# Patient Record
Sex: MALE | Race: WHITE | NOT HISPANIC OR LATINO | Employment: OTHER | URBAN - METROPOLITAN AREA
[De-identification: names, ages, dates, MRNs, and addresses within clinical notes are randomized per-mention and may not be internally consistent; named-entity substitution may affect disease eponyms.]

---

## 2023-05-17 ENCOUNTER — TELEPHONE (OUTPATIENT)
Dept: NEUROLOGY | Facility: CLINIC | Age: 78
End: 2023-05-17
Payer: MEDICARE

## 2023-05-18 ENCOUNTER — OFFICE VISIT (OUTPATIENT)
Dept: NEUROLOGY | Facility: CLINIC | Age: 78
End: 2023-05-18
Payer: MEDICARE

## 2023-05-18 VITALS — DIASTOLIC BLOOD PRESSURE: 92 MMHG | HEART RATE: 71 BPM | SYSTOLIC BLOOD PRESSURE: 160 MMHG | OXYGEN SATURATION: 96 %

## 2023-05-18 DIAGNOSIS — G40.909 SEIZURE DISORDER: ICD-10-CM

## 2023-05-18 DIAGNOSIS — G61.81 CIDP (CHRONIC INFLAMMATORY DEMYELINATING POLYNEUROPATHY): Primary | ICD-10-CM

## 2023-05-18 RX ORDER — LEVETIRACETAM 1000 MG/1
1 TABLET ORAL EVERY 12 HOURS SCHEDULED
COMMUNITY
Start: 2023-05-16

## 2023-05-18 RX ORDER — DIPHENOXYLATE HYDROCHLORIDE AND ATROPINE SULFATE 2.5; .025 MG/1; MG/1
TABLET ORAL DAILY
COMMUNITY

## 2023-05-18 NOTE — PROGRESS NOTES
Notes by MA:  Patient has been referred to our office for CIPD. Patient presents today with their a friend, Chris and Lavern Carrera (POA) and has given consent to give health information to them.         Subjective:     Dear Winnie, thank you for referring Mr. Christianson for neurological consultation.  As you know, he is a very pleasant and intelligent 78-year-old gentleman sent for evaluation and management of CIDP.  This first came to light in 2020 where he had a thorough evaluation at the [facility], which I personally reviewed today..  His EMG/nerve conduction study at the time revealed an advanced demyelinating polyneuropathy.  He had appropriate lumbar puncture testing as well as laboratory testing and was eventually diagnosed with CIDP.  He was put in the hospital for a 5-day IVIG course.  He tolerated this very well and thinks there might have been a mild improvement afterwards but he did not follow-up any further with him citing the onset of the pandemic at that time.  He has not received specific treatment for this since then presents for further evaluation and potential management.  He is getting appropriate occupational and physical therapy and has appropriate support at home as well as a wheelchair and other supportive devices.  Patient ID: Efren Christianson is a 78 y.o. male.    History of Present Illness  The following portions of the patient's history were reviewed and updated as appropriate: allergies, current medications, past family history, past medical history, past social history, past surgical history and problem list.    Review of Systems   Constitutional: Positive for appetite change and fatigue. Negative for activity change.   HENT: Negative for facial swelling and trouble swallowing.    Eyes: Negative for photophobia, pain and visual disturbance.   Respiratory: Negative for chest tightness, shortness of breath and wheezing.    Cardiovascular: Positive for leg swelling  (left ankle). Negative for chest pain and palpitations.   Gastrointestinal: Negative for abdominal pain, nausea and vomiting.   Musculoskeletal: Positive for gait problem. Negative for arthralgias, back pain, joint swelling, myalgias, neck pain and neck stiffness.   Neurological: Positive for seizures, syncope and weakness (left leg). Negative for dizziness, tremors, facial asymmetry, speech difficulty, light-headedness, numbness and headaches.   Hematological: Does not bruise/bleed easily.   Psychiatric/Behavioral: Positive for agitation. Negative for behavioral problems, confusion, decreased concentration, dysphoric mood, hallucinations, self-injury, sleep disturbance and suicidal ideas. The patient is nervous/anxious. The patient is not hyperactive.         Objective:    Neurologic Exam  He is awake alert pleasant cooperative cognitively preserved and very pleasant.  Speech and language functions are normal.    Cranial nerves II through XII normal and symmetric.    Motor exam reveals diffuse atrophy of the intrinsic hand muscles bilaterally with both proximal and distal weakness of the arms, greater than legs.  However, there is weakness of hip flexion extension and distal foot weakness, worse in the left than on the right.  No spasticity or upper motor neuron findings.    Sensory exam reveals some distal decreased to light touch and temperature sensation.    Tendon reflexes are diffusely absent.    He is in a wheelchair today.  He cannot walk without assistance from ambulatory devices or physical help.  Physical Exam  He is thin.  Neck is supple.  No cervical bruits.  Chest is clear.  Cardiac rhythm is regular.  Reasonable distal pulses.  Assessment/Plan:     Diagnoses and all orders for this visit:    1. 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 Seizure disorder     78-year-old gentleman carrying the diagnosis of CIDP.  I reviewed his extensive work-up from the [facility]  [place] from 2020.  At that point he received 1 full round of IVIG with modest improvement but failed to follow-up as the COVID pandemic was just setting in.  He is now finally getting around to follow-up with neurology.  I reviewed his records with him.  We discussed the diagnosis and I answered his questions today.  He is interested in getting back to IVIG and we will do our best to try to make those arrangements and the most convenient possible form out for him, hopefully via home health given his mobility issues.    He also has a history of seizures and remains on Keppra at 1000 mg twice daily.  He has been seizure-free for some time and is interested in trying to come off of the medication.  We have both agreed to delay this decision until we get treatment for his CIDP underway and will address this again at our next visit.    65 minutes total patient care time today including record review, examination and discussion,  and documentation.

## 2023-05-22 ENCOUNTER — TELEPHONE (OUTPATIENT)
Dept: NEUROLOGY | Facility: CLINIC | Age: 78
End: 2023-05-22

## 2023-05-22 DIAGNOSIS — G61.81 CIDP (CHRONIC INFLAMMATORY DEMYELINATING POLYNEUROPATHY): Primary | ICD-10-CM

## 2023-05-22 NOTE — TELEPHONE ENCOUNTER
Caller: SCHUYLER     Relationship: CAREGIVER     Best call back number: [phone] CAN LEAVE DETAILED MESS IF GET V.M     What was the call regarding: PT CHANGED MIND AND WOULD LIKE  HIS INFUSION DONE AT HOSPITAL NOT HOME/PLEASE VERIFY  P.A FOR INS      Do you require a callback: YES     PLEASE ADVISE.

## 2023-05-23 ENCOUNTER — PATIENT ROUNDING (BHMG ONLY) (OUTPATIENT)
Dept: NEUROLOGY | Facility: CLINIC | Age: 78
End: 2023-05-23
Payer: MEDICARE

## 2023-05-23 ENCOUNTER — TELEPHONE (OUTPATIENT)
Dept: NEUROLOGY | Facility: CLINIC | Age: 78
End: 2023-05-23
Payer: MEDICARE

## 2023-05-23 NOTE — TELEPHONE ENCOUNTER
Spoke with Megan at [employer]. Per Megan, Case ID [id number] would also approve infusions at  [facility] is not site specific and is good for hosp infusions, as well. Dates approved 04/19/23 - 05/18/2024.

## 2023-06-02 ENCOUNTER — TELEPHONE (OUTPATIENT)
Dept: INFUSION THERAPY | Facility: HOSPITAL | Age: 78
End: 2023-06-02

## 2023-06-08 ENCOUNTER — HOSPITAL ENCOUNTER (OUTPATIENT)
Dept: INFUSION THERAPY | Facility: HOSPITAL | Age: 78
Discharge: HOME | End: 2023-06-08
Payer: MEDICARE

## 2023-06-12 ENCOUNTER — HOSPITAL ENCOUNTER (OUTPATIENT)
Dept: INFUSION THERAPY | Facility: HOSPITAL | Age: 78
Discharge: HOME | End: 2023-06-12
Admitting: PSYCHIATRY & NEUROLOGY
Payer: MEDICARE

## 2023-06-12 VITALS
TEMPERATURE: 97.6 F | DIASTOLIC BLOOD PRESSURE: 80 MMHG | OXYGEN SATURATION: 97 % | HEART RATE: 62 BPM | WEIGHT: 138.5 LBS | RESPIRATION RATE: 16 BRPM | SYSTOLIC BLOOD PRESSURE: 147 MMHG

## 2023-06-12 DIAGNOSIS — G61.81 CIDP WITH CNS OVERLAP (CHRONIC INFLAMMATORY DEMYELINATING POLYNEURITIS): ICD-10-CM

## 2023-06-12 DIAGNOSIS — G61.81 CIDP (CHRONIC INFLAMMATORY DEMYELINATING POLYNEUROPATHY): Primary | ICD-10-CM

## 2023-06-12 PROCEDURE — A9270 NON-COVERED ITEM OR SERVICE: HCPCS | Performed by: PSYCHIATRY & NEUROLOGY

## 2023-06-12 PROCEDURE — 63710000001 ACETAMINOPHEN 325 MG TABLET: Performed by: PSYCHIATRY & NEUROLOGY

## 2023-06-12 PROCEDURE — 63710000001 DIPHENHYDRAMINE PER 50 MG: Performed by: PSYCHIATRY & NEUROLOGY

## 2023-06-12 PROCEDURE — 96365 THER/PROPH/DIAG IV INF INIT: CPT

## 2023-06-12 PROCEDURE — 25010000002 IMMUNE GLOBULIN (HUMAN) 20 GM/200ML SOLUTION: Performed by: PSYCHIATRY & NEUROLOGY

## 2023-06-12 PROCEDURE — 96366 THER/PROPH/DIAG IV INF ADDON: CPT

## 2023-06-12 PROCEDURE — 25010000002 IMMUNE GLOBULIN (HUMAN) 5 GM/50ML SOLUTION: Performed by: PSYCHIATRY & NEUROLOGY

## 2023-06-12 RX ORDER — FERROUS SULFATE TAB EC 324 MG (65 MG FE EQUIVALENT) 324 (65 FE) MG
324 TABLET DELAYED RESPONSE ORAL
COMMUNITY

## 2023-06-12 RX ORDER — ACETAMINOPHEN 325 MG/1
650 TABLET ORAL ONCE
Status: CANCELLED | OUTPATIENT
Start: 2023-06-13

## 2023-06-12 RX ORDER — MULTIVIT WITH MINERALS/LUTEIN
250 TABLET ORAL DAILY
COMMUNITY

## 2023-06-12 RX ORDER — ACETAMINOPHEN 325 MG/1
650 TABLET ORAL ONCE
Status: COMPLETED | OUTPATIENT
Start: 2023-06-12 | End: 2023-06-12

## 2023-06-12 RX ORDER — SODIUM CHLORIDE 9 MG/ML
250 INJECTION, SOLUTION INTRAVENOUS ONCE
Status: CANCELLED | OUTPATIENT
Start: 2023-06-13

## 2023-06-12 RX ORDER — MEPERIDINE HYDROCHLORIDE 50 MG/ML
25 INJECTION INTRAMUSCULAR; INTRAVENOUS; SUBCUTANEOUS
Status: CANCELLED | OUTPATIENT
Start: 2023-06-13

## 2023-06-12 RX ORDER — FAMOTIDINE 10 MG/ML
20 INJECTION, SOLUTION INTRAVENOUS AS NEEDED
Status: CANCELLED | OUTPATIENT
Start: 2023-06-13

## 2023-06-12 RX ORDER — DIPHENHYDRAMINE HYDROCHLORIDE 50 MG/ML
25 INJECTION INTRAMUSCULAR; INTRAVENOUS AS NEEDED
Status: CANCELLED | OUTPATIENT
Start: 2023-06-13

## 2023-06-12 RX ORDER — DIPHENHYDRAMINE HCL 25 MG
25 CAPSULE ORAL ONCE
Status: CANCELLED | OUTPATIENT
Start: 2023-06-13

## 2023-06-12 RX ORDER — DIPHENHYDRAMINE HCL 25 MG
25 CAPSULE ORAL ONCE
Status: COMPLETED | OUTPATIENT
Start: 2023-06-12 | End: 2023-06-12

## 2023-06-12 RX ADMIN — ACETAMINOPHEN 650 MG: 325 TABLET ORAL at 10:44

## 2023-06-12 RX ADMIN — IMMUNE GLOBULIN INFUSION (HUMAN) 5 G: 100 INJECTION, SOLUTION INTRAVENOUS; SUBCUTANEOUS at 11:00

## 2023-06-12 RX ADMIN — IMMUNE GLOBULIN INFUSION (HUMAN) 20 G: 100 INJECTION, SOLUTION INTRAVENOUS; SUBCUTANEOUS at 11:36

## 2023-06-12 RX ADMIN — DIPHENHYDRAMINE HYDROCHLORIDE 25 MG: 25 CAPSULE ORAL at 10:44

## 2023-06-12 NOTE — NURSING NOTE
Patient arrived to [facility] at 1030.  History and medications reviewed with patient and caregiver.  Medication administered as ordered without complications.  AVS refused by patient.  Patient discharged in stable condition at 1334.  No complaints.

## 2023-06-13 ENCOUNTER — HOSPITAL ENCOUNTER (OUTPATIENT)
Dept: INFUSION THERAPY | Facility: HOSPITAL | Age: 78
Discharge: HOME | End: 2023-06-13
Admitting: PSYCHIATRY & NEUROLOGY
Payer: MEDICARE

## 2023-06-13 VITALS
TEMPERATURE: 98.1 F | OXYGEN SATURATION: 98 % | HEART RATE: 87 BPM | SYSTOLIC BLOOD PRESSURE: 167 MMHG | RESPIRATION RATE: 16 BRPM | DIASTOLIC BLOOD PRESSURE: 87 MMHG

## 2023-06-13 DIAGNOSIS — G61.81 CIDP (CHRONIC INFLAMMATORY DEMYELINATING POLYNEUROPATHY): Primary | ICD-10-CM

## 2023-06-13 DIAGNOSIS — G61.81 CIDP WITH CNS OVERLAP (CHRONIC INFLAMMATORY DEMYELINATING POLYNEURITIS): ICD-10-CM

## 2023-06-13 PROCEDURE — 63710000001 ACETAMINOPHEN 325 MG TABLET: Performed by: PSYCHIATRY & NEUROLOGY

## 2023-06-13 PROCEDURE — 25010000002 IMMUNE GLOBULIN (HUMAN) 20 GM/200ML SOLUTION: Performed by: PSYCHIATRY & NEUROLOGY

## 2023-06-13 PROCEDURE — A9270 NON-COVERED ITEM OR SERVICE: HCPCS | Performed by: PSYCHIATRY & NEUROLOGY

## 2023-06-13 PROCEDURE — 96365 THER/PROPH/DIAG IV INF INIT: CPT

## 2023-06-13 PROCEDURE — 25010000002 IMMUNE GLOBULIN (HUMAN) 5 GM/50ML SOLUTION: Performed by: PSYCHIATRY & NEUROLOGY

## 2023-06-13 PROCEDURE — 63710000001 DIPHENHYDRAMINE PER 50 MG: Performed by: PSYCHIATRY & NEUROLOGY

## 2023-06-13 PROCEDURE — 96366 THER/PROPH/DIAG IV INF ADDON: CPT

## 2023-06-13 RX ORDER — DIPHENHYDRAMINE HYDROCHLORIDE 50 MG/ML
25 INJECTION INTRAMUSCULAR; INTRAVENOUS AS NEEDED
Status: CANCELLED | OUTPATIENT
Start: 2023-06-14

## 2023-06-13 RX ORDER — ACETAMINOPHEN 325 MG/1
650 TABLET ORAL ONCE
Status: CANCELLED | OUTPATIENT
Start: 2023-06-14

## 2023-06-13 RX ORDER — DIPHENHYDRAMINE HCL 25 MG
25 CAPSULE ORAL ONCE
Status: CANCELLED | OUTPATIENT
Start: 2023-06-14

## 2023-06-13 RX ORDER — DIPHENHYDRAMINE HCL 25 MG
25 CAPSULE ORAL ONCE
Status: COMPLETED | OUTPATIENT
Start: 2023-06-13 | End: 2023-06-13

## 2023-06-13 RX ORDER — MEPERIDINE HYDROCHLORIDE 50 MG/ML
25 INJECTION INTRAMUSCULAR; INTRAVENOUS; SUBCUTANEOUS
Status: DISCONTINUED | OUTPATIENT
Start: 2023-06-13 | End: 2023-06-15 | Stop reason: HOSPADM

## 2023-06-13 RX ORDER — ACETAMINOPHEN 325 MG/1
650 TABLET ORAL ONCE
Status: COMPLETED | OUTPATIENT
Start: 2023-06-13 | End: 2023-06-13

## 2023-06-13 RX ORDER — DIPHENHYDRAMINE HYDROCHLORIDE 50 MG/ML
25 INJECTION INTRAMUSCULAR; INTRAVENOUS AS NEEDED
Status: DISCONTINUED | OUTPATIENT
Start: 2023-06-13 | End: 2023-06-15 | Stop reason: HOSPADM

## 2023-06-13 RX ORDER — SODIUM CHLORIDE 9 MG/ML
250 INJECTION, SOLUTION INTRAVENOUS ONCE
Status: CANCELLED | OUTPATIENT
Start: 2023-06-14

## 2023-06-13 RX ORDER — FAMOTIDINE 10 MG/ML
20 INJECTION, SOLUTION INTRAVENOUS AS NEEDED
Status: CANCELLED | OUTPATIENT
Start: 2023-06-14

## 2023-06-13 RX ORDER — MEPERIDINE HYDROCHLORIDE 50 MG/ML
25 INJECTION INTRAMUSCULAR; INTRAVENOUS; SUBCUTANEOUS
Status: CANCELLED | OUTPATIENT
Start: 2023-06-14

## 2023-06-13 RX ORDER — FAMOTIDINE 10 MG/ML
20 INJECTION, SOLUTION INTRAVENOUS AS NEEDED
Status: DISCONTINUED | OUTPATIENT
Start: 2023-06-13 | End: 2023-06-15 | Stop reason: HOSPADM

## 2023-06-13 RX ADMIN — ACETAMINOPHEN 650 MG: 325 TABLET ORAL at 10:05

## 2023-06-13 RX ADMIN — IMMUNE GLOBULIN INFUSION (HUMAN) 20 G: 100 INJECTION, SOLUTION INTRAVENOUS; SUBCUTANEOUS at 10:40

## 2023-06-13 RX ADMIN — IMMUNE GLOBULIN INFUSION (HUMAN) 5 G: 100 INJECTION, SOLUTION INTRAVENOUS; SUBCUTANEOUS at 10:07

## 2023-06-13 RX ADMIN — DIPHENHYDRAMINE HYDROCHLORIDE 25 MG: 25 CAPSULE ORAL at 10:05

## 2023-06-13 NOTE — NURSING NOTE
Pt arrived to [facility] for appt. VSS, no complaints at this time. Medication given per MD order. Pt tolerated infusion without complications. VSS post infusion. Pt discharged at 13:00 in stable condition, without complaints.

## 2023-06-13 NOTE — PATIENT INSTRUCTIONS
"  Call  Dr. Efraín Reyes @ [phone]  if you have any problems or concerns.    We know you have a Choice in healthcare and appreciate you using [facility].  Our purpose is to provide you \"Excellent Care\".  We hope that you will always choose us in the future and continue to recommend us to your family and friends.              "

## 2023-06-14 ENCOUNTER — HOSPITAL ENCOUNTER (OUTPATIENT)
Dept: INFUSION THERAPY | Facility: HOSPITAL | Age: 78
Discharge: HOME | End: 2023-06-14
Admitting: PSYCHIATRY & NEUROLOGY
Payer: MEDICARE

## 2023-06-14 VITALS
OXYGEN SATURATION: 98 % | SYSTOLIC BLOOD PRESSURE: 159 MMHG | HEART RATE: 68 BPM | DIASTOLIC BLOOD PRESSURE: 84 MMHG | TEMPERATURE: 98.7 F | RESPIRATION RATE: 18 BRPM

## 2023-06-14 DIAGNOSIS — G61.81 CIDP WITH CNS OVERLAP (CHRONIC INFLAMMATORY DEMYELINATING POLYNEURITIS): ICD-10-CM

## 2023-06-14 DIAGNOSIS — G61.81 CIDP (CHRONIC INFLAMMATORY DEMYELINATING POLYNEUROPATHY): Primary | ICD-10-CM

## 2023-06-14 PROCEDURE — A9270 NON-COVERED ITEM OR SERVICE: HCPCS | Performed by: PSYCHIATRY & NEUROLOGY

## 2023-06-14 PROCEDURE — 63710000001 ACETAMINOPHEN 325 MG TABLET: Performed by: PSYCHIATRY & NEUROLOGY

## 2023-06-14 PROCEDURE — 96366 THER/PROPH/DIAG IV INF ADDON: CPT

## 2023-06-14 PROCEDURE — 96365 THER/PROPH/DIAG IV INF INIT: CPT

## 2023-06-14 PROCEDURE — 25010000002 IMMUNE GLOBULIN (HUMAN) 20 GM/200ML SOLUTION: Performed by: PSYCHIATRY & NEUROLOGY

## 2023-06-14 PROCEDURE — 63710000001 DIPHENHYDRAMINE PER 50 MG: Performed by: PSYCHIATRY & NEUROLOGY

## 2023-06-14 PROCEDURE — 25010000002 IMMUNE GLOBULIN (HUMAN) 5 GM/50ML SOLUTION: Performed by: PSYCHIATRY & NEUROLOGY

## 2023-06-14 RX ORDER — SODIUM CHLORIDE 9 MG/ML
250 INJECTION, SOLUTION INTRAVENOUS ONCE
Status: CANCELLED | OUTPATIENT
Start: 2023-06-15

## 2023-06-14 RX ORDER — FAMOTIDINE 10 MG/ML
20 INJECTION, SOLUTION INTRAVENOUS AS NEEDED
Status: CANCELLED | OUTPATIENT
Start: 2023-06-15

## 2023-06-14 RX ORDER — DIPHENHYDRAMINE HCL 25 MG
25 CAPSULE ORAL ONCE
Status: COMPLETED | OUTPATIENT
Start: 2023-06-14 | End: 2023-06-14

## 2023-06-14 RX ORDER — ACETAMINOPHEN 325 MG/1
650 TABLET ORAL ONCE
Status: CANCELLED | OUTPATIENT
Start: 2023-06-15

## 2023-06-14 RX ORDER — DIPHENHYDRAMINE HYDROCHLORIDE 50 MG/ML
25 INJECTION INTRAMUSCULAR; INTRAVENOUS AS NEEDED
Status: CANCELLED | OUTPATIENT
Start: 2023-06-15

## 2023-06-14 RX ORDER — ACETAMINOPHEN 325 MG/1
650 TABLET ORAL ONCE
Status: COMPLETED | OUTPATIENT
Start: 2023-06-14 | End: 2023-06-14

## 2023-06-14 RX ORDER — DIPHENHYDRAMINE HCL 25 MG
25 CAPSULE ORAL ONCE
Status: CANCELLED | OUTPATIENT
Start: 2023-06-15

## 2023-06-14 RX ORDER — MEPERIDINE HYDROCHLORIDE 50 MG/ML
25 INJECTION INTRAMUSCULAR; INTRAVENOUS; SUBCUTANEOUS
Status: CANCELLED | OUTPATIENT
Start: 2023-06-15

## 2023-06-14 RX ADMIN — DIPHENHYDRAMINE HYDROCHLORIDE 25 MG: 25 CAPSULE ORAL at 09:48

## 2023-06-14 RX ADMIN — IMMUNE GLOBULIN INFUSION (HUMAN) 20 G: 100 INJECTION, SOLUTION INTRAVENOUS; SUBCUTANEOUS at 10:21

## 2023-06-14 RX ADMIN — IMMUNE GLOBULIN INFUSION (HUMAN) 5 G: 100 INJECTION, SOLUTION INTRAVENOUS; SUBCUTANEOUS at 09:58

## 2023-06-14 RX ADMIN — ACETAMINOPHEN 650 MG: 325 TABLET ORAL at 09:48

## 2023-06-14 NOTE — NURSING NOTE
Pt arrived for [facility] appt. VSS, no complaints at this time. Medication given per MD order. Pt tolerated well. VSS post infusion. Pt discharged at 12:50 PM in stable condition, without complaints.

## 2023-06-15 ENCOUNTER — HOSPITAL ENCOUNTER (OUTPATIENT)
Dept: INFUSION THERAPY | Facility: HOSPITAL | Age: 78
Discharge: HOME | End: 2023-06-15
Payer: MEDICARE

## 2023-06-15 VITALS
RESPIRATION RATE: 16 BRPM | OXYGEN SATURATION: 97 % | DIASTOLIC BLOOD PRESSURE: 82 MMHG | HEART RATE: 79 BPM | TEMPERATURE: 97.4 F | SYSTOLIC BLOOD PRESSURE: 145 MMHG

## 2023-06-15 DIAGNOSIS — G61.81 CIDP WITH CNS OVERLAP (CHRONIC INFLAMMATORY DEMYELINATING POLYNEURITIS): ICD-10-CM

## 2023-06-15 DIAGNOSIS — G61.81 CIDP (CHRONIC INFLAMMATORY DEMYELINATING POLYNEUROPATHY): Primary | ICD-10-CM

## 2023-06-15 PROCEDURE — 96366 THER/PROPH/DIAG IV INF ADDON: CPT

## 2023-06-15 PROCEDURE — 96365 THER/PROPH/DIAG IV INF INIT: CPT

## 2023-06-15 PROCEDURE — A9270 NON-COVERED ITEM OR SERVICE: HCPCS | Performed by: PSYCHIATRY & NEUROLOGY

## 2023-06-15 PROCEDURE — 63710000001 DIPHENHYDRAMINE PER 50 MG: Performed by: PSYCHIATRY & NEUROLOGY

## 2023-06-15 PROCEDURE — 25010000002 IMMUNE GLOBULIN (HUMAN) 5 GM/50ML SOLUTION: Performed by: PSYCHIATRY & NEUROLOGY

## 2023-06-15 PROCEDURE — 63710000001 ACETAMINOPHEN 325 MG TABLET: Performed by: PSYCHIATRY & NEUROLOGY

## 2023-06-15 PROCEDURE — 25010000002 IMMUNE GLOBULIN (HUMAN) 20 GM/200ML SOLUTION: Performed by: PSYCHIATRY & NEUROLOGY

## 2023-06-15 RX ORDER — ACETAMINOPHEN 325 MG/1
650 TABLET ORAL ONCE
Status: COMPLETED | OUTPATIENT
Start: 2023-06-15 | End: 2023-06-15

## 2023-06-15 RX ORDER — MEPERIDINE HYDROCHLORIDE 50 MG/ML
25 INJECTION INTRAMUSCULAR; INTRAVENOUS; SUBCUTANEOUS
OUTPATIENT
Start: 2023-06-16

## 2023-06-15 RX ORDER — DIPHENHYDRAMINE HCL 25 MG
25 CAPSULE ORAL ONCE
Status: COMPLETED | OUTPATIENT
Start: 2023-06-15 | End: 2023-06-15

## 2023-06-15 RX ORDER — ACETAMINOPHEN 325 MG/1
650 TABLET ORAL ONCE
Status: CANCELLED | OUTPATIENT
Start: 2023-06-16

## 2023-06-15 RX ORDER — FAMOTIDINE 10 MG/ML
20 INJECTION, SOLUTION INTRAVENOUS AS NEEDED
OUTPATIENT
Start: 2023-06-16

## 2023-06-15 RX ORDER — SODIUM CHLORIDE 9 MG/ML
250 INJECTION, SOLUTION INTRAVENOUS ONCE
Status: CANCELLED | OUTPATIENT
Start: 2023-06-16

## 2023-06-15 RX ORDER — DIPHENHYDRAMINE HCL 25 MG
25 CAPSULE ORAL ONCE
Status: CANCELLED | OUTPATIENT
Start: 2023-06-16

## 2023-06-15 RX ORDER — DIPHENHYDRAMINE HYDROCHLORIDE 50 MG/ML
25 INJECTION INTRAMUSCULAR; INTRAVENOUS AS NEEDED
OUTPATIENT
Start: 2023-06-16

## 2023-06-15 RX ADMIN — IMMUNE GLOBULIN INFUSION (HUMAN) 20 G: 100 INJECTION, SOLUTION INTRAVENOUS; SUBCUTANEOUS at 11:55

## 2023-06-15 RX ADMIN — DIPHENHYDRAMINE HYDROCHLORIDE 25 MG: 25 CAPSULE ORAL at 11:09

## 2023-06-15 RX ADMIN — ACETAMINOPHEN 650 MG: 325 TABLET ORAL at 11:09

## 2023-06-15 RX ADMIN — IMMUNE GLOBULIN INFUSION (HUMAN) 5 G: 100 INJECTION, SOLUTION INTRAVENOUS; SUBCUTANEOUS at 11:20

## 2023-06-15 NOTE — NURSING NOTE
NURSING PROGRESS NOTE:    PATIENT ARRIVE TO [facility] FOR SCHEDULED INFUSION AT 1040 .  PROCEDURE WAS PERFORMED WITHOUT INCIDENT. ESCORTED TO [facility] PER W/C AND   PATIENT DISCHARGED HOME AT 1340 WITH POA. . REED HOLGUIN

## 2023-06-16 ENCOUNTER — HOSPITAL ENCOUNTER (OUTPATIENT)
Dept: INFUSION THERAPY | Facility: HOSPITAL | Age: 78
Discharge: HOME | End: 2023-06-16
Payer: MEDICARE

## 2023-06-16 VITALS
TEMPERATURE: 98 F | RESPIRATION RATE: 16 BRPM | HEART RATE: 65 BPM | OXYGEN SATURATION: 98 % | SYSTOLIC BLOOD PRESSURE: 127 MMHG | DIASTOLIC BLOOD PRESSURE: 76 MMHG

## 2023-06-16 DIAGNOSIS — G61.81 CIDP (CHRONIC INFLAMMATORY DEMYELINATING POLYNEUROPATHY): Primary | ICD-10-CM

## 2023-06-16 DIAGNOSIS — G61.81 CIDP WITH CNS OVERLAP (CHRONIC INFLAMMATORY DEMYELINATING POLYNEURITIS): ICD-10-CM

## 2023-06-16 PROCEDURE — A9270 NON-COVERED ITEM OR SERVICE: HCPCS | Performed by: PSYCHIATRY & NEUROLOGY

## 2023-06-16 PROCEDURE — 63710000001 ACETAMINOPHEN 325 MG TABLET: Performed by: PSYCHIATRY & NEUROLOGY

## 2023-06-16 PROCEDURE — 63710000001 DIPHENHYDRAMINE PER 50 MG: Performed by: PSYCHIATRY & NEUROLOGY

## 2023-06-16 PROCEDURE — 96365 THER/PROPH/DIAG IV INF INIT: CPT

## 2023-06-16 PROCEDURE — 96366 THER/PROPH/DIAG IV INF ADDON: CPT

## 2023-06-16 PROCEDURE — 25010000002 IMMUNE GLOBULIN (HUMAN) 20 GM/200ML SOLUTION: Performed by: PSYCHIATRY & NEUROLOGY

## 2023-06-16 PROCEDURE — 25010000002 IMMUNE GLOBULIN (HUMAN) 5 GM/50ML SOLUTION: Performed by: PSYCHIATRY & NEUROLOGY

## 2023-06-16 RX ORDER — ACETAMINOPHEN 325 MG/1
650 TABLET ORAL ONCE
Status: COMPLETED | OUTPATIENT
Start: 2023-06-16 | End: 2023-06-16

## 2023-06-16 RX ORDER — FAMOTIDINE 10 MG/ML
20 INJECTION, SOLUTION INTRAVENOUS AS NEEDED
OUTPATIENT
Start: 2023-06-17

## 2023-06-16 RX ORDER — DIPHENHYDRAMINE HYDROCHLORIDE 50 MG/ML
25 INJECTION INTRAMUSCULAR; INTRAVENOUS AS NEEDED
OUTPATIENT
Start: 2023-06-17

## 2023-06-16 RX ORDER — DIPHENHYDRAMINE HCL 25 MG
25 CAPSULE ORAL ONCE
Status: COMPLETED | OUTPATIENT
Start: 2023-06-16 | End: 2023-06-16

## 2023-06-16 RX ORDER — SODIUM CHLORIDE 9 MG/ML
250 INJECTION, SOLUTION INTRAVENOUS ONCE
OUTPATIENT
Start: 2023-06-17

## 2023-06-16 RX ORDER — DIPHENHYDRAMINE HCL 25 MG
25 CAPSULE ORAL ONCE
Status: CANCELLED | OUTPATIENT
Start: 2023-06-17

## 2023-06-16 RX ORDER — SODIUM CHLORIDE 9 MG/ML
250 INJECTION, SOLUTION INTRAVENOUS ONCE
Status: DISCONTINUED | OUTPATIENT
Start: 2023-06-16 | End: 2023-06-18 | Stop reason: HOSPADM

## 2023-06-16 RX ORDER — MEPERIDINE HYDROCHLORIDE 50 MG/ML
25 INJECTION INTRAMUSCULAR; INTRAVENOUS; SUBCUTANEOUS
OUTPATIENT
Start: 2023-06-17

## 2023-06-16 RX ORDER — ACETAMINOPHEN 325 MG/1
650 TABLET ORAL ONCE
Status: CANCELLED | OUTPATIENT
Start: 2023-06-17

## 2023-06-16 RX ADMIN — IMMUNE GLOBULIN INFUSION (HUMAN) 20 G: 100 INJECTION, SOLUTION INTRAVENOUS; SUBCUTANEOUS at 10:43

## 2023-06-16 RX ADMIN — DIPHENHYDRAMINE HYDROCHLORIDE 25 MG: 25 CAPSULE ORAL at 09:44

## 2023-06-16 RX ADMIN — IMMUNE GLOBULIN INFUSION (HUMAN) 5 G: 100 INJECTION, SOLUTION INTRAVENOUS; SUBCUTANEOUS at 10:17

## 2023-06-16 RX ADMIN — ACETAMINOPHEN 650 MG: 325 TABLET ORAL at 09:44

## 2023-06-16 NOTE — PATIENT INSTRUCTIONS
"  Call  Dr. Efraín Reyes @ [phone]  if you have any problems or concerns.    We know you have a Choice in healthcare and appreciate you using [facility].  Our purpose is to provide you \"Excellent Care\".  We hope that you will always choose us in the future and continue to recommend us to your family and friends.              "

## 2023-07-17 PROBLEM — L72.3 SEBACEOUS CYST: Status: ACTIVE | Noted: 2023-07-17

## 2023-07-26 ENCOUNTER — ANESTHESIA EVENT (OUTPATIENT)
Dept: PERIOP | Facility: HOSPITAL | Age: 78
End: 2023-07-26
Payer: MEDICARE

## 2023-07-26 ENCOUNTER — PRE-ADMISSION TESTING (OUTPATIENT)
Dept: PREADMISSION TESTING | Facility: HOSPITAL | Age: 78
End: 2023-07-26
Payer: MEDICARE

## 2023-07-26 VITALS
RESPIRATION RATE: 16 BRPM | BODY MASS INDEX: 20.92 KG/M2 | DIASTOLIC BLOOD PRESSURE: 82 MMHG | HEART RATE: 73 BPM | OXYGEN SATURATION: 96 % | HEIGHT: 68 IN | SYSTOLIC BLOOD PRESSURE: 168 MMHG | WEIGHT: 138 LBS

## 2023-07-26 LAB
ANION GAP SERPL CALCULATED.3IONS-SCNC: 7.4 MMOL/L (ref 5–15)
BUN SERPL-MCNC: 23 MG/DL (ref 8–23)
BUN/CREAT SERPL: 20.4 (ref 7–25)
CALCIUM SPEC-SCNC: 8.7 MG/DL (ref 8.6–10.5)
CHLORIDE SERPL-SCNC: 103 MMOL/L (ref 98–107)
CO2 SERPL-SCNC: 27.6 MMOL/L (ref 22–29)
CREAT SERPL-MCNC: 1.13 MG/DL (ref 0.76–1.27)
DEPRECATED RDW RBC AUTO: 46.2 FL (ref 37–54)
EGFRCR SERPLBLD CKD-EPI 2021: 67.4 ML/MIN/1.73
ERYTHROCYTE [DISTWIDTH] IN BLOOD BY AUTOMATED COUNT: 12.4 % (ref 12.3–15.4)
GLUCOSE SERPL-MCNC: 76 MG/DL (ref 65–99)
HCT VFR BLD AUTO: 40.4 % (ref 37.5–51)
HGB BLD-MCNC: 13.6 G/DL (ref 13–17.7)
MCH RBC QN AUTO: 33.3 PG (ref 26.6–33)
MCHC RBC AUTO-ENTMCNC: 33.7 G/DL (ref 31.5–35.7)
MCV RBC AUTO: 98.8 FL (ref 79–97)
PLATELET # BLD AUTO: 161 10*3/MM3 (ref 140–450)
PMV BLD AUTO: 9.8 FL (ref 6–12)
POTASSIUM SERPL-SCNC: 4.4 MMOL/L (ref 3.5–5.2)
RBC # BLD AUTO: 4.09 10*6/MM3 (ref 4.14–5.8)
SODIUM SERPL-SCNC: 138 MMOL/L (ref 136–145)
WBC NRBC COR # BLD: 5.34 10*3/MM3 (ref 3.4–10.8)

## 2023-07-26 PROCEDURE — 80048 BASIC METABOLIC PNL TOTAL CA: CPT | Performed by: SURGERY

## 2023-07-26 PROCEDURE — 36415 COLL VENOUS BLD VENIPUNCTURE: CPT

## 2023-07-26 PROCEDURE — 85027 COMPLETE CBC AUTOMATED: CPT | Performed by: SURGERY

## 2023-07-26 NOTE — DISCHARGE INSTRUCTIONS
PRE-ADMISSION TESTING INSTRUCTIONS FOR ADULTS    Take these medications the morning of surgery with a small sip of water: Keppra       Do not take any insulin or diabetes medications the morning of surgery.      No aspirin, advil, aleve, ibuprofen, naproxen, diet pills, decongestants, or herbal/vitamins for a week prior to surgery.       Tylenol/Acetaminophen is okay to take if needed.    General Instructions:    DO NOT EAT SOLID FOOD AFTER MIDNIGHT THE NIGHT BEFORE SURGERY. No gum, mints, or hard candy after midnight the night before surgery.  You may drink clear liquids the day of surgery up until 2 hours before your arrival time.  Clear liquids are liquids you can see through. Nothing RED in color.    Plain water    Sports drinks      Gelatin (Jell-O)  Fruit juices without pulp such as white grape juice and apple juice  Popsicles that contain no fruit or yogurt  Tea or coffee (no cream or milk added)    It is beneficial for you to have a clear drink that contains carbohydrates 2 hours before your arrival time.  We suggest a 20 ounce bottle of Gatorade or Powerade for non-diabetic patients or a 20 ounce bottle of Gatorade Zero or Powerade Zero for diabetic patients.     Patients who avoid smoking, chewing tobacco and alcohol for 4 weeks prior to surgery have a reduced risk of post-operative complications.  If at all possible, quit smoking as many days before surgery as you can.    Do not smoke, use chewing tobacco or drink alcohol the day of surgery    Bring your C-PAP/ BI-PAP machine if you use one.  Wear clean comfortable clothes.  Do not wear contact lenses, lotion, deodorant, or make-up.  Bring a case for your glasses if applicable. You may brush your teeth the morning of surgery.  You may wear dentures/partials, do not put adhesive/glue on them.  Leave all other jewelry and valuables at home.      Preventing a Surgical Site Infection:    Shower the night before and on the morning of surgery using the  chlorhexidine soap you were given.  Use a clean washcloth with the soap.  Place clean sheets on your bed after showering the night before surgery. Do not use the CHG soap on your hair, face, or private areas. Wash your body gently for five (5) minutes. Do not scrub your skin.  Dry with a clean towel and dress in clean clothing.  Do not shave the surgical area for 10 days-2 weeks prior to surgery  because the razor can irritate skin and make it easier to develop an infection.  Make sure you, your family, and all healthcare providers clean their hands with soap and water or an alcohol based hand  before caring for you or your wound.      Day of surgery:    Your surgeon’s office will advise you of your arrival time for the day of surgery.    Upon arrival, a Pre-op nurse and Anesthesia provider will review your health history, obtain vital signs, and answer questions you may have. The anesthesia provider will also discuss the type of anesthesia that will be needed for your procedure, which may include general anesthesia. The only belongings needed at this time will be your home medications and if applicable your C-PAP/BI-PAP machine.  If you are staying overnight your family can leave the rest of your belongings in the car and bring them to your room later.  A Pre-op nurse will start an IV and you may receive medication in preparation for surgery, including something to help you relax.  Your family will be able to see you in the Pre-op area.  While you are in surgery your family should notify the waiting room  if they leave the waiting room area and provide a contact phone number.    IF you have any questions, you can call the Pre-Admission Department at [phone] or your surgeon's office.  Notify your surgeon if  you become sick, have a fever, productive cough, or cannot be here the day of surgery    Please be aware that surgery does come with discomfort.  We want to make every effort to  control your discomfort so please discuss any uncontrolled symptoms with your nurse.   Your doctor will most likely have prescribed pain medications.      If you are going home after surgery, you will receive individualized written care instructions before being discharged.  A responsible adult (over the age of 18) must drive you to and from the hospital on the day of your surgery and stay with you for 24 hours after anesthesia.    If you are staying overnight following surgery, you will be transported to your hospital room following the recovery period.  [facility] has all private rooms.    You may receive a survey regarding the care you received. Your feedback is very important and will be used to collect the necessary data to help us to continue to provide excellent care.     Deductibles and co-payments are collected on the day of service. Please be prepared to pay the required co-pay, deductible or deposit on the day of service as defined by your plan.

## 2023-07-26 NOTE — PAT
Pt here for PAT visit.  Pre-op tests completed, chg soap given, and instructions reviewed.  Instructed clears until 2 hrs prior to arrival time, voiced understanding. Pt sees Neurologist for CIDP - notified Anesthesia.

## 2023-08-03 ENCOUNTER — ANESTHESIA (OUTPATIENT)
Dept: PERIOP | Facility: HOSPITAL | Age: 78
End: 2023-08-03
Payer: MEDICARE

## 2023-08-03 ENCOUNTER — HOSPITAL ENCOUNTER (OUTPATIENT)
Facility: HOSPITAL | Age: 78
Setting detail: HOSPITAL OUTPATIENT SURGERY
Discharge: HOME | End: 2023-08-03
Attending: SURGERY | Admitting: SURGERY
Payer: MEDICARE

## 2023-08-03 VITALS
BODY MASS INDEX: 21.13 KG/M2 | DIASTOLIC BLOOD PRESSURE: 83 MMHG | HEART RATE: 65 BPM | OXYGEN SATURATION: 97 % | WEIGHT: 139 LBS | RESPIRATION RATE: 15 BRPM | TEMPERATURE: 97.9 F | SYSTOLIC BLOOD PRESSURE: 141 MMHG

## 2023-08-03 DIAGNOSIS — L72.3 SEBACEOUS CYST: ICD-10-CM

## 2023-08-03 PROCEDURE — 25010000002 PROPOFOL 200 MG/20ML EMULSION: Performed by: NURSE ANESTHETIST, CERTIFIED REGISTERED

## 2023-08-03 PROCEDURE — 11406 EXC TR-EXT B9+MARG >4.0 CM: CPT | Performed by: SURGERY

## 2023-08-03 PROCEDURE — 25010000002 FENTANYL CITRATE (PF) 50 MCG/ML SOLUTION: Performed by: NURSE ANESTHETIST, CERTIFIED REGISTERED

## 2023-08-03 PROCEDURE — 11404 EXC TR-EXT B9+MARG 3.1-4 CM: CPT | Performed by: SURGERY

## 2023-08-03 PROCEDURE — 25010000002 ONDANSETRON PER 1 MG: Performed by: ANESTHESIOLOGY

## 2023-08-03 PROCEDURE — 12034 INTMD RPR S/TR/EXT 7.6-12.5: CPT | Performed by: SURGERY

## 2023-08-03 PROCEDURE — 88304 TISSUE EXAM BY PATHOLOGIST: CPT | Performed by: SURGERY

## 2023-08-03 PROCEDURE — 25010000002 BUPIVACAINE (PF) 0.5 % SOLUTION 30 ML VIAL: Performed by: SURGERY

## 2023-08-03 PROCEDURE — 0 CEFAZOLIN SODIUM-DEXTROSE 2-3 GM-%(50ML) RECONSTITUTED SOLUTION: Performed by: SURGERY

## 2023-08-03 RX ORDER — CEFAZOLIN SODIUM 2 G/50ML
2000 SOLUTION INTRAVENOUS ONCE
Status: COMPLETED | OUTPATIENT
Start: 2023-08-03 | End: 2023-08-03

## 2023-08-03 RX ORDER — SODIUM CHLORIDE, SODIUM LACTATE, POTASSIUM CHLORIDE, CALCIUM CHLORIDE 600; 310; 30; 20 MG/100ML; MG/100ML; MG/100ML; MG/100ML
100 INJECTION, SOLUTION INTRAVENOUS CONTINUOUS
Status: DISCONTINUED | OUTPATIENT
Start: 2023-08-03 | End: 2023-08-03 | Stop reason: HOSPADM

## 2023-08-03 RX ORDER — SODIUM CHLORIDE 9 MG/ML
40 INJECTION, SOLUTION INTRAVENOUS AS NEEDED
Status: DISCONTINUED | OUTPATIENT
Start: 2023-08-03 | End: 2023-08-03 | Stop reason: HOSPADM

## 2023-08-03 RX ORDER — SODIUM CHLORIDE, SODIUM LACTATE, POTASSIUM CHLORIDE, CALCIUM CHLORIDE 600; 310; 30; 20 MG/100ML; MG/100ML; MG/100ML; MG/100ML
9 INJECTION, SOLUTION INTRAVENOUS CONTINUOUS
Status: DISCONTINUED | OUTPATIENT
Start: 2023-08-03 | End: 2023-08-03 | Stop reason: HOSPADM

## 2023-08-03 RX ORDER — FENTANYL CITRATE 50 UG/ML
INJECTION, SOLUTION INTRAMUSCULAR; INTRAVENOUS AS NEEDED
Status: DISCONTINUED | OUTPATIENT
Start: 2023-08-03 | End: 2023-08-03 | Stop reason: SURG

## 2023-08-03 RX ORDER — ONDANSETRON 2 MG/ML
4 INJECTION INTRAMUSCULAR; INTRAVENOUS ONCE AS NEEDED
Status: DISCONTINUED | OUTPATIENT
Start: 2023-08-03 | End: 2023-08-03 | Stop reason: HOSPADM

## 2023-08-03 RX ORDER — ONDANSETRON 2 MG/ML
4 INJECTION INTRAMUSCULAR; INTRAVENOUS ONCE AS NEEDED
Status: COMPLETED | OUTPATIENT
Start: 2023-08-03 | End: 2023-08-03

## 2023-08-03 RX ORDER — PROPOFOL 10 MG/ML
INJECTION, EMULSION INTRAVENOUS AS NEEDED
Status: DISCONTINUED | OUTPATIENT
Start: 2023-08-03 | End: 2023-08-03 | Stop reason: SURG

## 2023-08-03 RX ORDER — SODIUM CHLORIDE 0.9 % (FLUSH) 0.9 %
10 SYRINGE (ML) INJECTION EVERY 12 HOURS SCHEDULED
Status: DISCONTINUED | OUTPATIENT
Start: 2023-08-03 | End: 2023-08-03 | Stop reason: HOSPADM

## 2023-08-03 RX ORDER — FAMOTIDINE 10 MG/ML
20 INJECTION, SOLUTION INTRAVENOUS
Status: COMPLETED | OUTPATIENT
Start: 2023-08-03 | End: 2023-08-03

## 2023-08-03 RX ORDER — SODIUM CHLORIDE 0.9 % (FLUSH) 0.9 %
10 SYRINGE (ML) INJECTION AS NEEDED
Status: DISCONTINUED | OUTPATIENT
Start: 2023-08-03 | End: 2023-08-03 | Stop reason: HOSPADM

## 2023-08-03 RX ORDER — LIDOCAINE HYDROCHLORIDE 20 MG/ML
INJECTION, SOLUTION INFILTRATION; PERINEURAL AS NEEDED
Status: DISCONTINUED | OUTPATIENT
Start: 2023-08-03 | End: 2023-08-03 | Stop reason: SURG

## 2023-08-03 RX ORDER — MAGNESIUM HYDROXIDE 1200 MG/15ML
LIQUID ORAL AS NEEDED
Status: DISCONTINUED | OUTPATIENT
Start: 2023-08-03 | End: 2023-08-03 | Stop reason: HOSPADM

## 2023-08-03 RX ADMIN — PROPOFOL 30 MG: 10 INJECTION, EMULSION INTRAVENOUS at 10:38

## 2023-08-03 RX ADMIN — PROPOFOL 20 MG: 10 INJECTION, EMULSION INTRAVENOUS at 10:37

## 2023-08-03 RX ADMIN — PROPOFOL 100 MG: 10 INJECTION, EMULSION INTRAVENOUS at 11:01

## 2023-08-03 RX ADMIN — CEFAZOLIN SODIUM 2000 MG: 2 SOLUTION INTRAVENOUS at 10:35

## 2023-08-03 RX ADMIN — PROPOFOL 50 MG: 10 INJECTION, EMULSION INTRAVENOUS at 10:50

## 2023-08-03 RX ADMIN — FENTANYL CITRATE 25 MCG: 50 INJECTION, SOLUTION INTRAMUSCULAR; INTRAVENOUS at 10:36

## 2023-08-03 RX ADMIN — PROPOFOL 50 MG: 10 INJECTION, EMULSION INTRAVENOUS at 11:09

## 2023-08-03 RX ADMIN — FENTANYL CITRATE 25 MCG: 50 INJECTION, SOLUTION INTRAMUSCULAR; INTRAVENOUS at 10:35

## 2023-08-03 RX ADMIN — LIDOCAINE HYDROCHLORIDE 50 MG: 20 INJECTION, SOLUTION INFILTRATION; PERINEURAL at 10:37

## 2023-08-03 RX ADMIN — PROPOFOL 100 MG: 10 INJECTION, EMULSION INTRAVENOUS at 11:25

## 2023-08-03 RX ADMIN — PROPOFOL 50 MG: 10 INJECTION, EMULSION INTRAVENOUS at 10:47

## 2023-08-03 RX ADMIN — PROPOFOL 50 MG: 10 INJECTION, EMULSION INTRAVENOUS at 10:43

## 2023-08-03 RX ADMIN — ONDANSETRON 4 MG: 2 INJECTION INTRAMUSCULAR; INTRAVENOUS at 10:13

## 2023-08-03 RX ADMIN — PROPOFOL 100 MG: 10 INJECTION, EMULSION INTRAVENOUS at 10:54

## 2023-08-03 RX ADMIN — SODIUM CHLORIDE, POTASSIUM CHLORIDE, SODIUM LACTATE AND CALCIUM CHLORIDE 9 ML/HR: 600; 310; 30; 20 INJECTION, SOLUTION INTRAVENOUS at 09:54

## 2023-08-03 RX ADMIN — PROPOFOL 50 MG: 10 INJECTION, EMULSION INTRAVENOUS at 11:03

## 2023-08-03 RX ADMIN — FAMOTIDINE 20 MG: 10 INJECTION INTRAVENOUS at 10:14

## 2023-08-03 NOTE — H&P
Efren Christianson 78 y.o. male presents @ the req of MUKESH Arnold for eval of   Chief Complaint   Patient presents with    Follow-up     PT PRESENTS TODAY TO DISCUSS CT AS WELL AS EXCISING LESIONS.             BAYLEE Schwartz is here following his CT scan.  His CT scan showed a greater than 7 cm lesion on his chest wall that was consistent with an epidermal inclusion cyst.  He also has a sebaceous cyst on his back that is still draining.  He is wheelchair-bound but his infusions are very much helping his neurologic issues.  He has no chest pain or shortness of breath.  He has no fevers or chills.  He has no other complaints.      Review of Systems   All other systems reviewed and are negative.          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        Social History     Tobacco Use    Smoking status: Never    Smokeless tobacco: Never   Substance Use Topics    Alcohol use: Not Currently    Drug use: Never             There is no immunization history on file for this patient.        Physical Exam  Constitutional:       Appearance: Normal appearance.   HENT:      Head: Normocephalic and atraumatic.   Cardiovascular:      Rate and Rhythm: Normal rate and regular rhythm.   Pulmonary:      Effort: Pulmonary effort is normal.      Breath sounds: Normal breath sounds.    Abdominal:      General: Bowel sounds are normal.      Palpations: Abdomen is soft.   Musculoskeletal:         General: Deformity present. No swelling.   Skin:     Comments: Greater than 7 cm epidermal cyst of the chest wall and 1 cm sebaceous cyst of the back   Neurological:      General: No focal deficit present.      Mental Status: He is alert and oriented to person, place, and time.   Psychiatric:         Mood and Affect: Mood normal.         Behavior: Behavior normal.       Debilities/Disabilities Identified: None    Emotional Behavior: Appropriate      There were no vitals taken for this visit.        Diagnoses and all orders for this visit:    1. Sebaceous cyst (Primary)    We discussed excising both of these cysts and surgery.  I discussed with Efren and his caregiver the benefits and risks which they appear to understand and are willing to proceed.    Thank you for allowing me to participate in the care of this interesting patient.

## 2023-08-03 NOTE — OP NOTE
GENERAL SURGERY :  Hazel  Efren CAVAZOS Slade  1945    Procedure Date: 08/03/23    Pre-op Diagnosis: Sebaceous cyst of the chest wall and back    Post-op Diagnosis: Sebaceous cyst of the chest wall and back    Procedure: Excision of sebaceous cyst of the back and sebaceous cyst of the chest wall    Surgeon: Hazel    Assistant: Ruth Ann Pascal CSA was responsible for performing the following activities: Retraction, Closing, and Placing Dressing and their skilled assistance was necessary for the success of this case.      Estimated Blood Loss:  5 ml    Complications: None    Specimen: 7.5 cm x 4 cm x 4 cm sebaceous cyst of the chest wall.  4 cm x 3 cm x 3 cm sebaceous cyst of the back    Findings: Very large sebaceous cyst of the chest wall and smaller sebaceous cyst of the back.    Clinical Note: Efren presented with a sebaceous cyst of the back that was leaking.  He actually had a quite large sebaceous cyst of his chest wall.  We discussed excising them both in surgery.  We discussed all the benefits risks and rationale for the procedure and he appeared to understand and was willing to proceed.    Procedure: Efren was taken the operative suite and placed in the left lateral decubitus position.  He was given MAC anesthesia with appropriate cardiopulmonary.  He was prepped and draped in usual sterile fashion.  After appropriate timeout was performed local was injected on the chest wall cyst.  Elliptical incision was made.  Using Bovie cautery the cyst was excised in entirety.  Copious irrigation was carried out.  The skin had to be undermined due to the large nature of the cyst.  Copious irrigation was carried out.  Hemostasis was perfected with Bovie cautery.  The wound was then closed in layered fashion using 2-0 Monocryl and 3-0 nylon suture.  Bacitracin ointment was placed on the wound and sterile dressings were applied.  Attention was taken to the back wound where local was injected.  An  elliptical incision was made around the wound and it was excised as entirety Bovie cautery.  Copious irrigation was carried out.  The wound was closed in layered fashion using 2-0 Monocryl and 3-0 nylon suture.  Bacitracin ointment and sterile dressings were applied.  Efren was then taken back to his room in stable postoperative condition having tolerated his procedure well.        Kianna Oliva DO  11:24 EDT

## 2023-08-04 LAB
LAB AP CASE REPORT: NORMAL
PATH REPORT.FINAL DX SPEC: NORMAL
PATH REPORT.GROSS SPEC: NORMAL

## 2023-08-21 ENCOUNTER — OFFICE VISIT (OUTPATIENT)
Dept: SURGERY | Facility: CLINIC | Age: 78
End: 2023-08-21
Payer: MEDICARE

## 2023-08-21 DIAGNOSIS — Z48.02 VISIT FOR SUTURE REMOVAL: ICD-10-CM

## 2023-08-21 DIAGNOSIS — Z98.890 STATUS POST EXCISIONAL BIOPSY: Primary | ICD-10-CM

## 2023-08-21 PROCEDURE — 1160F RVW MEDS BY RX/DR IN RCRD: CPT | Performed by: SURGERY

## 2023-08-21 PROCEDURE — 99212 OFFICE O/P EST SF 10 MIN: CPT | Performed by: SURGERY

## 2023-08-21 PROCEDURE — 1159F MED LIST DOCD IN RCRD: CPT | Performed by: SURGERY

## 2023-08-21 NOTE — LETTER
August 21, 2023       No Recipients    Patient: Efren Christianson   YOB: 1945   Date of Visit: 8/21/2023     Dear Winnie Murray:       Thank you for referring Efren Christianson to me for evaluation. Below are the relevant portions of my assessment and plan of care.    If you have questions, please do not hesitate to call me. I look forward to following Efren along with you.         Sincerely,        Kianna Oliva DO        CC:   No Recipients    Kianna Oliva DO  08/21/23 1125  Sign when Signing Visit  Efren Christianson 78 y.o. male presents for PO FU/suture removal.        HPI   Above noted and agree.  Efren is doing well.        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   Musculoskeletal:         General: Deformity present.   Skin:     Comments: Wounds healed.  Sutures removed.  There is some ecchymosis at the chest wall wound.   Neurological:      General: No focal deficit present.      Mental Status: He is alert and oriented to person, place, and time.   Psychiatric:         Mood and Affect: Mood normal.         Behavior: Behavior normal.           There were no vitals taken for this visit.        Diagnoses and all orders for this visit:    1. Status post excisional biopsy (Primary)    2. Visit for suture removal    Efren may return anytime as needed.    Thank you for allowing me to participate in the care of this interesting patient.

## 2023-08-21 NOTE — PROGRESS NOTES
Efren Christianson 78 y.o. male presents for PO FU/suture removal.        HPI   Above noted and agree.  Efren is doing well.        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   Musculoskeletal:         General: Deformity present.   Skin:     Comments: Wounds healed.  Sutures removed.  There is some ecchymosis at the chest wall wound.   Neurological:      General: No focal deficit present.      Mental Status: He is alert and oriented to person, place, and time.   Psychiatric:         Mood and Affect: Mood normal.         Behavior: Behavior normal.           There were no vitals taken for this visit.        Diagnoses and all orders for this visit:    1. Status post excisional biopsy (Primary)    2. Visit for suture removal    Efren may return anytime as needed.    Thank you for allowing me to participate in the care of this interesting patient.

## 2023-08-24 ENCOUNTER — OFFICE VISIT (OUTPATIENT)
Dept: NEUROLOGY | Facility: CLINIC | Age: 78
End: 2023-08-24
Payer: MEDICARE

## 2023-08-24 VITALS
OXYGEN SATURATION: 93 % | HEART RATE: 77 BPM | HEIGHT: 68 IN | SYSTOLIC BLOOD PRESSURE: 124 MMHG | DIASTOLIC BLOOD PRESSURE: 80 MMHG | BODY MASS INDEX: 21.14 KG/M2

## 2023-08-24 DIAGNOSIS — G61.81 CIDP (CHRONIC INFLAMMATORY DEMYELINATING POLYNEUROPATHY): ICD-10-CM

## 2023-08-24 DIAGNOSIS — R56.9 GENERALIZED CONVULSIVE SEIZURES: Primary | ICD-10-CM

## 2023-08-24 NOTE — PROGRESS NOTES
Notes by LPN:  Patient presents for follow up CIDP. Infusions worked great. Has had increased symptoms for about 6 weeks/          Subjective:     Patient ID: Efren Christianson is a 78 y.o. male.    History of Present Illness  The following portions of the patient's history were reviewed and updated as appropriate: allergies, current medications, past family history, past medical history, past social history, past surgical history, and problem list.    Review of Systems   Constitutional:  Negative for activity change, appetite change and fatigue.   HENT:  Negative for facial swelling, trouble swallowing and voice change.    Eyes:  Negative for photophobia, pain and visual disturbance.   Respiratory:  Negative for chest tightness, shortness of breath and wheezing.    Cardiovascular:  Negative for chest pain, palpitations and leg swelling.   Gastrointestinal:  Negative for abdominal pain, nausea and vomiting.   Endocrine: Negative for cold intolerance and heat intolerance.   Musculoskeletal:  Positive for gait problem. Negative for arthralgias, back pain, joint swelling, myalgias, neck pain and neck stiffness.   Neurological:  Negative for dizziness, tremors, seizures, syncope, facial asymmetry, speech difficulty, weakness, light-headedness, numbness and headaches.   Hematological:  Does not bruise/bleed easily.   Psychiatric/Behavioral:  Negative for agitation, behavioral problems, confusion, decreased concentration, dysphoric mood, hallucinations, self-injury, sleep disturbance and suicidal ideas. The patient is not nervous/anxious and is not hyperactive.       Objective:    Neurologic Exam  He is awake alert pleasant cooperative cognitively preserved and very pleasant.  Speech and language functions are normal.     Cranial nerves II through XII normal and symmetric.     Motor exam reveals diffuse atrophy of the intrinsic hand muscles bilaterally with both proximal and distal weakness of the arms, greater than legs.   However, there is weakness of hip flexion extension and distal foot weakness, worse in the left than on the right.  No spasticity or upper motor neuron findings.     Sensory exam reveals some distal decreased to light touch and temperature sensation.     Tendon reflexes are diffusely absent.     He is in a wheelchair today.  He cannot walk without assistance from ambulatory devices or physical help.  Physical Exam  He is thin.  Neck is supple.  No cervical bruits.  Chest is clear.  Cardiac rhythm is regular.  Reasonable distal pulses.  Physical Exam    Assessment/Plan:     Diagnoses and all orders for this visit:    1. Generalized convulsive seizures (Primary)  -     EEG (Hospital Performed); Future    2. CIDP (chronic inflammatory demyelinating polyneuropathy)     Apparently had 1 episode of loss of consciousness that was worrisome for a seizure.  This started him on Keppra and he is interested in coming off.  He has been seizure-free for some time.  We will arrange for follow-up EEG and if normal consider the pros and cons of tapering him slowly off of his Keppra once he returns from his vacation in mid September.    CIDP, responded very nicely to 5-day IVIG infusion.  However, this appears to have worn off and he has clinically deteriorated once again to his previous baseline.  He tolerated it quite well.  I think we should arrange a follow-up 5-day infusion followed by monthly 1 g/kg infusions and I discussed this with him and his family at length and answered their questions.  We will try to make those arrangements for him in short order.    40 minutes total patient care time including record review, examination, counseling, , and documentation.

## 2023-08-30 ENCOUNTER — TELEPHONE (OUTPATIENT)
Dept: NEUROLOGY | Facility: CLINIC | Age: 78
End: 2023-08-30
Payer: MEDICARE

## 2023-08-30 NOTE — TELEPHONE ENCOUNTER
Caller: SCHUYLER  Relationship to Patient: KAYLYN  Phone Number: [phone]    Reason for Call: SHE STATES SHE WAS CALLING TO CHECK ON THE STATUS OF THE 5 DAY IVIG, SHE STATES THEY ARE GOING OUT OF TOWN 9-25-23 AND PT WOULD LIKE COMPLETED BEFORE THEN. PLEASE REVIEW AND ADVISE

## 2023-08-31 ENCOUNTER — HOSPITAL ENCOUNTER (OUTPATIENT)
Dept: PULMONOLOGY | Facility: HOSPITAL | Age: 78
Discharge: HOME | End: 2023-08-31
Payer: MEDICARE

## 2023-08-31 DIAGNOSIS — R56.9 GENERALIZED CONVULSIVE SEIZURES: ICD-10-CM

## 2023-08-31 PROCEDURE — 95812 EEG 41-60 MINUTES: CPT

## 2023-09-01 RX ORDER — IBUPROFEN 200 MG
600 TABLET ORAL ONCE
OUTPATIENT
Start: 2023-10-03

## 2023-09-01 RX ORDER — CETIRIZINE HYDROCHLORIDE 10 MG/1
10 TABLET ORAL ONCE
OUTPATIENT
Start: 2023-10-03

## 2023-09-01 RX ORDER — SODIUM CHLORIDE 9 MG/ML
250 INJECTION, SOLUTION INTRAVENOUS ONCE
OUTPATIENT
Start: 2023-10-03

## 2023-09-01 RX ORDER — MEPERIDINE HYDROCHLORIDE 50 MG/ML
25 INJECTION INTRAMUSCULAR; INTRAVENOUS; SUBCUTANEOUS
OUTPATIENT
Start: 2023-10-03

## 2023-09-01 RX ORDER — DIPHENHYDRAMINE HCL 25 MG
25 CAPSULE ORAL ONCE
OUTPATIENT
Start: 2023-10-03

## 2023-09-01 RX ORDER — ACETAMINOPHEN 325 MG/1
650 TABLET ORAL ONCE
OUTPATIENT
Start: 2023-10-03

## 2023-09-01 RX ORDER — METHYLPREDNISOLONE SODIUM SUCCINATE 125 MG/2ML
125 INJECTION, POWDER, LYOPHILIZED, FOR SOLUTION INTRAMUSCULAR; INTRAVENOUS ONCE
OUTPATIENT
Start: 2023-10-03

## 2023-09-01 RX ORDER — DIPHENHYDRAMINE HYDROCHLORIDE 50 MG/ML
25 INJECTION INTRAMUSCULAR; INTRAVENOUS AS NEEDED
OUTPATIENT
Start: 2023-10-03

## 2023-09-01 RX ORDER — FAMOTIDINE 10 MG/ML
20 INJECTION, SOLUTION INTRAVENOUS ONCE
OUTPATIENT
Start: 2023-10-03

## 2023-09-01 RX ORDER — FAMOTIDINE 10 MG/ML
20 INJECTION, SOLUTION INTRAVENOUS AS NEEDED
OUTPATIENT
Start: 2023-10-03

## 2023-09-07 NOTE — ADDENDUM NOTE
Addended by: NEHEMIAS RIVERA on: 9/7/2023 02:26 PM     Modules accepted: Orders, Level of Service

## 2023-09-07 NOTE — TELEPHONE ENCOUNTER
Provider: DR CARTER  Caller: SCHUYLER  Relationship to Patient: POA  Phone Number: [phone]  Reason for Call: CALLED TO GET A STATUS UPDATE ON IVIG. PLEASE ADVISE, THANK YOU.

## 2023-09-07 NOTE — TELEPHONE ENCOUNTER
Left message orders placed and sent. If not contacted to schedule by Monday, please call me back.

## 2023-09-18 ENCOUNTER — HOSPITAL ENCOUNTER (OUTPATIENT)
Dept: INFUSION THERAPY | Facility: HOSPITAL | Age: 78
Discharge: HOME | End: 2023-09-18
Admitting: PSYCHIATRY & NEUROLOGY
Payer: MEDICARE

## 2023-09-18 VITALS
BODY MASS INDEX: 22.51 KG/M2 | OXYGEN SATURATION: 97 % | DIASTOLIC BLOOD PRESSURE: 71 MMHG | WEIGHT: 148 LBS | TEMPERATURE: 97.9 F | RESPIRATION RATE: 18 BRPM | SYSTOLIC BLOOD PRESSURE: 138 MMHG | HEART RATE: 66 BPM

## 2023-09-18 DIAGNOSIS — G61.81 CIDP WITH CNS OVERLAP (CHRONIC INFLAMMATORY DEMYELINATING POLYNEURITIS): Primary | ICD-10-CM

## 2023-09-18 DIAGNOSIS — G61.81 CIDP (CHRONIC INFLAMMATORY DEMYELINATING POLYNEUROPATHY): ICD-10-CM

## 2023-09-18 PROCEDURE — 96365 THER/PROPH/DIAG IV INF INIT: CPT

## 2023-09-18 PROCEDURE — 63710000001 DIPHENHYDRAMINE PER 50 MG: Performed by: PSYCHIATRY & NEUROLOGY

## 2023-09-18 PROCEDURE — 25010000002 METHYLPREDNISOLONE PER 125 MG: Performed by: PSYCHIATRY & NEUROLOGY

## 2023-09-18 PROCEDURE — A9270 NON-COVERED ITEM OR SERVICE: HCPCS | Performed by: PSYCHIATRY & NEUROLOGY

## 2023-09-18 PROCEDURE — 63710000001 ACETAMINOPHEN 325 MG TABLET: Performed by: PSYCHIATRY & NEUROLOGY

## 2023-09-18 PROCEDURE — 25010000002 IMMUNE GLOBULIN (HUMAN) 20 GM/200ML SOLUTION: Performed by: PSYCHIATRY & NEUROLOGY

## 2023-09-18 PROCEDURE — 25010000002 IMMUNE GLOBULIN (HUMAN) 5 GM/50ML SOLUTION: Performed by: PSYCHIATRY & NEUROLOGY

## 2023-09-18 PROCEDURE — 96366 THER/PROPH/DIAG IV INF ADDON: CPT

## 2023-09-18 PROCEDURE — 96375 TX/PRO/DX INJ NEW DRUG ADDON: CPT

## 2023-09-18 PROCEDURE — 63710000001 CETIRIZINE 10 MG TABLET: Performed by: PSYCHIATRY & NEUROLOGY

## 2023-09-18 RX ORDER — MEPERIDINE HYDROCHLORIDE 50 MG/ML
25 INJECTION INTRAMUSCULAR; INTRAVENOUS; SUBCUTANEOUS
Status: CANCELLED | OUTPATIENT
Start: 2023-10-16

## 2023-09-18 RX ORDER — DIPHENHYDRAMINE HCL 25 MG
25 CAPSULE ORAL ONCE
Status: CANCELLED | OUTPATIENT
Start: 2023-09-19

## 2023-09-18 RX ORDER — METHYLPREDNISOLONE SODIUM SUCCINATE 125 MG/2ML
125 INJECTION, POWDER, LYOPHILIZED, FOR SOLUTION INTRAMUSCULAR; INTRAVENOUS ONCE
Status: CANCELLED | OUTPATIENT
Start: 2023-10-16

## 2023-09-18 RX ORDER — METHYLPREDNISOLONE SODIUM SUCCINATE 125 MG/2ML
125 INJECTION, POWDER, LYOPHILIZED, FOR SOLUTION INTRAMUSCULAR; INTRAVENOUS ONCE
Status: CANCELLED | OUTPATIENT
Start: 2023-09-19

## 2023-09-18 RX ORDER — FAMOTIDINE 10 MG/ML
20 INJECTION, SOLUTION INTRAVENOUS ONCE
Status: DISCONTINUED | OUTPATIENT
Start: 2023-09-18 | End: 2023-09-18

## 2023-09-18 RX ORDER — SODIUM CHLORIDE 9 MG/ML
250 INJECTION, SOLUTION INTRAVENOUS ONCE
Status: CANCELLED | OUTPATIENT
Start: 2023-09-19

## 2023-09-18 RX ORDER — MEPERIDINE HYDROCHLORIDE 50 MG/ML
25 INJECTION INTRAMUSCULAR; INTRAVENOUS; SUBCUTANEOUS
Status: CANCELLED | OUTPATIENT
Start: 2023-09-19

## 2023-09-18 RX ORDER — FAMOTIDINE 10 MG/ML
20 INJECTION, SOLUTION INTRAVENOUS AS NEEDED
Status: CANCELLED | OUTPATIENT
Start: 2023-09-19

## 2023-09-18 RX ORDER — FAMOTIDINE 10 MG/ML
20 INJECTION, SOLUTION INTRAVENOUS ONCE
Status: CANCELLED | OUTPATIENT
Start: 2023-10-16

## 2023-09-18 RX ORDER — DIPHENHYDRAMINE HCL 25 MG
25 CAPSULE ORAL ONCE
Status: COMPLETED | OUTPATIENT
Start: 2023-09-18 | End: 2023-09-18

## 2023-09-18 RX ORDER — FAMOTIDINE 10 MG/ML
20 INJECTION, SOLUTION INTRAVENOUS ONCE
Status: COMPLETED | OUTPATIENT
Start: 2023-09-18 | End: 2023-09-18

## 2023-09-18 RX ORDER — METHYLPREDNISOLONE SODIUM SUCCINATE 125 MG/2ML
125 INJECTION, POWDER, LYOPHILIZED, FOR SOLUTION INTRAMUSCULAR; INTRAVENOUS ONCE
Status: DISCONTINUED | OUTPATIENT
Start: 2023-09-18 | End: 2023-09-18

## 2023-09-18 RX ORDER — ACETAMINOPHEN 325 MG/1
650 TABLET ORAL ONCE
Status: CANCELLED | OUTPATIENT
Start: 2023-09-19

## 2023-09-18 RX ORDER — DIPHENHYDRAMINE HYDROCHLORIDE 50 MG/ML
25 INJECTION INTRAMUSCULAR; INTRAVENOUS AS NEEDED
Status: CANCELLED | OUTPATIENT
Start: 2023-10-16

## 2023-09-18 RX ORDER — FAMOTIDINE 10 MG/ML
20 INJECTION, SOLUTION INTRAVENOUS AS NEEDED
Status: CANCELLED | OUTPATIENT
Start: 2023-10-16

## 2023-09-18 RX ORDER — CETIRIZINE HYDROCHLORIDE 10 MG/1
10 TABLET ORAL ONCE
Status: DISCONTINUED | OUTPATIENT
Start: 2023-09-18 | End: 2023-09-18

## 2023-09-18 RX ORDER — CETIRIZINE HYDROCHLORIDE 10 MG/1
10 TABLET ORAL ONCE
Status: COMPLETED | OUTPATIENT
Start: 2023-09-18 | End: 2023-09-18

## 2023-09-18 RX ORDER — DIPHENHYDRAMINE HCL 25 MG
25 CAPSULE ORAL ONCE
Status: CANCELLED | OUTPATIENT
Start: 2023-10-16

## 2023-09-18 RX ORDER — FAMOTIDINE 10 MG/ML
20 INJECTION, SOLUTION INTRAVENOUS ONCE
Status: CANCELLED | OUTPATIENT
Start: 2023-09-19

## 2023-09-18 RX ORDER — DIPHENHYDRAMINE HYDROCHLORIDE 50 MG/ML
25 INJECTION INTRAMUSCULAR; INTRAVENOUS AS NEEDED
Status: CANCELLED | OUTPATIENT
Start: 2023-09-19

## 2023-09-18 RX ORDER — ACETAMINOPHEN 325 MG/1
650 TABLET ORAL ONCE
Status: COMPLETED | OUTPATIENT
Start: 2023-09-18 | End: 2023-09-18

## 2023-09-18 RX ORDER — METHYLPREDNISOLONE SODIUM SUCCINATE 125 MG/2ML
125 INJECTION, POWDER, LYOPHILIZED, FOR SOLUTION INTRAMUSCULAR; INTRAVENOUS ONCE
Status: COMPLETED | OUTPATIENT
Start: 2023-09-18 | End: 2023-09-18

## 2023-09-18 RX ORDER — CETIRIZINE HYDROCHLORIDE 10 MG/1
10 TABLET ORAL ONCE
Status: CANCELLED | OUTPATIENT
Start: 2023-09-19

## 2023-09-18 RX ORDER — ACETAMINOPHEN 325 MG/1
650 TABLET ORAL ONCE
Status: DISCONTINUED | OUTPATIENT
Start: 2023-09-18 | End: 2023-09-18

## 2023-09-18 RX ORDER — CETIRIZINE HYDROCHLORIDE 10 MG/1
10 TABLET ORAL ONCE
Status: CANCELLED | OUTPATIENT
Start: 2023-10-16

## 2023-09-18 RX ORDER — DIPHENHYDRAMINE HCL 25 MG
25 CAPSULE ORAL ONCE
Status: DISCONTINUED | OUTPATIENT
Start: 2023-09-18 | End: 2023-09-18

## 2023-09-18 RX ORDER — SODIUM CHLORIDE 9 MG/ML
250 INJECTION, SOLUTION INTRAVENOUS ONCE
Status: CANCELLED | OUTPATIENT
Start: 2023-10-16

## 2023-09-18 RX ORDER — ACETAMINOPHEN 325 MG/1
650 TABLET ORAL ONCE
Status: CANCELLED | OUTPATIENT
Start: 2023-10-16

## 2023-09-18 RX ADMIN — FAMOTIDINE 20 MG: 10 INJECTION INTRAVENOUS at 10:57

## 2023-09-18 RX ADMIN — IMMUNE GLOBULIN INFUSION (HUMAN) 20 G: 100 INJECTION, SOLUTION INTRAVENOUS; SUBCUTANEOUS at 11:49

## 2023-09-18 RX ADMIN — METHYLPREDNISOLONE SODIUM SUCCINATE 125 MG: 125 INJECTION, POWDER, FOR SOLUTION INTRAMUSCULAR; INTRAVENOUS at 11:07

## 2023-09-18 RX ADMIN — IMMUNE GLOBULIN INFUSION (HUMAN) 5 G: 100 INJECTION, SOLUTION INTRAVENOUS; SUBCUTANEOUS at 11:10

## 2023-09-18 RX ADMIN — CETIRIZINE HYDROCHLORIDE 10 MG: 10 TABLET, FILM COATED ORAL at 11:14

## 2023-09-18 RX ADMIN — ACETAMINOPHEN 650 MG: 325 TABLET ORAL at 10:57

## 2023-09-18 RX ADMIN — DIPHENHYDRAMINE HYDROCHLORIDE 25 MG: 25 CAPSULE ORAL at 10:57

## 2023-09-18 NOTE — PATIENT INSTRUCTIONS
"  Call  DR. BIB CARTER @ [phone]  if you have any problems or concerns.    We know you have a Choice in healthcare and appreciate you using [facility].  Our purpose is to provide you \"Excellent Care\".  We hope that you will always choose us in the future and continue to recommend us to your family and friends.              "

## 2023-09-18 NOTE — NURSING NOTE
Pt arrived to [facility] for appt. VSS, no complaints at this time. History and medication reviewed with pt. Medication administered per MD order. Pt tolerated well. VSS post infusion. Rescheduled appt on Friday. Pt discharged from [facility] at 13:40 PM in stable condition, without complaints.

## 2023-09-19 ENCOUNTER — HOSPITAL ENCOUNTER (OUTPATIENT)
Dept: INFUSION THERAPY | Facility: HOSPITAL | Age: 78
Discharge: HOME | End: 2023-09-19
Admitting: PSYCHIATRY & NEUROLOGY
Payer: MEDICARE

## 2023-09-19 VITALS
OXYGEN SATURATION: 95 % | RESPIRATION RATE: 18 BRPM | HEART RATE: 66 BPM | TEMPERATURE: 97 F | SYSTOLIC BLOOD PRESSURE: 135 MMHG | DIASTOLIC BLOOD PRESSURE: 81 MMHG

## 2023-09-19 DIAGNOSIS — G61.81 CIDP WITH CNS OVERLAP (CHRONIC INFLAMMATORY DEMYELINATING POLYNEURITIS): Primary | ICD-10-CM

## 2023-09-19 DIAGNOSIS — G61.81 CIDP (CHRONIC INFLAMMATORY DEMYELINATING POLYNEUROPATHY): ICD-10-CM

## 2023-09-19 PROCEDURE — 63710000001 ACETAMINOPHEN 325 MG TABLET: Performed by: PSYCHIATRY & NEUROLOGY

## 2023-09-19 PROCEDURE — 63710000001 DIPHENHYDRAMINE PER 50 MG: Performed by: PSYCHIATRY & NEUROLOGY

## 2023-09-19 PROCEDURE — 96375 TX/PRO/DX INJ NEW DRUG ADDON: CPT

## 2023-09-19 PROCEDURE — 96366 THER/PROPH/DIAG IV INF ADDON: CPT

## 2023-09-19 PROCEDURE — A9270 NON-COVERED ITEM OR SERVICE: HCPCS | Performed by: PSYCHIATRY & NEUROLOGY

## 2023-09-19 PROCEDURE — 63710000001 CETIRIZINE 10 MG TABLET: Performed by: PSYCHIATRY & NEUROLOGY

## 2023-09-19 PROCEDURE — 25010000002 IMMUNE GLOBULIN (HUMAN) 5 GM/50ML SOLUTION: Performed by: PSYCHIATRY & NEUROLOGY

## 2023-09-19 PROCEDURE — 25010000002 IMMUNE GLOBULIN (HUMAN) 20 GM/200ML SOLUTION: Performed by: PSYCHIATRY & NEUROLOGY

## 2023-09-19 PROCEDURE — 25010000002 METHYLPREDNISOLONE PER 125 MG: Performed by: PSYCHIATRY & NEUROLOGY

## 2023-09-19 PROCEDURE — 96365 THER/PROPH/DIAG IV INF INIT: CPT

## 2023-09-19 RX ORDER — CETIRIZINE HYDROCHLORIDE 10 MG/1
10 TABLET ORAL ONCE
Status: COMPLETED | OUTPATIENT
Start: 2023-09-19 | End: 2023-09-19

## 2023-09-19 RX ORDER — SODIUM CHLORIDE 9 MG/ML
250 INJECTION, SOLUTION INTRAVENOUS ONCE
Status: CANCELLED | OUTPATIENT
Start: 2023-09-20

## 2023-09-19 RX ORDER — DIPHENHYDRAMINE HCL 25 MG
25 CAPSULE ORAL ONCE
Status: DISCONTINUED | OUTPATIENT
Start: 2023-09-19 | End: 2023-09-19

## 2023-09-19 RX ORDER — IBUPROFEN 400 MG/1
400 TABLET ORAL EVERY 6 HOURS PRN
Status: CANCELLED | OUTPATIENT
Start: 2023-09-20

## 2023-09-19 RX ORDER — FAMOTIDINE 10 MG/ML
20 INJECTION, SOLUTION INTRAVENOUS ONCE
Status: COMPLETED | OUTPATIENT
Start: 2023-09-19 | End: 2023-09-19

## 2023-09-19 RX ORDER — FAMOTIDINE 10 MG/ML
20 INJECTION, SOLUTION INTRAVENOUS ONCE
Status: CANCELLED | OUTPATIENT
Start: 2023-09-20

## 2023-09-19 RX ORDER — METHYLPREDNISOLONE SODIUM SUCCINATE 125 MG/2ML
125 INJECTION, POWDER, LYOPHILIZED, FOR SOLUTION INTRAMUSCULAR; INTRAVENOUS ONCE
Status: CANCELLED | OUTPATIENT
Start: 2023-09-20

## 2023-09-19 RX ORDER — DIPHENHYDRAMINE HCL 25 MG
25 TABLET ORAL ONCE
Status: CANCELLED | OUTPATIENT
Start: 2023-09-20

## 2023-09-19 RX ORDER — CETIRIZINE HYDROCHLORIDE 10 MG/1
10 TABLET ORAL ONCE
Status: CANCELLED | OUTPATIENT
Start: 2023-09-20

## 2023-09-19 RX ORDER — DIPHENHYDRAMINE HYDROCHLORIDE 50 MG/ML
25 INJECTION INTRAMUSCULAR; INTRAVENOUS AS NEEDED
Status: CANCELLED | OUTPATIENT
Start: 2023-09-20

## 2023-09-19 RX ORDER — FAMOTIDINE 10 MG/ML
20 INJECTION, SOLUTION INTRAVENOUS AS NEEDED
Status: CANCELLED | OUTPATIENT
Start: 2023-09-19

## 2023-09-19 RX ORDER — FAMOTIDINE 10 MG/ML
20 INJECTION, SOLUTION INTRAVENOUS ONCE
Status: DISCONTINUED | OUTPATIENT
Start: 2023-09-19 | End: 2023-09-19

## 2023-09-19 RX ORDER — DEXTROSE MONOHYDRATE 50 MG/ML
250 INJECTION, SOLUTION INTRAVENOUS ONCE
Status: CANCELLED | OUTPATIENT
Start: 2023-09-20

## 2023-09-19 RX ORDER — FAMOTIDINE 10 MG/ML
20 INJECTION, SOLUTION INTRAVENOUS AS NEEDED
Status: CANCELLED | OUTPATIENT
Start: 2023-09-20

## 2023-09-19 RX ORDER — METHYLPREDNISOLONE SODIUM SUCCINATE 125 MG/2ML
125 INJECTION, POWDER, LYOPHILIZED, FOR SOLUTION INTRAMUSCULAR; INTRAVENOUS ONCE
Status: DISCONTINUED | OUTPATIENT
Start: 2023-09-19 | End: 2023-09-21 | Stop reason: HOSPADM

## 2023-09-19 RX ORDER — METHYLPREDNISOLONE SODIUM SUCCINATE 125 MG/2ML
125 INJECTION, POWDER, LYOPHILIZED, FOR SOLUTION INTRAMUSCULAR; INTRAVENOUS ONCE
Status: DISCONTINUED | OUTPATIENT
Start: 2023-09-19 | End: 2023-09-19

## 2023-09-19 RX ORDER — DIPHENHYDRAMINE HCL 25 MG
25 TABLET ORAL ONCE
Status: CANCELLED | OUTPATIENT
Start: 2023-09-19

## 2023-09-19 RX ORDER — ACETAMINOPHEN 325 MG/1
650 TABLET ORAL EVERY 6 HOURS PRN
Status: CANCELLED | OUTPATIENT
Start: 2023-09-19

## 2023-09-19 RX ORDER — DIPHENHYDRAMINE HYDROCHLORIDE 50 MG/ML
25 INJECTION INTRAMUSCULAR; INTRAVENOUS AS NEEDED
Status: CANCELLED | OUTPATIENT
Start: 2023-09-19

## 2023-09-19 RX ORDER — ACETAMINOPHEN 325 MG/1
650 TABLET ORAL EVERY 6 HOURS PRN
Status: CANCELLED | OUTPATIENT
Start: 2023-09-20

## 2023-09-19 RX ORDER — ACETAMINOPHEN 325 MG/1
650 TABLET ORAL ONCE
Status: CANCELLED | OUTPATIENT
Start: 2023-09-20

## 2023-09-19 RX ORDER — CETIRIZINE HYDROCHLORIDE 10 MG/1
10 TABLET ORAL ONCE
Status: DISCONTINUED | OUTPATIENT
Start: 2023-09-19 | End: 2023-09-19

## 2023-09-19 RX ORDER — ACETAMINOPHEN 325 MG/1
650 TABLET ORAL ONCE
Status: COMPLETED | OUTPATIENT
Start: 2023-09-19 | End: 2023-09-19

## 2023-09-19 RX ORDER — IBUPROFEN 400 MG/1
400 TABLET ORAL EVERY 6 HOURS PRN
Status: CANCELLED | OUTPATIENT
Start: 2023-09-19

## 2023-09-19 RX ORDER — MEPERIDINE HYDROCHLORIDE 50 MG/ML
25 INJECTION INTRAMUSCULAR; INTRAVENOUS; SUBCUTANEOUS
Status: CANCELLED | OUTPATIENT
Start: 2023-09-19

## 2023-09-19 RX ORDER — DIPHENHYDRAMINE HCL 25 MG
25 CAPSULE ORAL ONCE
Status: CANCELLED | OUTPATIENT
Start: 2023-09-20

## 2023-09-19 RX ORDER — MEPERIDINE HYDROCHLORIDE 50 MG/ML
25 INJECTION INTRAMUSCULAR; INTRAVENOUS; SUBCUTANEOUS
Status: CANCELLED | OUTPATIENT
Start: 2023-09-20

## 2023-09-19 RX ORDER — DEXTROSE MONOHYDRATE 50 MG/ML
250 INJECTION, SOLUTION INTRAVENOUS ONCE
Status: CANCELLED | OUTPATIENT
Start: 2023-09-19

## 2023-09-19 RX ORDER — ACETAMINOPHEN 325 MG/1
650 TABLET ORAL ONCE
Status: DISCONTINUED | OUTPATIENT
Start: 2023-09-19 | End: 2023-09-19

## 2023-09-19 RX ORDER — DIPHENHYDRAMINE HCL 25 MG
25 CAPSULE ORAL ONCE
Status: COMPLETED | OUTPATIENT
Start: 2023-09-19 | End: 2023-09-19

## 2023-09-19 RX ORDER — SODIUM CHLORIDE 9 MG/ML
250 INJECTION, SOLUTION INTRAVENOUS ONCE
Status: CANCELLED | OUTPATIENT
Start: 2023-09-19

## 2023-09-19 RX ADMIN — IMMUNE GLOBULIN INFUSION (HUMAN) 20 G: 100 INJECTION, SOLUTION INTRAVENOUS; SUBCUTANEOUS at 14:09

## 2023-09-19 RX ADMIN — DIPHENHYDRAMINE HYDROCHLORIDE 25 MG: 25 CAPSULE ORAL at 13:23

## 2023-09-19 RX ADMIN — METHYLPREDNISOLONE SODIUM SUCCINATE 125 MG: 125 INJECTION, POWDER, FOR SOLUTION INTRAMUSCULAR; INTRAVENOUS at 13:23

## 2023-09-19 RX ADMIN — IMMUNE GLOBULIN INFUSION (HUMAN) 5 G: 100 INJECTION, SOLUTION INTRAVENOUS; SUBCUTANEOUS at 13:33

## 2023-09-19 RX ADMIN — CETIRIZINE HYDROCHLORIDE 10 MG: 10 TABLET, FILM COATED ORAL at 13:23

## 2023-09-19 RX ADMIN — ACETAMINOPHEN 650 MG: 325 TABLET ORAL at 13:23

## 2023-09-19 RX ADMIN — FAMOTIDINE 20 MG: 10 INJECTION INTRAVENOUS at 13:24

## 2023-09-19 NOTE — PATIENT INSTRUCTIONS
"  Call  DR. IBB CARTER @ [phone]  if you have any problems or concerns.    We know you have a Choice in healthcare and appreciate you using [facility].  Our purpose is to provide you \"Excellent Care\".  We hope that you will always choose us in the future and continue to recommend us to your family and friends.              "

## 2023-09-19 NOTE — NURSING NOTE
Pt arrived to [facility] for appt. VSS, no complaints at this time. Medication and history reviewed with pt. Medication administered per MD order. Pt tolerated well. VSS post infusion. Pt discharged from [facility] at 16:05 PM in stable condition, without complaints.

## 2023-09-20 ENCOUNTER — HOSPITAL ENCOUNTER (OUTPATIENT)
Dept: INFUSION THERAPY | Facility: HOSPITAL | Age: 78
Discharge: HOME | End: 2023-09-20
Admitting: PSYCHIATRY & NEUROLOGY
Payer: MEDICARE

## 2023-09-20 VITALS
DIASTOLIC BLOOD PRESSURE: 79 MMHG | RESPIRATION RATE: 16 BRPM | HEART RATE: 68 BPM | TEMPERATURE: 98.1 F | WEIGHT: 147.93 LBS | SYSTOLIC BLOOD PRESSURE: 144 MMHG | OXYGEN SATURATION: 97 % | BODY MASS INDEX: 22.5 KG/M2

## 2023-09-20 DIAGNOSIS — G61.81 CIDP (CHRONIC INFLAMMATORY DEMYELINATING POLYNEUROPATHY): ICD-10-CM

## 2023-09-20 DIAGNOSIS — G61.81 CIDP WITH CNS OVERLAP (CHRONIC INFLAMMATORY DEMYELINATING POLYNEURITIS): Primary | ICD-10-CM

## 2023-09-20 PROCEDURE — 96365 THER/PROPH/DIAG IV INF INIT: CPT

## 2023-09-20 PROCEDURE — A9270 NON-COVERED ITEM OR SERVICE: HCPCS | Performed by: PSYCHIATRY & NEUROLOGY

## 2023-09-20 PROCEDURE — 96366 THER/PROPH/DIAG IV INF ADDON: CPT

## 2023-09-20 PROCEDURE — 63710000001 DIPHENHYDRAMINE PER 50 MG: Performed by: PSYCHIATRY & NEUROLOGY

## 2023-09-20 PROCEDURE — 63710000001 ACETAMINOPHEN 325 MG TABLET: Performed by: PSYCHIATRY & NEUROLOGY

## 2023-09-20 PROCEDURE — 25010000002 METHYLPREDNISOLONE PER 125 MG: Performed by: PSYCHIATRY & NEUROLOGY

## 2023-09-20 PROCEDURE — 25010000002 IMMUNE GLOBULIN (HUMAN) 20 GM/200ML SOLUTION: Performed by: PSYCHIATRY & NEUROLOGY

## 2023-09-20 PROCEDURE — 96375 TX/PRO/DX INJ NEW DRUG ADDON: CPT

## 2023-09-20 PROCEDURE — 63710000001 CETIRIZINE 10 MG TABLET: Performed by: PSYCHIATRY & NEUROLOGY

## 2023-09-20 PROCEDURE — 25010000002 IMMUNE GLOBULIN (HUMAN) 5 GM/50ML SOLUTION: Performed by: PSYCHIATRY & NEUROLOGY

## 2023-09-20 RX ORDER — DIPHENHYDRAMINE HCL 25 MG
25 CAPSULE ORAL ONCE
Status: DISCONTINUED | OUTPATIENT
Start: 2023-09-20 | End: 2023-09-20

## 2023-09-20 RX ORDER — DIPHENHYDRAMINE HCL 25 MG
25 CAPSULE ORAL ONCE
Status: CANCELLED | OUTPATIENT
Start: 2023-09-21

## 2023-09-20 RX ORDER — MEPERIDINE HYDROCHLORIDE 50 MG/ML
25 INJECTION INTRAMUSCULAR; INTRAVENOUS; SUBCUTANEOUS
Status: CANCELLED | OUTPATIENT
Start: 2023-09-21

## 2023-09-20 RX ORDER — DIPHENHYDRAMINE HYDROCHLORIDE 50 MG/ML
25 INJECTION INTRAMUSCULAR; INTRAVENOUS AS NEEDED
Status: CANCELLED | OUTPATIENT
Start: 2023-09-21

## 2023-09-20 RX ORDER — SODIUM CHLORIDE 9 MG/ML
250 INJECTION, SOLUTION INTRAVENOUS ONCE
Status: CANCELLED | OUTPATIENT
Start: 2023-09-21

## 2023-09-20 RX ORDER — FAMOTIDINE 10 MG/ML
20 INJECTION, SOLUTION INTRAVENOUS AS NEEDED
Status: CANCELLED | OUTPATIENT
Start: 2023-09-21

## 2023-09-20 RX ORDER — FAMOTIDINE 10 MG/ML
20 INJECTION, SOLUTION INTRAVENOUS ONCE
Status: DISCONTINUED | OUTPATIENT
Start: 2023-09-20 | End: 2023-09-20

## 2023-09-20 RX ORDER — ACETAMINOPHEN 325 MG/1
650 TABLET ORAL ONCE
Status: COMPLETED | OUTPATIENT
Start: 2023-09-20 | End: 2023-09-20

## 2023-09-20 RX ORDER — METHYLPREDNISOLONE SODIUM SUCCINATE 125 MG/2ML
125 INJECTION, POWDER, LYOPHILIZED, FOR SOLUTION INTRAMUSCULAR; INTRAVENOUS ONCE
Status: DISCONTINUED | OUTPATIENT
Start: 2023-09-20 | End: 2023-09-20

## 2023-09-20 RX ORDER — CETIRIZINE HYDROCHLORIDE 10 MG/1
10 TABLET ORAL ONCE
Status: DISCONTINUED | OUTPATIENT
Start: 2023-09-20 | End: 2023-09-20

## 2023-09-20 RX ORDER — FAMOTIDINE 10 MG/ML
20 INJECTION, SOLUTION INTRAVENOUS ONCE
Status: CANCELLED | OUTPATIENT
Start: 2023-09-21

## 2023-09-20 RX ORDER — ACETAMINOPHEN 325 MG/1
650 TABLET ORAL ONCE
Status: DISCONTINUED | OUTPATIENT
Start: 2023-09-20 | End: 2023-09-20

## 2023-09-20 RX ORDER — ACETAMINOPHEN 325 MG/1
650 TABLET ORAL ONCE
Status: CANCELLED | OUTPATIENT
Start: 2023-09-21

## 2023-09-20 RX ORDER — DEXTROSE MONOHYDRATE 50 MG/ML
250 INJECTION, SOLUTION INTRAVENOUS ONCE
Status: CANCELLED | OUTPATIENT
Start: 2023-09-21

## 2023-09-20 RX ORDER — FAMOTIDINE 10 MG/ML
20 INJECTION, SOLUTION INTRAVENOUS ONCE
Status: COMPLETED | OUTPATIENT
Start: 2023-09-20 | End: 2023-09-20

## 2023-09-20 RX ORDER — DIPHENHYDRAMINE HCL 25 MG
25 TABLET ORAL ONCE
Status: CANCELLED | OUTPATIENT
Start: 2023-09-21

## 2023-09-20 RX ORDER — METHYLPREDNISOLONE SODIUM SUCCINATE 125 MG/2ML
125 INJECTION, POWDER, LYOPHILIZED, FOR SOLUTION INTRAMUSCULAR; INTRAVENOUS ONCE
Status: CANCELLED | OUTPATIENT
Start: 2023-09-21

## 2023-09-20 RX ORDER — METHYLPREDNISOLONE SODIUM SUCCINATE 125 MG/2ML
125 INJECTION, POWDER, LYOPHILIZED, FOR SOLUTION INTRAMUSCULAR; INTRAVENOUS ONCE
Status: COMPLETED | OUTPATIENT
Start: 2023-09-20 | End: 2023-09-20

## 2023-09-20 RX ORDER — ACETAMINOPHEN 325 MG/1
650 TABLET ORAL EVERY 6 HOURS PRN
Status: CANCELLED | OUTPATIENT
Start: 2023-09-21

## 2023-09-20 RX ORDER — CETIRIZINE HYDROCHLORIDE 10 MG/1
10 TABLET ORAL ONCE
Status: CANCELLED | OUTPATIENT
Start: 2023-09-21

## 2023-09-20 RX ORDER — DIPHENHYDRAMINE HCL 25 MG
25 CAPSULE ORAL ONCE
Status: COMPLETED | OUTPATIENT
Start: 2023-09-20 | End: 2023-09-20

## 2023-09-20 RX ORDER — IBUPROFEN 400 MG/1
400 TABLET ORAL EVERY 6 HOURS PRN
Status: CANCELLED | OUTPATIENT
Start: 2023-09-21

## 2023-09-20 RX ORDER — CETIRIZINE HYDROCHLORIDE 10 MG/1
10 TABLET ORAL ONCE
Status: COMPLETED | OUTPATIENT
Start: 2023-09-20 | End: 2023-09-20

## 2023-09-20 RX ADMIN — CETIRIZINE HYDROCHLORIDE 10 MG: 10 TABLET, FILM COATED ORAL at 08:57

## 2023-09-20 RX ADMIN — IMMUNE GLOBULIN INFUSION (HUMAN) 5 G: 100 INJECTION, SOLUTION INTRAVENOUS; SUBCUTANEOUS at 09:16

## 2023-09-20 RX ADMIN — FAMOTIDINE 20 MG: 10 INJECTION INTRAVENOUS at 09:08

## 2023-09-20 RX ADMIN — METHYLPREDNISOLONE SODIUM SUCCINATE 125 MG: 125 INJECTION, POWDER, FOR SOLUTION INTRAMUSCULAR; INTRAVENOUS at 09:06

## 2023-09-20 RX ADMIN — IMMUNE GLOBULIN INFUSION (HUMAN) 20 G: 100 INJECTION, SOLUTION INTRAVENOUS; SUBCUTANEOUS at 10:01

## 2023-09-20 RX ADMIN — ACETAMINOPHEN 650 MG: 325 TABLET ORAL at 08:56

## 2023-09-20 RX ADMIN — DIPHENHYDRAMINE HYDROCHLORIDE 25 MG: 25 CAPSULE ORAL at 08:56

## 2023-09-20 NOTE — NURSING NOTE
0900 Pt here to [facility] per wheelchair for IVIG infusion.  Pt with caretaker.  1153  Pt discharged per wheelchair with caretaker; no paperwork wanted; has appt dates and times.  Pt denies any adverse reactions to infusion today; VSS.

## 2023-09-21 ENCOUNTER — HOSPITAL ENCOUNTER (OUTPATIENT)
Dept: INFUSION THERAPY | Facility: HOSPITAL | Age: 78
Discharge: HOME | End: 2023-09-21

## 2023-09-21 VITALS
TEMPERATURE: 97.6 F | RESPIRATION RATE: 18 BRPM | DIASTOLIC BLOOD PRESSURE: 89 MMHG | OXYGEN SATURATION: 93 % | HEART RATE: 76 BPM | SYSTOLIC BLOOD PRESSURE: 149 MMHG

## 2023-09-21 DIAGNOSIS — G61.81 CIDP WITH CNS OVERLAP (CHRONIC INFLAMMATORY DEMYELINATING POLYNEURITIS): ICD-10-CM

## 2023-09-21 DIAGNOSIS — G61.81 CIDP (CHRONIC INFLAMMATORY DEMYELINATING POLYNEUROPATHY): Primary | ICD-10-CM

## 2023-09-21 PROCEDURE — 63710000001 DIPHENHYDRAMINE PER 50 MG: Performed by: PSYCHIATRY & NEUROLOGY

## 2023-09-21 PROCEDURE — A9270 NON-COVERED ITEM OR SERVICE: HCPCS | Performed by: PSYCHIATRY & NEUROLOGY

## 2023-09-21 PROCEDURE — 25010000002 IMMUNE GLOBULIN (HUMAN) 20 GM/200ML SOLUTION: Performed by: PSYCHIATRY & NEUROLOGY

## 2023-09-21 PROCEDURE — 96366 THER/PROPH/DIAG IV INF ADDON: CPT

## 2023-09-21 PROCEDURE — 25010000002 METHYLPREDNISOLONE PER 125 MG: Performed by: PSYCHIATRY & NEUROLOGY

## 2023-09-21 PROCEDURE — 96375 TX/PRO/DX INJ NEW DRUG ADDON: CPT

## 2023-09-21 PROCEDURE — 63710000001 ACETAMINOPHEN 325 MG TABLET: Performed by: PSYCHIATRY & NEUROLOGY

## 2023-09-21 PROCEDURE — 96365 THER/PROPH/DIAG IV INF INIT: CPT

## 2023-09-21 PROCEDURE — 63710000001 CETIRIZINE 10 MG TABLET: Performed by: PSYCHIATRY & NEUROLOGY

## 2023-09-21 PROCEDURE — 25010000002 IMMUNE GLOBULIN (HUMAN) 5 GM/50ML SOLUTION: Performed by: PSYCHIATRY & NEUROLOGY

## 2023-09-21 RX ORDER — METHYLPREDNISOLONE SODIUM SUCCINATE 125 MG/2ML
125 INJECTION, POWDER, LYOPHILIZED, FOR SOLUTION INTRAMUSCULAR; INTRAVENOUS ONCE
Status: COMPLETED | OUTPATIENT
Start: 2023-09-21 | End: 2023-09-21

## 2023-09-21 RX ORDER — DIPHENHYDRAMINE HCL 25 MG
25 CAPSULE ORAL ONCE
Status: COMPLETED | OUTPATIENT
Start: 2023-09-21 | End: 2023-09-21

## 2023-09-21 RX ORDER — DIPHENHYDRAMINE HCL 25 MG
25 CAPSULE ORAL ONCE
Status: CANCELLED | OUTPATIENT
Start: 2023-09-22

## 2023-09-21 RX ORDER — DIPHENHYDRAMINE HCL 25 MG
25 TABLET ORAL ONCE
OUTPATIENT
Start: 2023-09-22

## 2023-09-21 RX ORDER — CETIRIZINE HYDROCHLORIDE 10 MG/1
10 TABLET ORAL ONCE
Status: CANCELLED | OUTPATIENT
Start: 2023-09-22

## 2023-09-21 RX ORDER — MEPERIDINE HYDROCHLORIDE 50 MG/ML
25 INJECTION INTRAMUSCULAR; INTRAVENOUS; SUBCUTANEOUS
Status: CANCELLED | OUTPATIENT
Start: 2023-09-22

## 2023-09-21 RX ORDER — FAMOTIDINE 10 MG/ML
20 INJECTION, SOLUTION INTRAVENOUS ONCE
Status: CANCELLED | OUTPATIENT
Start: 2023-09-22

## 2023-09-21 RX ORDER — ACETAMINOPHEN 325 MG/1
650 TABLET ORAL ONCE
Status: DISCONTINUED | OUTPATIENT
Start: 2023-09-21 | End: 2023-09-21

## 2023-09-21 RX ORDER — SODIUM CHLORIDE 9 MG/ML
250 INJECTION, SOLUTION INTRAVENOUS ONCE
Status: CANCELLED | OUTPATIENT
Start: 2023-09-22

## 2023-09-21 RX ORDER — DIPHENHYDRAMINE HYDROCHLORIDE 50 MG/ML
25 INJECTION INTRAMUSCULAR; INTRAVENOUS AS NEEDED
Status: CANCELLED | OUTPATIENT
Start: 2023-09-22

## 2023-09-21 RX ORDER — METHYLPREDNISOLONE SODIUM SUCCINATE 125 MG/2ML
125 INJECTION, POWDER, LYOPHILIZED, FOR SOLUTION INTRAMUSCULAR; INTRAVENOUS ONCE
Status: DISCONTINUED | OUTPATIENT
Start: 2023-09-21 | End: 2023-09-21

## 2023-09-21 RX ORDER — FAMOTIDINE 10 MG/ML
20 INJECTION, SOLUTION INTRAVENOUS ONCE
Status: COMPLETED | OUTPATIENT
Start: 2023-09-21 | End: 2023-09-21

## 2023-09-21 RX ORDER — ACETAMINOPHEN 325 MG/1
650 TABLET ORAL ONCE
Status: CANCELLED | OUTPATIENT
Start: 2023-09-22

## 2023-09-21 RX ORDER — ACETAMINOPHEN 325 MG/1
650 TABLET ORAL ONCE
Status: COMPLETED | OUTPATIENT
Start: 2023-09-21 | End: 2023-09-21

## 2023-09-21 RX ORDER — FAMOTIDINE 10 MG/ML
20 INJECTION, SOLUTION INTRAVENOUS ONCE
Status: DISCONTINUED | OUTPATIENT
Start: 2023-09-21 | End: 2023-09-21

## 2023-09-21 RX ORDER — CETIRIZINE HYDROCHLORIDE 10 MG/1
10 TABLET ORAL ONCE
Status: DISCONTINUED | OUTPATIENT
Start: 2023-09-21 | End: 2023-09-21

## 2023-09-21 RX ORDER — FAMOTIDINE 10 MG/ML
20 INJECTION, SOLUTION INTRAVENOUS AS NEEDED
Status: CANCELLED | OUTPATIENT
Start: 2023-09-22

## 2023-09-21 RX ORDER — DEXTROSE MONOHYDRATE 50 MG/ML
250 INJECTION, SOLUTION INTRAVENOUS ONCE
Status: CANCELLED | OUTPATIENT
Start: 2023-09-22

## 2023-09-21 RX ORDER — ACETAMINOPHEN 325 MG/1
650 TABLET ORAL EVERY 6 HOURS PRN
OUTPATIENT
Start: 2023-09-22

## 2023-09-21 RX ORDER — DIPHENHYDRAMINE HCL 25 MG
25 CAPSULE ORAL ONCE
Status: DISCONTINUED | OUTPATIENT
Start: 2023-09-21 | End: 2023-09-21

## 2023-09-21 RX ORDER — CETIRIZINE HYDROCHLORIDE 10 MG/1
10 TABLET ORAL ONCE
Status: COMPLETED | OUTPATIENT
Start: 2023-09-21 | End: 2023-09-21

## 2023-09-21 RX ORDER — METHYLPREDNISOLONE SODIUM SUCCINATE 125 MG/2ML
125 INJECTION, POWDER, LYOPHILIZED, FOR SOLUTION INTRAMUSCULAR; INTRAVENOUS ONCE
Status: CANCELLED | OUTPATIENT
Start: 2023-09-22

## 2023-09-21 RX ORDER — IBUPROFEN 400 MG/1
400 TABLET ORAL EVERY 6 HOURS PRN
OUTPATIENT
Start: 2023-09-22

## 2023-09-21 RX ADMIN — CETIRIZINE HYDROCHLORIDE 10 MG: 10 TABLET, FILM COATED ORAL at 11:05

## 2023-09-21 RX ADMIN — DIPHENHYDRAMINE HYDROCHLORIDE 25 MG: 25 CAPSULE ORAL at 11:05

## 2023-09-21 RX ADMIN — IMMUNE GLOBULIN INFUSION (HUMAN) 20 G: 100 INJECTION, SOLUTION INTRAVENOUS; SUBCUTANEOUS at 11:50

## 2023-09-21 RX ADMIN — IMMUNE GLOBULIN INFUSION (HUMAN) 5 G: 100 INJECTION, SOLUTION INTRAVENOUS; SUBCUTANEOUS at 11:09

## 2023-09-21 RX ADMIN — ACETAMINOPHEN 650 MG: 325 TABLET ORAL at 11:05

## 2023-09-21 RX ADMIN — FAMOTIDINE 20 MG: 10 INJECTION INTRAVENOUS at 11:03

## 2023-09-21 RX ADMIN — METHYLPREDNISOLONE SODIUM SUCCINATE 125 MG: 125 INJECTION, POWDER, FOR SOLUTION INTRAMUSCULAR; INTRAVENOUS at 11:04

## 2023-09-21 NOTE — PATIENT INSTRUCTIONS
"  Call  DR BIB CARTER @ [phone]  if you have any problems or concerns.    We know you have a Choice in healthcare and appreciate you using [facility].  Our purpose is to provide you \"Excellent Care\".  We hope that you will always choose us in the future and continue to recommend us to your family and friends.              "

## 2023-09-22 ENCOUNTER — HOSPITAL ENCOUNTER (OUTPATIENT)
Dept: INFUSION THERAPY | Facility: HOSPITAL | Age: 78
Discharge: HOME | End: 2023-09-22
Payer: MEDICARE

## 2023-09-22 VITALS
BODY MASS INDEX: 22.36 KG/M2 | DIASTOLIC BLOOD PRESSURE: 81 MMHG | HEART RATE: 72 BPM | WEIGHT: 147 LBS | TEMPERATURE: 98.1 F | SYSTOLIC BLOOD PRESSURE: 154 MMHG | OXYGEN SATURATION: 97 % | RESPIRATION RATE: 16 BRPM

## 2023-09-22 DIAGNOSIS — G61.81 CIDP (CHRONIC INFLAMMATORY DEMYELINATING POLYNEUROPATHY): Primary | ICD-10-CM

## 2023-09-22 DIAGNOSIS — G61.81 CIDP WITH CNS OVERLAP (CHRONIC INFLAMMATORY DEMYELINATING POLYNEURITIS): ICD-10-CM

## 2023-09-22 PROCEDURE — 63710000001 ACETAMINOPHEN 325 MG TABLET: Performed by: PSYCHIATRY & NEUROLOGY

## 2023-09-22 PROCEDURE — 63710000001 CETIRIZINE 10 MG TABLET: Performed by: PSYCHIATRY & NEUROLOGY

## 2023-09-22 PROCEDURE — 25010000002 IMMUNE GLOBULIN (HUMAN) 20 GM/200ML SOLUTION: Performed by: PSYCHIATRY & NEUROLOGY

## 2023-09-22 PROCEDURE — 63710000001 DIPHENHYDRAMINE PER 50 MG: Performed by: PSYCHIATRY & NEUROLOGY

## 2023-09-22 PROCEDURE — 96365 THER/PROPH/DIAG IV INF INIT: CPT

## 2023-09-22 PROCEDURE — 96366 THER/PROPH/DIAG IV INF ADDON: CPT

## 2023-09-22 PROCEDURE — 25010000002 IMMUNE GLOBULIN (HUMAN) 5 GM/50ML SOLUTION: Performed by: PSYCHIATRY & NEUROLOGY

## 2023-09-22 PROCEDURE — A9270 NON-COVERED ITEM OR SERVICE: HCPCS | Performed by: PSYCHIATRY & NEUROLOGY

## 2023-09-22 PROCEDURE — 25010000002 METHYLPREDNISOLONE PER 125 MG: Performed by: PSYCHIATRY & NEUROLOGY

## 2023-09-22 PROCEDURE — 96375 TX/PRO/DX INJ NEW DRUG ADDON: CPT

## 2023-09-22 RX ORDER — CETIRIZINE HYDROCHLORIDE 10 MG/1
10 TABLET ORAL ONCE
Status: COMPLETED | OUTPATIENT
Start: 2023-09-22 | End: 2023-09-22

## 2023-09-22 RX ORDER — FAMOTIDINE 10 MG/ML
20 INJECTION, SOLUTION INTRAVENOUS ONCE
Status: CANCELLED | OUTPATIENT
Start: 2023-09-23

## 2023-09-22 RX ORDER — DIPHENHYDRAMINE HYDROCHLORIDE 50 MG/ML
25 INJECTION INTRAMUSCULAR; INTRAVENOUS AS NEEDED
OUTPATIENT
Start: 2023-10-20

## 2023-09-22 RX ORDER — ACETAMINOPHEN 325 MG/1
650 TABLET ORAL ONCE
Status: DISCONTINUED | OUTPATIENT
Start: 2023-09-22 | End: 2023-09-22

## 2023-09-22 RX ORDER — ACETAMINOPHEN 325 MG/1
650 TABLET ORAL ONCE
Status: COMPLETED | OUTPATIENT
Start: 2023-09-22 | End: 2023-09-22

## 2023-09-22 RX ORDER — ACETAMINOPHEN 325 MG/1
650 TABLET ORAL ONCE
OUTPATIENT
Start: 2023-10-20

## 2023-09-22 RX ORDER — DEXTROSE MONOHYDRATE 50 MG/ML
250 INJECTION, SOLUTION INTRAVENOUS ONCE
Status: DISCONTINUED | OUTPATIENT
Start: 2023-09-22 | End: 2023-09-24 | Stop reason: HOSPADM

## 2023-09-22 RX ORDER — MEPERIDINE HYDROCHLORIDE 50 MG/ML
25 INJECTION INTRAMUSCULAR; INTRAVENOUS; SUBCUTANEOUS
OUTPATIENT
Start: 2023-09-23

## 2023-09-22 RX ORDER — CETIRIZINE HYDROCHLORIDE 10 MG/1
10 TABLET ORAL ONCE
Status: CANCELLED | OUTPATIENT
Start: 2023-09-23

## 2023-09-22 RX ORDER — ACETAMINOPHEN 325 MG/1
650 TABLET ORAL EVERY 6 HOURS PRN
OUTPATIENT
Start: 2023-09-23

## 2023-09-22 RX ORDER — METHYLPREDNISOLONE SODIUM SUCCINATE 125 MG/2ML
125 INJECTION, POWDER, LYOPHILIZED, FOR SOLUTION INTRAMUSCULAR; INTRAVENOUS ONCE
Status: CANCELLED | OUTPATIENT
Start: 2023-09-23

## 2023-09-22 RX ORDER — FAMOTIDINE 10 MG/ML
20 INJECTION, SOLUTION INTRAVENOUS AS NEEDED
Status: DISCONTINUED | OUTPATIENT
Start: 2023-09-22 | End: 2023-09-22

## 2023-09-22 RX ORDER — MEPERIDINE HYDROCHLORIDE 50 MG/ML
25 INJECTION INTRAMUSCULAR; INTRAVENOUS; SUBCUTANEOUS
OUTPATIENT
Start: 2023-10-20

## 2023-09-22 RX ORDER — CETIRIZINE HYDROCHLORIDE 10 MG/1
10 TABLET ORAL ONCE
Status: DISCONTINUED | OUTPATIENT
Start: 2023-09-22 | End: 2023-09-22

## 2023-09-22 RX ORDER — ACETAMINOPHEN 325 MG/1
650 TABLET ORAL ONCE
Status: CANCELLED | OUTPATIENT
Start: 2023-09-23

## 2023-09-22 RX ORDER — SODIUM CHLORIDE 9 MG/ML
250 INJECTION, SOLUTION INTRAVENOUS ONCE
Status: DISCONTINUED | OUTPATIENT
Start: 2023-09-22 | End: 2023-09-24 | Stop reason: HOSPADM

## 2023-09-22 RX ORDER — FAMOTIDINE 10 MG/ML
20 INJECTION, SOLUTION INTRAVENOUS ONCE
Status: DISCONTINUED | OUTPATIENT
Start: 2023-09-22 | End: 2023-09-22

## 2023-09-22 RX ORDER — SODIUM CHLORIDE 9 MG/ML
250 INJECTION, SOLUTION INTRAVENOUS ONCE
OUTPATIENT
Start: 2023-10-20

## 2023-09-22 RX ORDER — DIPHENHYDRAMINE HYDROCHLORIDE 50 MG/ML
25 INJECTION INTRAMUSCULAR; INTRAVENOUS AS NEEDED
Status: DISCONTINUED | OUTPATIENT
Start: 2023-09-22 | End: 2023-09-24 | Stop reason: HOSPADM

## 2023-09-22 RX ORDER — FAMOTIDINE 10 MG/ML
20 INJECTION, SOLUTION INTRAVENOUS AS NEEDED
OUTPATIENT
Start: 2023-10-20

## 2023-09-22 RX ORDER — METHYLPREDNISOLONE SODIUM SUCCINATE 125 MG/2ML
125 INJECTION, POWDER, LYOPHILIZED, FOR SOLUTION INTRAMUSCULAR; INTRAVENOUS ONCE
Status: DISCONTINUED | OUTPATIENT
Start: 2023-09-22 | End: 2023-09-22

## 2023-09-22 RX ORDER — FAMOTIDINE 10 MG/ML
20 INJECTION, SOLUTION INTRAVENOUS AS NEEDED
OUTPATIENT
Start: 2023-09-23

## 2023-09-22 RX ORDER — DIPHENHYDRAMINE HCL 25 MG
25 CAPSULE ORAL ONCE
Status: COMPLETED | OUTPATIENT
Start: 2023-09-22 | End: 2023-09-22

## 2023-09-22 RX ORDER — IBUPROFEN 400 MG/1
400 TABLET ORAL EVERY 6 HOURS PRN
OUTPATIENT
Start: 2023-09-23

## 2023-09-22 RX ORDER — METHYLPREDNISOLONE SODIUM SUCCINATE 125 MG/2ML
125 INJECTION, POWDER, LYOPHILIZED, FOR SOLUTION INTRAMUSCULAR; INTRAVENOUS ONCE
Status: COMPLETED | OUTPATIENT
Start: 2023-09-22 | End: 2023-09-22

## 2023-09-22 RX ORDER — DIPHENHYDRAMINE HCL 25 MG
25 CAPSULE ORAL ONCE
Status: DISCONTINUED | OUTPATIENT
Start: 2023-09-22 | End: 2023-09-22

## 2023-09-22 RX ORDER — MEPERIDINE HYDROCHLORIDE 50 MG/ML
25 INJECTION INTRAMUSCULAR; INTRAVENOUS; SUBCUTANEOUS
Status: ACTIVE | OUTPATIENT
Start: 2023-09-22 | End: 2023-09-22

## 2023-09-22 RX ORDER — DIPHENHYDRAMINE HCL 25 MG
25 CAPSULE ORAL ONCE
Status: CANCELLED | OUTPATIENT
Start: 2023-09-23

## 2023-09-22 RX ORDER — SODIUM CHLORIDE 9 MG/ML
250 INJECTION, SOLUTION INTRAVENOUS ONCE
OUTPATIENT
Start: 2023-09-23

## 2023-09-22 RX ORDER — FAMOTIDINE 10 MG/ML
20 INJECTION, SOLUTION INTRAVENOUS ONCE
Status: COMPLETED | OUTPATIENT
Start: 2023-09-22 | End: 2023-09-22

## 2023-09-22 RX ORDER — DEXTROSE MONOHYDRATE 50 MG/ML
250 INJECTION, SOLUTION INTRAVENOUS ONCE
OUTPATIENT
Start: 2023-09-23

## 2023-09-22 RX ORDER — DIPHENHYDRAMINE HCL 25 MG
25 CAPSULE ORAL ONCE
OUTPATIENT
Start: 2023-10-20

## 2023-09-22 RX ORDER — METHYLPREDNISOLONE SODIUM SUCCINATE 125 MG/2ML
125 INJECTION, POWDER, LYOPHILIZED, FOR SOLUTION INTRAMUSCULAR; INTRAVENOUS ONCE
OUTPATIENT
Start: 2023-10-20

## 2023-09-22 RX ORDER — DIPHENHYDRAMINE HCL 25 MG
25 TABLET ORAL ONCE
OUTPATIENT
Start: 2023-09-23

## 2023-09-22 RX ORDER — DIPHENHYDRAMINE HYDROCHLORIDE 50 MG/ML
25 INJECTION INTRAMUSCULAR; INTRAVENOUS AS NEEDED
OUTPATIENT
Start: 2023-09-23

## 2023-09-22 RX ORDER — FAMOTIDINE 10 MG/ML
20 INJECTION, SOLUTION INTRAVENOUS ONCE
OUTPATIENT
Start: 2023-10-20

## 2023-09-22 RX ORDER — CETIRIZINE HYDROCHLORIDE 10 MG/1
10 TABLET ORAL ONCE
OUTPATIENT
Start: 2023-10-20

## 2023-09-22 RX ADMIN — IMMUNE GLOBULIN INFUSION (HUMAN) 5 G: 100 INJECTION, SOLUTION INTRAVENOUS; SUBCUTANEOUS at 11:57

## 2023-09-22 RX ADMIN — CETIRIZINE HYDROCHLORIDE 10 MG: 10 TABLET, FILM COATED ORAL at 11:54

## 2023-09-22 RX ADMIN — ACETAMINOPHEN 650 MG: 325 TABLET ORAL at 11:41

## 2023-09-22 RX ADMIN — FAMOTIDINE 20 MG: 10 INJECTION INTRAVENOUS at 11:42

## 2023-09-22 RX ADMIN — METHYLPREDNISOLONE SODIUM SUCCINATE 125 MG: 125 INJECTION, POWDER, FOR SOLUTION INTRAMUSCULAR; INTRAVENOUS at 11:54

## 2023-09-22 RX ADMIN — DIPHENHYDRAMINE HYDROCHLORIDE 25 MG: 25 CAPSULE ORAL at 11:39

## 2023-09-22 RX ADMIN — IMMUNE GLOBULIN INFUSION (HUMAN) 20 G: 100 INJECTION, SOLUTION INTRAVENOUS; SUBCUTANEOUS at 12:34

## 2023-09-22 NOTE — NURSING NOTE
NURSING PROGRESS NOTE:    PATIENT ARRIVE TO [facility] FOR SCHEDULED INFUSION AT 1120 .  PROCEDURE WAS PERFORMED WITHOUT INCIDENT. AVS REVIEWED PRIOR TO DISCHARGE. ESCORTED TO LOBBY PER W/C AND PATIENT DISCHARGED HOME AT 1415 WITH POAILEEN HOLGUIN RN

## 2023-10-05 ENCOUNTER — TELEPHONE (OUTPATIENT)
Dept: NEUROLOGY | Facility: CLINIC | Age: 78
End: 2023-10-05
Payer: MEDICARE

## 2023-10-05 NOTE — TELEPHONE ENCOUNTER
Provider: RAUL    Caller:SCHUYLER    Relationship to Patient: POA    Phone Number: [phone]     Reason for Call: SCHUYLER CALLED AND STATES THAT INFUSION CENTER IS NEEDING ORDERS FROM PROVIDER TO GET PT SCHEDULED FOR NEXT INFUSION.    PLEASE REVIEW AND ADVISE.  THANK YOU

## 2023-10-16 NOTE — TELEPHONE ENCOUNTER
Spoke with Kristen in the [facility]. Confirmed orders there and signed. rKisten will reach out to pt to schedule.

## 2023-10-16 NOTE — TELEPHONE ENCOUNTER
Informed Lavern orders there and [facility] will be calling to schedule pt. Lavern v/u.

## 2023-10-20 ENCOUNTER — HOSPITAL ENCOUNTER (OUTPATIENT)
Dept: INFUSION THERAPY | Facility: HOSPITAL | Age: 78
Discharge: HOME | End: 2023-10-20
Payer: MEDICARE

## 2023-10-20 VITALS
OXYGEN SATURATION: 96 % | RESPIRATION RATE: 18 BRPM | HEART RATE: 82 BPM | SYSTOLIC BLOOD PRESSURE: 158 MMHG | TEMPERATURE: 97.2 F | DIASTOLIC BLOOD PRESSURE: 99 MMHG

## 2023-10-20 DIAGNOSIS — G61.81 CIDP (CHRONIC INFLAMMATORY DEMYELINATING POLYNEUROPATHY): Primary | ICD-10-CM

## 2023-10-20 PROCEDURE — 25010000002 IMMUNE GLOBULIN (HUMAN) 30 GM/300ML SOLUTION: Performed by: PSYCHIATRY & NEUROLOGY

## 2023-10-20 PROCEDURE — 63710000001 DIPHENHYDRAMINE PER 50 MG: Performed by: PSYCHIATRY & NEUROLOGY

## 2023-10-20 PROCEDURE — A9270 NON-COVERED ITEM OR SERVICE: HCPCS | Performed by: PSYCHIATRY & NEUROLOGY

## 2023-10-20 PROCEDURE — 63710000001 ACETAMINOPHEN 325 MG TABLET: Performed by: PSYCHIATRY & NEUROLOGY

## 2023-10-20 PROCEDURE — 96366 THER/PROPH/DIAG IV INF ADDON: CPT

## 2023-10-20 PROCEDURE — 25010000002 METHYLPREDNISOLONE PER 125 MG: Performed by: PSYCHIATRY & NEUROLOGY

## 2023-10-20 PROCEDURE — 96365 THER/PROPH/DIAG IV INF INIT: CPT

## 2023-10-20 PROCEDURE — 63710000001 CETIRIZINE 10 MG TABLET: Performed by: PSYCHIATRY & NEUROLOGY

## 2023-10-20 PROCEDURE — 96375 TX/PRO/DX INJ NEW DRUG ADDON: CPT

## 2023-10-20 RX ORDER — CETIRIZINE HYDROCHLORIDE 10 MG/1
10 TABLET ORAL ONCE
OUTPATIENT
Start: 2023-11-17

## 2023-10-20 RX ORDER — MEPERIDINE HYDROCHLORIDE 50 MG/ML
25 INJECTION INTRAMUSCULAR; INTRAVENOUS; SUBCUTANEOUS
OUTPATIENT
Start: 2023-11-17

## 2023-10-20 RX ORDER — FAMOTIDINE 10 MG/ML
20 INJECTION, SOLUTION INTRAVENOUS ONCE
OUTPATIENT
Start: 2023-11-17

## 2023-10-20 RX ORDER — ACETAMINOPHEN 325 MG/1
650 TABLET ORAL ONCE
OUTPATIENT
Start: 2023-11-17

## 2023-10-20 RX ORDER — METHYLPREDNISOLONE SODIUM SUCCINATE 125 MG/2ML
125 INJECTION, POWDER, LYOPHILIZED, FOR SOLUTION INTRAMUSCULAR; INTRAVENOUS ONCE
Status: COMPLETED | OUTPATIENT
Start: 2023-10-20 | End: 2023-10-20

## 2023-10-20 RX ORDER — DIPHENHYDRAMINE HCL 25 MG
25 CAPSULE ORAL ONCE
Status: COMPLETED | OUTPATIENT
Start: 2023-10-20 | End: 2023-10-20

## 2023-10-20 RX ORDER — METHYLPREDNISOLONE SODIUM SUCCINATE 125 MG/2ML
125 INJECTION, POWDER, LYOPHILIZED, FOR SOLUTION INTRAMUSCULAR; INTRAVENOUS ONCE
OUTPATIENT
Start: 2023-11-17

## 2023-10-20 RX ORDER — FAMOTIDINE 10 MG/ML
20 INJECTION, SOLUTION INTRAVENOUS AS NEEDED
OUTPATIENT
Start: 2023-11-17

## 2023-10-20 RX ORDER — FAMOTIDINE 10 MG/ML
20 INJECTION, SOLUTION INTRAVENOUS ONCE
Status: COMPLETED | OUTPATIENT
Start: 2023-10-20 | End: 2023-10-20

## 2023-10-20 RX ORDER — DIPHENHYDRAMINE HYDROCHLORIDE 50 MG/ML
25 INJECTION INTRAMUSCULAR; INTRAVENOUS AS NEEDED
OUTPATIENT
Start: 2023-11-17

## 2023-10-20 RX ORDER — CETIRIZINE HYDROCHLORIDE 10 MG/1
10 TABLET ORAL ONCE
Status: COMPLETED | OUTPATIENT
Start: 2023-10-20 | End: 2023-10-20

## 2023-10-20 RX ORDER — SODIUM CHLORIDE 9 MG/ML
250 INJECTION, SOLUTION INTRAVENOUS ONCE
OUTPATIENT
Start: 2023-11-17

## 2023-10-20 RX ORDER — DIPHENHYDRAMINE HCL 25 MG
25 CAPSULE ORAL ONCE
OUTPATIENT
Start: 2023-11-17

## 2023-10-20 RX ORDER — ACETAMINOPHEN 325 MG/1
650 TABLET ORAL ONCE
Status: COMPLETED | OUTPATIENT
Start: 2023-10-20 | End: 2023-10-20

## 2023-10-20 RX ADMIN — IMMUNE GLOBULIN INFUSION (HUMAN) 30 G: 100 INJECTION, SOLUTION INTRAVENOUS; SUBCUTANEOUS at 12:11

## 2023-10-20 RX ADMIN — IMMUNE GLOBULIN INFUSION (HUMAN) 5 G: 100 INJECTION, SOLUTION INTRAVENOUS; SUBCUTANEOUS at 09:15

## 2023-10-20 RX ADMIN — DIPHENHYDRAMINE HYDROCHLORIDE 25 MG: 25 CAPSULE ORAL at 08:59

## 2023-10-20 RX ADMIN — IMMUNE GLOBULIN INFUSION (HUMAN) 30 G: 100 INJECTION, SOLUTION INTRAVENOUS; SUBCUTANEOUS at 09:45

## 2023-10-20 RX ADMIN — ACETAMINOPHEN 650 MG: 325 TABLET ORAL at 08:59

## 2023-10-20 RX ADMIN — FAMOTIDINE 20 MG: 10 INJECTION, SOLUTION INTRAVENOUS at 09:00

## 2023-10-20 RX ADMIN — CETIRIZINE HYDROCHLORIDE 10 MG: 10 TABLET, FILM COATED ORAL at 08:59

## 2023-10-20 RX ADMIN — METHYLPREDNISOLONE SODIUM SUCCINATE 125 MG: 125 INJECTION, POWDER, FOR SOLUTION INTRAMUSCULAR; INTRAVENOUS at 08:59

## 2023-10-20 NOTE — NURSING NOTE
Patient arrived to [facility] at 0900.  History and medications reviewed with patient.  Medication administered without complications.  AVS refused by patient.  Patient discharged at 1508 in stable condition without complaint.

## 2023-10-20 NOTE — PATIENT INSTRUCTIONS
"  Call  DR BIB CARTER @ [phone]  if you have any problems or concerns.    We know you have a Choice in healthcare and appreciate you using [facility].  Our purpose is to provide you \"Excellent Care\".  We hope that you will always choose us in the future and continue to recommend us to your family and friends.            "

## 2023-11-17 ENCOUNTER — HOSPITAL ENCOUNTER (OUTPATIENT)
Dept: INFUSION THERAPY | Facility: HOSPITAL | Age: 78
Discharge: HOME | End: 2023-11-17
Payer: MEDICARE

## 2023-11-17 VITALS
BODY MASS INDEX: 23.27 KG/M2 | SYSTOLIC BLOOD PRESSURE: 168 MMHG | DIASTOLIC BLOOD PRESSURE: 96 MMHG | RESPIRATION RATE: 18 BRPM | OXYGEN SATURATION: 95 % | TEMPERATURE: 98.1 F | HEART RATE: 86 BPM | WEIGHT: 153 LBS

## 2023-11-17 DIAGNOSIS — G61.81 CIDP WITH CNS OVERLAP (CHRONIC INFLAMMATORY DEMYELINATING POLYNEURITIS): Primary | ICD-10-CM

## 2023-11-17 DIAGNOSIS — G61.81 CIDP (CHRONIC INFLAMMATORY DEMYELINATING POLYNEUROPATHY): ICD-10-CM

## 2023-11-17 PROCEDURE — 25010000002 IMMUNE GLOBULIN (HUMAN) 30 GM/300ML SOLUTION: Performed by: PSYCHIATRY & NEUROLOGY

## 2023-11-17 PROCEDURE — 25010000002 METHYLPREDNISOLONE PER 125 MG: Performed by: PSYCHIATRY & NEUROLOGY

## 2023-11-17 PROCEDURE — A9270 NON-COVERED ITEM OR SERVICE: HCPCS | Performed by: PSYCHIATRY & NEUROLOGY

## 2023-11-17 PROCEDURE — 96375 TX/PRO/DX INJ NEW DRUG ADDON: CPT

## 2023-11-17 PROCEDURE — 63710000001 ACETAMINOPHEN 325 MG TABLET: Performed by: PSYCHIATRY & NEUROLOGY

## 2023-11-17 PROCEDURE — 96365 THER/PROPH/DIAG IV INF INIT: CPT

## 2023-11-17 PROCEDURE — 25010000002 IMMUNE GLOBULIN (HUMAN) 5 GM/50ML SOLUTION: Performed by: PSYCHIATRY & NEUROLOGY

## 2023-11-17 PROCEDURE — 63710000001 CETIRIZINE 10 MG TABLET: Performed by: PSYCHIATRY & NEUROLOGY

## 2023-11-17 PROCEDURE — 96366 THER/PROPH/DIAG IV INF ADDON: CPT

## 2023-11-17 PROCEDURE — 63710000001 DIPHENHYDRAMINE PER 50 MG: Performed by: PSYCHIATRY & NEUROLOGY

## 2023-11-17 RX ORDER — METHYLPREDNISOLONE SODIUM SUCCINATE 125 MG/2ML
125 INJECTION, POWDER, LYOPHILIZED, FOR SOLUTION INTRAMUSCULAR; INTRAVENOUS ONCE
OUTPATIENT
Start: 2023-12-15

## 2023-11-17 RX ORDER — CETIRIZINE HYDROCHLORIDE 10 MG/1
10 TABLET ORAL ONCE
Status: COMPLETED | OUTPATIENT
Start: 2023-11-17 | End: 2023-11-17

## 2023-11-17 RX ORDER — CETIRIZINE HYDROCHLORIDE 10 MG/1
10 TABLET ORAL ONCE
OUTPATIENT
Start: 2023-12-15

## 2023-11-17 RX ORDER — FAMOTIDINE 10 MG/ML
20 INJECTION, SOLUTION INTRAVENOUS ONCE
OUTPATIENT
Start: 2023-12-15

## 2023-11-17 RX ORDER — DIPHENHYDRAMINE HCL 25 MG
25 CAPSULE ORAL ONCE
OUTPATIENT
Start: 2023-12-15

## 2023-11-17 RX ORDER — METHYLPREDNISOLONE SODIUM SUCCINATE 125 MG/2ML
125 INJECTION, POWDER, LYOPHILIZED, FOR SOLUTION INTRAMUSCULAR; INTRAVENOUS ONCE
Status: COMPLETED | OUTPATIENT
Start: 2023-11-17 | End: 2023-11-17

## 2023-11-17 RX ORDER — FAMOTIDINE 10 MG/ML
20 INJECTION, SOLUTION INTRAVENOUS AS NEEDED
OUTPATIENT
Start: 2023-12-15

## 2023-11-17 RX ORDER — ACETAMINOPHEN 325 MG/1
650 TABLET ORAL ONCE
Status: COMPLETED | OUTPATIENT
Start: 2023-11-17 | End: 2023-11-17

## 2023-11-17 RX ORDER — ACETAMINOPHEN 325 MG/1
650 TABLET ORAL ONCE
OUTPATIENT
Start: 2023-12-15

## 2023-11-17 RX ORDER — DIPHENHYDRAMINE HYDROCHLORIDE 50 MG/ML
25 INJECTION INTRAMUSCULAR; INTRAVENOUS AS NEEDED
OUTPATIENT
Start: 2023-12-15

## 2023-11-17 RX ORDER — MEPERIDINE HYDROCHLORIDE 50 MG/ML
25 INJECTION INTRAMUSCULAR; INTRAVENOUS; SUBCUTANEOUS
OUTPATIENT
Start: 2023-12-15

## 2023-11-17 RX ORDER — DIPHENHYDRAMINE HCL 25 MG
25 CAPSULE ORAL ONCE
Status: COMPLETED | OUTPATIENT
Start: 2023-11-17 | End: 2023-11-17

## 2023-11-17 RX ORDER — FAMOTIDINE 10 MG/ML
20 INJECTION, SOLUTION INTRAVENOUS ONCE
Status: COMPLETED | OUTPATIENT
Start: 2023-11-17 | End: 2023-11-17

## 2023-11-17 RX ORDER — SODIUM CHLORIDE 9 MG/ML
250 INJECTION, SOLUTION INTRAVENOUS ONCE
OUTPATIENT
Start: 2023-12-15

## 2023-11-17 RX ADMIN — IMMUNE GLOBULIN INFUSION (HUMAN) 30 G: 100 INJECTION, SOLUTION INTRAVENOUS; SUBCUTANEOUS at 10:47

## 2023-11-17 RX ADMIN — ACETAMINOPHEN 650 MG: 325 TABLET ORAL at 09:17

## 2023-11-17 RX ADMIN — DIPHENHYDRAMINE HYDROCHLORIDE 25 MG: 25 CAPSULE ORAL at 09:17

## 2023-11-17 RX ADMIN — FAMOTIDINE 20 MG: 10 INJECTION, SOLUTION INTRAVENOUS at 09:19

## 2023-11-17 RX ADMIN — METHYLPREDNISOLONE SODIUM SUCCINATE 125 MG: 125 INJECTION, POWDER, FOR SOLUTION INTRAMUSCULAR; INTRAVENOUS at 09:24

## 2023-11-17 RX ADMIN — IMMUNE GLOBULIN INFUSION (HUMAN) 30 G: 100 INJECTION, SOLUTION INTRAVENOUS; SUBCUTANEOUS at 13:02

## 2023-11-17 RX ADMIN — CETIRIZINE HYDROCHLORIDE 10 MG: 10 TABLET, FILM COATED ORAL at 09:19

## 2023-11-17 RX ADMIN — IMMUNE GLOBULIN INFUSION (HUMAN) 5 G: 100 INJECTION, SOLUTION INTRAVENOUS; SUBCUTANEOUS at 09:31

## 2023-11-17 RX ADMIN — IMMUNE GLOBULIN INFUSION (HUMAN) 5 G: 100 INJECTION, SOLUTION INTRAVENOUS; SUBCUTANEOUS at 10:29

## 2023-11-17 NOTE — NURSING NOTE
NURSING PROGRESS NOTE:    PATIENT ARRIVE TO [facility] PER W/C FOR SCHEDULED INFUSION AT 0900 .  PROCEDURE WAS PERFORMED WITHOUT INCIDENT. DECLINED THE NEED FOR AVS.   PATIENT ESCORTED TO LOBBY VIA W/C PER CARE GIVER AND DISCHARGED HOME AT 1530 . REED HOLGUIN

## 2023-12-15 ENCOUNTER — HOSPITAL ENCOUNTER (OUTPATIENT)
Dept: INFUSION THERAPY | Facility: HOSPITAL | Age: 78
Discharge: HOME | End: 2023-12-15
Payer: MEDICARE

## 2023-12-15 VITALS
SYSTOLIC BLOOD PRESSURE: 157 MMHG | OXYGEN SATURATION: 98 % | RESPIRATION RATE: 18 BRPM | HEART RATE: 69 BPM | DIASTOLIC BLOOD PRESSURE: 72 MMHG | TEMPERATURE: 97.7 F

## 2023-12-15 DIAGNOSIS — G61.81 CIDP (CHRONIC INFLAMMATORY DEMYELINATING POLYNEUROPATHY): ICD-10-CM

## 2023-12-15 DIAGNOSIS — G61.81 CIDP WITH CNS OVERLAP (CHRONIC INFLAMMATORY DEMYELINATING POLYNEURITIS): Primary | ICD-10-CM

## 2023-12-15 PROCEDURE — 63710000001 ACETAMINOPHEN 325 MG TABLET: Performed by: PSYCHIATRY & NEUROLOGY

## 2023-12-15 PROCEDURE — 96365 THER/PROPH/DIAG IV INF INIT: CPT

## 2023-12-15 PROCEDURE — 63710000001 CETIRIZINE 10 MG TABLET: Performed by: PSYCHIATRY & NEUROLOGY

## 2023-12-15 PROCEDURE — A9270 NON-COVERED ITEM OR SERVICE: HCPCS | Performed by: PSYCHIATRY & NEUROLOGY

## 2023-12-15 PROCEDURE — 25010000002 IMMUNE GLOBULIN (HUMAN) 10 GM/100ML SOLUTION: Performed by: PSYCHIATRY & NEUROLOGY

## 2023-12-15 PROCEDURE — 96375 TX/PRO/DX INJ NEW DRUG ADDON: CPT

## 2023-12-15 PROCEDURE — 25010000002 IMMUNE GLOBULIN (HUMAN) 30 GM/300ML SOLUTION: Performed by: PSYCHIATRY & NEUROLOGY

## 2023-12-15 PROCEDURE — 63710000001 DIPHENHYDRAMINE PER 50 MG: Performed by: PSYCHIATRY & NEUROLOGY

## 2023-12-15 PROCEDURE — 96366 THER/PROPH/DIAG IV INF ADDON: CPT

## 2023-12-15 RX ORDER — MEPERIDINE HYDROCHLORIDE 50 MG/ML
25 INJECTION INTRAMUSCULAR; INTRAVENOUS; SUBCUTANEOUS
OUTPATIENT
Start: 2024-01-12

## 2023-12-15 RX ORDER — CETIRIZINE HYDROCHLORIDE 10 MG/1
10 TABLET ORAL ONCE
OUTPATIENT
Start: 2024-01-12

## 2023-12-15 RX ORDER — FAMOTIDINE 10 MG/ML
20 INJECTION, SOLUTION INTRAVENOUS ONCE
Status: COMPLETED | OUTPATIENT
Start: 2023-12-15 | End: 2023-12-15

## 2023-12-15 RX ORDER — FAMOTIDINE 10 MG/ML
20 INJECTION, SOLUTION INTRAVENOUS ONCE
OUTPATIENT
Start: 2024-01-12

## 2023-12-15 RX ORDER — FAMOTIDINE 10 MG/ML
20 INJECTION, SOLUTION INTRAVENOUS AS NEEDED
OUTPATIENT
Start: 2024-01-12

## 2023-12-15 RX ORDER — METHYLPREDNISOLONE SODIUM SUCCINATE 125 MG/2ML
125 INJECTION, POWDER, LYOPHILIZED, FOR SOLUTION INTRAMUSCULAR; INTRAVENOUS ONCE
Status: DISCONTINUED | OUTPATIENT
Start: 2023-12-15 | End: 2023-12-17 | Stop reason: HOSPADM

## 2023-12-15 RX ORDER — ACETAMINOPHEN 325 MG/1
650 TABLET ORAL ONCE
Status: COMPLETED | OUTPATIENT
Start: 2023-12-15 | End: 2023-12-15

## 2023-12-15 RX ORDER — SODIUM CHLORIDE 9 MG/ML
250 INJECTION, SOLUTION INTRAVENOUS ONCE
OUTPATIENT
Start: 2024-01-12

## 2023-12-15 RX ORDER — ACETAMINOPHEN 325 MG/1
650 TABLET ORAL ONCE
OUTPATIENT
Start: 2024-01-12

## 2023-12-15 RX ORDER — DIPHENHYDRAMINE HYDROCHLORIDE 50 MG/ML
25 INJECTION INTRAMUSCULAR; INTRAVENOUS AS NEEDED
OUTPATIENT
Start: 2024-01-12

## 2023-12-15 RX ORDER — METHYLPREDNISOLONE SODIUM SUCCINATE 125 MG/2ML
125 INJECTION, POWDER, LYOPHILIZED, FOR SOLUTION INTRAMUSCULAR; INTRAVENOUS ONCE
OUTPATIENT
Start: 2024-01-12

## 2023-12-15 RX ORDER — CETIRIZINE HYDROCHLORIDE 10 MG/1
10 TABLET ORAL ONCE
Status: COMPLETED | OUTPATIENT
Start: 2023-12-15 | End: 2023-12-15

## 2023-12-15 RX ORDER — DIPHENHYDRAMINE HCL 25 MG
25 CAPSULE ORAL ONCE
Status: COMPLETED | OUTPATIENT
Start: 2023-12-15 | End: 2023-12-15

## 2023-12-15 RX ORDER — DIPHENHYDRAMINE HCL 25 MG
25 CAPSULE ORAL ONCE
OUTPATIENT
Start: 2024-01-12

## 2023-12-15 RX ADMIN — IMMUNE GLOBULIN INFUSION (HUMAN) 30 G: 100 INJECTION, SOLUTION INTRAVENOUS; SUBCUTANEOUS at 12:16

## 2023-12-15 RX ADMIN — IMMUNE GLOBULIN INFUSION (HUMAN) 30 G: 100 INJECTION, SOLUTION INTRAVENOUS; SUBCUTANEOUS at 10:01

## 2023-12-15 RX ADMIN — ACETAMINOPHEN 650 MG: 325 TABLET ORAL at 09:12

## 2023-12-15 RX ADMIN — CETIRIZINE HYDROCHLORIDE 10 MG: 10 TABLET, FILM COATED ORAL at 09:12

## 2023-12-15 RX ADMIN — DIPHENHYDRAMINE HYDROCHLORIDE 25 MG: 25 CAPSULE ORAL at 09:12

## 2023-12-15 RX ADMIN — FAMOTIDINE 20 MG: 10 INJECTION, SOLUTION INTRAVENOUS at 09:12

## 2023-12-15 RX ADMIN — IMMUNE GLOBULIN INFUSION (HUMAN) 10 G: 100 INJECTION, SOLUTION INTRAVENOUS; SUBCUTANEOUS at 09:05

## 2023-12-15 NOTE — NURSING NOTE
Pt arrived to [facility] for appt. VSS, no complaints at this time. Medication administered per MD order. Pt tolerated well. VSS post infusion. Scheduled next appt. Pt discharged from [facility] at 15:00 PM in stable condition, without complaints.    Ambulatory

## 2023-12-15 NOTE — PATIENT INSTRUCTIONS
"Call  DR BIB CARTER @ [phone]   if you have any problems or concerns.  -  [facility]  Our purpose is to provide you \"Excellent Care\".  We hope that you will always choose us in the future and continue to recommend us to your family and friends.  -            "

## 2024-01-01 DIAGNOSIS — G61.81 CIDP (CHRONIC INFLAMMATORY DEMYELINATING POLYNEUROPATHY): Primary | ICD-10-CM

## 2024-01-26 ENCOUNTER — HOSPITAL ENCOUNTER (OUTPATIENT)
Dept: INFUSION THERAPY | Facility: HOSPITAL | Age: 79
Discharge: HOME | End: 2024-01-26
Payer: MEDICARE

## 2024-01-26 VITALS
BODY MASS INDEX: 22.9 KG/M2 | TEMPERATURE: 97.5 F | HEART RATE: 67 BPM | SYSTOLIC BLOOD PRESSURE: 131 MMHG | DIASTOLIC BLOOD PRESSURE: 75 MMHG | WEIGHT: 150.6 LBS | OXYGEN SATURATION: 96 % | RESPIRATION RATE: 18 BRPM

## 2024-01-26 DIAGNOSIS — G61.81 CIDP (CHRONIC INFLAMMATORY DEMYELINATING POLYNEUROPATHY): Primary | ICD-10-CM

## 2024-01-26 PROCEDURE — 96375 TX/PRO/DX INJ NEW DRUG ADDON: CPT

## 2024-01-26 PROCEDURE — A9270 NON-COVERED ITEM OR SERVICE: HCPCS | Performed by: PSYCHIATRY & NEUROLOGY

## 2024-01-26 PROCEDURE — 63710000001 ACETAMINOPHEN 325 MG TABLET: Performed by: PSYCHIATRY & NEUROLOGY

## 2024-01-26 PROCEDURE — 63710000001 CETIRIZINE 10 MG TABLET: Performed by: PSYCHIATRY & NEUROLOGY

## 2024-01-26 PROCEDURE — 63710000001 DIPHENHYDRAMINE PER 50 MG: Performed by: PSYCHIATRY & NEUROLOGY

## 2024-01-26 PROCEDURE — 25010000002 METHYLPREDNISOLONE PER 125 MG: Performed by: PSYCHIATRY & NEUROLOGY

## 2024-01-26 PROCEDURE — 25010000002 IMMUNE GLOBULIN (HUMAN) 30 GM/300ML SOLUTION: Performed by: PSYCHIATRY & NEUROLOGY

## 2024-01-26 PROCEDURE — 96366 THER/PROPH/DIAG IV INF ADDON: CPT

## 2024-01-26 PROCEDURE — 25010000002 IMMUNE GLOBULIN (HUMAN) 10 GM/100ML SOLUTION: Performed by: PSYCHIATRY & NEUROLOGY

## 2024-01-26 PROCEDURE — 96365 THER/PROPH/DIAG IV INF INIT: CPT

## 2024-01-26 RX ORDER — DIPHENHYDRAMINE HYDROCHLORIDE 50 MG/ML
25 INJECTION INTRAMUSCULAR; INTRAVENOUS AS NEEDED
OUTPATIENT
Start: 2024-02-09

## 2024-01-26 RX ORDER — ACETAMINOPHEN 325 MG/1
650 TABLET ORAL ONCE
OUTPATIENT
Start: 2024-02-09

## 2024-01-26 RX ORDER — DIPHENHYDRAMINE HCL 25 MG
25 CAPSULE ORAL ONCE
Status: COMPLETED | OUTPATIENT
Start: 2024-01-26 | End: 2024-01-26

## 2024-01-26 RX ORDER — METHYLPREDNISOLONE SODIUM SUCCINATE 125 MG/2ML
125 INJECTION, POWDER, LYOPHILIZED, FOR SOLUTION INTRAMUSCULAR; INTRAVENOUS ONCE
OUTPATIENT
Start: 2024-02-09

## 2024-01-26 RX ORDER — ACETAMINOPHEN 325 MG/1
650 TABLET ORAL ONCE
Status: COMPLETED | OUTPATIENT
Start: 2024-01-26 | End: 2024-01-26

## 2024-01-26 RX ORDER — CETIRIZINE HYDROCHLORIDE 10 MG/1
10 TABLET ORAL ONCE
OUTPATIENT
Start: 2024-02-09

## 2024-01-26 RX ORDER — FAMOTIDINE 10 MG/ML
20 INJECTION, SOLUTION INTRAVENOUS AS NEEDED
OUTPATIENT
Start: 2024-02-09

## 2024-01-26 RX ORDER — METHYLPREDNISOLONE SODIUM SUCCINATE 125 MG/2ML
125 INJECTION, POWDER, LYOPHILIZED, FOR SOLUTION INTRAMUSCULAR; INTRAVENOUS ONCE
Status: COMPLETED | OUTPATIENT
Start: 2024-01-26 | End: 2024-01-26

## 2024-01-26 RX ORDER — FAMOTIDINE 10 MG/ML
20 INJECTION, SOLUTION INTRAVENOUS ONCE
Status: COMPLETED | OUTPATIENT
Start: 2024-01-26 | End: 2024-01-26

## 2024-01-26 RX ORDER — DIPHENHYDRAMINE HCL 25 MG
25 CAPSULE ORAL ONCE
OUTPATIENT
Start: 2024-02-09

## 2024-01-26 RX ORDER — SODIUM CHLORIDE 9 MG/ML
250 INJECTION, SOLUTION INTRAVENOUS ONCE
Status: DISCONTINUED | OUTPATIENT
Start: 2024-01-26 | End: 2024-01-28 | Stop reason: HOSPADM

## 2024-01-26 RX ORDER — SODIUM CHLORIDE 9 MG/ML
250 INJECTION, SOLUTION INTRAVENOUS ONCE
OUTPATIENT
Start: 2024-02-09

## 2024-01-26 RX ORDER — FAMOTIDINE 10 MG/ML
20 INJECTION, SOLUTION INTRAVENOUS ONCE
OUTPATIENT
Start: 2024-02-09

## 2024-01-26 RX ORDER — CETIRIZINE HYDROCHLORIDE 10 MG/1
10 TABLET ORAL ONCE
Status: COMPLETED | OUTPATIENT
Start: 2024-01-26 | End: 2024-01-26

## 2024-01-26 RX ORDER — MEPERIDINE HYDROCHLORIDE 50 MG/ML
25 INJECTION INTRAMUSCULAR; INTRAVENOUS; SUBCUTANEOUS
OUTPATIENT
Start: 2024-02-09

## 2024-01-26 RX ADMIN — ACETAMINOPHEN 650 MG: 325 TABLET ORAL at 09:04

## 2024-01-26 RX ADMIN — IMMUNE GLOBULIN INFUSION (HUMAN) 30 G: 100 INJECTION, SOLUTION INTRAVENOUS; SUBCUTANEOUS at 11:47

## 2024-01-26 RX ADMIN — FAMOTIDINE 20 MG: 10 INJECTION, SOLUTION INTRAVENOUS at 09:04

## 2024-01-26 RX ADMIN — IMMUNE GLOBULIN INFUSION (HUMAN) 10 G: 100 INJECTION, SOLUTION INTRAVENOUS; SUBCUTANEOUS at 14:25

## 2024-01-26 RX ADMIN — IMMUNE GLOBULIN INFUSION (HUMAN) 30 G: 100 INJECTION, SOLUTION INTRAVENOUS; SUBCUTANEOUS at 09:14

## 2024-01-26 RX ADMIN — METHYLPREDNISOLONE SODIUM SUCCINATE 125 MG: 125 INJECTION, POWDER, FOR SOLUTION INTRAMUSCULAR; INTRAVENOUS at 09:04

## 2024-01-26 RX ADMIN — DIPHENHYDRAMINE HYDROCHLORIDE 25 MG: 25 CAPSULE ORAL at 09:04

## 2024-01-26 RX ADMIN — CETIRIZINE HYDROCHLORIDE 10 MG: 10 TABLET, FILM COATED ORAL at 09:04

## 2024-01-26 NOTE — NURSING NOTE
Pt arrived to [facility] for appt. VSS, no complaints at this time. Medication administered per MD order. VSS throughout infusion. Pt tolerated well. Scheduled next appt. AVS printed out, copy given to pt. Pt discharged from [facility] at 15:30 PM in stable condition, without complaints.

## 2024-02-05 ENCOUNTER — OFFICE VISIT (OUTPATIENT)
Dept: SURGERY | Facility: CLINIC | Age: 79
End: 2024-02-05
Payer: MEDICARE

## 2024-02-05 VITALS
SYSTOLIC BLOOD PRESSURE: 126 MMHG | BODY MASS INDEX: 22.73 KG/M2 | DIASTOLIC BLOOD PRESSURE: 80 MMHG | WEIGHT: 150 LBS | RESPIRATION RATE: 16 BRPM | HEART RATE: 72 BPM | HEIGHT: 68 IN

## 2024-02-05 DIAGNOSIS — S30.851A FOREIGN BODY IN UMBILICUS: Primary | ICD-10-CM

## 2024-02-05 PROCEDURE — 1160F RVW MEDS BY RX/DR IN RCRD: CPT | Performed by: SURGERY

## 2024-02-05 PROCEDURE — 10120 INC&RMVL FB SUBQ TISS SMPL: CPT | Performed by: SURGERY

## 2024-02-05 PROCEDURE — 1159F MED LIST DOCD IN RCRD: CPT | Performed by: SURGERY

## 2024-02-05 NOTE — LETTER
February 5, 2024       No Recipients    Patient: Efren Christianson   YOB: 1945   Date of Visit: 2/5/2024     Dear Winnie Murray:       Thank you for referring Efren Christianson to me for evaluation. Below are the relevant portions of my assessment and plan of care.    If you have questions, please do not hesitate to call me. I look forward to following Efren along with you.         Sincerely,        Kianna Oliva DO        CC:   No Recipients    Kianna Oliva DO  02/05/24 0859  Sign when Signing Visit  Efren Christianson 78 y.o. male presents as a self ref for eval umbilicus.  Pt states he noticed what feels like a stone inside his umbilicus for a long time.  Pt states she has tried softening the hard area in the shower and rubbing it out.  Reports this caused irritation.   Chief Complaint   Patient presents with   • Mass             HPI   Above noted and agree.      Review of Systems          Current Outpatient Medications:   •  ferrous sulfate 324 (65 Fe) MG tablet delayed-release EC tablet, Take 1 tablet by mouth Daily With Breakfast., Disp: , Rfl:   •  Immune Globulin, Human, (GAMMAGARD IV), Infuse 1 g/kg into a venous catheter Every 30 (Thirty) Days., Disp: , Rfl:   •  levETIRAcetam (KEPPRA) 1000 MG tablet, Take 1 tablet by mouth Every 12 (Twelve) Hours., Disp: , Rfl:   •  multivitamin (THERAGRAN) tablet tablet, Take  by mouth Daily., Disp: , Rfl:   •  vitamin C (ASCORBIC ACID) 250 MG tablet, Take 1 tablet by mouth Daily., Disp: , Rfl:         No Known Allergies        Past Medical History:   Diagnosis Date   • 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)    • Difficulty walking    • Hepatitis A     as a child   • Seizures    • Syncope    • Urgency of urination            Past Surgical History:   Procedure Laterality Date   • TRUNK LESION/CYST EXCISION N/A 8/3/2023    Procedure: excision of chest wall cyst and back cyst 10:00;  Surgeon: Kianna Oliva  "DO;  Location: [facility] OR;  Service: General;  Laterality: N/A;           Social History     Tobacco Use   • Smoking status: Never   • Smokeless tobacco: Never   Vaping Use   • Vaping Use: Never used   Substance Use Topics   • Alcohol use: Yes     Comment: occasional   • Drug use: Never             There is no immunization history on file for this patient.        Physical Exam  Constitutional:       Appearance: Normal appearance.   Skin:     Comments: The umbilicus was cleaned with Betadine.  Using a pickup the black tissue was grasped and removed.  It was the exact shape of his umbilicus.  The umbilicus was then cleaned with Betadine and covered with a sterile Band aid.     Neurological:      Mental Status: He is alert. Mental status is at baseline.   Psychiatric:         Mood and Affect: Mood normal.         Debilities/Disabilities Identified: None    Emotional Behavior: Appropriate      /80   Pulse 72   Resp 16   Ht 172.7 cm (68\")   Wt 68 kg (150 lb)   BMI 22.81 kg/m²         Diagnoses and all orders for this visit:    1. Foreign body in umbilicus (Primary)    We discussed keeping the umbilicus clean.  Efren may return anytime as needed.    Thank you for allowing me to participate in the care of this interesting patient.         Answers submitted by the patient for this visit:  Primary Reason for Visit (Submitted on 2/4/2024)  What is the primary reason for your visit?: Other  Other (Submitted on 2/4/2024)  Please describe your symptoms.: Possible cyst in belly button  Have you had these symptoms before?: Yes  How long have you been having these symptoms?: Greater than 2 weeks    "

## 2024-02-05 NOTE — PROGRESS NOTES
Efren Christianson 78 y.o. male presents as a self ref for eval umbilicus.  Pt states he noticed what feels like a stone inside his umbilicus for a long time.  Pt states she has tried softening the hard area in the shower and rubbing it out.  Reports this caused irritation.   Chief Complaint   Patient presents with    Mass             HPI   Above noted and agree.      Review of Systems          Current Outpatient Medications:     ferrous sulfate 324 (65 Fe) MG tablet delayed-release EC tablet, Take 1 tablet by mouth Daily With Breakfast., Disp: , Rfl:     Immune Globulin, Human, (GAMMAGARD IV), Infuse 1 g/kg into a venous catheter Every 30 (Thirty) Days., Disp: , Rfl:     levETIRAcetam (KEPPRA) 1000 MG tablet, Take 1 tablet by mouth Every 12 (Twelve) Hours., Disp: , Rfl:     multivitamin (THERAGRAN) tablet tablet, Take  by mouth Daily., Disp: , Rfl:     vitamin C (ASCORBIC ACID) 250 MG tablet, Take 1 tablet by mouth Daily., Disp: , Rfl:         No Known Allergies        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)     Difficulty walking     Hepatitis A     as a child    Seizures     Syncope     Urgency of urination            Past Surgical History:   Procedure Laterality Date    TRUNK LESION/CYST EXCISION N/A 8/3/2023    Procedure: excision of chest wall cyst and back cyst 10:00;  Surgeon: Kianna Oliva DO;  Location: [facility];  Service: General;  Laterality: N/A;           Social History     Tobacco Use    Smoking status: Never    Smokeless tobacco: Never   Vaping Use    Vaping Use: Never used   Substance Use Topics    Alcohol use: Yes     Comment: occasional    Drug use: Never             There is no immunization history on file for this patient.        Physical Exam  Constitutional:       Appearance: Normal appearance.   Skin:     Comments: The umbilicus was cleaned with Betadine.  Using a pickup the black tissue was grasped and removed.   "It was the exact shape of his umbilicus.  The umbilicus was then cleaned with Betadine and covered with a sterile Band aid.     Neurological:      Mental Status: He is alert. Mental status is at baseline.   Psychiatric:         Mood and Affect: Mood normal.         Debilities/Disabilities Identified: None    Emotional Behavior: Appropriate      /80   Pulse 72   Resp 16   Ht 172.7 cm (68\")   Wt 68 kg (150 lb)   BMI 22.81 kg/m²         Diagnoses and all orders for this visit:    1. Foreign body in umbilicus (Primary)    We discussed keeping the umbilicus clean.  Efren may return anytime as needed.    Thank you for allowing me to participate in the care of this interesting patient.         Answers submitted by the patient for this visit:  Primary Reason for Visit (Submitted on 2/4/2024)  What is the primary reason for your visit?: Other  Other (Submitted on 2/4/2024)  Please describe your symptoms.: Possible cyst in belly button  Have you had these symptoms before?: Yes  How long have you been having these symptoms?: Greater than 2 weeks    "

## 2024-02-23 ENCOUNTER — HOSPITAL ENCOUNTER (OUTPATIENT)
Dept: INFUSION THERAPY | Facility: HOSPITAL | Age: 79
Discharge: HOME | End: 2024-02-23
Payer: MEDICARE

## 2024-02-23 DIAGNOSIS — G61.81 CIDP (CHRONIC INFLAMMATORY DEMYELINATING POLYNEUROPATHY): Primary | ICD-10-CM

## 2024-02-29 ENCOUNTER — HOSPITAL ENCOUNTER (OUTPATIENT)
Dept: INFUSION THERAPY | Facility: HOSPITAL | Age: 79
Discharge: HOME | End: 2024-02-29
Admitting: PSYCHIATRY & NEUROLOGY
Payer: MEDICARE

## 2024-02-29 VITALS
DIASTOLIC BLOOD PRESSURE: 87 MMHG | HEIGHT: 70 IN | OXYGEN SATURATION: 97 % | HEART RATE: 87 BPM | SYSTOLIC BLOOD PRESSURE: 150 MMHG | WEIGHT: 154 LBS | RESPIRATION RATE: 16 BRPM | BODY MASS INDEX: 22.05 KG/M2 | TEMPERATURE: 97.9 F

## 2024-02-29 DIAGNOSIS — G61.81 CIDP (CHRONIC INFLAMMATORY DEMYELINATING POLYNEUROPATHY): Primary | ICD-10-CM

## 2024-02-29 PROCEDURE — 96374 THER/PROPH/DIAG INJ IV PUSH: CPT

## 2024-02-29 PROCEDURE — 96375 TX/PRO/DX INJ NEW DRUG ADDON: CPT

## 2024-02-29 PROCEDURE — 63710000001 DIPHENHYDRAMINE PER 50 MG: Performed by: PSYCHIATRY & NEUROLOGY

## 2024-02-29 PROCEDURE — 96365 THER/PROPH/DIAG IV INF INIT: CPT

## 2024-02-29 PROCEDURE — A9270 NON-COVERED ITEM OR SERVICE: HCPCS | Performed by: PSYCHIATRY & NEUROLOGY

## 2024-02-29 PROCEDURE — 25010000002 IMMUNE GLOBULIN (HUMAN) 30 GM/300ML SOLUTION: Performed by: PSYCHIATRY & NEUROLOGY

## 2024-02-29 PROCEDURE — 96366 THER/PROPH/DIAG IV INF ADDON: CPT

## 2024-02-29 PROCEDURE — 63710000001 CETIRIZINE 10 MG TABLET: Performed by: PSYCHIATRY & NEUROLOGY

## 2024-02-29 PROCEDURE — 25010000002 METHYLPREDNISOLONE PER 125 MG: Performed by: PSYCHIATRY & NEUROLOGY

## 2024-02-29 PROCEDURE — 63710000001 ACETAMINOPHEN 325 MG TABLET: Performed by: PSYCHIATRY & NEUROLOGY

## 2024-02-29 PROCEDURE — 25010000002 IMMUNE GLOBULIN (HUMAN) 20 GM/200ML SOLUTION: Performed by: PSYCHIATRY & NEUROLOGY

## 2024-02-29 RX ORDER — ACETAMINOPHEN 325 MG/1
650 TABLET ORAL ONCE
Status: COMPLETED | OUTPATIENT
Start: 2024-02-29 | End: 2024-02-29

## 2024-02-29 RX ORDER — FAMOTIDINE 10 MG/ML
20 INJECTION, SOLUTION INTRAVENOUS AS NEEDED
OUTPATIENT
Start: 2024-02-29

## 2024-02-29 RX ORDER — FAMOTIDINE 10 MG/ML
20 INJECTION, SOLUTION INTRAVENOUS ONCE
Status: CANCELLED | OUTPATIENT
Start: 2024-02-29

## 2024-02-29 RX ORDER — CETIRIZINE HYDROCHLORIDE 10 MG/1
10 TABLET ORAL ONCE
Status: CANCELLED | OUTPATIENT
Start: 2024-02-29

## 2024-02-29 RX ORDER — DIPHENHYDRAMINE HCL 25 MG
25 CAPSULE ORAL ONCE
Status: CANCELLED | OUTPATIENT
Start: 2024-02-29

## 2024-02-29 RX ORDER — FAMOTIDINE 10 MG/ML
20 INJECTION, SOLUTION INTRAVENOUS ONCE
Status: COMPLETED | OUTPATIENT
Start: 2024-02-29 | End: 2024-02-29

## 2024-02-29 RX ORDER — ACETAMINOPHEN 325 MG/1
650 TABLET ORAL ONCE
Status: CANCELLED | OUTPATIENT
Start: 2024-02-29

## 2024-02-29 RX ORDER — CETIRIZINE HYDROCHLORIDE 10 MG/1
10 TABLET ORAL ONCE
Status: COMPLETED | OUTPATIENT
Start: 2024-02-29 | End: 2024-02-29

## 2024-02-29 RX ORDER — DIPHENHYDRAMINE HCL 25 MG
25 CAPSULE ORAL ONCE
Status: COMPLETED | OUTPATIENT
Start: 2024-02-29 | End: 2024-02-29

## 2024-02-29 RX ORDER — DIPHENHYDRAMINE HYDROCHLORIDE 50 MG/ML
25 INJECTION INTRAMUSCULAR; INTRAVENOUS AS NEEDED
Status: CANCELLED | OUTPATIENT
Start: 2024-02-29

## 2024-02-29 RX ORDER — METHYLPREDNISOLONE SODIUM SUCCINATE 125 MG/2ML
125 INJECTION, POWDER, LYOPHILIZED, FOR SOLUTION INTRAMUSCULAR; INTRAVENOUS ONCE
Status: CANCELLED | OUTPATIENT
Start: 2024-02-29

## 2024-02-29 RX ORDER — SODIUM CHLORIDE 9 MG/ML
250 INJECTION, SOLUTION INTRAVENOUS ONCE
Status: CANCELLED | OUTPATIENT
Start: 2024-02-29

## 2024-02-29 RX ORDER — METHYLPREDNISOLONE SODIUM SUCCINATE 125 MG/2ML
125 INJECTION, POWDER, LYOPHILIZED, FOR SOLUTION INTRAMUSCULAR; INTRAVENOUS ONCE
Status: COMPLETED | OUTPATIENT
Start: 2024-02-29 | End: 2024-02-29

## 2024-02-29 RX ORDER — MEPERIDINE HYDROCHLORIDE 50 MG/ML
25 INJECTION INTRAMUSCULAR; INTRAVENOUS; SUBCUTANEOUS
OUTPATIENT
Start: 2024-02-29

## 2024-02-29 RX ORDER — SODIUM CHLORIDE 9 MG/ML
250 INJECTION, SOLUTION INTRAVENOUS ONCE
OUTPATIENT
Start: 2024-02-29

## 2024-02-29 RX ORDER — DIPHENHYDRAMINE HYDROCHLORIDE 50 MG/ML
25 INJECTION INTRAMUSCULAR; INTRAVENOUS AS NEEDED
OUTPATIENT
Start: 2024-02-29

## 2024-02-29 RX ORDER — FAMOTIDINE 10 MG/ML
20 INJECTION, SOLUTION INTRAVENOUS AS NEEDED
Status: CANCELLED | OUTPATIENT
Start: 2024-02-29

## 2024-02-29 RX ORDER — MEPERIDINE HYDROCHLORIDE 50 MG/ML
25 INJECTION INTRAMUSCULAR; INTRAVENOUS; SUBCUTANEOUS
Status: CANCELLED | OUTPATIENT
Start: 2024-02-29

## 2024-02-29 RX ADMIN — METHYLPREDNISOLONE SODIUM SUCCINATE 125 MG: 125 INJECTION, POWDER, FOR SOLUTION INTRAMUSCULAR; INTRAVENOUS at 09:29

## 2024-02-29 RX ADMIN — IMMUNE GLOBULIN INFUSION (HUMAN) 20 G: 100 INJECTION, SOLUTION INTRAVENOUS; SUBCUTANEOUS at 13:03

## 2024-02-29 RX ADMIN — ACETAMINOPHEN 650 MG: 325 TABLET ORAL at 09:29

## 2024-02-29 RX ADMIN — IMMUNE GLOBULIN INFUSION (HUMAN) 30 G: 100 INJECTION, SOLUTION INTRAVENOUS; SUBCUTANEOUS at 10:43

## 2024-02-29 RX ADMIN — IMMUNE GLOBULIN INFUSION (HUMAN) 20 G: 100 INJECTION, SOLUTION INTRAVENOUS; SUBCUTANEOUS at 14:25

## 2024-02-29 RX ADMIN — CETIRIZINE HYDROCHLORIDE 10 MG: 10 TABLET, FILM COATED ORAL at 09:29

## 2024-02-29 RX ADMIN — FAMOTIDINE 20 MG: 10 INJECTION, SOLUTION INTRAVENOUS at 09:31

## 2024-02-29 RX ADMIN — DIPHENHYDRAMINE HYDROCHLORIDE 25 MG: 25 CAPSULE ORAL at 09:29

## 2024-02-29 NOTE — NURSING NOTE
Patient arrived to [facility] at 0900. History and medications reviewed. Medications given as ordered. Patient tolerated well without complications. AVS refused. Patient discharged at 1600 in stable condition without complaints.

## 2024-03-04 ENCOUNTER — TELEPHONE (OUTPATIENT)
Dept: NEUROLOGY | Facility: CLINIC | Age: 79
End: 2024-03-04
Payer: MEDICARE

## 2024-03-04 NOTE — TELEPHONE ENCOUNTER
Provider: DR CARTER    Caller: SCHUYLER    Relationship to Patient: POA    Phone Number: [phone]    Reason for Call: STATES AFTER INFUSION ON THURSDAY THEY WERE ADVISED THAT THEY COULD NOT SCHEDULE ANYMORE INFUSIONS AND NEEDED TO CALL THE OFFICE. PLEASE REVIEW AND ADVISE, THANK YOU.

## 2024-03-15 ENCOUNTER — OFFICE VISIT (OUTPATIENT)
Dept: NEUROLOGY | Facility: CLINIC | Age: 79
End: 2024-03-15
Payer: MEDICARE

## 2024-03-15 VITALS — OXYGEN SATURATION: 97 % | HEART RATE: 88 BPM | DIASTOLIC BLOOD PRESSURE: 80 MMHG | SYSTOLIC BLOOD PRESSURE: 120 MMHG

## 2024-03-15 DIAGNOSIS — G61.81 CIDP (CHRONIC INFLAMMATORY DEMYELINATING POLYNEUROPATHY): Primary | ICD-10-CM

## 2024-03-15 PROCEDURE — 1159F MED LIST DOCD IN RCRD: CPT | Performed by: PSYCHIATRY & NEUROLOGY

## 2024-03-15 PROCEDURE — 99214 OFFICE O/P EST MOD 30 MIN: CPT | Performed by: PSYCHIATRY & NEUROLOGY

## 2024-03-15 PROCEDURE — 1160F RVW MEDS BY RX/DR IN RCRD: CPT | Performed by: PSYCHIATRY & NEUROLOGY

## 2024-03-15 RX ORDER — DIPHENHYDRAMINE HYDROCHLORIDE 50 MG/ML
25 INJECTION INTRAMUSCULAR; INTRAVENOUS AS NEEDED
OUTPATIENT
Start: 2024-03-18

## 2024-03-15 RX ORDER — MEPERIDINE HYDROCHLORIDE 50 MG/ML
25 INJECTION INTRAMUSCULAR; INTRAVENOUS; SUBCUTANEOUS
OUTPATIENT
Start: 2024-03-18

## 2024-03-15 RX ORDER — ACETAMINOPHEN 325 MG/1
650 TABLET ORAL ONCE
Status: CANCELLED | OUTPATIENT
Start: 2024-03-18

## 2024-03-15 RX ORDER — CETIRIZINE HYDROCHLORIDE 10 MG/1
10 TABLET ORAL ONCE
Status: CANCELLED | OUTPATIENT
Start: 2024-03-18

## 2024-03-15 RX ORDER — SODIUM CHLORIDE 9 MG/ML
20 INJECTION, SOLUTION INTRAVENOUS ONCE
OUTPATIENT
Start: 2024-03-18

## 2024-03-15 RX ORDER — METHYLPREDNISOLONE SODIUM SUCCINATE 125 MG/2ML
125 INJECTION, POWDER, LYOPHILIZED, FOR SOLUTION INTRAMUSCULAR; INTRAVENOUS ONCE
Status: CANCELLED | OUTPATIENT
Start: 2024-03-18

## 2024-03-15 RX ORDER — FAMOTIDINE 10 MG/ML
20 INJECTION, SOLUTION INTRAVENOUS AS NEEDED
OUTPATIENT
Start: 2024-03-18

## 2024-03-15 RX ORDER — DIPHENHYDRAMINE HCL 25 MG
25 CAPSULE ORAL ONCE
Status: CANCELLED | OUTPATIENT
Start: 2024-03-18

## 2024-03-15 RX ORDER — FAMOTIDINE 10 MG/ML
20 INJECTION, SOLUTION INTRAVENOUS ONCE
Status: CANCELLED | OUTPATIENT
Start: 2024-03-18

## 2024-03-15 NOTE — PROGRESS NOTES
Notes by MA:  Pt is here today with his caregiver, Lavern. Pt verifies consent that we can speak with her regarding his medical care.      Subjective:     Patient ID: Efren Christianson is a 78 y.o. male.    History of Present Illness  The following portions of the patient's history were reviewed and updated as appropriate: allergies, current medications, past family history, past medical history, past social history, past surgical history, and problem list.    Review of Systems   Musculoskeletal:  Positive for gait problem (in wheelchair).   Neurological:  Positive for tremors, weakness and numbness. Negative for dizziness, seizures, syncope, facial asymmetry, speech difficulty, light-headedness and headaches.   Psychiatric/Behavioral:  Negative for agitation, behavioral problems, confusion, decreased concentration, dysphoric mood, hallucinations, self-injury, sleep disturbance and suicidal ideas. The patient is not nervous/anxious and is not hyperactive.         Objective:    Neurologic Exam  He is awake alert pleasant cooperative cognitively preserved and very pleasant.  Speech and language functions are normal.     Cranial nerves II through XII normal and symmetric.     Motor exam reveals diffuse atrophy of the intrinsic hand muscles bilaterally with both proximal and distal weakness of the arms, greater than legs.  However, there is weakness of hip flexion extension and distal foot weakness, worse in the left than on the right.  No spasticity or upper motor neuron findings.     Sensory exam reveals some distal decreased to light touch and temperature sensation.     Tendon reflexes are diffusely absent.     He is in a wheelchair today.  Physical Exam    Assessment/Plan:     Diagnoses and all orders for this visit:    1. CIDP (chronic inflammatory demyelinating polyneuropathy) (Primary)     Significant improvement in function with monthly IVIG infusions.  He can clearly see the improvement and then tapering off prior  to his next infusion.  He is still mostly wheelchair-bound but can transfer from a chair to her bed or to a toilet.  I am interested in trying to get him more ambulatory.  Offered a referral to physical therapy and he says he will begin home exercises with his caretaker first and then if they need it they will call for referral.  Otherwise no changes from our standpoint.  Continuing IVIG unchanged.

## 2024-03-18 ENCOUNTER — HOSPITAL ENCOUNTER (OUTPATIENT)
Dept: INFUSION THERAPY | Facility: HOSPITAL | Age: 79
Discharge: HOME | End: 2024-03-18
Admitting: PSYCHIATRY & NEUROLOGY
Payer: MEDICARE

## 2024-03-18 VITALS
WEIGHT: 152.2 LBS | BODY MASS INDEX: 21.84 KG/M2 | HEART RATE: 83 BPM | DIASTOLIC BLOOD PRESSURE: 84 MMHG | SYSTOLIC BLOOD PRESSURE: 151 MMHG | OXYGEN SATURATION: 96 % | TEMPERATURE: 97.9 F | RESPIRATION RATE: 16 BRPM

## 2024-03-18 DIAGNOSIS — G61.81 CIDP (CHRONIC INFLAMMATORY DEMYELINATING POLYNEUROPATHY): Primary | ICD-10-CM

## 2024-03-18 PROCEDURE — A9270 NON-COVERED ITEM OR SERVICE: HCPCS | Performed by: PSYCHIATRY & NEUROLOGY

## 2024-03-18 PROCEDURE — 96366 THER/PROPH/DIAG IV INF ADDON: CPT

## 2024-03-18 PROCEDURE — 96365 THER/PROPH/DIAG IV INF INIT: CPT

## 2024-03-18 PROCEDURE — 25010000002 METHYLPREDNISOLONE PER 125 MG: Performed by: PSYCHIATRY & NEUROLOGY

## 2024-03-18 PROCEDURE — 96367 TX/PROPH/DG ADDL SEQ IV INF: CPT

## 2024-03-18 PROCEDURE — 96376 TX/PRO/DX INJ SAME DRUG ADON: CPT

## 2024-03-18 PROCEDURE — 25010000002 IMMUNE GLOBULIN (HUMAN) 30 GM/300ML SOLUTION: Performed by: PSYCHIATRY & NEUROLOGY

## 2024-03-18 PROCEDURE — 25010000002 IMMUNE GLOBULIN (HUMAN) 20 GM/200ML SOLUTION: Performed by: PSYCHIATRY & NEUROLOGY

## 2024-03-18 PROCEDURE — 63710000001 ACETAMINOPHEN 325 MG TABLET: Performed by: PSYCHIATRY & NEUROLOGY

## 2024-03-18 PROCEDURE — 63710000001 DIPHENHYDRAMINE PER 50 MG: Performed by: PSYCHIATRY & NEUROLOGY

## 2024-03-18 PROCEDURE — 63710000001 CETIRIZINE 10 MG TABLET: Performed by: PSYCHIATRY & NEUROLOGY

## 2024-03-18 RX ORDER — DIPHENHYDRAMINE HCL 25 MG
25 CAPSULE ORAL ONCE
OUTPATIENT
Start: 2024-04-18

## 2024-03-18 RX ORDER — MEPERIDINE HYDROCHLORIDE 50 MG/ML
25 INJECTION INTRAMUSCULAR; INTRAVENOUS; SUBCUTANEOUS
OUTPATIENT
Start: 2024-04-18

## 2024-03-18 RX ORDER — DIPHENHYDRAMINE HYDROCHLORIDE 50 MG/ML
25 INJECTION INTRAMUSCULAR; INTRAVENOUS AS NEEDED
OUTPATIENT
Start: 2024-04-18

## 2024-03-18 RX ORDER — CETIRIZINE HYDROCHLORIDE 10 MG/1
10 TABLET ORAL ONCE
OUTPATIENT
Start: 2024-04-18

## 2024-03-18 RX ORDER — DIPHENHYDRAMINE HCL 25 MG
25 CAPSULE ORAL ONCE
Status: COMPLETED | OUTPATIENT
Start: 2024-03-18 | End: 2024-03-18

## 2024-03-18 RX ORDER — FAMOTIDINE 10 MG/ML
20 INJECTION, SOLUTION INTRAVENOUS AS NEEDED
OUTPATIENT
Start: 2024-04-18

## 2024-03-18 RX ORDER — FAMOTIDINE 10 MG/ML
20 INJECTION, SOLUTION INTRAVENOUS ONCE
OUTPATIENT
Start: 2024-04-18

## 2024-03-18 RX ORDER — ACETAMINOPHEN 325 MG/1
650 TABLET ORAL ONCE
OUTPATIENT
Start: 2024-04-18

## 2024-03-18 RX ORDER — METHYLPREDNISOLONE SODIUM SUCCINATE 125 MG/2ML
125 INJECTION, POWDER, LYOPHILIZED, FOR SOLUTION INTRAMUSCULAR; INTRAVENOUS ONCE
Status: COMPLETED | OUTPATIENT
Start: 2024-03-18 | End: 2024-03-18

## 2024-03-18 RX ORDER — ACETAMINOPHEN 325 MG/1
650 TABLET ORAL ONCE
Status: COMPLETED | OUTPATIENT
Start: 2024-03-18 | End: 2024-03-18

## 2024-03-18 RX ORDER — METHYLPREDNISOLONE SODIUM SUCCINATE 125 MG/2ML
125 INJECTION, POWDER, LYOPHILIZED, FOR SOLUTION INTRAMUSCULAR; INTRAVENOUS ONCE
OUTPATIENT
Start: 2024-04-18

## 2024-03-18 RX ORDER — CETIRIZINE HYDROCHLORIDE 10 MG/1
10 TABLET ORAL ONCE
Status: COMPLETED | OUTPATIENT
Start: 2024-03-18 | End: 2024-03-18

## 2024-03-18 RX ORDER — FAMOTIDINE 10 MG/ML
20 INJECTION, SOLUTION INTRAVENOUS ONCE
Status: COMPLETED | OUTPATIENT
Start: 2024-03-18 | End: 2024-03-18

## 2024-03-18 RX ORDER — SODIUM CHLORIDE 9 MG/ML
20 INJECTION, SOLUTION INTRAVENOUS ONCE
OUTPATIENT
Start: 2024-04-18

## 2024-03-18 RX ADMIN — FAMOTIDINE 20 MG: 10 INJECTION, SOLUTION INTRAVENOUS at 09:10

## 2024-03-18 RX ADMIN — IMMUNE GLOBULIN INFUSION (HUMAN) 30 G: 100 INJECTION, SOLUTION INTRAVENOUS; SUBCUTANEOUS at 09:16

## 2024-03-18 RX ADMIN — ACETAMINOPHEN 650 MG: 325 TABLET ORAL at 09:11

## 2024-03-18 RX ADMIN — METHYLPREDNISOLONE SODIUM SUCCINATE 125 MG: 125 INJECTION, POWDER, FOR SOLUTION INTRAMUSCULAR; INTRAVENOUS at 09:10

## 2024-03-18 RX ADMIN — IMMUNE GLOBULIN INFUSION (HUMAN) 20 G: 100 INJECTION, SOLUTION INTRAVENOUS; SUBCUTANEOUS at 13:11

## 2024-03-18 RX ADMIN — IMMUNE GLOBULIN INFUSION (HUMAN) 20 G: 100 INJECTION, SOLUTION INTRAVENOUS; SUBCUTANEOUS at 11:46

## 2024-03-18 RX ADMIN — DIPHENHYDRAMINE HYDROCHLORIDE 25 MG: 25 CAPSULE ORAL at 09:11

## 2024-03-18 RX ADMIN — CETIRIZINE HYDROCHLORIDE 10 MG: 10 TABLET, FILM COATED ORAL at 09:10

## 2024-03-18 NOTE — NURSING NOTE
Patient arrived to [facility] at 0845.  History and medications reviewed with patient.  Medications administered as ordered without complications.  AVS refused by patient.  Patient discharged att 1457 in stable condition without complaint.

## 2024-04-19 ENCOUNTER — HOSPITAL ENCOUNTER (OUTPATIENT)
Dept: INFUSION THERAPY | Facility: HOSPITAL | Age: 79
Discharge: HOME | End: 2024-04-19
Payer: MEDICARE

## 2024-04-19 VITALS
WEIGHT: 152 LBS | OXYGEN SATURATION: 95 % | SYSTOLIC BLOOD PRESSURE: 158 MMHG | DIASTOLIC BLOOD PRESSURE: 93 MMHG | TEMPERATURE: 97.5 F | BODY MASS INDEX: 21.81 KG/M2 | RESPIRATION RATE: 15 BRPM | HEART RATE: 90 BPM

## 2024-04-19 DIAGNOSIS — G61.81 CIDP (CHRONIC INFLAMMATORY DEMYELINATING POLYNEUROPATHY): Primary | ICD-10-CM

## 2024-04-19 PROCEDURE — 63710000001 DIPHENHYDRAMINE PER 50 MG: Performed by: PSYCHIATRY & NEUROLOGY

## 2024-04-19 PROCEDURE — 96366 THER/PROPH/DIAG IV INF ADDON: CPT

## 2024-04-19 PROCEDURE — 96365 THER/PROPH/DIAG IV INF INIT: CPT

## 2024-04-19 PROCEDURE — A9270 NON-COVERED ITEM OR SERVICE: HCPCS | Performed by: PSYCHIATRY & NEUROLOGY

## 2024-04-19 PROCEDURE — 63710000001 CETIRIZINE 10 MG TABLET: Performed by: PSYCHIATRY & NEUROLOGY

## 2024-04-19 PROCEDURE — 25010000002 IMMUNE GLOBULIN (HUMAN) 20 GM/200ML SOLUTION: Performed by: PSYCHIATRY & NEUROLOGY

## 2024-04-19 PROCEDURE — 63710000001 ACETAMINOPHEN 325 MG TABLET: Performed by: PSYCHIATRY & NEUROLOGY

## 2024-04-19 PROCEDURE — 25010000002 METHYLPREDNISOLONE PER 125 MG: Performed by: PSYCHIATRY & NEUROLOGY

## 2024-04-19 PROCEDURE — 96374 THER/PROPH/DIAG INJ IV PUSH: CPT

## 2024-04-19 PROCEDURE — 96375 TX/PRO/DX INJ NEW DRUG ADDON: CPT

## 2024-04-19 PROCEDURE — 25010000002 IMMUNE GLOBULIN (HUMAN) 30 GM/300ML SOLUTION: Performed by: PSYCHIATRY & NEUROLOGY

## 2024-04-19 RX ORDER — DIPHENHYDRAMINE HYDROCHLORIDE 50 MG/ML
25 INJECTION INTRAMUSCULAR; INTRAVENOUS AS NEEDED
OUTPATIENT
Start: 2024-05-18

## 2024-04-19 RX ORDER — SODIUM CHLORIDE 9 MG/ML
20 INJECTION, SOLUTION INTRAVENOUS ONCE
OUTPATIENT
Start: 2024-05-18

## 2024-04-19 RX ORDER — METHYLPREDNISOLONE SODIUM SUCCINATE 125 MG/2ML
125 INJECTION, POWDER, LYOPHILIZED, FOR SOLUTION INTRAMUSCULAR; INTRAVENOUS ONCE
OUTPATIENT
Start: 2024-05-18

## 2024-04-19 RX ORDER — ACETAMINOPHEN 325 MG/1
650 TABLET ORAL ONCE
Status: COMPLETED | OUTPATIENT
Start: 2024-04-19 | End: 2024-04-19

## 2024-04-19 RX ORDER — DIPHENHYDRAMINE HCL 25 MG
25 CAPSULE ORAL ONCE
OUTPATIENT
Start: 2024-05-18

## 2024-04-19 RX ORDER — DIPHENHYDRAMINE HCL 25 MG
25 CAPSULE ORAL ONCE
Status: COMPLETED | OUTPATIENT
Start: 2024-04-19 | End: 2024-04-19

## 2024-04-19 RX ORDER — METHYLPREDNISOLONE SODIUM SUCCINATE 125 MG/2ML
125 INJECTION, POWDER, LYOPHILIZED, FOR SOLUTION INTRAMUSCULAR; INTRAVENOUS ONCE
Status: COMPLETED | OUTPATIENT
Start: 2024-04-19 | End: 2024-04-19

## 2024-04-19 RX ORDER — CETIRIZINE HYDROCHLORIDE 10 MG/1
10 TABLET ORAL ONCE
OUTPATIENT
Start: 2024-05-18

## 2024-04-19 RX ORDER — SODIUM CHLORIDE 9 MG/ML
20 INJECTION, SOLUTION INTRAVENOUS ONCE
Status: DISCONTINUED | OUTPATIENT
Start: 2024-04-19 | End: 2024-04-21 | Stop reason: HOSPADM

## 2024-04-19 RX ORDER — MEPERIDINE HYDROCHLORIDE 50 MG/ML
25 INJECTION INTRAMUSCULAR; INTRAVENOUS; SUBCUTANEOUS
OUTPATIENT
Start: 2024-05-18

## 2024-04-19 RX ORDER — CETIRIZINE HYDROCHLORIDE 10 MG/1
10 TABLET ORAL ONCE
Status: COMPLETED | OUTPATIENT
Start: 2024-04-19 | End: 2024-04-19

## 2024-04-19 RX ORDER — FAMOTIDINE 10 MG/ML
20 INJECTION, SOLUTION INTRAVENOUS ONCE
Status: COMPLETED | OUTPATIENT
Start: 2024-04-19 | End: 2024-04-19

## 2024-04-19 RX ORDER — FAMOTIDINE 10 MG/ML
20 INJECTION, SOLUTION INTRAVENOUS AS NEEDED
OUTPATIENT
Start: 2024-05-18

## 2024-04-19 RX ORDER — FAMOTIDINE 10 MG/ML
20 INJECTION, SOLUTION INTRAVENOUS ONCE
OUTPATIENT
Start: 2024-05-18

## 2024-04-19 RX ORDER — ACETAMINOPHEN 325 MG/1
650 TABLET ORAL ONCE
OUTPATIENT
Start: 2024-05-18

## 2024-04-19 RX ADMIN — CETIRIZINE HYDROCHLORIDE 10 MG: 10 TABLET, FILM COATED ORAL at 09:13

## 2024-04-19 RX ADMIN — IMMUNE GLOBULIN INFUSION (HUMAN) 30 G: 100 INJECTION, SOLUTION INTRAVENOUS; SUBCUTANEOUS at 13:37

## 2024-04-19 RX ADMIN — IMMUNE GLOBULIN INFUSION (HUMAN) 20 G: 100 INJECTION, SOLUTION INTRAVENOUS; SUBCUTANEOUS at 09:24

## 2024-04-19 RX ADMIN — ACETAMINOPHEN 650 MG: 325 TABLET, FILM COATED ORAL at 09:13

## 2024-04-19 RX ADMIN — DIPHENHYDRAMINE HYDROCHLORIDE 25 MG: 25 CAPSULE ORAL at 09:13

## 2024-04-19 RX ADMIN — IMMUNE GLOBULIN INFUSION (HUMAN) 20 G: 100 INJECTION, SOLUTION INTRAVENOUS; SUBCUTANEOUS at 11:31

## 2024-04-19 RX ADMIN — METHYLPREDNISOLONE SODIUM SUCCINATE 125 MG: 125 INJECTION, POWDER, FOR SOLUTION INTRAMUSCULAR; INTRAVENOUS at 09:22

## 2024-04-19 RX ADMIN — FAMOTIDINE 20 MG: 10 INJECTION, SOLUTION INTRAVENOUS at 09:17

## 2024-04-19 NOTE — NURSING NOTE
PT ARRIVED TO [facility] FOR APPT. VSS, NO COMPLAINTS AT THIS TIME. MEDICATION ADMINISTERED PER MD ORDER. PT TOLERATED WELL. VSS POST INFUSION. SCHEDULED NEXT APPT. AVS OFFERED, PT REFUSED. PT DISCHARGED FROM [facility] AT 16:17 PM IN STABLE CONDITION, WITHOUT COMPLAINTS.

## 2024-04-19 NOTE — PATIENT INSTRUCTIONS
"  Call  DR BIB CARTER @ [phone]  if you have any problems or concerns.    We know you have a Choice in healthcare and appreciate you using [facility].  Our purpose is to provide you \"Excellent Care\".  We hope that you will always choose us in the future and continue to recommend us to your family and friends.              "

## 2024-04-22 ENCOUNTER — TELEPHONE (OUTPATIENT)
Dept: NEUROLOGY | Facility: CLINIC | Age: 79
End: 2024-04-22
Payer: MEDICARE

## 2024-04-22 NOTE — TELEPHONE ENCOUNTER
[facility] CALLING REGARDING PATIENT MEDICATION - NEED TO SPEAK WITH SOMEONE - [phone]  REF # [id number]    THANK YOU

## 2024-04-23 ENCOUNTER — TELEMEDICINE (OUTPATIENT)
Dept: INFUSION THERAPY | Facility: HOSPITAL | Age: 79
End: 2024-04-23
Payer: MEDICARE

## 2024-04-23 NOTE — PROGRESS NOTES
Leann from [facility] ([facility]) called to obtain dose and vial size of IVIG administered on 10/20/23.  Portillo Kwan [facility]  04/23/24  16:35 EDT

## 2024-04-24 ENCOUNTER — SPECIALTY PHARMACY (OUTPATIENT)
Dept: INFUSION THERAPY | Facility: HOSPITAL | Age: 79
End: 2024-04-24
Payer: MEDICARE

## 2024-05-21 ENCOUNTER — HOSPITAL ENCOUNTER (OUTPATIENT)
Dept: INFUSION THERAPY | Facility: HOSPITAL | Age: 79
Discharge: HOME | End: 2024-05-21
Payer: MEDICARE

## 2024-05-21 DIAGNOSIS — G61.81 CIDP (CHRONIC INFLAMMATORY DEMYELINATING POLYNEUROPATHY): Primary | ICD-10-CM

## 2024-05-23 ENCOUNTER — HOSPITAL ENCOUNTER (OUTPATIENT)
Dept: INFUSION THERAPY | Facility: HOSPITAL | Age: 79
Discharge: HOME | End: 2024-05-23
Payer: MEDICARE

## 2024-05-23 VITALS
TEMPERATURE: 97.6 F | HEART RATE: 80 BPM | RESPIRATION RATE: 16 BRPM | SYSTOLIC BLOOD PRESSURE: 148 MMHG | OXYGEN SATURATION: 96 % | BODY MASS INDEX: 23.84 KG/M2 | DIASTOLIC BLOOD PRESSURE: 80 MMHG | WEIGHT: 157.3 LBS | HEIGHT: 68 IN

## 2024-05-23 DIAGNOSIS — G61.81 CIDP (CHRONIC INFLAMMATORY DEMYELINATING POLYNEUROPATHY): Primary | ICD-10-CM

## 2024-05-23 PROCEDURE — 63710000001 ACETAMINOPHEN 325 MG TABLET: Performed by: PSYCHIATRY & NEUROLOGY

## 2024-05-23 PROCEDURE — A9270 NON-COVERED ITEM OR SERVICE: HCPCS | Performed by: PSYCHIATRY & NEUROLOGY

## 2024-05-23 PROCEDURE — 96366 THER/PROPH/DIAG IV INF ADDON: CPT

## 2024-05-23 PROCEDURE — 25010000002 IMMUNE GLOBULIN (HUMAN) 30 GM/300ML SOLUTION: Performed by: PSYCHIATRY & NEUROLOGY

## 2024-05-23 PROCEDURE — 25010000002 IMMUNE GLOBULIN (HUMAN) 10 GM/100ML SOLUTION: Performed by: PSYCHIATRY & NEUROLOGY

## 2024-05-23 PROCEDURE — 96375 TX/PRO/DX INJ NEW DRUG ADDON: CPT

## 2024-05-23 PROCEDURE — 63710000001 DIPHENHYDRAMINE PER 50 MG: Performed by: PSYCHIATRY & NEUROLOGY

## 2024-05-23 PROCEDURE — 25010000002 METHYLPREDNISOLONE PER 125 MG: Performed by: PSYCHIATRY & NEUROLOGY

## 2024-05-23 PROCEDURE — 63710000001 CETIRIZINE 10 MG TABLET: Performed by: PSYCHIATRY & NEUROLOGY

## 2024-05-23 PROCEDURE — 96365 THER/PROPH/DIAG IV INF INIT: CPT

## 2024-05-23 RX ORDER — FAMOTIDINE 10 MG/ML
20 INJECTION, SOLUTION INTRAVENOUS ONCE
Status: COMPLETED | OUTPATIENT
Start: 2024-05-23 | End: 2024-05-23

## 2024-05-23 RX ORDER — MEPERIDINE HYDROCHLORIDE 50 MG/ML
25 INJECTION INTRAMUSCULAR; INTRAVENOUS; SUBCUTANEOUS
OUTPATIENT
Start: 2024-06-13

## 2024-05-23 RX ORDER — METHYLPREDNISOLONE SODIUM SUCCINATE 125 MG/2ML
125 INJECTION, POWDER, LYOPHILIZED, FOR SOLUTION INTRAMUSCULAR; INTRAVENOUS ONCE
Status: COMPLETED | OUTPATIENT
Start: 2024-05-23 | End: 2024-05-23

## 2024-05-23 RX ORDER — DIPHENHYDRAMINE HCL 25 MG
25 CAPSULE ORAL ONCE
Status: COMPLETED | OUTPATIENT
Start: 2024-05-23 | End: 2024-05-23

## 2024-05-23 RX ORDER — CETIRIZINE HYDROCHLORIDE 10 MG/1
10 TABLET ORAL ONCE
OUTPATIENT
Start: 2024-06-13

## 2024-05-23 RX ORDER — DIPHENHYDRAMINE HYDROCHLORIDE 50 MG/ML
25 INJECTION INTRAMUSCULAR; INTRAVENOUS AS NEEDED
OUTPATIENT
Start: 2024-06-13

## 2024-05-23 RX ORDER — ACETAMINOPHEN 325 MG/1
650 TABLET ORAL ONCE
OUTPATIENT
Start: 2024-06-13

## 2024-05-23 RX ORDER — FAMOTIDINE 10 MG/ML
20 INJECTION, SOLUTION INTRAVENOUS AS NEEDED
OUTPATIENT
Start: 2024-06-13

## 2024-05-23 RX ORDER — ACETAMINOPHEN 325 MG/1
650 TABLET ORAL ONCE
Status: COMPLETED | OUTPATIENT
Start: 2024-05-23 | End: 2024-05-23

## 2024-05-23 RX ORDER — DIPHENHYDRAMINE HCL 25 MG
25 CAPSULE ORAL ONCE
OUTPATIENT
Start: 2024-06-13

## 2024-05-23 RX ORDER — FAMOTIDINE 10 MG/ML
20 INJECTION, SOLUTION INTRAVENOUS ONCE
OUTPATIENT
Start: 2024-06-13

## 2024-05-23 RX ORDER — METHYLPREDNISOLONE SODIUM SUCCINATE 125 MG/2ML
125 INJECTION, POWDER, LYOPHILIZED, FOR SOLUTION INTRAMUSCULAR; INTRAVENOUS ONCE
OUTPATIENT
Start: 2024-06-13

## 2024-05-23 RX ORDER — CETIRIZINE HYDROCHLORIDE 10 MG/1
10 TABLET ORAL ONCE
Status: COMPLETED | OUTPATIENT
Start: 2024-05-23 | End: 2024-05-23

## 2024-05-23 RX ORDER — SODIUM CHLORIDE 9 MG/ML
20 INJECTION, SOLUTION INTRAVENOUS ONCE
OUTPATIENT
Start: 2024-06-13

## 2024-05-23 RX ADMIN — METHYLPREDNISOLONE SODIUM SUCCINATE 125 MG: 125 INJECTION, POWDER, FOR SOLUTION INTRAMUSCULAR; INTRAVENOUS at 10:00

## 2024-05-23 RX ADMIN — DIPHENHYDRAMINE HYDROCHLORIDE 25 MG: 25 CAPSULE ORAL at 09:49

## 2024-05-23 RX ADMIN — ACETAMINOPHEN 650 MG: 325 TABLET ORAL at 09:50

## 2024-05-23 RX ADMIN — IMMUNE GLOBULIN INFUSION (HUMAN) 30 G: 100 INJECTION, SOLUTION INTRAVENOUS; SUBCUTANEOUS at 11:00

## 2024-05-23 RX ADMIN — IMMUNE GLOBULIN INFUSION (HUMAN) 30 G: 100 INJECTION, SOLUTION INTRAVENOUS; SUBCUTANEOUS at 13:03

## 2024-05-23 RX ADMIN — CETIRIZINE HYDROCHLORIDE 10 MG: 10 TABLET, FILM COATED ORAL at 09:49

## 2024-05-23 RX ADMIN — IMMUNE GLOBULIN INFUSION (HUMAN) 10 G: 100 INJECTION, SOLUTION INTRAVENOUS; SUBCUTANEOUS at 10:04

## 2024-05-23 RX ADMIN — FAMOTIDINE 20 MG: 10 INJECTION, SOLUTION INTRAVENOUS at 09:51

## 2024-05-23 NOTE — NURSING NOTE
NURSE PROGRESS NOTE    PT. ARRIVED IN [facility] FOR SCHEDULED INFUSION @ 0900 .  INFUSION WAS PERFORMED WITHOUT INCIDENT. PT. SAT IN RECLINER FOR INFUSION, DID  % OF LUNCH TRAY. FLUIDS ENCOURAGED, ASSISTED WITH URINAL PRN. PT. ISSUED AVS AND VERBALIZES UNDERSTANDING, AND DISCHARGED AT 1530 VIA W/C WITH CAREGIVER.

## 2024-05-23 NOTE — PATIENT INSTRUCTIONS
"  Call  DR. DIA  if you have any problems or concerns.    We know you have a Choice in healthcare and appreciate you using [facility].  Our purpose is to provide you \"Excellent Care\".  We hope that you will always choose us in the future and continue to recommend us to your family and friends.             "

## 2024-06-17 ENCOUNTER — HOSPITAL ENCOUNTER (OUTPATIENT)
Dept: INFUSION THERAPY | Facility: HOSPITAL | Age: 79
Discharge: HOME | End: 2024-06-17
Admitting: PSYCHIATRY & NEUROLOGY
Payer: MEDICARE

## 2024-06-17 VITALS
HEIGHT: 68 IN | DIASTOLIC BLOOD PRESSURE: 85 MMHG | TEMPERATURE: 98.2 F | SYSTOLIC BLOOD PRESSURE: 135 MMHG | OXYGEN SATURATION: 95 % | WEIGHT: 157 LBS | HEART RATE: 73 BPM | BODY MASS INDEX: 23.79 KG/M2 | RESPIRATION RATE: 16 BRPM

## 2024-06-17 DIAGNOSIS — G61.81 CIDP (CHRONIC INFLAMMATORY DEMYELINATING POLYNEUROPATHY): Primary | ICD-10-CM

## 2024-06-17 PROCEDURE — A9270 NON-COVERED ITEM OR SERVICE: HCPCS | Performed by: PSYCHIATRY & NEUROLOGY

## 2024-06-17 PROCEDURE — 25010000002 IMMUNE GLOBULIN (HUMAN) 20 GM/200ML SOLUTION: Performed by: PSYCHIATRY & NEUROLOGY

## 2024-06-17 PROCEDURE — 25010000002 IMMUNE GLOBULIN (HUMAN) 30 GM/300ML SOLUTION: Performed by: PSYCHIATRY & NEUROLOGY

## 2024-06-17 PROCEDURE — 63710000001 CETIRIZINE 10 MG TABLET: Performed by: PSYCHIATRY & NEUROLOGY

## 2024-06-17 PROCEDURE — 96365 THER/PROPH/DIAG IV INF INIT: CPT

## 2024-06-17 PROCEDURE — 96374 THER/PROPH/DIAG INJ IV PUSH: CPT

## 2024-06-17 PROCEDURE — 25010000002 METHYLPREDNISOLONE PER 125 MG: Performed by: PSYCHIATRY & NEUROLOGY

## 2024-06-17 PROCEDURE — 63710000001 ACETAMINOPHEN 325 MG TABLET: Performed by: PSYCHIATRY & NEUROLOGY

## 2024-06-17 PROCEDURE — 96375 TX/PRO/DX INJ NEW DRUG ADDON: CPT

## 2024-06-17 PROCEDURE — 63710000001 DIPHENHYDRAMINE PER 50 MG: Performed by: PSYCHIATRY & NEUROLOGY

## 2024-06-17 PROCEDURE — 96366 THER/PROPH/DIAG IV INF ADDON: CPT

## 2024-06-17 RX ORDER — DIPHENHYDRAMINE HCL 25 MG
25 CAPSULE ORAL ONCE
Status: COMPLETED | OUTPATIENT
Start: 2024-06-17 | End: 2024-06-17

## 2024-06-17 RX ORDER — CETIRIZINE HYDROCHLORIDE 10 MG/1
10 TABLET ORAL ONCE
Status: COMPLETED | OUTPATIENT
Start: 2024-06-17 | End: 2024-06-17

## 2024-06-17 RX ORDER — METHYLPREDNISOLONE SODIUM SUCCINATE 125 MG/2ML
125 INJECTION, POWDER, LYOPHILIZED, FOR SOLUTION INTRAMUSCULAR; INTRAVENOUS ONCE
Status: COMPLETED | OUTPATIENT
Start: 2024-06-17 | End: 2024-06-17

## 2024-06-17 RX ORDER — DIPHENHYDRAMINE HYDROCHLORIDE 50 MG/ML
25 INJECTION INTRAMUSCULAR; INTRAVENOUS AS NEEDED
OUTPATIENT
Start: 2024-06-18

## 2024-06-17 RX ORDER — SODIUM CHLORIDE 9 MG/ML
20 INJECTION, SOLUTION INTRAVENOUS ONCE
Status: DISCONTINUED | OUTPATIENT
Start: 2024-06-17 | End: 2024-06-19 | Stop reason: HOSPADM

## 2024-06-17 RX ORDER — FAMOTIDINE 10 MG/ML
20 INJECTION, SOLUTION INTRAVENOUS ONCE
Status: COMPLETED | OUTPATIENT
Start: 2024-06-17 | End: 2024-06-17

## 2024-06-17 RX ORDER — FAMOTIDINE 10 MG/ML
20 INJECTION, SOLUTION INTRAVENOUS ONCE
OUTPATIENT
Start: 2024-06-18

## 2024-06-17 RX ORDER — CETIRIZINE HYDROCHLORIDE 10 MG/1
10 TABLET ORAL ONCE
OUTPATIENT
Start: 2024-06-18

## 2024-06-17 RX ORDER — METHYLPREDNISOLONE SODIUM SUCCINATE 125 MG/2ML
125 INJECTION, POWDER, LYOPHILIZED, FOR SOLUTION INTRAMUSCULAR; INTRAVENOUS ONCE
OUTPATIENT
Start: 2024-06-18

## 2024-06-17 RX ORDER — ACETAMINOPHEN 325 MG/1
650 TABLET ORAL ONCE
OUTPATIENT
Start: 2024-06-18

## 2024-06-17 RX ORDER — FAMOTIDINE 10 MG/ML
20 INJECTION, SOLUTION INTRAVENOUS AS NEEDED
OUTPATIENT
Start: 2024-06-18

## 2024-06-17 RX ORDER — MEPERIDINE HYDROCHLORIDE 50 MG/ML
25 INJECTION INTRAMUSCULAR; INTRAVENOUS; SUBCUTANEOUS
OUTPATIENT
Start: 2024-06-18

## 2024-06-17 RX ORDER — SODIUM CHLORIDE 9 MG/ML
20 INJECTION, SOLUTION INTRAVENOUS ONCE
OUTPATIENT
Start: 2024-06-18

## 2024-06-17 RX ORDER — DIPHENHYDRAMINE HCL 25 MG
25 CAPSULE ORAL ONCE
OUTPATIENT
Start: 2024-06-18

## 2024-06-17 RX ORDER — ACETAMINOPHEN 325 MG/1
650 TABLET ORAL ONCE
Status: COMPLETED | OUTPATIENT
Start: 2024-06-17 | End: 2024-06-17

## 2024-06-17 RX ADMIN — METHYLPREDNISOLONE SODIUM SUCCINATE 125 MG: 125 INJECTION, POWDER, FOR SOLUTION INTRAMUSCULAR; INTRAVENOUS at 08:16

## 2024-06-17 RX ADMIN — DIPHENHYDRAMINE HYDROCHLORIDE 25 MG: 25 CAPSULE ORAL at 08:17

## 2024-06-17 RX ADMIN — CETIRIZINE HYDROCHLORIDE 10 MG: 10 TABLET, FILM COATED ORAL at 08:17

## 2024-06-17 RX ADMIN — IMMUNE GLOBULIN INFUSION (HUMAN) 20 G: 100 INJECTION, SOLUTION INTRAVENOUS; SUBCUTANEOUS at 08:22

## 2024-06-17 RX ADMIN — FAMOTIDINE 20 MG: 10 INJECTION, SOLUTION INTRAVENOUS at 08:22

## 2024-06-17 RX ADMIN — ACETAMINOPHEN 650 MG: 325 TABLET ORAL at 08:17

## 2024-06-17 RX ADMIN — IMMUNE GLOBULIN INFUSION (HUMAN) 20 G: 100 INJECTION, SOLUTION INTRAVENOUS; SUBCUTANEOUS at 10:11

## 2024-06-17 RX ADMIN — IMMUNE GLOBULIN INFUSION (HUMAN) 30 G: 100 INJECTION, SOLUTION INTRAVENOUS; SUBCUTANEOUS at 11:24

## 2024-06-17 NOTE — NURSING NOTE
Pt arrived to [facility] for appt. VSS, no complaints at this time. Medication administered per MD order. Pt tolerated well. VSS throughout infusion and post infusion. Pt did not want to schedule next appt until caregiver called. Pt discharged from [facility] at 13:45 PM in stable condition, without complaints.

## 2024-07-15 ENCOUNTER — HOSPITAL ENCOUNTER (OUTPATIENT)
Dept: INFUSION THERAPY | Facility: HOSPITAL | Age: 79
Discharge: HOME | End: 2024-07-15
Admitting: PSYCHIATRY & NEUROLOGY
Payer: MEDICARE

## 2024-07-15 VITALS
TEMPERATURE: 97.6 F | RESPIRATION RATE: 16 BRPM | DIASTOLIC BLOOD PRESSURE: 83 MMHG | HEART RATE: 89 BPM | SYSTOLIC BLOOD PRESSURE: 139 MMHG | WEIGHT: 157 LBS | BODY MASS INDEX: 23.87 KG/M2 | OXYGEN SATURATION: 95 %

## 2024-07-15 DIAGNOSIS — G61.81 CIDP (CHRONIC INFLAMMATORY DEMYELINATING POLYNEUROPATHY): Primary | ICD-10-CM

## 2024-07-15 PROCEDURE — 63710000001 CETIRIZINE 10 MG TABLET: Performed by: PSYCHIATRY & NEUROLOGY

## 2024-07-15 PROCEDURE — A9270 NON-COVERED ITEM OR SERVICE: HCPCS | Performed by: PSYCHIATRY & NEUROLOGY

## 2024-07-15 PROCEDURE — 63710000001 ACETAMINOPHEN 325 MG TABLET: Performed by: PSYCHIATRY & NEUROLOGY

## 2024-07-15 PROCEDURE — 63710000001 DIPHENHYDRAMINE PER 50 MG: Performed by: PSYCHIATRY & NEUROLOGY

## 2024-07-15 PROCEDURE — 96366 THER/PROPH/DIAG IV INF ADDON: CPT

## 2024-07-15 PROCEDURE — 25010000002 IMMUNE GLOBULIN (HUMAN) 30 GM/300ML SOLUTION: Performed by: PSYCHIATRY & NEUROLOGY

## 2024-07-15 PROCEDURE — 25010000002 METHYLPREDNISOLONE PER 125 MG: Performed by: PSYCHIATRY & NEUROLOGY

## 2024-07-15 PROCEDURE — 96375 TX/PRO/DX INJ NEW DRUG ADDON: CPT

## 2024-07-15 PROCEDURE — 96365 THER/PROPH/DIAG IV INF INIT: CPT

## 2024-07-15 PROCEDURE — 96374 THER/PROPH/DIAG INJ IV PUSH: CPT

## 2024-07-15 PROCEDURE — 25010000002 IMMUNE GLOBULIN (HUMAN) 10 GM/100ML SOLUTION: Performed by: PSYCHIATRY & NEUROLOGY

## 2024-07-15 RX ORDER — FAMOTIDINE 10 MG/ML
20 INJECTION, SOLUTION INTRAVENOUS AS NEEDED
OUTPATIENT
Start: 2024-07-18

## 2024-07-15 RX ORDER — SODIUM CHLORIDE 9 MG/ML
20 INJECTION, SOLUTION INTRAVENOUS ONCE
OUTPATIENT
Start: 2024-07-18

## 2024-07-15 RX ORDER — ACETAMINOPHEN 325 MG/1
650 TABLET ORAL ONCE
Status: COMPLETED | OUTPATIENT
Start: 2024-07-15 | End: 2024-07-15

## 2024-07-15 RX ORDER — DIPHENHYDRAMINE HCL 25 MG
25 CAPSULE ORAL ONCE
OUTPATIENT
Start: 2024-07-18

## 2024-07-15 RX ORDER — DIPHENHYDRAMINE HYDROCHLORIDE 50 MG/ML
25 INJECTION INTRAMUSCULAR; INTRAVENOUS AS NEEDED
OUTPATIENT
Start: 2024-07-18

## 2024-07-15 RX ORDER — METHYLPREDNISOLONE SODIUM SUCCINATE 125 MG/2ML
125 INJECTION, POWDER, LYOPHILIZED, FOR SOLUTION INTRAMUSCULAR; INTRAVENOUS ONCE
OUTPATIENT
Start: 2024-07-18

## 2024-07-15 RX ORDER — METHYLPREDNISOLONE SODIUM SUCCINATE 125 MG/2ML
125 INJECTION, POWDER, LYOPHILIZED, FOR SOLUTION INTRAMUSCULAR; INTRAVENOUS ONCE
Status: COMPLETED | OUTPATIENT
Start: 2024-07-15 | End: 2024-07-15

## 2024-07-15 RX ORDER — CETIRIZINE HYDROCHLORIDE 10 MG/1
10 TABLET ORAL ONCE
Status: COMPLETED | OUTPATIENT
Start: 2024-07-15 | End: 2024-07-15

## 2024-07-15 RX ORDER — ACETAMINOPHEN 325 MG/1
650 TABLET ORAL ONCE
OUTPATIENT
Start: 2024-07-18

## 2024-07-15 RX ORDER — MEPERIDINE HYDROCHLORIDE 50 MG/ML
25 INJECTION INTRAMUSCULAR; INTRAVENOUS; SUBCUTANEOUS
OUTPATIENT
Start: 2024-07-18

## 2024-07-15 RX ORDER — FAMOTIDINE 10 MG/ML
20 INJECTION, SOLUTION INTRAVENOUS ONCE
Status: COMPLETED | OUTPATIENT
Start: 2024-07-15 | End: 2024-07-15

## 2024-07-15 RX ORDER — CETIRIZINE HYDROCHLORIDE 10 MG/1
10 TABLET ORAL ONCE
OUTPATIENT
Start: 2024-07-18

## 2024-07-15 RX ORDER — DIPHENHYDRAMINE HCL 25 MG
25 CAPSULE ORAL ONCE
Status: COMPLETED | OUTPATIENT
Start: 2024-07-15 | End: 2024-07-15

## 2024-07-15 RX ORDER — FAMOTIDINE 10 MG/ML
20 INJECTION, SOLUTION INTRAVENOUS ONCE
OUTPATIENT
Start: 2024-07-18

## 2024-07-15 RX ADMIN — IMMUNE GLOBULIN INFUSION (HUMAN) 30 G: 100 INJECTION, SOLUTION INTRAVENOUS; SUBCUTANEOUS at 09:36

## 2024-07-15 RX ADMIN — DIPHENHYDRAMINE HYDROCHLORIDE 25 MG: 25 CAPSULE ORAL at 08:22

## 2024-07-15 RX ADMIN — IMMUNE GLOBULIN INFUSION (HUMAN) 10 G: 100 INJECTION, SOLUTION INTRAVENOUS; SUBCUTANEOUS at 08:40

## 2024-07-15 RX ADMIN — CETIRIZINE HYDROCHLORIDE 10 MG: 10 TABLET, FILM COATED ORAL at 08:22

## 2024-07-15 RX ADMIN — ACETAMINOPHEN 650 MG: 325 TABLET ORAL at 08:22

## 2024-07-15 RX ADMIN — METHYLPREDNISOLONE SODIUM SUCCINATE 125 MG: 125 INJECTION, POWDER, FOR SOLUTION INTRAMUSCULAR; INTRAVENOUS at 08:30

## 2024-07-15 RX ADMIN — IMMUNE GLOBULIN INFUSION (HUMAN) 30 G: 100 INJECTION, SOLUTION INTRAVENOUS; SUBCUTANEOUS at 11:44

## 2024-07-15 RX ADMIN — FAMOTIDINE 20 MG: 10 INJECTION, SOLUTION INTRAVENOUS at 08:22

## 2024-07-15 NOTE — NURSING NOTE
PT ARRIVED TO [facility] FOR APPT. VSS, NO COMPLAINTS AT THIS TIME. MEDICATION ADMINISTERED PER MD ORDER. PT TOLERATED WELL. VSS POST INFUSION. SCHEDULED NEXT APPT. AVS OFFERED, PT REFUSED. PT DISCHARGED FROM [facility] AT  14:05 PM IN STABLE CONDITION, WITHOUT COMPLAINTS.

## 2024-07-15 NOTE — PATIENT INSTRUCTIONS
"  Call  Dr. Reyes at [phone]  if you have any problems or concerns.    We know you have a Choice in healthcare and appreciate you using [facility].  Our purpose is to provide you \"Excellent Care\".  We hope that you will always choose us in the future and continue to recommend us to your family and friends.             "

## 2024-08-12 ENCOUNTER — HOSPITAL ENCOUNTER (OUTPATIENT)
Dept: INFUSION THERAPY | Facility: HOSPITAL | Age: 79
Discharge: HOME | End: 2024-08-12
Admitting: PSYCHIATRY & NEUROLOGY
Payer: MEDICARE

## 2024-08-12 VITALS
DIASTOLIC BLOOD PRESSURE: 89 MMHG | RESPIRATION RATE: 16 BRPM | SYSTOLIC BLOOD PRESSURE: 161 MMHG | OXYGEN SATURATION: 96 % | HEART RATE: 76 BPM | TEMPERATURE: 97.4 F

## 2024-08-12 DIAGNOSIS — G61.81 CIDP (CHRONIC INFLAMMATORY DEMYELINATING POLYNEUROPATHY): Primary | ICD-10-CM

## 2024-08-12 PROCEDURE — A9270 NON-COVERED ITEM OR SERVICE: HCPCS | Performed by: PSYCHIATRY & NEUROLOGY

## 2024-08-12 PROCEDURE — 63710000001 CETIRIZINE 10 MG TABLET: Performed by: PSYCHIATRY & NEUROLOGY

## 2024-08-12 PROCEDURE — 63710000001 DIPHENHYDRAMINE PER 50 MG: Performed by: PSYCHIATRY & NEUROLOGY

## 2024-08-12 PROCEDURE — 96374 THER/PROPH/DIAG INJ IV PUSH: CPT

## 2024-08-12 PROCEDURE — 96375 TX/PRO/DX INJ NEW DRUG ADDON: CPT

## 2024-08-12 PROCEDURE — 25010000002 METHYLPREDNISOLONE PER 125 MG: Performed by: PSYCHIATRY & NEUROLOGY

## 2024-08-12 PROCEDURE — 96366 THER/PROPH/DIAG IV INF ADDON: CPT

## 2024-08-12 PROCEDURE — 96365 THER/PROPH/DIAG IV INF INIT: CPT

## 2024-08-12 PROCEDURE — 63710000001 ACETAMINOPHEN 325 MG TABLET: Performed by: PSYCHIATRY & NEUROLOGY

## 2024-08-12 PROCEDURE — 25010000002 IMMUNE GLOBULIN (HUMAN) 30 GM/300ML SOLUTION: Performed by: PSYCHIATRY & NEUROLOGY

## 2024-08-12 PROCEDURE — 25010000002 IMMUNE GLOBULIN (HUMAN) 10 GM/100ML SOLUTION: Performed by: PSYCHIATRY & NEUROLOGY

## 2024-08-12 RX ORDER — CETIRIZINE HYDROCHLORIDE 10 MG/1
10 TABLET ORAL ONCE
Status: COMPLETED | OUTPATIENT
Start: 2024-08-12 | End: 2024-08-12

## 2024-08-12 RX ORDER — FAMOTIDINE 10 MG/ML
20 INJECTION, SOLUTION INTRAVENOUS AS NEEDED
OUTPATIENT
Start: 2024-08-18

## 2024-08-12 RX ORDER — METHYLPREDNISOLONE SODIUM SUCCINATE 125 MG/2ML
125 INJECTION, POWDER, LYOPHILIZED, FOR SOLUTION INTRAMUSCULAR; INTRAVENOUS ONCE
OUTPATIENT
Start: 2024-08-18

## 2024-08-12 RX ORDER — FAMOTIDINE 10 MG/ML
20 INJECTION, SOLUTION INTRAVENOUS ONCE
OUTPATIENT
Start: 2024-08-18

## 2024-08-12 RX ORDER — ACETAMINOPHEN 325 MG/1
650 TABLET ORAL ONCE
OUTPATIENT
Start: 2024-08-18

## 2024-08-12 RX ORDER — MEPERIDINE HYDROCHLORIDE 50 MG/ML
25 INJECTION INTRAMUSCULAR; INTRAVENOUS; SUBCUTANEOUS
OUTPATIENT
Start: 2024-08-18

## 2024-08-12 RX ORDER — CETIRIZINE HYDROCHLORIDE 10 MG/1
10 TABLET ORAL ONCE
OUTPATIENT
Start: 2024-08-18

## 2024-08-12 RX ORDER — SODIUM CHLORIDE 9 MG/ML
20 INJECTION, SOLUTION INTRAVENOUS ONCE
OUTPATIENT
Start: 2024-08-18

## 2024-08-12 RX ORDER — DIPHENHYDRAMINE HYDROCHLORIDE 50 MG/ML
25 INJECTION INTRAMUSCULAR; INTRAVENOUS AS NEEDED
OUTPATIENT
Start: 2024-08-18

## 2024-08-12 RX ORDER — DIPHENHYDRAMINE HCL 25 MG
25 CAPSULE ORAL ONCE
OUTPATIENT
Start: 2024-08-18

## 2024-08-12 RX ORDER — FAMOTIDINE 10 MG/ML
20 INJECTION, SOLUTION INTRAVENOUS ONCE
Status: COMPLETED | OUTPATIENT
Start: 2024-08-12 | End: 2024-08-12

## 2024-08-12 RX ORDER — ACETAMINOPHEN 325 MG/1
650 TABLET ORAL ONCE
Status: COMPLETED | OUTPATIENT
Start: 2024-08-12 | End: 2024-08-12

## 2024-08-12 RX ORDER — METHYLPREDNISOLONE SODIUM SUCCINATE 125 MG/2ML
125 INJECTION, POWDER, LYOPHILIZED, FOR SOLUTION INTRAMUSCULAR; INTRAVENOUS ONCE
Status: COMPLETED | OUTPATIENT
Start: 2024-08-12 | End: 2024-08-12

## 2024-08-12 RX ORDER — DIPHENHYDRAMINE HCL 25 MG
25 CAPSULE ORAL ONCE
Status: COMPLETED | OUTPATIENT
Start: 2024-08-12 | End: 2024-08-12

## 2024-08-12 RX ADMIN — IMMUNE GLOBULIN INFUSION (HUMAN) 10 G: 100 INJECTION, SOLUTION INTRAVENOUS; SUBCUTANEOUS at 11:33

## 2024-08-12 RX ADMIN — FAMOTIDINE 20 MG: 10 INJECTION, SOLUTION INTRAVENOUS at 08:20

## 2024-08-12 RX ADMIN — CETIRIZINE HYDROCHLORIDE 10 MG: 10 TABLET, FILM COATED ORAL at 08:20

## 2024-08-12 RX ADMIN — METHYLPREDNISOLONE SODIUM SUCCINATE 125 MG: 125 INJECTION, POWDER, FOR SOLUTION INTRAMUSCULAR; INTRAVENOUS at 08:19

## 2024-08-12 RX ADMIN — DIPHENHYDRAMINE HYDROCHLORIDE 25 MG: 25 CAPSULE ORAL at 08:20

## 2024-08-12 RX ADMIN — IMMUNE GLOBULIN INFUSION (HUMAN) 30 G: 100 INJECTION, SOLUTION INTRAVENOUS; SUBCUTANEOUS at 08:28

## 2024-08-12 RX ADMIN — ACETAMINOPHEN 650 MG: 325 TABLET ORAL at 08:20

## 2024-08-12 RX ADMIN — IMMUNE GLOBULIN INFUSION (HUMAN) 30 G: 100 INJECTION, SOLUTION INTRAVENOUS; SUBCUTANEOUS at 09:24

## 2024-08-12 NOTE — NURSING NOTE
PT ARRIVED TO [facility] FOR APPT. VSS, NO COMPLAINTS AT THIS TIME. MEDICATION ADMINISTERED PER MD ORDER. VSS THROUGHOUT INFUSION AND POST INFUSION. SCHEDULED NEXT APPT. AVS OFFERED, PT REFUSED. PT DISCHARGED FROM [facility] AT 14:08 PM IN STABLE CONDITION, WITHOUT COMPLAINTS.

## 2024-09-10 ENCOUNTER — HOSPITAL ENCOUNTER (OUTPATIENT)
Dept: INFUSION THERAPY | Facility: HOSPITAL | Age: 79
Discharge: HOME | End: 2024-09-10
Admitting: PSYCHIATRY & NEUROLOGY
Payer: MEDICARE

## 2024-09-10 VITALS
SYSTOLIC BLOOD PRESSURE: 162 MMHG | BODY MASS INDEX: 23.87 KG/M2 | DIASTOLIC BLOOD PRESSURE: 100 MMHG | HEART RATE: 88 BPM | WEIGHT: 157 LBS | OXYGEN SATURATION: 95 % | RESPIRATION RATE: 16 BRPM | TEMPERATURE: 98.1 F

## 2024-09-10 DIAGNOSIS — G61.81 CIDP (CHRONIC INFLAMMATORY DEMYELINATING POLYNEUROPATHY): Primary | ICD-10-CM

## 2024-09-10 PROCEDURE — 96374 THER/PROPH/DIAG INJ IV PUSH: CPT

## 2024-09-10 PROCEDURE — 25010000002 METHYLPREDNISOLONE PER 125 MG: Performed by: PSYCHIATRY & NEUROLOGY

## 2024-09-10 PROCEDURE — 96375 TX/PRO/DX INJ NEW DRUG ADDON: CPT

## 2024-09-10 PROCEDURE — A9270 NON-COVERED ITEM OR SERVICE: HCPCS | Performed by: PSYCHIATRY & NEUROLOGY

## 2024-09-10 PROCEDURE — 63710000001 CETIRIZINE 10 MG TABLET: Performed by: PSYCHIATRY & NEUROLOGY

## 2024-09-10 PROCEDURE — 63710000001 ACETAMINOPHEN 325 MG TABLET: Performed by: PSYCHIATRY & NEUROLOGY

## 2024-09-10 PROCEDURE — 96365 THER/PROPH/DIAG IV INF INIT: CPT

## 2024-09-10 PROCEDURE — 63710000001 DIPHENHYDRAMINE PER 50 MG: Performed by: PSYCHIATRY & NEUROLOGY

## 2024-09-10 PROCEDURE — 96366 THER/PROPH/DIAG IV INF ADDON: CPT

## 2024-09-10 PROCEDURE — 25010000002 IMMUNE GLOBULIN (HUMAN) 10 GM/100ML SOLUTION: Performed by: PSYCHIATRY & NEUROLOGY

## 2024-09-10 PROCEDURE — 25010000002 IMMUNE GLOBULIN (HUMAN) 30 GM/300ML SOLUTION: Performed by: PSYCHIATRY & NEUROLOGY

## 2024-09-10 RX ORDER — MEPERIDINE HYDROCHLORIDE 50 MG/ML
25 INJECTION INTRAMUSCULAR; INTRAVENOUS; SUBCUTANEOUS
OUTPATIENT
Start: 2024-09-18

## 2024-09-10 RX ORDER — CETIRIZINE HYDROCHLORIDE 10 MG/1
10 TABLET ORAL ONCE
OUTPATIENT
Start: 2024-09-18

## 2024-09-10 RX ORDER — SODIUM CHLORIDE 9 MG/ML
20 INJECTION, SOLUTION INTRAVENOUS ONCE
OUTPATIENT
Start: 2024-09-18

## 2024-09-10 RX ORDER — METHYLPREDNISOLONE SODIUM SUCCINATE 125 MG/2ML
125 INJECTION, POWDER, LYOPHILIZED, FOR SOLUTION INTRAMUSCULAR; INTRAVENOUS ONCE
OUTPATIENT
Start: 2024-09-18

## 2024-09-10 RX ORDER — DIPHENHYDRAMINE HCL 25 MG
25 CAPSULE ORAL ONCE
OUTPATIENT
Start: 2024-09-18

## 2024-09-10 RX ORDER — FAMOTIDINE 10 MG/ML
20 INJECTION, SOLUTION INTRAVENOUS ONCE
Status: COMPLETED | OUTPATIENT
Start: 2024-09-10 | End: 2024-09-10

## 2024-09-10 RX ORDER — DIPHENHYDRAMINE HCL 25 MG
25 CAPSULE ORAL ONCE
Status: COMPLETED | OUTPATIENT
Start: 2024-09-10 | End: 2024-09-10

## 2024-09-10 RX ORDER — FAMOTIDINE 10 MG/ML
20 INJECTION, SOLUTION INTRAVENOUS AS NEEDED
OUTPATIENT
Start: 2024-09-18

## 2024-09-10 RX ORDER — CETIRIZINE HYDROCHLORIDE 10 MG/1
10 TABLET ORAL ONCE
Status: COMPLETED | OUTPATIENT
Start: 2024-09-10 | End: 2024-09-10

## 2024-09-10 RX ORDER — FAMOTIDINE 10 MG/ML
20 INJECTION, SOLUTION INTRAVENOUS ONCE
OUTPATIENT
Start: 2024-09-18

## 2024-09-10 RX ORDER — ACETAMINOPHEN 325 MG/1
650 TABLET ORAL ONCE
OUTPATIENT
Start: 2024-09-18

## 2024-09-10 RX ORDER — ACETAMINOPHEN 325 MG/1
650 TABLET ORAL ONCE
Status: COMPLETED | OUTPATIENT
Start: 2024-09-10 | End: 2024-09-10

## 2024-09-10 RX ORDER — DIPHENHYDRAMINE HYDROCHLORIDE 50 MG/ML
25 INJECTION INTRAMUSCULAR; INTRAVENOUS AS NEEDED
OUTPATIENT
Start: 2024-09-18

## 2024-09-10 RX ORDER — METHYLPREDNISOLONE SODIUM SUCCINATE 125 MG/2ML
125 INJECTION, POWDER, LYOPHILIZED, FOR SOLUTION INTRAMUSCULAR; INTRAVENOUS ONCE
Status: COMPLETED | OUTPATIENT
Start: 2024-09-10 | End: 2024-09-10

## 2024-09-10 RX ADMIN — FAMOTIDINE 20 MG: 10 INJECTION, SOLUTION INTRAVENOUS at 09:23

## 2024-09-10 RX ADMIN — ACETAMINOPHEN 650 MG: 325 TABLET ORAL at 09:23

## 2024-09-10 RX ADMIN — IMMUNE GLOBULIN INFUSION (HUMAN) 10 G: 100 INJECTION, SOLUTION INTRAVENOUS; SUBCUTANEOUS at 10:07

## 2024-09-10 RX ADMIN — IMMUNE GLOBULIN INFUSION (HUMAN) 30 G: 100 INJECTION, SOLUTION INTRAVENOUS; SUBCUTANEOUS at 11:08

## 2024-09-10 RX ADMIN — DIPHENHYDRAMINE HYDROCHLORIDE 25 MG: 25 CAPSULE ORAL at 09:23

## 2024-09-10 RX ADMIN — METHYLPREDNISOLONE SODIUM SUCCINATE 125 MG: 125 INJECTION, POWDER, FOR SOLUTION INTRAMUSCULAR; INTRAVENOUS at 09:23

## 2024-09-10 RX ADMIN — IMMUNE GLOBULIN INFUSION (HUMAN) 30 G: 100 INJECTION, SOLUTION INTRAVENOUS; SUBCUTANEOUS at 13:10

## 2024-09-10 RX ADMIN — CETIRIZINE HYDROCHLORIDE 10 MG: 10 TABLET, FILM COATED ORAL at 09:23

## 2024-09-10 NOTE — NURSING NOTE
0900 Patient arrives to [facility] per wheelchair in stable condition without any complaints. Patient moves to recliner with assist of 2 staff members. BP elevated, rechecked after 15 min with better reading, no complaints, s/s of distress. Medications and history reviewed with patient. Medications given as ordered, patient tolerated infusion well. Vital signs remained stable with slightly elevated BP throughout.  Post infusion BP remains elevated without any signs/symptoms. AVS printed and copy to patient. Assisted to wheelchair by 2 people. Instructed patient to followup with his doctor about elevated BP, he verbally states he will be at the doctor later this week and will discuss with them.  Patient discharged from [facility] in stable condition without any complaints at 1540.

## 2024-09-10 NOTE — PATIENT INSTRUCTIONS
"  Call  Dr Reyes [phone]  if you have any problems or concerns.    [facility] know [facility] have a [facility] and [facility]  Our purpose is to provide you \"Excellent Care\".  We hope that you will always choose us in the future and continue to recommend us to your family and friends.             "

## 2024-09-13 ENCOUNTER — OFFICE VISIT (OUTPATIENT)
Dept: NEUROLOGY | Facility: CLINIC | Age: 79
End: 2024-09-13
Payer: MEDICARE

## 2024-09-13 VITALS — SYSTOLIC BLOOD PRESSURE: 140 MMHG | HEART RATE: 69 BPM | DIASTOLIC BLOOD PRESSURE: 90 MMHG | OXYGEN SATURATION: 98 %

## 2024-09-13 DIAGNOSIS — G40.909 SEIZURE DISORDER: ICD-10-CM

## 2024-09-13 DIAGNOSIS — G61.81 CIDP (CHRONIC INFLAMMATORY DEMYELINATING POLYNEUROPATHY): Primary | ICD-10-CM

## 2024-09-13 NOTE — PROGRESS NOTES
Notes by MA:  Pt is here today for a follow up appointment. His caregiver accompanies him today. She feels that his infusions are wearing off too quickly and Pt experiences moodiness and aggravation after he receives the infusion.      Subjective:     Patient ID: Efren Christianson is a 79 y.o. male.    History of Present Illness  The following portions of the patient's history were reviewed and updated as appropriate: allergies, current medications, past family history, past medical history, past social history, past surgical history, and problem list.    Review of Systems   Hematological:  Bruises/bleeds easily.   Psychiatric/Behavioral:  Positive for agitation.         Objective:    Neurologic Exam  He is awake alert pleasant cooperative cognitively preserved and very pleasant.  Speech and language functions are normal.     Cranial nerves II through XII normal and symmetric.     Motor exam reveals diffuse atrophy of the intrinsic hand muscles bilaterally with both proximal and distal weakness of the arms, greater than legs.  However, there is weakness of hip flexion extension and distal foot weakness, worse in the left than on the right.  No spasticity or upper motor neuron findings.     Sensory exam reveals some distal decreased to light touch and temperature sensation.     Tendon reflexes are diffusely absent.     He is in a wheelchair today.   Physical Exam    Assessment/Plan:     Diagnoses and all orders for this visit:    1. 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 Seizure disorder     He still gets significant improvement with IVIG.  Unfortunately it wears off several days prior to his next infusion and then takes about 2 weeks to become effective and so he has a significant amount of time during the treatment cycle where he has profound weakness.  He is also having significant bruising from his repeated need for IVs.  Added onto this is the complexity of having to travel for  his IV infusions.  We discussed all of this together and because of these issues, I have suggested we consider Vyvgart Hytrulo weekly subcutaneous infusions which I am hopeful may solve all of the above issues.  We talked about the risks and benefits of doing this.  Will investigate this option for him and get him started if possible.    No further seizure-like events, continuing on Keppra 1000 mg twice daily.  No problematic side effects or toxic findings on exam.

## 2024-10-01 DIAGNOSIS — G61.81 CIDP (CHRONIC INFLAMMATORY DEMYELINATING POLYNEUROPATHY): Primary | ICD-10-CM

## 2024-10-01 RX ORDER — ACETAMINOPHEN 325 MG/1
650 TABLET ORAL ONCE
OUTPATIENT
Start: 2024-10-08

## 2024-10-01 RX ORDER — SODIUM CHLORIDE 9 MG/ML
20 INJECTION, SOLUTION INTRAVENOUS ONCE
OUTPATIENT
Start: 2024-10-08

## 2024-10-04 ENCOUNTER — TELEPHONE (OUTPATIENT)
Dept: INFUSION THERAPY | Facility: HOSPITAL | Age: 79
End: 2024-10-04
Payer: MEDICARE

## 2024-10-04 NOTE — TELEPHONE ENCOUNTER
Made OBC to [facility] and spoke with Nikunj SABA, PA rep, to follow up on PA for Vyvsherlyt Elijahulo.  Nikunj confirmed that PA Ref# [id number] for Vyvgart Hytrulo is approved and covered. I requested a fax containing this information.   Portillo Kwan, [facility]  10/04/24  15:49 EDT

## 2024-10-08 ENCOUNTER — HOSPITAL ENCOUNTER (OUTPATIENT)
Dept: INFUSION THERAPY | Facility: HOSPITAL | Age: 79
Discharge: HOME | End: 2024-10-08
Payer: MEDICARE

## 2024-10-08 ENCOUNTER — HOSPITAL ENCOUNTER (INPATIENT)
Facility: HOSPITAL | Age: 79
LOS: 4 days | Discharge: HOME HEALTH | End: 2024-10-13
Attending: EMERGENCY MEDICINE | Admitting: HOSPITALIST
Payer: MEDICARE

## 2024-10-08 ENCOUNTER — TELEPHONE (OUTPATIENT)
Dept: NEUROLOGY | Facility: CLINIC | Age: 79
End: 2024-10-08
Payer: MEDICARE

## 2024-10-08 DIAGNOSIS — I10 PRIMARY HYPERTENSION: ICD-10-CM

## 2024-10-08 DIAGNOSIS — R53.1 PROGRESSIVE FOCAL MOTOR WEAKNESS: ICD-10-CM

## 2024-10-08 DIAGNOSIS — G61.81 CIDP WITH CNS OVERLAP (CHRONIC INFLAMMATORY DEMYELINATING POLYNEURITIS): Primary | ICD-10-CM

## 2024-10-08 DIAGNOSIS — Z79.899 LONG-TERM CURRENT USE OF INTRAVENOUS IMMUNOGLOBULIN (IVIG): ICD-10-CM

## 2024-10-08 DIAGNOSIS — G61.81 CIDP (CHRONIC INFLAMMATORY DEMYELINATING POLYNEUROPATHY): Primary | ICD-10-CM

## 2024-10-08 LAB
ALBUMIN SERPL-MCNC: 4.2 G/DL (ref 3.5–5.2)
ALBUMIN/GLOB SERPL: 1.1 G/DL
ALP SERPL-CCNC: 70 U/L (ref 39–117)
ALT SERPL W P-5'-P-CCNC: 24 U/L (ref 1–41)
ANION GAP SERPL CALCULATED.3IONS-SCNC: 9.1 MMOL/L (ref 5–15)
AST SERPL-CCNC: 32 U/L (ref 1–40)
BASOPHILS # BLD AUTO: 0.04 10*3/MM3 (ref 0–0.2)
BASOPHILS NFR BLD AUTO: 0.6 % (ref 0–1.5)
BILIRUB SERPL-MCNC: 0.5 MG/DL (ref 0–1.2)
BILIRUB UR QL STRIP: NEGATIVE
BUN SERPL-MCNC: 21 MG/DL (ref 8–23)
BUN/CREAT SERPL: 21 (ref 7–25)
CALCIUM SPEC-SCNC: 9.4 MG/DL (ref 8.6–10.5)
CHLORIDE SERPL-SCNC: 102 MMOL/L (ref 98–107)
CLARITY UR: CLEAR
CO2 SERPL-SCNC: 25.9 MMOL/L (ref 22–29)
COLOR UR: YELLOW
CREAT SERPL-MCNC: 1 MG/DL (ref 0.76–1.27)
DEPRECATED RDW RBC AUTO: 43.5 FL (ref 37–54)
EGFRCR SERPLBLD CKD-EPI 2021: 77.5 ML/MIN/1.73
EOSINOPHIL # BLD AUTO: 0.04 10*3/MM3 (ref 0–0.4)
EOSINOPHIL NFR BLD AUTO: 0.6 % (ref 0.3–6.2)
ERYTHROCYTE [DISTWIDTH] IN BLOOD BY AUTOMATED COUNT: 12 % (ref 12.3–15.4)
GLOBULIN UR ELPH-MCNC: 3.7 GM/DL
GLUCOSE SERPL-MCNC: 94 MG/DL (ref 65–99)
GLUCOSE UR STRIP-MCNC: NEGATIVE MG/DL
HCT VFR BLD AUTO: 44.3 % (ref 37.5–51)
HGB BLD-MCNC: 15 G/DL (ref 13–17.7)
HGB UR QL STRIP.AUTO: NEGATIVE
HOLD SPECIMEN: NORMAL
IMM GRANULOCYTES # BLD AUTO: 0.01 10*3/MM3 (ref 0–0.05)
IMM GRANULOCYTES NFR BLD AUTO: 0.2 % (ref 0–0.5)
KETONES UR QL STRIP: NEGATIVE
LEUKOCYTE ESTERASE UR QL STRIP.AUTO: NEGATIVE
LYMPHOCYTES # BLD AUTO: 1.39 10*3/MM3 (ref 0.7–3.1)
LYMPHOCYTES NFR BLD AUTO: 22.4 % (ref 19.6–45.3)
MAGNESIUM SERPL-MCNC: 2 MG/DL (ref 1.6–2.4)
MCH RBC QN AUTO: 32.7 PG (ref 26.6–33)
MCHC RBC AUTO-ENTMCNC: 33.9 G/DL (ref 31.5–35.7)
MCV RBC AUTO: 96.5 FL (ref 79–97)
MONOCYTES # BLD AUTO: 0.65 10*3/MM3 (ref 0.1–0.9)
MONOCYTES NFR BLD AUTO: 10.5 % (ref 5–12)
NEUTROPHILS NFR BLD AUTO: 4.08 10*3/MM3 (ref 1.7–7)
NEUTROPHILS NFR BLD AUTO: 65.7 % (ref 42.7–76)
NITRITE UR QL STRIP: NEGATIVE
PH UR STRIP.AUTO: 5.5 [PH] (ref 4.5–8)
PHOSPHATE SERPL-MCNC: 2.4 MG/DL (ref 2.5–4.5)
PLATELET # BLD AUTO: 167 10*3/MM3 (ref 140–450)
PMV BLD AUTO: 9.9 FL (ref 6–12)
POTASSIUM SERPL-SCNC: 4.2 MMOL/L (ref 3.5–5.2)
PROT SERPL-MCNC: 7.9 G/DL (ref 6–8.5)
PROT UR QL STRIP: NEGATIVE
RBC # BLD AUTO: 4.59 10*6/MM3 (ref 4.14–5.8)
SODIUM SERPL-SCNC: 137 MMOL/L (ref 136–145)
SP GR UR STRIP: 1.01 (ref 1–1.03)
T4 FREE SERPL-MCNC: 1.47 NG/DL (ref 0.93–1.7)
TSH SERPL DL<=0.05 MIU/L-ACNC: 4.35 UIU/ML (ref 0.27–4.2)
UROBILINOGEN UR QL STRIP: NORMAL
WBC NRBC COR # BLD AUTO: 6.21 10*3/MM3 (ref 3.4–10.8)
WHOLE BLOOD HOLD COAG: NORMAL
WHOLE BLOOD HOLD SPECIMEN: NORMAL

## 2024-10-08 PROCEDURE — 99214 OFFICE O/P EST MOD 30 MIN: CPT | Performed by: PSYCHIATRY & NEUROLOGY

## 2024-10-08 PROCEDURE — 99285 EMERGENCY DEPT VISIT HI MDM: CPT | Performed by: EMERGENCY MEDICINE

## 2024-10-08 PROCEDURE — 80053 COMPREHEN METABOLIC PANEL: CPT | Performed by: NURSE PRACTITIONER

## 2024-10-08 PROCEDURE — 85025 COMPLETE CBC W/AUTO DIFF WBC: CPT | Performed by: NURSE PRACTITIONER

## 2024-10-08 PROCEDURE — 83735 ASSAY OF MAGNESIUM: CPT | Performed by: NURSE PRACTITIONER

## 2024-10-08 PROCEDURE — 94799 UNLISTED PULMONARY SVC/PX: CPT

## 2024-10-08 PROCEDURE — G0378 HOSPITAL OBSERVATION PER HR: HCPCS

## 2024-10-08 PROCEDURE — 84100 ASSAY OF PHOSPHORUS: CPT | Performed by: NURSE PRACTITIONER

## 2024-10-08 PROCEDURE — 81003 URINALYSIS AUTO W/O SCOPE: CPT | Performed by: NURSE PRACTITIONER

## 2024-10-08 PROCEDURE — 99223 1ST HOSP IP/OBS HIGH 75: CPT | Performed by: FAMILY MEDICINE

## 2024-10-08 PROCEDURE — 84439 ASSAY OF FREE THYROXINE: CPT | Performed by: NURSE PRACTITIONER

## 2024-10-08 PROCEDURE — 84443 ASSAY THYROID STIM HORMONE: CPT | Performed by: NURSE PRACTITIONER

## 2024-10-08 PROCEDURE — 25010000002 METHYLPREDNISOLONE PER 125 MG: Performed by: PSYCHIATRY & NEUROLOGY

## 2024-10-08 RX ORDER — LORAZEPAM 1 MG/1
1 TABLET ORAL NIGHTLY PRN
Status: DISCONTINUED | OUTPATIENT
Start: 2024-10-08 | End: 2024-10-13 | Stop reason: HOSPADM

## 2024-10-08 RX ORDER — BISACODYL 5 MG/1
5 TABLET, DELAYED RELEASE ORAL DAILY PRN
Status: DISCONTINUED | OUTPATIENT
Start: 2024-10-08 | End: 2024-10-13 | Stop reason: HOSPADM

## 2024-10-08 RX ORDER — SODIUM CHLORIDE 9 MG/ML
40 INJECTION, SOLUTION INTRAVENOUS AS NEEDED
Status: DISCONTINUED | OUTPATIENT
Start: 2024-10-08 | End: 2024-10-13 | Stop reason: HOSPADM

## 2024-10-08 RX ORDER — SODIUM CHLORIDE 0.9 % (FLUSH) 0.9 %
10 SYRINGE (ML) INJECTION AS NEEDED
Status: DISCONTINUED | OUTPATIENT
Start: 2024-10-08 | End: 2024-10-13 | Stop reason: HOSPADM

## 2024-10-08 RX ORDER — LEVETIRACETAM 500 MG/1
1000 TABLET ORAL EVERY 12 HOURS SCHEDULED
Status: DISCONTINUED | OUTPATIENT
Start: 2024-10-08 | End: 2024-10-13 | Stop reason: HOSPADM

## 2024-10-08 RX ORDER — ONDANSETRON 4 MG/1
4 TABLET, ORALLY DISINTEGRATING ORAL EVERY 6 HOURS PRN
Status: DISCONTINUED | OUTPATIENT
Start: 2024-10-08 | End: 2024-10-13 | Stop reason: HOSPADM

## 2024-10-08 RX ORDER — SODIUM CHLORIDE 0.9 % (FLUSH) 0.9 %
10 SYRINGE (ML) INJECTION EVERY 12 HOURS SCHEDULED
Status: DISCONTINUED | OUTPATIENT
Start: 2024-10-08 | End: 2024-10-13 | Stop reason: HOSPADM

## 2024-10-08 RX ORDER — AMOXICILLIN 250 MG
2 CAPSULE ORAL 2 TIMES DAILY PRN
Status: DISCONTINUED | OUTPATIENT
Start: 2024-10-08 | End: 2024-10-13 | Stop reason: HOSPADM

## 2024-10-08 RX ORDER — BISACODYL 10 MG
10 SUPPOSITORY, RECTAL RECTAL DAILY PRN
Status: DISCONTINUED | OUTPATIENT
Start: 2024-10-08 | End: 2024-10-13 | Stop reason: HOSPADM

## 2024-10-08 RX ORDER — LEVETIRACETAM 500 MG/1
1000 TABLET ORAL 2 TIMES DAILY
Status: DISCONTINUED | OUTPATIENT
Start: 2024-10-08 | End: 2024-10-08

## 2024-10-08 RX ORDER — POLYETHYLENE GLYCOL 3350 17 G/17G
17 POWDER, FOR SOLUTION ORAL DAILY PRN
Status: DISCONTINUED | OUTPATIENT
Start: 2024-10-08 | End: 2024-10-13 | Stop reason: HOSPADM

## 2024-10-08 RX ORDER — FAMOTIDINE 20 MG/1
20 TABLET, FILM COATED ORAL
Status: DISCONTINUED | OUTPATIENT
Start: 2024-10-08 | End: 2024-10-13 | Stop reason: HOSPADM

## 2024-10-08 RX ORDER — ENOXAPARIN SODIUM 100 MG/ML
40 INJECTION SUBCUTANEOUS DAILY
Status: DISCONTINUED | OUTPATIENT
Start: 2024-10-09 | End: 2024-10-13 | Stop reason: HOSPADM

## 2024-10-08 RX ORDER — ONDANSETRON 2 MG/ML
4 INJECTION INTRAMUSCULAR; INTRAVENOUS EVERY 6 HOURS PRN
Status: DISCONTINUED | OUTPATIENT
Start: 2024-10-08 | End: 2024-10-13 | Stop reason: HOSPADM

## 2024-10-08 RX ADMIN — LEVETIRACETAM 1000 MG: 500 TABLET, FILM COATED ORAL at 21:16

## 2024-10-08 RX ADMIN — METHYLPREDNISOLONE SODIUM SUCCINATE 500 MG: 500 INJECTION INTRAMUSCULAR; INTRAVENOUS at 17:57

## 2024-10-08 RX ADMIN — Medication 10 ML: at 21:18

## 2024-10-08 RX ADMIN — WHITE PETROLATUM 41 % TOPICAL OINTMENT 1 APPLICATION: OINTMENT at 21:16

## 2024-10-08 RX ADMIN — Medication 10 ML: at 15:30

## 2024-10-08 RX ADMIN — Medication 1 PACKET: at 21:17

## 2024-10-08 RX ADMIN — FAMOTIDINE 20 MG: 20 TABLET, FILM COATED ORAL at 17:38

## 2024-10-08 NOTE — PLAN OF CARE
Goal Outcome Evaluation:  Plan of Care Reviewed With: patient        Progress: no change  Outcome Evaluation: Dr Shay consulted; ordered Solumedrol IVPB q 8 hrs; assistance with drinking/eating and ambulating needed due to generalized weakness & bilateral hand and feet neuropathy

## 2024-10-08 NOTE — CONSULTS
Patient Identification:  NAME:  Efren Christianson  Age:  79 y.o.   Sex:  male   :  1945   MRN:  [id number]       Chief complaint: He states he has just gotten weaker and weaker, reason for consult exacerbation of CIDP    History of present illness: Patient is a 79-year-old right-handed white male who originally had some distal weakness clumsiness and some loss of sensation and has been diagnosed with CIDP.  He does not think he has ever had a nerve biopsy but he has had a spinal tap and nerve conduction testing that was consistent with that.  He does not think he has ever had plasma exchange or steroids but he has been treated at least for the last 2 years with a modifying factor of IVIG.  He and his pseudo daughter note that he does get some strength improvement for a week and a half after he gets those treatments.  Nonetheless for the last few days and weeks in particular in duration.  He feels like he has been getting weaker particularly in the hands.  Location is all 4 extremities.  Context a patient who has been on IVIG monthly for the last couple years and was going to start outpatient Vyvgart infusions this Thursday.  No associated symptoms of shortness of breath or chest pain and his swallowing seems to be okay unless he tries to eat too quickly.  Modifying factors so far has been the monthly IVIG treatments  He does have a history of seizures and has not had any seizure activity.  Of recent.      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    Home medications:  Medications Prior to Admission   Medication Sig Dispense Refill Last Dose    Immune Globulin, Human, (GAMMAGARD IV) Infuse 1 g/kg into a venous catheter Every 30 (Thirty) Days.   9/10/2024    levETIRAcetam (KEPPRA) 1000 MG tablet Take 1 tablet by mouth Every 12 (Twelve) Hours.   10/8/2024 at 0800    multivitamin (THERAGRAN) tablet tablet Take  by mouth Daily.   10/8/2024 at 0800    ferrous sulfate 324 (65 Fe) MG tablet delayed-release EC tablet Take 1 tablet by mouth Daily With Breakfast. Three times a week       vitamin C (ASCORBIC ACID) 250 MG tablet Take 1 tablet by mouth Daily.           Hospital medications:  Aquaphor Advanced Therapy, 1 Application, Topical, Q12H  [START ON 10/9/2024] enoxaparin, 40 mg, Subcutaneous, Daily  famotidine, 20 mg, Oral, BID AC  methylPREDNISolone sodium succinate, 500 mg, Intravenous, Q8H  sodium chloride, 10 mL, Intravenous, Q12H           senna-docusate sodium **AND** polyethylene glycol **AND** bisacodyl **AND** bisacodyl    Calcium Replacement - Follow Nurse / BPA Driven Protocol    influenza vaccine    LORazepam    Magnesium Standard Dose Replacement - Follow Nurse / BPA Driven Protocol    ondansetron ODT **OR** ondansetron    Phosphorus Replacement - Follow Nurse / BPA Driven Protocol    Potassium Replacement - Follow Nurse / BPA Driven Protocol    [COMPLETED] Insert Peripheral IV **AND** sodium chloride    sodium chloride    sodium chloride    Family history:  Family History   Problem Relation Age of Onset    Cancer Mother     Cancer Father     Coronary artery disease Brother     Multiple sclerosis Paternal Cousin     Multiple sclerosis Paternal Cousin        Social history:  Social History     Tobacco Use    Smoking status: Never    Smokeless tobacco: Never   Vaping Use    Vaping status: Never Used   Substance Use Topics    Alcohol use: Yes     Comment: occasional    Drug use: Never        Review of systems:    No hair eyes ears nose throat skin bone joint  lymphatic hematologic or oncologic complaints no chest pain abdominal pain bowel incontinence fever chills or rash.  He does not lay in bed all day but has not been able to walk for quite a while.  He is using a wheelchair and has a scooter that he can use.  He can only walk with extreme assistance.  He cannot really  the walker with the hands    Objective:  Vitals Ranges:   Temp:  [97.9 °F (36.6 °C)-98.4 °F (36.9 °C)] 98.4 °F (36.9 °C)  Heart Rate:  [] 88  Resp:  [12-16] 16  BP: (145-177)/() 177/96      Physical Exam:  Awake alert oriented x 3 and very pleasant.  Fund of knowledge good attention span concentration good recent remote memory good language function normal.  No dysarthria.  Recent and remote memory seems to be good.  Pupils approximately 4 constricting slightly bilaterally extraocular movements are full without nystagmus or subjective diplopia nasolabial folds palate tongue symmetrical normal facial sensation.  Neck flexion is good head turning is good.  Motor exam distal greater than proximal weakness in all 4 extremities.  Both of his hands are with fingers that are flexed downward.  There may be an element of Dupuytren's contracture in the left hand.  Proximal strength 3+ out of 5.  He seems to have some triceps strength on the right 3+ out of 5 and has less of that strength 1 out of 5 in the left upper extremity as his arm flops back toward him.  Bicep strength 3+ out of 5 bilaterally distal strength 1-2 out of 5 distal atrophy particularly in the intrinsic hand muscles noted.  Lower extremity strength proximally 3 out of 5 distally.  He can wiggle his toes but has poor dorsi flexion on the right foot definite loss of dorsiflexion in the left foot plantarflexion is present bilaterally.  Reflexes absent throughout toes mute bilaterally.  There is some loss or abnormal sensation distally in all 4 extremities.   Coordination significant problems in the upper extremities with proprioception.  Station and gait not possible.  Heart is regular without murmur neck supple without bruits extremities no clubbing cyanosis or edema    Results review:   I reviewed the patient's new clinical results.    Data review:  Lab Results (last 24 hours)       Procedure Component Value Units Date/Time    [facility] Draw [[id number]] Collected: 10/08/24 1233    Specimen: Blood Updated: 10/08/24 1436    Narrative:      The following orders were created for panel order [facility].  Procedure                               Abnormality         Status                     ---------                               -----------         ------                     Green Top (Gel)[[id number]]                                  Final result               Lavender Top[[id number]]                                     Final result               Red Top[[id number]]                                                                     Gold Top - SST[[id number]]                                   Final result               Green Top (No Gel)[[id number]]                                                          Floyd Top[[id number]]                                         Final result               Light Blue Top[[id number]]                                   Final result               Green Top (Gel)[[id number]]                                                               Please view results for these tests on the individual orders.    Urinalysis With Microscopic If Indicated (No Culture) - Urine, Clean Catch [[id number]]  (Normal) Collected: 10/08/24 1318    Specimen: Urine, Clean Catch Updated: 10/08/24 1348     Color, UA Yellow     Appearance, UA Clear     pH, UA 5.5     Specific Gravity, UA 1.015     Glucose, UA Negative     Ketones, UA Negative     Bilirubin, UA Negative     Blood, UA Negative     Protein, UA Negative     Leuk Esterase, UA Negative     Nitrite, UA Negative      Urobilinogen, UA 0.2 E.U./dL    Narrative:      Urine microscopic not indicated.    TSH [[id number]]  (Abnormal) Collected: 10/08/24 1233    Specimen: Blood Updated: 10/08/24 1308     TSH 4.350 uIU/mL     T4, Free [[id number]]  (Normal) Collected: 10/08/24 1233    Specimen: Blood Updated: 10/08/24 1308     Free T4 1.47 ng/dL     Narrative:      Results may be falsely increased if patient taking Biotin.      Comprehensive Metabolic Panel [[id number]] Collected: 10/08/24 1233    Specimen: Blood Updated: 10/08/24 1307     Glucose 94 mg/dL      BUN 21 mg/dL      Creatinine 1.00 mg/dL      Sodium 137 mmol/L      Potassium 4.2 mmol/L      Chloride 102 mmol/L      CO2 25.9 mmol/L      Calcium 9.4 mg/dL      Total Protein 7.9 g/dL      Albumin 4.2 g/dL      ALT (SGPT) 24 U/L      AST (SGOT) 32 U/L      Alkaline Phosphatase 70 U/L      Total Bilirubin 0.5 mg/dL      Globulin 3.7 gm/dL      A/G Ratio 1.1 g/dL      BUN/Creatinine Ratio 21.0     Anion Gap 9.1 mmol/L      eGFR 77.5 mL/min/1.73     Narrative:      GFR Normal >60  Chronic Kidney Disease <60  Kidney Failure <15    The GFR formula is only valid for adults with stable renal function between ages 18 and 70.    Phosphorus [[id number]]  (Abnormal) Collected: 10/08/24 1233    Specimen: Blood Updated: 10/08/24 1307     Phosphorus 2.4 mg/dL     Magnesium [[id number]]  (Normal) Collected: 10/08/24 1233    Specimen: Blood Updated: 10/08/24 1307     Magnesium 2.0 mg/dL     Green Top (Gel) [[id number]] Collected: 10/08/24 1233    Specimen: Blood Updated: 10/08/24 1245     Extra Tube Hold for add-ons.     Comment: Auto resulted.       Lavender Top [[id number]] Collected: 10/08/24 1233    Specimen: Blood Updated: 10/08/24 1245     Extra Tube hold for add-on     Comment: Auto resulted       Gold Top - SST [[id number]] Collected: 10/08/24 1233    Specimen: Blood Updated: 10/08/24 1245     Extra Tube Hold for add-ons.     Comment: Auto resulted.       Everett Marin [[id number]] Collected:  10/08/24 1233    Specimen: Blood Updated: 10/08/24 1245     Extra Tube Hold for add-ons.     Comment: Auto resulted.       Light Blue Top [[id number]] Collected: 10/08/24 1233    Specimen: Blood Updated: 10/08/24 1245     Extra Tube Hold for add-ons.     Comment: Auto resulted       CBC & Differential [[id number]]  (Abnormal) Collected: 10/08/24 1233    Specimen: Blood Updated: 10/08/24 1244    Narrative:      The following orders were created for panel order CBC & Differential.  Procedure                               Abnormality         Status                     ---------                               -----------         ------                     CBC Auto Differential[[id number]]        Abnormal            Final result                 Please view results for these tests on the individual orders.    CBC Auto Differential [[id number]]  (Abnormal) Collected: 10/08/24 1233    Specimen: Blood Updated: 10/08/24 1244     WBC 6.21 10*3/mm3      RBC 4.59 10*6/mm3      Hemoglobin 15.0 g/dL      Hematocrit 44.3 %      MCV 96.5 fL      MCH 32.7 pg      MCHC 33.9 g/dL      RDW 12.0 %      RDW-SD 43.5 fl      MPV 9.9 fL      Platelets 167 10*3/mm3      Neutrophil % 65.7 %      Lymphocyte % 22.4 %      Monocyte % 10.5 %      Eosinophil % 0.6 %      Basophil % 0.6 %      Immature Grans % 0.2 %      Neutrophils, Absolute 4.08 10*3/mm3      Lymphocytes, Absolute 1.39 10*3/mm3      Monocytes, Absolute 0.65 10*3/mm3      Eosinophils, Absolute 0.04 10*3/mm3      Basophils, Absolute 0.04 10*3/mm3      Immature Grans, Absolute 0.01 10*3/mm3              Imaging:  Imaging Results (Last 24 Hours)       ** No results found for the last 24 hours. **               Assessment and Plan:       CIDP (chronic inflammatory demyelinating polyneuropathy)  He has been treated with monthly infusions of IVIG for the last several years but he has gotten progressively weaker recently and was going to start Vyvgart this Thursday  At this point I think he  would be a great candidate for a bolus of IV Solu-Medrol for several days to see if we can quickly get his strength to improve.  He states to his knowledge he has never been treated with steroids previously.  Also note he states that he has never had plasma exchange therapy and he would have to be transferred to [facility] for this.  If we needed to do it.  I am not in favor of that at this time as his swallowing and breathing and cranial nerve function all still seems to be excellent.    In short, I think at least several days of IV steroids is appropriate for him and I definitely agree with starting the Vyvgart which he will receive as outpatient infusions.  We will hold on the Vyvgart temporarily while he is getting the steroid treatment at this time.  But, subsequent to this I like the Vyvgart for him    So, Solu-Medrol 500 mg IV every 8 hours.  I have written for as needed Ativan at night if he needs it for sleep and he will be started on Pepcid.  He is also on subcu Lovenox to prevent DVT formation.      Fuentes Shay MD  10/08/24  16:08 EDT

## 2024-10-08 NOTE — H&P
[facility] GROUP HOSPITALIST     PCP: Winnie Murray    CODE status: Full     CHIEF COMPLAINT:  Weakness    HISTORY OF PRESENT ILLNESS:    80 y/o male with hx of CIDP and seizures presents to [facility] ED for worsening weakness.  Patient is followed by Dr Reyes and receives monthly IVIg infusions.  He says he has been more week the last few days. Today, he woke up and was unable to do much for himself.  His left hand is much weaker, and he is unable to feed himself.  He usually walks with a walker, although recently he has been so weak, he is unable to ambulate. He denies any headaches, blurry vision, slurred speech, fevers, chills, nausea, vomiting, diarrhea, cough, chest pain, or SOB.         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)     Difficulty walking     Hepatitis A     as a child    Seizures     Syncope     Urgency of urination      Past Surgical History:   Procedure Laterality Date    TRUNK LESION/CYST EXCISION N/A 8/3/2023    Procedure: excision of chest wall cyst and back cyst 10:00;  Surgeon: Kianna Oliva DO;  Location: [facility];  Service: General;  Laterality: N/A;     Family History   Problem Relation Age of Onset    Cancer Mother     Cancer Father     Coronary artery disease Brother     Multiple sclerosis Paternal Cousin     Multiple sclerosis Paternal Cousin      Social History     Tobacco Use    Smoking status: Never    Smokeless tobacco: Never   Vaping Use    Vaping status: Never Used   Substance Use Topics    Alcohol use: Yes     Comment: occasional    Drug use: Never     (Not in a hospital admission)    Allergies:  Patient has no known allergies.      There is no immunization history on file for this patient.    REVIEW OF SYSTEMS:  Please see the above history of present illness for pertinent positives and negatives.  The remainder of the patient's systems have been reviewed and are  "negative.     Vital Signs  Temp:  [97.9 °F (36.6 °C)] 97.9 °F (36.6 °C)  Heart Rate:  [] 87  Resp:  [12-16] 16  BP: (145-177)/() 145/93  Flowsheet Rows      Flowsheet Row First Filed Value   Admission Height 172.7 cm (68\") Documented at 10/08/2024 1229   Admission Weight 70.8 kg (156 lb) Documented at 10/08/2024 1229               Physical Exam:  General: Patient is awake and alert.Sitting up in bed; pleasant  HENT: Head is atraumatic, normocephalic  Eyes: Vision is grossly intact. Pupils appear equal and round.   Cardiovascular: RRR, S1-S2  Respiratory: Lungs are clear to ausculation without wheezes, rhonchi or rales.   Abdominal/GI: Soft, nontender, bowel sounds present. No rebound or guarding present.   Extremities: No peripheral edema noted.   Musculoskeletal: Limited ROM of bilateral lower and upper extremities   Skin: Warm and dry.   Psych: Mood and affect are appropriate. Cooperative with exam.   Neuro: No facial asymmetry noted; claw hand left, with limited ;          Results Review:    I reviewed the patient's new clinical results.  Lab Results (most recent)       Procedure Component Value Units Date/Time    Urinalysis With Microscopic If Indicated (No Culture) - Urine, Clean Catch [[id number]]  (Normal) Collected: 10/08/24 1318    Specimen: Urine, Clean Catch Updated: 10/08/24 1348     Color, UA Yellow     Appearance, UA Clear     pH, UA 5.5     Specific Gravity, UA 1.015     Glucose, UA Negative     Ketones, UA Negative     Bilirubin, UA Negative     Blood, UA Negative     Protein, UA Negative     Leuk Esterase, UA Negative     Nitrite, UA Negative     Urobilinogen, UA 0.2 E.U./dL    Narrative:      Urine microscopic not indicated.    TSH [[id number]]  (Abnormal) Collected: 10/08/24 1233    Specimen: Blood Updated: 10/08/24 1308     TSH 4.350 uIU/mL     T4, Free [[id number]]  (Normal) Collected: 10/08/24 1233    Specimen: Blood Updated: 10/08/24 1308     Free T4 1.47 ng/dL     Narrative:      " Results may be falsely increased if patient taking Biotin.      Comprehensive Metabolic Panel [[id number]] Collected: 10/08/24 1233    Specimen: Blood Updated: 10/08/24 1307     Glucose 94 mg/dL      BUN 21 mg/dL      Creatinine 1.00 mg/dL      Sodium 137 mmol/L      Potassium 4.2 mmol/L      Chloride 102 mmol/L      CO2 25.9 mmol/L      Calcium 9.4 mg/dL      Total Protein 7.9 g/dL      Albumin 4.2 g/dL      ALT (SGPT) 24 U/L      AST (SGOT) 32 U/L      Alkaline Phosphatase 70 U/L      Total Bilirubin 0.5 mg/dL      Globulin 3.7 gm/dL      A/G Ratio 1.1 g/dL      BUN/Creatinine Ratio 21.0     Anion Gap 9.1 mmol/L      eGFR 77.5 mL/min/1.73     Narrative:      GFR Normal >60  Chronic Kidney Disease <60  Kidney Failure <15    The GFR formula is only valid for adults with stable renal function between ages 18 and 70.    Phosphorus [[id number]]  (Abnormal) Collected: 10/08/24 1233    Specimen: Blood Updated: 10/08/24 1307     Phosphorus 2.4 mg/dL     Magnesium [[id number]]  (Normal) Collected: 10/08/24 1233    Specimen: Blood Updated: 10/08/24 1307     Magnesium 2.0 mg/dL     [place] Draw [[id number]] Collected: 10/08/24 1233    Specimen: Blood Updated: 10/08/24 1245    Narrative:      The following orders were created for panel order [place] Draw.  Procedure                               Abnormality         Status                     ---------                               -----------         ------                     Green Top (Gel)[[id number]]                                  Final result               Lavender Top[[id number]]                                     Final result               Red Top[[id number]]                                                                     Gold Top - SST[[id number]]                                   Final result               Green Top (No Gel)[[id number]]                                                          Floyd Top[[id number]]                                         Final result                Light Blue Top[[id number]]                                   Final result               Green Top (Gel)[[id number]]                                                               Please view results for these tests on the individual orders.    Green Top (Gel) [[id number]] Collected: 10/08/24 1233    Specimen: Blood Updated: 10/08/24 1245     Extra Tube Hold for add-ons.     Comment: Auto resulted.       Lavender Top [[id number]] Collected: 10/08/24 1233    Specimen: Blood Updated: 10/08/24 1245     Extra Tube hold for add-on     Comment: Auto resulted       Gold Top - SST [[id number]] Collected: 10/08/24 1233    Specimen: Blood Updated: 10/08/24 1245     Extra Tube Hold for add-ons.     Comment: Auto resulted.       Floyd Top [[id number]] Collected: 10/08/24 1233    Specimen: Blood Updated: 10/08/24 1245     Extra Tube Hold for add-ons.     Comment: Auto resulted.       Light Blue Top [[id number]] Collected: 10/08/24 1233    Specimen: Blood Updated: 10/08/24 1245     Extra Tube Hold for add-ons.     Comment: Auto resulted       CBC & Differential [[id number]]  (Abnormal) Collected: 10/08/24 1233    Specimen: Blood Updated: 10/08/24 1244    Narrative:      The following orders were created for panel order CBC & Differential.  Procedure                               Abnormality         Status                     ---------                               -----------         ------                     CBC Auto Differential[[id number]]        Abnormal            Final result                 Please view results for these tests on the individual orders.    CBC Auto Differential [[id number]]  (Abnormal) Collected: 10/08/24 1233    Specimen: Blood Updated: 10/08/24 1244     WBC 6.21 10*3/mm3      RBC 4.59 10*6/mm3      Hemoglobin 15.0 g/dL      Hematocrit 44.3 %      MCV 96.5 fL      MCH 32.7 pg      MCHC 33.9 g/dL      RDW 12.0 %      RDW-SD 43.5 fl      MPV 9.9 fL      Platelets 167 10*3/mm3      Neutrophil % 65.7 %       Lymphocyte % 22.4 %      Monocyte % 10.5 %      Eosinophil % 0.6 %      Basophil % 0.6 %      Immature Grans % 0.2 %      Neutrophils, Absolute 4.08 10*3/mm3      Lymphocytes, Absolute 1.39 10*3/mm3      Monocytes, Absolute 0.65 10*3/mm3      Eosinophils, Absolute 0.04 10*3/mm3      Basophils, Absolute 0.04 10*3/mm3      Immature Grans, Absolute 0.01 10*3/mm3             Imaging Results (Most Recent)       None          reviewed    ECG/EMG Results (most recent)       None              Assessment & Plan     Acute exacerbation of CIDP  Seen by Neurology who plans to start high dose IV solumedrol   Monitor response  Pepcid BID while on steroids   PT/OT evaluations    Hx of Seizures  Continue Keppra    Hypophosphatemia  Replete     DVT ppx:  Lovenox       Chris Millan DO  10/08/24  14:35 EDT        At [facility], we believe that sharing information builds trust and better relationships. You are receiving this note because you recently visited [facility]. It is possible you will see health information before a provider has talked with you about it. This kind of information can be easy to misunderstand. To help you fully understand what it means for your health, we urge you to discuss this note with your provider.

## 2024-10-08 NOTE — ED PROVIDER NOTES
Subjective   History of Present Illness  79-year-old  male patient presented to the emergency department via private vehicle with a chief complaint of increasing weakness.  Per patient's report he has began having worsening weakness in his arms and legs over the past 2 to 3 days.  Patient does have a history of CDIP and gets Gammagard monthly.  He states he had his last infusion about a month ago.  Patient does see neurology.  He states that he and his caregiver reached out to neurology and they recommended he come to the emergency department due to his progressive weakness.    History provided by:  Medical records, patient and caregiver      Review of Systems   HENT: Negative.     Respiratory: Negative.     Cardiovascular: Negative.    Gastrointestinal:  Positive for diarrhea. Negative for abdominal distention, abdominal pain, anal bleeding, blood in stool, constipation, nausea, rectal pain and vomiting.   Musculoskeletal:  Positive for gait problem and joint swelling. Negative for arthralgias, back pain, myalgias, neck pain and neck stiffness.   Skin: Negative.    Neurological:  Positive for weakness.   Hematological: Negative.    Psychiatric/Behavioral: Negative.     All other systems reviewed and are negative.      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;  Service: General;  Laterality: N/A;       Family History   Problem Relation Age of Onset    Cancer Mother     Cancer Father     Coronary artery disease Brother     Multiple sclerosis Paternal Cousin     Multiple sclerosis Paternal Cousin         Social History     Socioeconomic History    Marital status: Single   Tobacco Use    Smoking status: Never    Smokeless tobacco: Never   Vaping Use    Vaping status: Never Used   Substance and Sexual Activity    Alcohol use: Yes     Comment: occasional    Drug use: Never    Sexual activity: Defer           Objective   Physical Exam  Vitals and nursing note reviewed.   Constitutional:       General: He is not in acute distress.     Appearance: He is normal weight. He is ill-appearing. He is not toxic-appearing or diaphoretic.   HENT:      Head: Normocephalic and atraumatic.      Right Ear: External ear normal.      Left Ear: External ear normal.      Nose: Nose normal.      Mouth/Throat:      Mouth: Mucous membranes are moist.      Pharynx: Oropharynx is clear.   Eyes:      Extraocular Movements: Extraocular movements intact.      Conjunctiva/sclera: Conjunctivae normal.      Pupils: Pupils are equal, round, and reactive to light.   Cardiovascular:      Rate and Rhythm: Normal rate and regular rhythm.      Pulses: Normal pulses.      Heart sounds: Normal heart sounds.   Pulmonary:      Effort: Pulmonary effort is normal.      Breath sounds: Normal breath sounds.   Abdominal:      General: Abdomen is scaphoid. Bowel sounds are normal. There is no distension or abdominal bruit. There are no signs of injury.      Palpations: Abdomen is soft.      Tenderness: There is no abdominal tenderness.   Musculoskeletal:      Right hand: Decreased range of motion.      Left hand: Decreased range of motion.      Cervical back: Normal range of motion and neck supple.      Comments: Hand of Benediction vs claw hand bilaterally, right is worse than left   Skin:     General: Skin is warm and dry.      Capillary Refill: Capillary refill takes less than 2 seconds.   Neurological:      Mental Status: He is alert and oriented to person, place, and time.      GCS: GCS eye subscore is 4. GCS verbal subscore is 5. GCS motor subscore is  6.      Cranial Nerves: Cranial nerves 2-12 are intact.      Motor: Weakness present.      Gait: Gait abnormal.      Deep Tendon Reflexes:      Reflex Scores:       Brachioradialis reflexes are 0 on the right side and 0 on the left side.       Achilles reflexes are 0 on the right side and 0 on the left side.  Psychiatric:         Attention and Perception: Attention and perception normal.         Mood and Affect: Mood and affect normal.         Speech: Speech normal.         Behavior: Behavior normal. Behavior is cooperative.         Thought Content: Thought content normal.         Cognition and Memory: Cognition and memory normal.         Judgment: Judgment normal.         Procedures           ED Course  ED Course as of 10/08/24 1344   Tue Oct 08, 2024   1323 Neurology paged to discuss patient [RC]   1327 Spoke with Dr. Reyes and discussed the patient.  He recommends we reach out to the hospitalist to get the patient admitted for IVIG treatment.  We will reach out to the hospitalist to discuss admission. [RC]   1335 Spoke with Dr. Millan the attending hospitalist to discuss patient.  He will admit the patient to the medical surgical floor for further observation and treatment. [RC]      ED Course User Index  [RC] Loi Madrid, MUKESH                                             Medical Decision Making  Differential diagnosis includes progressive CDIP, electrolyte abnormality, other demyelinating disorders, TIA, CVA, and debility.    Labs Reviewed  PHOSPHORUS - Abnormal; Notable for the following components:     Phosphorus                    2.4 (*)             All other components within normal limits  TSH - Abnormal; Notable for the following components:     TSH                           4.350 (*)            All other components within normal limits  CBC WITH AUTO DIFFERENTIAL - Abnormal; Notable for the following components:     RDW                           12.0 (*)            All other components within  normal limits  MAGNESIUM - Normal  T4, FREE - Normal         Narrative: Results may be falsely increased if patient taking Biotin.                    COMPREHENSIVE METABOLIC PANEL         Narrative: GFR Normal >60                  Chronic Kidney Disease <60                  Kidney Failure <15                                    The GFR formula is only valid for adults with stable renal function between ages 18 and 70.  RAINBOW DRAW         Narrative: The following orders were created for panel order [place] Draw.                  Procedure                               Abnormality         Status                                     ---------                               -----------         ------                                     Green Top (Gel)[[id number]]                                  Final result                               Lavender Top[[id number]]                                     Final result                               Red Top[[id number]]                                                                                     Gold Top - SST[[id number]]                                   Final result                               Green Top (No Gel)[[id number]]                                                                          Floyd Top[[id number]]                                         Final result                               Light Blue Top[[id number]]                                   Final result                               Green Top (Gel)[[id number]]                                                                                               Please view results for these tests on the individual orders.  URINALYSIS W/ MICROSCOPIC IF INDICATED (NO CULTURE)  GREEN TOP  LAVENDER TOP  GOLD TOP - SST  GRAY TOP  LIGHT BLUE TOP  CBC AND DIFFERENTIAL         Narrative: The following orders were created for panel order CBC & Differential.                  Procedure                               Abnormality          Status                                     ---------                               -----------         ------                                     CBC Auto Differential[[id number]]        Abnormal            Final result                                                 Please view results for these tests on the individual orders.  RED TOP  GREEN TOP NO GEL     Discussed laboratory and assessment findings with patient and caregiver.  Patient appears to be having a worsening/exacerbation of his CDIP.  Patient has no severe laboratory abnormalities.  I did call and talk with Dr. Reyes after reviewing laboratory and assessment findings.  He recommends the patient be admitted to the medical surgical floor by the hospitalist and to be started on IVIG.  He states he will consult with the hospitalist for dosing of the IVIG if needed.  Discussed the patient with the attending hospitalist Dr. Millan.  He agrees to admit the patient to the medical surgical floor.  This was discussed with the patient and caregiver.  Patient will be admitted to the medical surgical floor for further observation and treatment.    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 Decision-making details documented in ED Course.    Risk  Prescription drug management.  Decision regarding hospitalization.        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       Medication List      No changes were made to your prescriptions during this visit.            Loi Madrid, APRN  10/08/24 1342

## 2024-10-08 NOTE — TELEPHONE ENCOUNTER
Received a call from [facility] stating daughter called concerned over decline.     Called Lavern and discussed concerns. Lavern reports pt is not able to move hands and barely able to stand up straight. She reports he is worse than she has seen him. Per Dr. Reyes, present to ER for evaluation. Lavern v/u and will take pt to  [facility].

## 2024-10-08 NOTE — ED NOTES
"Nursing report ED to floor  Efren Christianson  79 y.o.  male    HPI :   Chief Complaint   Patient presents with    Weakness - Generalized     Pt and family states starting yesterday but has not been able to ambulate with his walker as he usually dose, knees are buckling upon standing, pt no longer is able to  with bilateral hands. 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 Diagnoses of Progressive focal motor weakness, Long-term current use of intravenous immunoglobulin (IVIG), and Primary hypertension were also pertinent to this visit.    Code status:   Current Code Status       Date Active Code Status Order ID Comments User Context       Not on file            Allergies:   Patient has no known allergies.    Isolation:   No active isolations    Intake and Output  No intake or output data in the 24 hours ending 10/08/24 1355    Weight:       10/08/24  1229   Weight: 70.8 kg (156 lb)       Most recent vitals:   Vitals:    10/08/24 1229 10/08/24 1231 10/08/24 1245 10/08/24 1330   BP:  177/100 154/97 166/99   Pulse: 102  93 98   Resp: 16  16 12   Temp:  97.9 °F (36.6 °C)     SpO2: 96%  96% 96%   Weight: 70.8 kg (156 lb)      Height: 172.7 cm (68\")          Active LDAs/IV Access:   Lines, Drains & Airways       Active LDAs       Name Placement date Placement time Site Days    Peripheral IV 09/10/24 0920 Distal;Left;Posterior Forearm 09/10/24  0920  Forearm  28                    Labs (abnormal labs have a star):   Labs Reviewed   PHOSPHORUS - Abnormal; Notable for the following components:       Result Value    Phosphorus 2.4 (*)     All other components within normal limits   TSH - Abnormal; Notable for the following components:    TSH 4.350 (*)     All other components within normal limits   CBC WITH AUTO " DIFFERENTIAL - Abnormal; Notable for the following components:    RDW 12.0 (*)     All other components within normal limits   MAGNESIUM - Normal   T4, FREE - Normal    Narrative:     Results may be falsely increased if patient taking Biotin.     URINALYSIS W/ MICROSCOPIC IF INDICATED (NO CULTURE) - Normal    Narrative:     Urine microscopic not indicated.   COMPREHENSIVE METABOLIC PANEL    Narrative:     GFR Normal >60  Chronic Kidney Disease <60  Kidney Failure <15    The GFR formula is only valid for adults with stable renal function between ages 18 and 70.   RAINBOW DRAW    Narrative:     The following orders were created for panel order [place] Draw.  Procedure                               Abnormality         Status                     ---------                               -----------         ------                     Green Top (Gel)[[id number]]                                  Final result               Lavender Top[[id number]]                                     Final result               Red Top[[id number]]                                                                     Gold Top - SST[[id number]]                                   Final result               Green Top (No Gel)[[id number]]                                                          Floyd Top[[id number]]                                         Final result               Light Blue Top[[id number]]                                   Final result               Green Top (Gel)[[id number]]                                                               Please view results for these tests on the individual orders.   GREEN TOP   LAVENDER TOP   GOLD TOP - SST   GRAY TOP   LIGHT BLUE TOP   CBC AND DIFFERENTIAL    Narrative:     The following orders were created for panel order CBC & Differential.  Procedure                               Abnormality         Status                     ---------                               -----------         ------                      CBC Auto Differential[[id number]]        Abnormal            Final result                 Please view results for these tests on the individual orders.   RED TOP   GREEN TOP NO GEL       Results       Procedure Component Value Ref Range Date/Time    Urinalysis With Microscopic If Indicated (No Culture) - Urine, Clean Catch [[id number]]  (Normal) Collected: 10/08/24 1318    Order Status: Completed Specimen: Urine, Clean Catch Updated: 10/08/24 1348     Color, UA Yellow Yellow, Straw      Appearance, UA Clear Clear      pH, UA 5.5 4.5 - 8.0      Specific Gravity, UA 1.015 1.003 - 1.030      Glucose, UA Negative Negative      Ketones, UA Negative Negative      Bilirubin, UA Negative Negative      Blood, UA Negative Negative      Protein, UA Negative Negative      Leuk Esterase, UA Negative Negative      Nitrite, UA Negative Negative      Urobilinogen, UA 0.2 E.U./dL 0.2 - 1.0 E.U./dL     Narrative:      Urine microscopic not indicated.    TSH [[id number]]  (Abnormal) Collected: 10/08/24 1233    Order Status: Completed Specimen: Blood Updated: 10/08/24 1308     TSH 4.350 0.270 - 4.200 uIU/mL     T4, Free [[id number]]  (Normal) Collected: 10/08/24 1233    Order Status: Completed Specimen: Blood Updated: 10/08/24 1308     Free T4 1.47 0.93 - 1.70 ng/dL     Narrative:      Results may be falsely increased if patient taking Biotin.      Comprehensive Metabolic Panel [[id number]] Collected: 10/08/24 1233    Order Status: Completed Specimen: Blood Updated: 10/08/24 1307     Glucose 94 65 - 99 mg/dL      BUN 21 8 - 23 mg/dL      Creatinine 1.00 0.76 - 1.27 mg/dL      Sodium 137 136 - 145 mmol/L      Potassium 4.2 3.5 - 5.2 mmol/L      Chloride 102 98 - 107 mmol/L      CO2 25.9 22.0 - 29.0 mmol/L      Calcium 9.4 8.6 - 10.5 mg/dL      Total Protein 7.9 6.0 - 8.5 g/dL      Albumin 4.2 3.5 - 5.2 g/dL      ALT (SGPT) 24 1 - 41 U/L      AST (SGOT) 32 1 - 40 U/L      Alkaline Phosphatase 70 39 - 117 U/L      Total Bilirubin 0.5  0.0 - 1.2 mg/dL      Globulin 3.7 gm/dL      A/G Ratio 1.1 g/dL      BUN/Creatinine Ratio 21.0 7.0 - 25.0      Anion Gap 9.1 5.0 - 15.0 mmol/L      eGFR 77.5 >60.0 mL/min/1.73     Narrative:      GFR Normal >60  Chronic Kidney Disease <60  Kidney Failure <15    The GFR formula is only valid for adults with stable renal function between ages 18 and 70.    Phosphorus [[id number]]  (Abnormal) Collected: 10/08/24 1233    Order Status: Completed Specimen: Blood Updated: 10/08/24 1307     Phosphorus 2.4 2.5 - 4.5 mg/dL     Magnesium [[id number]]  (Normal) Collected: 10/08/24 1233    Order Status: Completed Specimen: Blood Updated: 10/08/24 1307     Magnesium 2.0 1.6 - 2.4 mg/dL     [place] Draw [[id number]] Collected: 10/08/24 1233    Order Status: Sent Specimen: Blood Updated: 10/08/24 1245    Narrative:      The following orders were created for panel order [place] Draw.  Procedure                               Abnormality         Status                     ---------                               -----------         ------                     Green Top (Gel)[[id number]]                                  Final result               Lavender Top[[id number]]                                     Final result               Red Top[[id number]]                                                                     Gold Top - SST[[id number]]                                   Final result               Green Top (No Gel)[[id number]]                                                          Floyd Top[[id number]]                                         Final result               Light Blue Top[[id number]]                                   Final result               Green Top (Gel)[[id number]]                                                               Please view results for these tests on the individual orders.    CBC Auto Differential [[id number]]  (Abnormal) Collected: 10/08/24 1233    Order Status: Completed Specimen: Blood Updated: 10/08/24  1244     WBC 6.21 3.40 - 10.80 10*3/mm3      RBC 4.59 4.14 - 5.80 10*6/mm3      Hemoglobin 15.0 13.0 - 17.7 g/dL      Hematocrit 44.3 37.5 - 51.0 %      MCV 96.5 79.0 - 97.0 fL      MCH 32.7 26.6 - 33.0 pg      MCHC 33.9 31.5 - 35.7 g/dL      RDW 12.0 12.3 - 15.4 %      RDW-SD 43.5 37.0 - 54.0 fl      MPV 9.9 6.0 - 12.0 fL      Platelets 167 140 - 450 10*3/mm3      Neutrophil % 65.7 42.7 - 76.0 %      Lymphocyte % 22.4 19.6 - 45.3 %      Monocyte % 10.5 5.0 - 12.0 %      Eosinophil % 0.6 0.3 - 6.2 %      Basophil % 0.6 0.0 - 1.5 %      Immature Grans % 0.2 0.0 - 0.5 %      Neutrophils, Absolute 4.08 1.70 - 7.00 10*3/mm3      Lymphocytes, Absolute 1.39 0.70 - 3.10 10*3/mm3      Monocytes, Absolute 0.65 0.10 - 0.90 10*3/mm3      Eosinophils, Absolute 0.04 0.00 - 0.40 10*3/mm3      Basophils, Absolute 0.04 0.00 - 0.20 10*3/mm3      Immature Grans, Absolute 0.01 0.00 - 0.05 10*3/mm3              EKG:   No orders to display       Meds given in ED:   Medications   sodium chloride 0.9 % flush 10 mL (has no administration in time range)       Imaging results:  No radiology results for the last day    Ambulatory status:   - x2 assist    Social issues:   Social History     Socioeconomic History    Marital status: Single   Tobacco Use    Smoking status: Never    Smokeless tobacco: Never   Vaping Use    Vaping status: Never Used   Substance and Sexual Activity    Alcohol use: Yes     Comment: occasional    Drug use: Never    Sexual activity: Defer       NIH Stroke Scale:         Brittany Youssef RN  10/08/24 13:55 EDT

## 2024-10-09 LAB
ANION GAP SERPL CALCULATED.3IONS-SCNC: 11.2 MMOL/L (ref 5–15)
BUN SERPL-MCNC: 21 MG/DL (ref 8–23)
BUN/CREAT SERPL: 20.6 (ref 7–25)
CALCIUM SPEC-SCNC: 9.2 MG/DL (ref 8.6–10.5)
CHLORIDE SERPL-SCNC: 102 MMOL/L (ref 98–107)
CO2 SERPL-SCNC: 22.8 MMOL/L (ref 22–29)
CREAT SERPL-MCNC: 1.02 MG/DL (ref 0.76–1.27)
EGFRCR SERPLBLD CKD-EPI 2021: 75.7 ML/MIN/1.73
GLUCOSE SERPL-MCNC: 143 MG/DL (ref 65–99)
MAGNESIUM SERPL-MCNC: 2.2 MG/DL (ref 1.6–2.4)
PHOSPHATE SERPL-MCNC: 2.8 MG/DL (ref 2.5–4.5)
POTASSIUM SERPL-SCNC: 4.1 MMOL/L (ref 3.5–5.2)
SODIUM SERPL-SCNC: 136 MMOL/L (ref 136–145)

## 2024-10-09 PROCEDURE — 99232 SBSQ HOSP IP/OBS MODERATE 35: CPT | Performed by: HOSPITALIST

## 2024-10-09 PROCEDURE — 99232 SBSQ HOSP IP/OBS MODERATE 35: CPT | Performed by: PSYCHIATRY & NEUROLOGY

## 2024-10-09 PROCEDURE — 83735 ASSAY OF MAGNESIUM: CPT | Performed by: FAMILY MEDICINE

## 2024-10-09 PROCEDURE — 25010000002 ENOXAPARIN PER 10 MG: Performed by: FAMILY MEDICINE

## 2024-10-09 PROCEDURE — 80048 BASIC METABOLIC PNL TOTAL CA: CPT | Performed by: FAMILY MEDICINE

## 2024-10-09 PROCEDURE — 25010000002 METHYLPREDNISOLONE PER 125 MG: Performed by: PSYCHIATRY & NEUROLOGY

## 2024-10-09 PROCEDURE — 84100 ASSAY OF PHOSPHORUS: CPT | Performed by: FAMILY MEDICINE

## 2024-10-09 PROCEDURE — 97162 PT EVAL MOD COMPLEX 30 MIN: CPT

## 2024-10-09 PROCEDURE — 94799 UNLISTED PULMONARY SVC/PX: CPT

## 2024-10-09 PROCEDURE — 97166 OT EVAL MOD COMPLEX 45 MIN: CPT

## 2024-10-09 RX ADMIN — ENOXAPARIN SODIUM 40 MG: 40 INJECTION SUBCUTANEOUS at 08:53

## 2024-10-09 RX ADMIN — WHITE PETROLATUM 41 % TOPICAL OINTMENT 1 APPLICATION: OINTMENT at 08:58

## 2024-10-09 RX ADMIN — Medication 1 PACKET: at 08:52

## 2024-10-09 RX ADMIN — WHITE PETROLATUM 41 % TOPICAL OINTMENT 1 APPLICATION: OINTMENT at 21:37

## 2024-10-09 RX ADMIN — METHYLPREDNISOLONE SODIUM SUCCINATE 500 MG: 500 INJECTION INTRAMUSCULAR; INTRAVENOUS at 17:56

## 2024-10-09 RX ADMIN — FAMOTIDINE 20 MG: 20 TABLET, FILM COATED ORAL at 17:55

## 2024-10-09 RX ADMIN — Medication 10 ML: at 08:54

## 2024-10-09 RX ADMIN — Medication 10 ML: at 21:37

## 2024-10-09 RX ADMIN — METHYLPREDNISOLONE SODIUM SUCCINATE 500 MG: 500 INJECTION INTRAMUSCULAR; INTRAVENOUS at 00:58

## 2024-10-09 RX ADMIN — FAMOTIDINE 20 MG: 20 TABLET, FILM COATED ORAL at 08:52

## 2024-10-09 RX ADMIN — LEVETIRACETAM 1000 MG: 500 TABLET, FILM COATED ORAL at 21:37

## 2024-10-09 RX ADMIN — Medication 1 PACKET: at 13:19

## 2024-10-09 RX ADMIN — LEVETIRACETAM 1000 MG: 500 TABLET, FILM COATED ORAL at 08:53

## 2024-10-09 RX ADMIN — METHYLPREDNISOLONE SODIUM SUCCINATE 500 MG: 500 INJECTION INTRAMUSCULAR; INTRAVENOUS at 08:53

## 2024-10-09 NOTE — PLAN OF CARE
Problem: Adult Inpatient Plan of Care  Goal: Plan of Care Review  Recent Flowsheet Documentation  Taken 10/9/2024 0912 by Francis Good OTR  Plan of Care Reviewed With: patient  Outcome Evaluation: Occupational therapy evaluation completed. Pt with hx of CIDP.  Pt receives IVIG infusions monthly. He reports increased weakness is common a few days before his next infusion, yet he states the weakness was more significant this month than normal. Pt lives with caregivers that assist him with adl tasks and transfers. He typically is able to manage self feeding and peform stand pivot transfers to his w/c with assistance of caregivers.  He reported the weakness of his hands limited his functional grasp, and he noted more difficulty with bed mobility/transfers at home. Pt managed bed mobility with CGA/min assist this am. He maintained his static sitting balance with CGA, yet he required min/mod support to maintain dynamic sitting balance (posterior lean). Pt stood from EOB with bed surface elevated with mod assist with hand held assist x 2. Pt required max assist to dependence x 2 for stand step pivot transfer from the EOB to recliner. Pt needs assistance for all adl tasks at this time. Pt states his plan is to return to home with support of caregivers. Will continue to assess discharge needs/dispostion based on his progress during acute care stay.

## 2024-10-09 NOTE — PROGRESS NOTES
"Hospitalist Team      Patient Care Team:  Winnie Murray as PCP - General (Nurse Practitioner)  Winnie Murray (Nurse Practitioner)      Chief Complaint: Follow-up CIDP flare    Subjective    No acute events overnight.  Mr. Christianson reports he is not sure if he is doing better this morning or not.  Physical therapy does report improvement as he required less assistance getting up.  His baseline is standing and pivot to a wheelchair.  Staff is assisting him with his breakfast.  He denies chest pain and dyspnea.    Objective    Vital Signs  Temp:  [97.7 °F (36.5 °C)-98.5 °F (36.9 °C)] 98.4 °F (36.9 °C)  Heart Rate:  [] 90  Resp:  [12-20] 18  BP: (123-180)/() 149/90  Oxygen Therapy  SpO2: 96 %  Pulse Oximetry Type: Intermittent  Device (Oxygen Therapy): room air}    Flowsheet Rows      Flowsheet Row First Filed Value   Admission Height 172.7 cm (68\") Documented at 10/08/2024 1229   Admission Weight 70.8 kg (156 lb) Documented at 10/08/2024 1229          Physical Exam:    General: Thin male who appears in no acute distress.  Lungs: Breath sounds are clear throughout all fields.  Respirations are nonlabored.  CV: Regular rate and rhythm.  No murmur appreciated.  Radial pulses are 2+ and symmetric.  Abdomen: Soft and nontender with active bowel sounds.  MSK: No clubbing, cyanosis, or edema.  Neuro: Decreased muscle bulk noted the bilateral upper extremities and bilateral lower extremities.  No paralysis is noted.  Psych: Pleasant affect.  [place] x 3.    Results Review:     I reviewed the patient's new clinical results.    Lab Results (last 24 hours)       Procedure Component Value Units Date/Time    Basic Metabolic Panel [[id number]]  (Abnormal) Collected: 10/09/24 0556    Specimen: Blood Updated: 10/09/24 0727     Glucose 143 mg/dL      BUN 21 mg/dL      Creatinine 1.02 mg/dL      Sodium 136 mmol/L      Potassium 4.1 mmol/L      Chloride 102 mmol/L      CO2 22.8 mmol/L      Calcium 9.2 mg/dL      BUN/Creatinine " Ratio 20.6     Anion Gap 11.2 mmol/L      eGFR 75.7 mL/min/1.73     Narrative:      GFR Normal >60  Chronic Kidney Disease <60  Kidney Failure <15    The GFR formula is only valid for adults with stable renal function between ages 18 and 70.    Magnesium [[id number]]  (Normal) Collected: 10/09/24 0556    Specimen: Blood Updated: 10/09/24 0727     Magnesium 2.2 mg/dL     Phosphorus [[id number]]  (Normal) Collected: 10/09/24 0556    Specimen: Blood Updated: 10/09/24 0727     Phosphorus 2.8 mg/dL     [place] Draw [[id number]] Collected: 10/08/24 1233    Specimen: Blood Updated: 10/08/24 1436    Narrative:      The following orders were created for panel order [place] Draw.  Procedure                               Abnormality         Status                     ---------                               -----------         ------                     Green Top (Gel)[[id number]]                                  Final result               Lavender Top[[id number]]                                     Final result               Red Top[[id number]]                                                                     Gold Top - SST[[id number]]                                   Final result               Green Top (No Gel)[[id number]]                                                          Floyd Top[[id number]]                                         Final result               Light Blue Top[[id number]]                                   Final result               Green Top (Gel)[[id number]]                                                               Please view results for these tests on the individual orders.    Urinalysis With Microscopic If Indicated (No Culture) - Urine, Clean Catch [[id number]]  (Normal) Collected: 10/08/24 1318    Specimen: Urine, Clean Catch Updated: 10/08/24 1348     Color, UA Yellow     Appearance, UA Clear     pH, UA 5.5     Specific Gravity, UA 1.015     Glucose, UA Negative     Ketones, UA Negative     Bilirubin,  UA Negative     Blood, UA Negative     Protein, UA Negative     Leuk Esterase, UA Negative     Nitrite, UA Negative     Urobilinogen, UA 0.2 E.U./dL    Narrative:      Urine microscopic not indicated.    TSH [[id number]]  (Abnormal) Collected: 10/08/24 1233    Specimen: Blood Updated: 10/08/24 1308     TSH 4.350 uIU/mL     T4, Free [[id number]]  (Normal) Collected: 10/08/24 1233    Specimen: Blood Updated: 10/08/24 1308     Free T4 1.47 ng/dL     Narrative:      Results may be falsely increased if patient taking Biotin.      Comprehensive Metabolic Panel [[id number]] Collected: 10/08/24 1233    Specimen: Blood Updated: 10/08/24 1307     Glucose 94 mg/dL      BUN 21 mg/dL      Creatinine 1.00 mg/dL      Sodium 137 mmol/L      Potassium 4.2 mmol/L      Chloride 102 mmol/L      CO2 25.9 mmol/L      Calcium 9.4 mg/dL      Total Protein 7.9 g/dL      Albumin 4.2 g/dL      ALT (SGPT) 24 U/L      AST (SGOT) 32 U/L      Alkaline Phosphatase 70 U/L      Total Bilirubin 0.5 mg/dL      Globulin 3.7 gm/dL      A/G Ratio 1.1 g/dL      BUN/Creatinine Ratio 21.0     Anion Gap 9.1 mmol/L      eGFR 77.5 mL/min/1.73     Narrative:      GFR Normal >60  Chronic Kidney Disease <60  Kidney Failure <15    The GFR formula is only valid for adults with stable renal function between ages 18 and 70.    Phosphorus [[id number]]  (Abnormal) Collected: 10/08/24 1233    Specimen: Blood Updated: 10/08/24 1307     Phosphorus 2.4 mg/dL     Magnesium [[id number]]  (Normal) Collected: 10/08/24 1233    Specimen: Blood Updated: 10/08/24 1307     Magnesium 2.0 mg/dL     Green Top (Gel) [[id number]] Collected: 10/08/24 1233    Specimen: Blood Updated: 10/08/24 1245     Extra Tube Hold for add-ons.     Comment: Auto resulted.       Lavender Top [[id number]] Collected: 10/08/24 1233    Specimen: Blood Updated: 10/08/24 1245     Extra Tube hold for add-on     Comment: Auto resulted       Gold Top - SST [[id number]] Collected: 10/08/24 1233    Specimen: Blood Updated:  10/08/24 1245     Extra Tube Hold for add-ons.     Comment: Auto resulted.       Floyd Top [[id number]] Collected: 10/08/24 1233    Specimen: Blood Updated: 10/08/24 1245     Extra Tube Hold for add-ons.     Comment: Auto resulted.       Light Blue Top [[id number]] Collected: 10/08/24 1233    Specimen: Blood Updated: 10/08/24 1245     Extra Tube Hold for add-ons.     Comment: Auto resulted       CBC & Differential [[id number]]  (Abnormal) Collected: 10/08/24 1233    Specimen: Blood Updated: 10/08/24 1244    Narrative:      The following orders were created for panel order CBC & Differential.  Procedure                               Abnormality         Status                     ---------                               -----------         ------                     CBC Auto Differential[[id number]]        Abnormal            Final result                 Please view results for these tests on the individual orders.    CBC Auto Differential [[id number]]  (Abnormal) Collected: 10/08/24 1233    Specimen: Blood Updated: 10/08/24 1244     WBC 6.21 10*3/mm3      RBC 4.59 10*6/mm3      Hemoglobin 15.0 g/dL      Hematocrit 44.3 %      MCV 96.5 fL      MCH 32.7 pg      MCHC 33.9 g/dL      RDW 12.0 %      RDW-SD 43.5 fl      MPV 9.9 fL      Platelets 167 10*3/mm3      Neutrophil % 65.7 %      Lymphocyte % 22.4 %      Monocyte % 10.5 %      Eosinophil % 0.6 %      Basophil % 0.6 %      Immature Grans % 0.2 %      Neutrophils, Absolute 4.08 10*3/mm3      Lymphocytes, Absolute 1.39 10*3/mm3      Monocytes, Absolute 0.65 10*3/mm3      Eosinophils, Absolute 0.04 10*3/mm3      Basophils, Absolute 0.04 10*3/mm3      Immature Grans, Absolute 0.01 10*3/mm3             Imaging Results (Last 24 Hours)       ** No results found for the last 24 hours. **            Medication Review:   I have reviewed the patient's current medication list    Current Facility-Administered Medications:     Aquaphor Advanced Therapy ointment 1 Application, 1  Application, Topical, Q12H, Chris Millan DO, 1 Application at 10/09/24 0858    sennosides-docusate (PERICOLACE) 8.6-50 MG per tablet 2 tablet, 2 tablet, Oral, BID PRN **AND** polyethylene glycol (MIRALAX) packet 17 g, 17 g, Oral, Daily PRN **AND** bisacodyl (DULCOLAX) EC tablet 5 mg, 5 mg, Oral, Daily PRN **AND** bisacodyl (DULCOLAX) suppository 10 mg, 10 mg, Rectal, Daily PRN, Chris Millan DO    Calcium Replacement - Follow Nurse / BPA Driven Protocol, , Does not apply, PRN, Chris Millan DO    Enoxaparin Sodium (LOVENOX) syringe 40 mg, 40 mg, Subcutaneous, Daily, Chris Millan DO, 40 mg at 10/09/24 0853    famotidine (PEPCID) tablet 20 mg, 20 mg, Oral, BID AC, Fuentes Shay MD, 20 mg at 10/09/24 0852    influenza vac split high-dose (FLUZONE HIGH DOSE) injection 0.5 mL, 0.5 mL, Intramuscular, During Hospitalization, Chris Millan DO    levETIRAcetam (KEPPRA) tablet 1,000 mg, 1,000 mg, Oral, Q12H, Chris Millan DO, 1,000 mg at 10/09/24 0853    LORazepam (ATIVAN) tablet 1 mg, 1 mg, Oral, Nightly PRN, Fuentes Shay MD    Magnesium Standard Dose Replacement - Follow Nurse / BPA Driven Protocol, , Does not apply, PRN, Chris Millan DO    methylPREDNISolone sodium succinate (SOLU-Medrol) 500 mg in sodium chloride 0.9 % 100 mL IVPB, 500 mg, Intravenous, Q8H, Fuentes Shay MD, Last Rate: 200 mL/hr at 10/09/24 0853, 500 mg at 10/09/24 0853    ondansetron ODT (ZOFRAN-ODT) disintegrating tablet 4 mg, 4 mg, Oral, Q6H PRN **OR** ondansetron (ZOFRAN) injection 4 mg, 4 mg, Intravenous, Q6H PRN, Chris Millan DO    Phosphorus Replacement - Follow Nurse / BPA Driven Protocol, , Does not apply, Monty SORIANO Paul A, DO    potassium & sodium phosphates (PHOS-NAK) 280-160-250 MG packet 1 packet, 1 packet, Oral, 4x Daily With Meals & Nightly, Chris Millan DO, 1 packet at 10/09/24 0852    Potassium Replacement - Follow Nurse / BPA Driven Protocol, , Does not apply, PRN, Chris Millan,  DO    [COMPLETED] Insert Peripheral IV, , , Once **AND** sodium chloride 0.9 % flush 10 mL, 10 mL, Intravenous, PRN, Loi [place], MUKESH    sodium chloride 0.9 % flush 10 mL, 10 mL, Intravenous, Q12H, Chris Millan DO, 10 mL at 10/09/24 0854    sodium chloride 0.9 % flush 10 mL, 10 mL, Intravenous, PRN, Chris Millan,     sodium chloride 0.9 % infusion 40 mL, 40 mL, Intravenous, PRN, Chris Millan,       Assessment & Plan     Acute flare of CIDP: Reviewed neurology's note.  Appreciate Dr. Shay's assistance.  Will continue IV steroids as recommended.  As noted above, physical therapy notes improvement.  Seizure disorder: No acute issues.  Will change to p.o. therapy.    Plan for disposition: Predicated on hospital course.    Lenin Desai MD  10/09/24  09:15 EDT

## 2024-10-09 NOTE — PLAN OF CARE
Goal Outcome Evaluation:  Plan of Care Reviewed With: patient        Progress: improving  Outcome Evaluation: Pt up in chair today. Uses urinal w assist. Tolerating diet, needs assistance w feeding. Soft touch call light in use. Continue IV steroids. All care explained. All questions answered. Pt verb understanding.

## 2024-10-09 NOTE — THERAPY EVALUATION
Patient Name: Efren Christianson  : 1945    MRN: [id number]                              Today's Date: 10/9/2024       Admit Date: 10/8/2024    Visit Dx:     ICD-10-CM ICD-9-CM   1. 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 Progressive focal motor weakness  R53.1 728.87   3. Long-term current use of intravenous immunoglobulin (IVIG)  Z79.899 V58.69   4. 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)     Difficulty walking     Hepatitis A     as a child    Seizures     Syncope     Urgency of urination      Past Surgical History:   Procedure Laterality Date    TRUNK LESION/CYST EXCISION N/A 8/3/2023    Procedure: excision of chest wall cyst and back cyst 10:00;  Surgeon: Kianna Oliva DO;  Location: [facility];  Service: General;  Laterality: N/A;      General Information       Row Name 10/09/24 0927          Physical Therapy Time and Intention    Document Type evaluation  -BP     Mode of Treatment physical therapy  -BP       Row Name 10/09/24 0927          General Information    Patient Profile Reviewed yes  Patient presents due to increased weakness. Patient with CIDP, per chart review, likely exacerbation. Patient recieves IVIG infusions. Admitted for IV steroids.  -BP     Prior Level of Function --  Pt reports at baseline, over the past month, he performs stand pivot transfers to/from a w/c with assist from caregivers. Patient reports he is dependent on caregivers for ADLs.  -BP     Existing Precautions/Restrictions fall;seizures  -BP     Barriers to Rehab previous functional deficit;medically complex;physical barrier  -BP       Row Name 10/09/24 0927          Living  Environment    People in Home other (see comments)  Patient lives with caregivers,  and wife that alternate providing 24/7 care.  -BP       Row Name 10/09/24 0927          Home Main Entrance    Number of Stairs, Main Entrance none  ramp to enter home  -BP       Row Name 10/09/24 0927          Stairs Within Home, Primary    Stairs, Within Home, Primary one story home  -BP       Row Name 10/09/24 0927          Cognition    Orientation Status (Cognition) oriented x 3  -BP       Row Name 10/09/24 0927          Safety Issues, Functional Mobility    Impairments Affecting Function (Mobility) balance;postural/trunk control;strength;endurance/activity tolerance;grasp;motor control  -BP               User Key  (r) = Recorded By, (t) = Taken By, (c) = Cosigned By      Initials Name Provider Type    Supriya Collins, PT Physical Therapist                   Mobility       Row Name 10/09/24 0936          Bed Mobility    Bed Mobility supine-sit  -BP     Supine-Sit [place] (Bed Mobility) contact guard;minimum assist (75% patient effort)  -BP       Row Name 10/09/24 0936          Bed-Chair Transfer    Bed-Chair [place] (Transfers) maximum assist (25% patient effort);2 person assist;verbal cues  -BP     Assistive Device (Bed-Chair Transfers) other (see comments)  -BP     Comment, (Bed-Chair Transfer) Performed bed to chair transfer with hand held assist x 2. Unable to use a device due to poor  strength and UE strength. Initially requires max A however is dependent to complete. Patient with poor spatial awareness and sensation in LE's.  -BP       Row Name 10/09/24 0936          Sit-Stand Transfer    Sit-Stand Dillon (Transfers) moderate assist (50% patient effort);2 person assist  -BP     Assistive Device (Sit-Stand Transfers) other (see comments)  -BP     Comment, (Sit-Stand Transfer) Performed with hand held due to poor UE strength bilaterally, unable to manage a device.  -BP       Row Name 10/09/24  0936          Gait/Stairs (Locomotion)    Comment, (Gait/Stairs) Pivot transfer only. Reports he has not walked for at least a month.  -BP               User Key  (r) = Recorded By, (t) = Taken By, (c) = Cosigned By      Initials Name Provider Type    Supriya Collins, PT Physical Therapist                   Obj/Interventions       Row Name 10/09/24 1031          Range of Motion Comprehensive    Comment, General Range of Motion R LE AROM WFL. L knee and hip AROM WFL, decreased L ankle DF with external rotation noted. Patient reports he is supposed to be getting a brace for this.  -BP       Row Name 10/09/24 1031          Strength Comprehensive (MMT)    Comment, General Manual Muscle Testing (MMT) Assessment R LE gross MMT  4+/5. L hip flexion 4-/5. L knee gross MMT 4+/5.  -       Row Name 10/09/24 1031          Balance    Comment, Balance static sitting balance-CGA/Abdelrahman, dynamic sitting balance-min/mod A, static standing balance-mod A x 2 with hand held, dynamic standing-max/dependent x 2  -BP       Row Name 10/09/24 1031          Sensory Assessment (Somatosensory)    Sensory Assessment (Somatosensory) --  neuropathy in hands and feet, CIDP  -BP               User Key  (r) = Recorded By, (t) = Taken By, (c) = Cosigned By      Initials Name Provider Type    Supriya Colilns, PT Physical Therapist                   Goals/Plan       Row Name 10/09/24 1051          Bed Mobility Goal 1 (PT)    Activity/Assistive Device (Bed Mobility Goal 1, PT) bed mobility activities, all  -BP     [place] Level/Cues Needed (Bed Mobility Goal 1, PT) contact guard required  -BP     Time Frame (Bed Mobility Goal 1, PT) 3 days  -BP     Progress/Outcomes (Bed Mobility Goal 1, PT) new goal  -BP       Row Name 10/09/24 1051          Transfer Goal 1 (PT)    Activity/Assistive Device (Transfer Goal 1, PT) sit-to-stand/stand-to-sit;bed-to-chair/chair-to-bed  with hand held vs appropriate asssitive device  -BP     [place]  Level/Cues Needed (Transfer Goal 1, PT) moderate assist (50-74% patient effort)  -BP     Time Frame (Transfer Goal 1, PT) 3 days  -BP     Progress/Outcome (Transfer Goal 1, PT) new goal  -BP       Row Name 10/09/24 1051          Therapy Assessment/Plan (PT)    Planned Therapy Interventions (PT) balance training;bed mobility training;patient/family education;transfer training;strengthening  -BP               User Key  (r) = Recorded By, (t) = Taken By, (c) = Cosigned By      Initials Name Provider Type    BP Supriya Paula, PT Physical Therapist                   Clinical Impression       Row Name 10/09/24 1034          Pain    Pretreatment Pain Rating 0/10 - no pain  -BP     Posttreatment Pain Rating 0/10 - no pain  -BP       Row Name 10/09/24 1034          Plan of Care Review    Plan of Care Reviewed With patient  -BP     Outcome Evaluation PT Evaluation Complete: patient performs supine to sit transfer with min A, sit to/from stand transfers with mod A x 2, and stand pivot transfer bed to chair with max/dependent x 2. Performed transfers with hand held assist x 2, patient unable to manage a walker due to poor  strength and UE weakness. Patient reports he has not ambulated in approximately a month, he has jose miguel performing transfers to/from a w/c with assist as needed from caregivers. He reports mobility and strength fluctuate due to CIDP and IVIG infusions. He reports strength improves following infusions. He reports he has 24/7 caregivers that he lives with, they assist with all ADLs. He reports plan is to return home at discharge with continued 24/7 care. Will see for therapy while in the hospital to maximize functional independence and continue to assess discharge needs.  -BP       Row Name 10/09/24 1034          Therapy Assessment/Plan (PT)    Rehab Potential (PT) fair, will monitor progress closely  -BP     Criteria for Skilled Interventions Met (PT) yes;meets criteria  -BP     Therapy Frequency (PT) 5  times/wk  -BP     Predicted Duration of Therapy Intervention (PT) 3 days  -BP       Row Name 10/09/24 1034          Positioning and Restraints    Pre-Treatment Position in bed  -BP     Post Treatment Position chair  -BP     In Chair notified nsg;sitting;call light within reach;encouraged to call for assist  sitting on w/c seat cushion from home  -BP               User Key  (r) = Recorded By, (t) = Taken By, (c) = Cosigned By      Initials Name Provider Type    Supriya Collins, PT Physical Therapist                   Outcome Measures       Row Name 10/09/24 1052 10/09/24 0430       How much help from another person do you currently need...    Turning from your back to your side while in flat bed without using bedrails? 3  -BP 3  -AT    Moving from lying on back to sitting on the side of a flat bed without bedrails? 3  -BP 3  -AT    Moving to and from a bed to a chair (including a wheelchair)? 1  -BP 2  -AT    Standing up from a chair using your arms (e.g., wheelchair, bedside chair)? 1  -BP 2  -AT    Climbing 3-5 steps with a railing? 1  -BP 1  -AT    To walk in hospital room? 1  -BP 2  -AT    AM-PAC 6 Clicks Score (PT) 10  -BP 13  -AT    Highest Level of Mobility Goal 4 --> Transfer to chair/commode  -BP 4 --> Transfer to chair/commode  -AT      Row Name 10/09/24 1052 10/09/24 0942       Functional Assessment    Outcome Measure Options AM-PAC 6 Clicks Basic Mobility (PT)  -BP AM-PAC 6 Clicks Daily Activity (OT)  -SD              User Key  (r) = Recorded By, (t) = Taken By, (c) = Cosigned By      Initials Name Provider Type    Francis Moreno OTR Occupational Therapist    Supriya Collins, SYLVIE Physical Therapist    AT Abiel Ledbetter RN Registered Nurse                                 Physical Therapy Education       Title: PT OT SLP Therapies (In Progress)       Topic: Physical Therapy (In Progress)       Point: Mobility training (Done)       Learning Progress Summary             Patient Acceptance,  E,TB, VU by BP at 10/9/2024 1052                         Point: Home exercise program (Not Started)       Learner Progress:  Not documented in this visit.                              User Key       Initials Effective Dates Name Provider Type Discipline     06/16/21 -  Supriya Paula, PT Physical Therapist PT                  PT Recommendation and Plan  Planned Therapy Interventions (PT): balance training, bed mobility training, patient/family education, transfer training, strengthening  Plan of Care Reviewed With: patient  Outcome Evaluation: PT Evaluation Complete: patient performs supine to sit transfer with min A, sit to/from stand transfers with mod A x 2, and stand pivot transfer bed to chair with max/dependent x 2. Performed transfers with hand held assist x 2, patient unable to manage a walker due to poor  strength and UE weakness. Patient reports he has not ambulated in approximately a month, he has jose miguel performing transfers to/from a w/c with assist as needed from caregivers. He reports mobility and strength fluctuate due to CIDP and IVIG infusions. He reports strength improves following infusions. He reports he has 24/7 caregivers that he lives with, they assist with all ADLs. He reports plan is to return home at discharge with continued 24/7 care. Will see for therapy while in the hospital to maximize functional independence and continue to assess discharge needs.     Time Calculation:   PT Evaluation Complexity  History, PT Evaluation Complexity: 3 or more personal factors and/or comorbidities  Examination of Body Systems (PT Eval Complexity): total of 4 or more elements  Clinical Presentation (PT Evaluation Complexity): evolving  Clinical Decision Making (PT Evaluation Complexity): moderate complexity  Overall Complexity (PT Evaluation Complexity): moderate complexity     PT Charges       Row Name 10/09/24 1053             Time Calculation    Start Time 0805  -BP      Stop Time 0840  -BP       Time Calculation (min) 35 min  -BP      PT Received On 10/09/24  -BP      PT - Next Appointment 10/10/24  -BP                User Key  (r) = Recorded By, (t) = Taken By, (c) = Cosigned By      Initials Name Provider Type    Supriya Collins, PT Physical Therapist                  Therapy Charges for Today       Code Description Service Date Service Provider Modifiers Qty    85729675950 HC PT EVAL MOD COMPLEXITY 2 10/9/2024 Supriya Paula, PT GP 1            PT G-Codes  Outcome Measure Options: AM-PAC 6 Clicks Basic Mobility (PT)  AM-PAC 6 Clicks Score (PT): 10  AM-PAC 6 Clicks Score (OT): 6  PT Discharge Summary  Anticipated Discharge Disposition (PT): home with 24/7 care    Supriya Paula, PT  10/9/2024

## 2024-10-09 NOTE — PLAN OF CARE
Goal Outcome Evaluation:  Plan of Care Reviewed With: patient           Outcome Evaluation: PT Evaluation Complete: patient performs supine to sit transfer with min A, sit to/from stand transfers with mod A x 2, and stand pivot transfer bed to chair with max/dependent x 2. Performed transfers with hand held assist x 2, patient unable to manage a walker due to poor  strength and UE weakness. Patient reports he has not ambulated in approximately a month, he has jose miguel performing transfers to/from a w/c with assist as needed from caregivers. He reports mobility and strength fluctuate due to CIDP and IVIG infusions. He reports strength improves following infusions. He reports he has 24/7 caregivers that he lives with, they assist with all ADLs. He reports plan is to return home at discharge with continued 24/7 care. Will see for therapy while in the hospital to maximize functional independence and continue to assess discharge needs.      Anticipated Discharge Disposition (PT): home with 24/7 care

## 2024-10-09 NOTE — CASE MANAGEMENT/SOCIAL WORK
Continued Stay Note  CORKY Rosado     Patient Name: Efren Christianson  MRN: [id number]  Today's Date: 10/9/2024    Admit Date: 10/8/2024    Plan: plan home with caregivers   Discharge Plan       Row Name 10/09/24 1505       Plan    Plan plan home with caregivers    Patient/Family in Agreement with Plan yes    Plan Comments Spoke with patient at bedside, he is sitting up in recliner. Face sheet verified. IMM explained, signed and copy provided. Patient lives in a home with a  and wife that are his caregivers. He requires assistance with ADLs and recently has been stand/pivot only with assistance. Normally he is able to ambulate with either a standard walker or rolling walker with some assistance.Patient does not drive. He has used HH in the past and believes the agency is [facility]. He has not been to inpatient rehb. He does not have a living will. He sees Winnie MAYORGA as PCP. He uses [email or web address]. There are no issues obtaining medications. SDOH completed. CM # placed on white board, will follow for dc needs.                   Discharge Codes    No documentation.                       Balaji Richards RN

## 2024-10-09 NOTE — PLAN OF CARE
Goal Outcome Evaluation:  Plan of Care Reviewed With: patient        Progress: no change  Outcome Evaluation: VS stable, tolerating RA. No complaint of SOB, N/V. On solumedrol q8h. Assisted with basic needs. UO adequate. No complaints made.

## 2024-10-09 NOTE — PROGRESS NOTES
Patient Identification:  NAME:  Efren Christianson  Age:  79 y.o.   Sex:  male   :  1945   MRN:  [id number]       Chief complaint: Exacerbation of CIDP, treatment with high-dose IV steroids, seizure disorder    History of present illness: He feels like his legs are a little stronger today.  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    Home medications:  Medications Prior to Admission   Medication Sig Dispense Refill Last Dose    Immune Globulin, Human, (GAMMAGARD IV) Infuse 1 g/kg into a venous catheter Every 30 (Thirty) Days.   9/10/2024    levETIRAcetam (KEPPRA) 1000 MG tablet Take 1 tablet by mouth Every 12 (Twelve) Hours.   10/8/2024 at 0800    multivitamin (THERAGRAN) tablet tablet Take  by mouth Daily.   10/8/2024 at 0800    ferrous sulfate 324 (65 Fe) MG tablet delayed-release EC tablet Take 1 tablet by mouth Daily With Breakfast. Three times a week       vitamin C (ASCORBIC ACID) 250 MG tablet Take 1 tablet by mouth Daily.           Hospital medications:  Aquaphor Advanced Therapy, 1 Application, Topical, Q12H  enoxaparin, 40 mg, Subcutaneous, Daily  famotidine, 20 mg, Oral, BID AC  levETIRAcetam, 1,000 mg, Oral, Q12H  methylPREDNISolone sodium succinate, 500 mg, Intravenous, Q8H  potassium & sodium phosphates, 1 packet, Oral, 4x Daily With Meals & Nightly  sodium chloride, 10 mL, Intravenous, Q12H           senna-docusate sodium **AND** polyethylene glycol **AND** bisacodyl **AND** bisacodyl    Calcium Replacement - Follow Nurse / BPA Driven Protocol    influenza vaccine    LORazepam    Magnesium Standard Dose Replacement - Follow Nurse / BPA  Driven Protocol    ondansetron ODT **OR** ondansetron    Phosphorus Replacement - Follow Nurse / BPA Driven Protocol    Potassium Replacement - Follow Nurse / BPA Driven Protocol    [COMPLETED] Insert Peripheral IV **AND** sodium chloride    sodium chloride    sodium chloride      Objective:  Vitals Ranges:   Temp:  [97.7 °F (36.5 °C)-98.5 °F (36.9 °C)] 97.8 °F (36.6 °C)  Heart Rate:  [] 97  Resp:  [12-20] 18  BP: (123-180)/(77-99) 153/98      Physical Exam:  Normal cranial nerves II through VII able to eat without choking.  No shortness of breath, upper extremity strength about the same as yesterday.  Some tricep strength left more so than right  on the right might be slightly improved.  No change in the left  while sitting in chair.  He is able to kick out briskly with both legs which seems like some improvement.  He is areflexic throughout, toes downgoing    Results review:   I reviewed the patient's new clinical results.    Data review:  Lab Results (last 24 hours)       Procedure Component Value Units Date/Time    Basic Metabolic Panel [[id number]]  (Abnormal) Collected: 10/09/24 0556    Specimen: Blood Updated: 10/09/24 0727     Glucose 143 mg/dL      BUN 21 mg/dL      Creatinine 1.02 mg/dL      Sodium 136 mmol/L      Potassium 4.1 mmol/L      Chloride 102 mmol/L      CO2 22.8 mmol/L      Calcium 9.2 mg/dL      BUN/Creatinine Ratio 20.6     Anion Gap 11.2 mmol/L      eGFR 75.7 mL/min/1.73     Narrative:      GFR Normal >60  Chronic Kidney Disease <60  Kidney Failure <15    The GFR formula is only valid for adults with stable renal function between ages 18 and 70.    Magnesium [[id number]]  (Normal) Collected: 10/09/24 0556    Specimen: Blood Updated: 10/09/24 0727     Magnesium 2.2 mg/dL     Phosphorus [[id number]]  (Normal) Collected: 10/09/24 0556    Specimen: Blood Updated: 10/09/24 0727     Phosphorus 2.8 mg/dL     [facility] [[id number]] Collected: 10/08/24 1233    Specimen: Blood Updated:  10/08/24 1436    Narrative:      The following orders were created for panel order [facility].  Procedure                               Abnormality         Status                     ---------                               -----------         ------                     Green Top (Gel)[[id number]]                                  Final result               Lavender Top[[id number]]                                     Final result               Red Top[[id number]]                                                                     Gold Top - SST[[id number]]                                   Final result               Green Top (No Gel)[[id number]]                                                          Floyd Top[[id number]]                                         Final result               Light Blue Top[[id number]]                                   Final result               Green Top (Gel)[[id number]]                                                               Please view results for these tests on the individual orders.    Urinalysis With Microscopic If Indicated (No Culture) - Urine, Clean Catch [[id number]]  (Normal) Collected: 10/08/24 1318    Specimen: Urine, Clean Catch Updated: 10/08/24 1348     Color, UA Yellow     Appearance, UA Clear     pH, UA 5.5     Specific Gravity, UA 1.015     Glucose, UA Negative     Ketones, UA Negative     Bilirubin, UA Negative     Blood, UA Negative     Protein, UA Negative     Leuk Esterase, UA Negative     Nitrite, UA Negative     Urobilinogen, UA 0.2 E.U./dL    Narrative:      Urine microscopic not indicated.    TSH [[id number]]  (Abnormal) Collected: 10/08/24 1233    Specimen: Blood Updated: 10/08/24 1308     TSH 4.350 uIU/mL     T4, Free [[id number]]  (Normal) Collected: 10/08/24 1233    Specimen: Blood Updated: 10/08/24 1308     Free T4 1.47 ng/dL     Narrative:      Results may be falsely increased if patient taking Biotin.      Comprehensive Metabolic Panel [[id number]]  Collected: 10/08/24 1233    Specimen: Blood Updated: 10/08/24 1307     Glucose 94 mg/dL      BUN 21 mg/dL      Creatinine 1.00 mg/dL      Sodium 137 mmol/L      Potassium 4.2 mmol/L      Chloride 102 mmol/L      CO2 25.9 mmol/L      Calcium 9.4 mg/dL      Total Protein 7.9 g/dL      Albumin 4.2 g/dL      ALT (SGPT) 24 U/L      AST (SGOT) 32 U/L      Alkaline Phosphatase 70 U/L      Total Bilirubin 0.5 mg/dL      Globulin 3.7 gm/dL      A/G Ratio 1.1 g/dL      BUN/Creatinine Ratio 21.0     Anion Gap 9.1 mmol/L      eGFR 77.5 mL/min/1.73     Narrative:      GFR Normal >60  Chronic Kidney Disease <60  Kidney Failure <15    The GFR formula is only valid for adults with stable renal function between ages 18 and 70.    Phosphorus [[id number]]  (Abnormal) Collected: 10/08/24 1233    Specimen: Blood Updated: 10/08/24 1307     Phosphorus 2.4 mg/dL     Magnesium [[id number]]  (Normal) Collected: 10/08/24 1233    Specimen: Blood Updated: 10/08/24 1307     Magnesium 2.0 mg/dL     Green Top (Gel) [[id number]] Collected: 10/08/24 1233    Specimen: Blood Updated: 10/08/24 1245     Extra Tube Hold for add-ons.     Comment: Auto resulted.       Lavender Top [[id number]] Collected: 10/08/24 1233    Specimen: Blood Updated: 10/08/24 1245     Extra Tube hold for add-on     Comment: Auto resulted       Gold Top - SST [[id number]] Collected: 10/08/24 1233    Specimen: Blood Updated: 10/08/24 1245     Extra Tube Hold for add-ons.     Comment: Auto resulted.       Floyd Top [[id number]] Collected: 10/08/24 1233    Specimen: Blood Updated: 10/08/24 1245     Extra Tube Hold for add-ons.     Comment: Auto resulted.       Light Blue Top [[id number]] Collected: 10/08/24 1233    Specimen: Blood Updated: 10/08/24 1245     Extra Tube Hold for add-ons.     Comment: Auto resulted       CBC & Differential [[id number]]  (Abnormal) Collected: 10/08/24 1233    Specimen: Blood Updated: 10/08/24 1244    Narrative:      The following orders were created for panel  order CBC & Differential.  Procedure                               Abnormality         Status                     ---------                               -----------         ------                     CBC Auto Differential[[id number]]        Abnormal            Final result                 Please view results for these tests on the individual orders.    CBC Auto Differential [[id number]]  (Abnormal) Collected: 10/08/24 1233    Specimen: Blood Updated: 10/08/24 1244     WBC 6.21 10*3/mm3      RBC 4.59 10*6/mm3      Hemoglobin 15.0 g/dL      Hematocrit 44.3 %      MCV 96.5 fL      MCH 32.7 pg      MCHC 33.9 g/dL      RDW 12.0 %      RDW-SD 43.5 fl      MPV 9.9 fL      Platelets 167 10*3/mm3      Neutrophil % 65.7 %      Lymphocyte % 22.4 %      Monocyte % 10.5 %      Eosinophil % 0.6 %      Basophil % 0.6 %      Immature Grans % 0.2 %      Neutrophils, Absolute 4.08 10*3/mm3      Lymphocytes, Absolute 1.39 10*3/mm3      Monocytes, Absolute 0.65 10*3/mm3      Eosinophils, Absolute 0.04 10*3/mm3      Basophils, Absolute 0.04 10*3/mm3      Immature Grans, Absolute 0.01 10*3/mm3              Imaging:  Imaging Results (Last 24 Hours)       ** No results found for the last 24 hours. **               Assessment and Plan:       CIDP (chronic inflammatory demyelinating polyneuropathy)    Exacerbation of CIDP being treated with high-dose IV steroids.  He feels like his legs are a little stronger today.  Not much change in the upper extremities.  I think we are definitely going the right direction and the steroid should continue to bring improvement in his overall strength.  For now we will continue 500 mg IV every 8 hours      Fuentes Shay MD  10/09/24  12:43 EDT

## 2024-10-09 NOTE — THERAPY EVALUATION
Patient Name: Efren Christianson  : 1945    MRN: [id number]                              Today's Date: 10/9/2024       Admit Date: 10/8/2024    Visit Dx:     ICD-10-CM ICD-9-CM   1. 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 Progressive focal motor weakness  R53.1 728.87   3. Long-term current use of intravenous immunoglobulin (IVIG)  Z79.899 V58.69   4. 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)     Difficulty walking     Hepatitis A     as a child    Seizures     Syncope     Urgency of urination      Past Surgical History:   Procedure Laterality Date    TRUNK LESION/CYST EXCISION N/A 8/3/2023    Procedure: excision of chest wall cyst and back cyst 10:00;  Surgeon: Kianna Oliva DO;  Location: [facility];  Service: General;  Laterality: N/A;      General Information       Row Name 10/09/24 0845          OT Time and Intention    Document Type evaluation  -SD     Mode of Treatment occupational therapy  -SD       Row Name 10/09/24 0845          General Information    Patient Profile Reviewed yes  Pt admitted with progressive weakness over the past 2-3 days. Pt has hx of CIDP and receives IVIG infusions monthly. Pt has caregivers for daily tasks. He mobility is typically stand pivot transfers to a w/c with assistance.  -SD     Prior Level of Function --  Pt reports caregivers assist with adl's. He does typcially manage self feeding after set up assistance, yet the current level of weakness in bilateral hands require hin to have assist with self feeding (nursing notified). Pt has assist with SPS transfers  -SD     Existing Precautions/Restrictions fall;seizures  -SD     Barriers  to Rehab previous functional deficit;physical barrier;medically complex  -SD       Row Name 10/09/24 0845          Occupational Profile    Reason for Services/Referral (Occupational Profile) decreased adl status  -SD     Successful Occupations (Occupational Profile) Pt typically manages self feeding, yet weakness in his hands is impacting his I with self feeding.  -SD     Occupational History/Life Experiences (Occupational Profile) Pt with dx of CIDP and has had progressive weakness for years. He moved into a friend's home a few years ago to have more support with daily tasks.  -SD     Environmental Supports and Barriers (Occupational Profile) friend and her  assist with daily tasks  -SD     Patient Goals (Occupational Profile) return home with assistance and resume infusion tx's  -SD       Row Name 10/09/24 0845          Living Environment    People in Home other (see comments)  pt states he lives with a friend and her   -SD       Row Name 10/09/24 0845          Home Main Entrance    Number of Stairs, Main Entrance other (see comments)  ramp to enter home  -SD       Row Name 10/09/24 0845          Cognition    Orientation Status (Cognition) oriented x 3  -SD       Row Name 10/09/24 0845          Safety Issues, Functional Mobility    Impairments Affecting Function (Mobility) balance;postural/trunk control;strength;endurance/activity tolerance;grasp;motor control  -SD               User Key  (r) = Recorded By, (t) = Taken By, (c) = Cosigned By      Initials Name Provider Type    Francis Moreno, OTR Occupational Therapist                     Mobility/ADL's       Row Name 10/09/24 0881          Bed Mobility    Bed Mobility supine-sit  -SD     Supine-Sit [place] (Bed Mobility) contact guard;minimum assist (75% patient effort)  -SD     Bed Mobility, Safety Issues decreased use of arms for pushing/pulling  -SD     Comment, (Bed Mobility) min to scoot forward in bed toward EOB  -SD       Row Name  10/09/24 0853          Transfers    Transfers sit-stand transfer;stand-sit transfer;bed-chair transfer  -SD       Row Name 10/09/24 0853          Bed-Chair Transfer    Bed-Chair [place] (Transfers) maximum assist (25% patient effort);2 person assist;verbal cues  -SD     Assistive Device (Bed-Chair Transfers) other (see comments)  -SD     Comment, (Bed-Chair Transfer) hand held assistance x 2 for stand step pivot transfer from bed to recliner. Pt does not have adequate  strength to safely hold/manage a walker  -SD       Row Name 10/09/24 0853          Sit-Stand Transfer    Sit-Stand [place] (Transfers) moderate assist (50% patient effort);2 person assist  -SD     Assistive Device (Sit-Stand Transfers) other (see comments)  -SD     Comment, (Sit-Stand Transfer) hand held assistance x 2. Pt does not have adequate  strength to safely hold/manage a walker  -SD       Row Name 10/09/24 0853          Stand-Sit Transfer    Stand-Sit [place] (Transfers) moderate assist (50% patient effort);2 person assist  -SD     Comment, (Stand-Sit Transfer) hand held assistance x 2. Pt does not have adequate  strength to safely hold/manage a walker  -SD       Row Name 10/09/24 0853          Functional Mobility    Functional Mobility- Comment mobility was limited to stand step pivot transfer from bed to recliner with max assistance x 2 (nursing notified regarding level of assistance required)  -SD       Row Name 10/09/24 0853          Activities of Daily Living    BADL Assessment/Intervention other (see comments);feeding;lower body dressing  pt requires significant assistance for adl's (max to dependence)  -SD       Row Name 10/09/24 0853          Self-Feeding Assessment/Training    Comment, (Feeding) Pt states he currently is not able to hold a utensil to manage self feeding. Nursing aware that pt requires assistance. Education provided regarding compensatory strategies/use of adaptive equipment to increase  functional independence (ex, universal cuff)  -[facility] Name 10/09/24 0853          Lower Body Dressing Assessment/Training    [place] Level (Lower Body Dressing) lower body dressing skills;doff;don;shoes/slippers;socks;dependent (less than 25% patient effort)  -SD     Position (Lower Body Dressing) edge of bed sitting  -SD               User Key  (r) = Recorded By, (t) = Taken By, (c) = Cosigned By      Initials Name Provider Type    SD Francis Good OTR Occupational Therapist                   Obj/Interventions       Row Name 10/09/24 0906          Sensory Assessment (Somatosensory)    Sensory Assessment (Somatosensory) other (see comments)  pt has hx of neuropathy  -[facility] Name 10/09/24 0906          Range of Motion Comprehensive    Comment, General Range of Motion Pt with bilateral shoulder rom to approx 45 degrees (pt reports progressive rom limitation/weakness for years). Pt with functional elbow rom. Pt with limited rom of forearm/wrist/hands.  -[facility] Name 10/09/24 0906          Strength Comprehensive (MMT)    Comment, General Manual Muscle Testing (MMT) Assessment BUE strength grossly 2+/5. Pt with significant weakness of hands/wrists (pt with poor functional use of bilateral hands)  -[facility] Name 10/09/24 0906          Balance    Comment, Balance pt static sitting balance CGA/min assist, dynamic sitting balance min/mod assistance. static standing balance mod assist x 2 and dynamic standing balance Max/dependence x assist of 2  -SD               User Key  (r) = Recorded By, (t) = Taken By, (c) = Cosigned By      Initials Name Provider Type    Francis Moreno OTR Occupational Therapist                   Goals/Plan       Row Name 10/09/24 0939          Transfer Goal 1 (OT)    Activity/Assistive Device (Transfer Goal 1, OT) bed-to-chair/chair-to-bed;other (see comments)  hand held assist  -SD     [place] Level/Cues Needed (Transfer Goal 1, OT) moderate assist (50-74%  patient effort)  -SD     Time Frame (Transfer Goal 1, OT) by discharge  -SD     Progress/Outcome (Transfer Goal 1, OT) new goal  -SD       Row Name 10/09/24 0939          Problem Specific Goal 1 (OT)    Problem Specific Goal 1 (OT) Pt to verbalize compensatory strategies/use of AE that can increase functional indpendence for basic adl tasks (self feeding and light grooming).  -SD     Time Frame (Problem Specific Goal 1, OT) by discharge  -SD     Strategies/Barriers (Problem Specific Goal 1, OT) education with compensatory strategies/use of AE to compensate for limited functional grasp, decresaed BUE rom and BUE weakness  -SD     Progress/Outcome (Problem Specific Goal 1, OT) new goal  -SD       Row Name 10/09/24 0939          Therapy Assessment/Plan (OT)    Planned Therapy Interventions (OT) transfer/mobility retraining;functional balance retraining;patient/caregiver education/training;adaptive equipment training;BADL retraining  -SD               User Key  (r) = Recorded By, (t) = Taken By, (c) = Cosigned By      Initials Name Provider Type    SD Francis Good OTR Occupational Therapist                   Clinical Impression       Row Name 10/09/24 0912          Pain Assessment    Pretreatment Pain Rating 0/10 - no pain  -SD     Posttreatment Pain Rating 0/10 - no pain  -SD       Row Name 10/09/24 0912          Plan of Care Review    Plan of Care Reviewed With patient  -SD     Outcome Evaluation Occupational therapy evaluation completed. Pt with hx of CIDP.  Pt receives IVIG infusions monthly. He reports increased weakness is common a few days before his next infusion, yet he states the weakness was more significant this month than normal. Pt lives with caregivers that assist him with adl tasks and transfers. He typically is able to manage self feeding and peform stand pivot transfers to his w/c with assistance of caregivers.  He reported the weakness of his hands limited his functional grasp, and he noted more  difficulty with bed mobility/transfers at home. Pt managed bed mobility with CGA/min assist this am. He maintained his static sitting balance with CGA, yet he required min/mod support to maintain dynamic sitting balance (posterior lean). Pt stood from EOB with bed surface elevated with mod assist with hand held assist x 2. Pt required max assist to dependence x 2 for stand step pivot transfer from the EOB to recliner. Pt needs assistance for all adl tasks at this time. Pt states his plan is to return to home with support of caregivers. Will continue to assess discharge needs/dispostion based on his progress during acute care stay.  -SD       Row Name 10/09/24 0912          Therapy Assessment/Plan (OT)    Patient/Family Therapy Goal Statement (OT) return home  -SD     Rehab Potential (OT) fair, will monitor progress closely  -SD     Criteria for Skilled Therapeutic Interventions Met (OT) yes  -SD     Therapy Frequency (OT) 5 times/wk  -SD     Predicted Duration of Therapy Intervention (OT) 1 week  -SD       Row Name 10/09/24 0912          Therapy Plan Review/Discharge Plan (OT)    Anticipated Discharge Disposition (OT) home with 24/7 care  -SD       Row Name 10/09/24 0912          Positioning and Restraints    Pre-Treatment Position in bed  -SD     Post Treatment Position chair  -SD     In Chair reclined;call light within reach;encouraged to call for assist  adapted call system within reach of pt due to limited use of hands. pt sitting on cushion from home.  -SD               User Key  (r) = Recorded By, (t) = Taken By, (c) = Cosigned By      Initials Name Provider Type    SD Francis Good, OTR Occupational Therapist                   Outcome Measures       Row Name 10/09/24 0942          How much help from another is currently needed...    Putting on and taking off regular lower body clothing? 1  -SD     Bathing (including washing, rinsing, and drying) 1  -SD     Toileting (which includes using toilet bed pan or  urinal) 1  -SD     Putting on and taking off regular upper body clothing 1  -SD     Taking care of personal grooming (such as brushing teeth) 1  -SD     Eating meals 1  -SD     AM-PAC 6 Clicks Score (OT) 6  -SD       Row Name 10/09/24 0430          How much help from another person do you currently need...    Turning from your back to your side while in flat bed without using bedrails? 3  -AT     Moving from lying on back to sitting on the side of a flat bed without bedrails? 3  -AT     Moving to and from a bed to a chair (including a wheelchair)? 2  -AT     Standing up from a chair using your arms (e.g., wheelchair, bedside chair)? 2  -AT     Climbing 3-5 steps with a railing? 1  -AT     To walk in hospital room? 2  -AT     AM-PAC 6 Clicks Score (PT) 13  -AT     Highest Level of Mobility Goal 4 --> Transfer to chair/commode  -AT       Row Name 10/09/24 0942          Functional Assessment    Outcome Measure Options AM-PAC 6 Clicks Daily Activity (OT)  -SD               User Key  (r) = Recorded By, (t) = Taken By, (c) = Cosigned By      Initials Name Provider Type    Francis Moreno OTR Occupational Therapist    [facility]                    Occupational Therapy Education       Title: PT OT SLP Therapies (Done)       Topic: Occupational Therapy (Done)       Point: ADL training (Done)       Description:   Instruct learner(s) on proper safety adaptation and remediation techniques during self care or transfers.   Instruct in proper use of assistive devices.                  Learning Progress Summary             Patient Acceptance, E, VU by SD at 10/9/2024 0943    Comment: Education regarding safety with bed mobility and transfers. Rec stand step pivot transfer with hand held assist of 2 at this time for safety. Pt needs assist with all adl's including self feeding. Notified nursing. Pt in agreement.                                         User Key       Initials Effective Dates Name  Provider Type Discipline    SD 06/16/21 -  Francis Good, OTR Occupational Therapist OT                  OT Recommendation and Plan  Planned Therapy Interventions (OT): transfer/mobility retraining, functional balance retraining, patient/caregiver education/training, adaptive equipment training, BADL retraining  Therapy Frequency (OT): 5 times/wk  Plan of Care Review  Plan of Care Reviewed With: patient  Outcome Evaluation: Occupational therapy evaluation completed. Pt with hx of CIDP.  Pt receives IVIG infusions monthly. He reports increased weakness is common a few days before his next infusion, yet he states the weakness was more significant this month than normal. Pt lives with caregivers that assist him with adl tasks and transfers. He typically is able to manage self feeding and peform stand pivot transfers to his w/c with assistance of caregivers.  He reported the weakness of his hands limited his functional grasp, and he noted more difficulty with bed mobility/transfers at home. Pt managed bed mobility with CGA/min assist this am. He maintained his static sitting balance with CGA, yet he required min/mod support to maintain dynamic sitting balance (posterior lean). Pt stood from EOB with bed surface elevated with mod assist with hand held assist x 2. Pt required max assist to dependence x 2 for stand step pivot transfer from the EOB to recliner. Pt needs assistance for all adl tasks at this time. Pt states his plan is to return to home with support of caregivers. Will continue to assess discharge needs/dispostion based on his progress during acute care stay.     Time Calculation:   Evaluation Complexity (OT)  Review Occupational Profile/Medical/Therapy History Complexity: expanded/moderate complexity  Assessment, Occupational Performance/Identification of Deficit Complexity: 3-5 performance deficits  Clinical Decision Making Complexity (OT): detailed assessment/moderate complexity  Overall Complexity of  Evaluation (OT): moderate complexity     Time Calculation- OT       Row Name 10/09/24 0929             Time Calculation- OT    OT Start Time 0805  -SD      OT Stop Time 0838  -SD      OT Time Calculation (min) 33 min  -SD         Untimed Charges    OT Eval/Re-eval Minutes 33  -SD         Total Minutes    Untimed Charges Total Minutes 33  -SD       Total Minutes 33  -SD                User Key  (r) = Recorded By, (t) = Taken By, (c) = Cosigned By      Initials Name Provider Type    SD Francis Good OTR Occupational Therapist                  Therapy Charges for Today       Code Description Service Date Service Provider Modifiers Qty    [id number] HC OT EVAL MOD COMPLEXITY 2 10/9/2024 Francis Good OTR GO 1                 ANNIE Colbert  10/9/2024

## 2024-10-10 ENCOUNTER — HOSPITAL ENCOUNTER (OUTPATIENT)
Dept: INFUSION THERAPY | Facility: HOSPITAL | Age: 79
Discharge: HOME | End: 2024-10-10
Payer: MEDICARE

## 2024-10-10 DIAGNOSIS — G61.81 CIDP (CHRONIC INFLAMMATORY DEMYELINATING POLYNEUROPATHY): Primary | ICD-10-CM

## 2024-10-10 PROCEDURE — 25010000002 METHYLPREDNISOLONE PER 125 MG: Performed by: PSYCHIATRY & NEUROLOGY

## 2024-10-10 PROCEDURE — 25010000002 ENOXAPARIN PER 10 MG: Performed by: FAMILY MEDICINE

## 2024-10-10 PROCEDURE — 99232 SBSQ HOSP IP/OBS MODERATE 35: CPT | Performed by: HOSPITALIST

## 2024-10-10 PROCEDURE — 99231 SBSQ HOSP IP/OBS SF/LOW 25: CPT | Performed by: PSYCHIATRY & NEUROLOGY

## 2024-10-10 PROCEDURE — 97110 THERAPEUTIC EXERCISES: CPT

## 2024-10-10 PROCEDURE — 97530 THERAPEUTIC ACTIVITIES: CPT

## 2024-10-10 RX ADMIN — LEVETIRACETAM 1000 MG: 500 TABLET, FILM COATED ORAL at 21:14

## 2024-10-10 RX ADMIN — METHYLPREDNISOLONE SODIUM SUCCINATE 500 MG: 500 INJECTION INTRAMUSCULAR; INTRAVENOUS at 09:59

## 2024-10-10 RX ADMIN — FAMOTIDINE 20 MG: 20 TABLET, FILM COATED ORAL at 09:58

## 2024-10-10 RX ADMIN — METHYLPREDNISOLONE SODIUM SUCCINATE 500 MG: 500 INJECTION INTRAMUSCULAR; INTRAVENOUS at 01:19

## 2024-10-10 RX ADMIN — Medication 10 ML: at 21:15

## 2024-10-10 RX ADMIN — ENOXAPARIN SODIUM 40 MG: 40 INJECTION SUBCUTANEOUS at 09:59

## 2024-10-10 RX ADMIN — LEVETIRACETAM 1000 MG: 500 TABLET, FILM COATED ORAL at 09:59

## 2024-10-10 RX ADMIN — FAMOTIDINE 20 MG: 20 TABLET, FILM COATED ORAL at 17:50

## 2024-10-10 RX ADMIN — Medication 10 ML: at 09:59

## 2024-10-10 RX ADMIN — WHITE PETROLATUM 41 % TOPICAL OINTMENT 1 APPLICATION: OINTMENT at 09:59

## 2024-10-10 NOTE — THERAPY TREATMENT NOTE
Acute Care - Occupational Therapy Treatment Note   Nelli Moya     Patient Name: Efren Christianson  : 1945  MRN: [id number]  Today's Date: 10/10/2024             Admit Date: 10/8/2024       ICD-10-CM ICD-9-CM   1. 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 Progressive focal motor weakness  R53.1 728.87   3. Long-term current use of intravenous immunoglobulin (IVIG)  Z79.899 V58.69   4. 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)     Difficulty walking     Hepatitis A     as a child    Seizures     Syncope     Urgency of urination      Past Surgical History:   Procedure Laterality Date    TRUNK LESION/CYST EXCISION N/A 8/3/2023    Procedure: excision of chest wall cyst and back cyst 10:00;  Surgeon: Kianna Oliva DO;  Location: [facility];  Service: General;  Laterality: N/A;         OT ASSESSMENT FLOWSHEET (Last 12 Hours)       OT Evaluation and Treatment       Row Name 10/10/24 0941                   OT Time and Intention    Subjective Information complains of  decreased hand function  -EN        Document Type therapy note (daily note)  -EN        Mode of Treatment occupational therapy  -EN        Comment Patient reports his right digit 2 is staying extended more lately. Reports he plans on returning home with assist at discharge...............................................................  -EN           General Information    Risks Reviewed patient:;LOB  -EN        Benefits Reviewed patient:;improve function;increase independence;increase strength;increase balance  -EN        Barriers to Rehab previous functional deficit  -EN           Pain Assessment    Pretreatment  Pain Rating 0/10 - no pain  -EN        Posttreatment Pain Rating 0/10 - no pain  -EN           Cognition    Personal Safety Interventions gait belt;nonskid shoes/slippers when out of bed  -EN           Range of Motion Comprehensive    Comment, General Range of Motion Baseline imited B UE AROM, PROM WFL  -EN           Bed Mobility    Supine-Sit [place] (Bed Mobility) minimum assist (75% patient effort)  -EN        Bed Mobility, Safety Issues decreased use of arms for pushing/pulling;decreased use of legs for bridging/pushing  -EN        Assistive Device (Bed Mobility) head of bed elevated  -EN           Functional Mobility    Functional Mobility- Comment deferred, unalbe to take steps  -EN           Transfer Assessment/Treatment    Comment, (Transfers) patient stood from EOB with mod assist X 2 however was unable to lift or pivot left foot Third person assisted moving the bed and placing chair behind the patient.  -EN           Motor Skills    Therapeutic Exercise shoulder;elbow/forearm;wrist;hand  -EN           Shoulder (Therapeutic Exercise)    Shoulder (Therapeutic Exercise) PROM (passive range of motion);AROM (active range of motion)  -EN        Shoulder PROM (Therapeutic Exercise) bilateral;flexion;extension;aBduction;aDduction;external rotation;internal rotation;10 repetitions  -EN           Elbow/Forearm (Therapeutic Exercise)    Elbow/Forearm (Therapeutic Exercise) AROM (active range of motion);PROM (passive range of motion)  -EN        Elbow/Forearm AROM (Therapeutic Exercise) bilateral;flexion;extension;10 repetitions;supination;pronation  -EN        Elbow/Forearm PROM (Therapeutic Exercise) extension;10 repetitions  -EN           Wrist (Therapeutic Exercise)    Wrist (Therapeutic Exercise) AROM (active range of motion);PROM (passive range of motion)  -EN        Wrist PROM (Therapeutic Exercise) bilateral;flexion;extension;10 repetitions  -EN           Hand (Therapeutic Exercise)    Hand (Therapeutic  Exercise) PROM (passive range of motion)  -EN        Hand PROM (Therapeutic Exercise) bilateral;finger flexion;finger extension;thumb flexion;thumb extension;10 repetitions  -EN           Plan of Care Review    Plan of Care Reviewed With patient  -EN        Outcome Evaluation OT- Patient performed supine to sit with min assist. Patient sat EOB with SBA and was dependent for donning shoes and socks. Patient stood from EOB with mod assist X 2 however was unable to pivot to chair. Bed was moved and chair placed behind patient. Patient participated in B UE PROM and AROM exercises. Patient reports he plans to return home with 24/7 care at discharge.  -EN           Progress Summary (OT)    Progress Toward Functional Goals (OT) progress toward functional goals as expected  -EN                  User Key  (r) = Recorded By, (t) = Taken By, (c) = Cosigned By      Initials Name Effective Dates    EN Rach Gray OTR 06/16/21 -                      Occupational Therapy Education       Title: PT OT SLP Therapies (In Progress)       Topic: Occupational Therapy (Done)       Point: ADL training (Done)       Description:   Instruct learner(s) on proper safety adaptation and remediation techniques during self care or transfers.   Instruct in proper use of assistive devices.                  Learning Progress Summary             Patient Acceptance, E, VU by EN at 10/10/2024 1037    Comment: Educated on UE HEP and safety during functional transfers.    Acceptance, E, VU by SD at 10/9/2024 0943    Comment: Education regarding safety with bed mobility and transfers. Rec stand step pivot transfer with hand held assist of 2 at this time for safety. Pt needs assist with all adl's including self feeding. Notified nursing. Pt in agreement.                                         User Key       Initials Effective Dates Name Provider Type Discipline    EN 06/16/21 -  Rach Gray OTR Occupational Therapist OT    SD 06/16/21 -   Francis Good, OTR Occupational Therapist OT                      OT Recommendation and Plan     Progress Toward Functional Goals (OT): progress toward functional goals as expected  Plan of Care Review  Plan of Care Reviewed With: patient  Outcome Evaluation: OT- Patient performed supine to sit with min assist. Patient sat EOB with SBA and was dependent for donning shoes and socks. Patient stood from EOB with mod assist X 2 however was unable to pivot to chair. Bed was moved and chair placed behind patient. Patient participated in B UE PROM and AROM exercises. Patient reports he plans to return home with      Outcome Measures       Row Name 10/10/24 1000             How much help from another is currently needed...    Putting on and taking off regular lower body clothing? 1  -EN      Bathing (including washing, rinsing, and drying) 1  -EN      Toileting (which includes using toilet bed pan or urinal) 1  -EN      Putting on and taking off regular upper body clothing 1  -EN      Taking care of personal grooming (such as brushing teeth) 1  -EN      Eating meals 1  -EN      AM-PAC 6 Clicks Score (OT) 6  -EN                User Key  (r) = Recorded By, (t) = Taken By, (c) = Cosigned By      Initials Name Provider Type    Rach May OTR Occupational Therapist                    Time Calculation:    Time Calculation- OT       Row Name 10/10/24 1039             Time Calculation- OT    OT Start Time 0940  -EN      OT Stop Time 1003  -EN      OT Time Calculation (min) 23 min  -EN         Timed Charges    52330 - OT Therapeutic Activity Minutes 23  -EN         Total Minutes    Timed Charges Total Minutes 23  -EN       Total Minutes 23  -EN                User Key  (r) = Recorded By, (t) = Taken By, (c) = Cosigned By      Initials Name Provider Type    Rach May OTELY Occupational Therapist                  Therapy Charges for Today       Code Description Service Date Service Provider Modifiers Qty     [id number]  OT THERAPEUTIC ACT EA 15 MIN 10/10/2024 Rach Gray OTELY GO 2                 ANNIE Fernández  10/10/2024

## 2024-10-10 NOTE — PLAN OF CARE
Goal Outcome Evaluation:  Plan of Care Reviewed With: patient        Progress: improving  Outcome Evaluation: Patient sat up in chair for a bit then went back to bed. Ambulating with some improvement, and patient states his hands seem to be getting better. No complaints of pain or discomfort. VSS.

## 2024-10-10 NOTE — PROGRESS NOTES
Patient Identification:  NAME:  Efren Christianson  Age:  79 y.o.   Sex:  male   :  1945   MRN:  [id number]       Chief complaint: CIDP exacerbation/worsening, seizure disorder    History of present illness: He thinks he is doing a little bit better strength wise.  He thought it might be snowing last night outside but he denies any true hallucinations.  No bowel incontinence.  He does have some urinary leakage when he gets up.  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    Home medications:  Medications Prior to Admission   Medication Sig Dispense Refill Last Dose    Immune Globulin, Human, (GAMMAGARD IV) Infuse 1 g/kg into a venous catheter Every 30 (Thirty) Days.   9/10/2024    levETIRAcetam (KEPPRA) 1000 MG tablet Take 1 tablet by mouth Every 12 (Twelve) Hours.   10/8/2024 at 0800    multivitamin (THERAGRAN) tablet tablet Take  by mouth Daily.   10/8/2024 at 0800    ferrous sulfate 324 (65 Fe) MG tablet delayed-release EC tablet Take 1 tablet by mouth Daily With Breakfast. Three times a week       vitamin C (ASCORBIC ACID) 250 MG tablet Take 1 tablet by mouth Daily.           Hospital medications:  Aquaphor Advanced Therapy, 1 Application, Topical, Q12H  enoxaparin, 40 mg, Subcutaneous, Daily  famotidine, 20 mg, Oral, BID AC  levETIRAcetam, 1,000 mg, Oral, Q12H  [START ON 10/12/2024] methylPREDNISolone sodium succinate, 500 mg, Intravenous, Q AM  [START ON 10/11/2024] methylPREDNISolone sodium succinate, 750 mg, Intravenous, Once  sodium chloride, 10 mL, Intravenous, Q12H           senna-docusate sodium **AND** polyethylene glycol **AND** bisacodyl **AND** bisacodyl    Calcium  Replacement - Follow Nurse / BPA Driven Protocol    influenza vaccine    LORazepam    Magnesium Standard Dose Replacement - Follow Nurse / BPA Driven Protocol    ondansetron ODT **OR** ondansetron    Phosphorus Replacement - Follow Nurse / BPA Driven Protocol    Potassium Replacement - Follow Nurse / BPA Driven Protocol    [COMPLETED] Insert Peripheral IV **AND** sodium chloride    sodium chloride    sodium chloride      Objective:  Vitals Ranges:   Temp:  [97.6 °F (36.4 °C)-98.1 °F (36.7 °C)] 97.9 °F (36.6 °C)  Heart Rate:  [89-96] 95  Resp:  [16-18] 18  BP: (129-158)/(62-95) 135/95      Physical Exam:  Awake alert.  Normal cranial nerves II through VII speech is a bit more rapid.  Still trouble with  strength on either side and left triceps weakness more so than right, even though he sitting in a chair.  He can lift both legs off of the bed and I think this is an improvement.  Good dorsi and plantarflexion on the right not so much on the left.  Reflexes absent toes mute    Results review:   I reviewed the patient's new clinical results.    Data review:  Lab Results (last 24 hours)       ** No results found for the last 24 hours. **             Imaging:  Imaging Results (Last 24 Hours)       ** No results found for the last 24 hours. **               Assessment and Plan:       CIDP (chronic inflammatory demyelinating polyneuropathy)    I think he is regaining some strength.  He seems to lift the legs better when he is sitting in a chair.  Also he thinks his right hand may be a bit stronger.  He is not having true psychosis but last night he thought it might be snowing outside but it was the lights on the rocks earlier he thought he heard his daughter outside the door...  Therefore I am going to discontinue the current dose of IV Solu-Medrol.  He has already received 1000 mg IV today.  Tomorrow he will receive 750 mg IV Solu-Medrol x 1 tomorrow morning.  Then starting on 10/12/2024.  He will be on 500 mg IV  every morning.  At some point we will stop the Solu-Medrol and switch him over to p.o. prednisone.      Fuentes Shay MD  10/10/24  12:39 EDT

## 2024-10-10 NOTE — PLAN OF CARE
Goal Outcome Evaluation:  Plan of Care Reviewed With: patient        Progress: improving  Outcome Evaluation: Pt up to chair. Continue IV steroids but dosage adjusted. Patient reports feeling better but still requires assistance to stand and is unable to feed himself. Room Air. All care explained. All questions answered. Pt verb understanding.

## 2024-10-10 NOTE — PROGRESS NOTES
"Hospitalist Team      Patient Care Team:  Winnie Murray as PCP - General (Nurse Practitioner)  Winnie Murray (Nurse Practitioner)      Chief Complaint: Follow-up flare CIDP    Subjective    No acute events overnight.  Mr. Christianson reports he feels like he is slowly progressing.  He denies chest pain and dyspnea.  He is tolerating p.o.    Objective    Vital Signs  Temp:  [97.6 °F (36.4 °C)-98.1 °F (36.7 °C)] 97.9 °F (36.6 °C)  Heart Rate:  [89-97] 96  Resp:  [16-18] 16  BP: (129-158)/(62-98) 139/86  Oxygen Therapy  SpO2: 92 %  Pulse Oximetry Type: Intermittent  Device (Oxygen Therapy): room air}    Flowsheet Rows      Flowsheet Row First Filed Value   Admission Height 172.7 cm (68\") Documented at 10/08/2024 1229   Admission Weight 70.8 kg (156 lb) Documented at 10/08/2024 1229          Physical Exam:    General: Appears in no acute distress.  Lungs: Breath sounds are clear throughout all fields.  Excursion is normal.  CV: Regular rate and rhythm.  No murmurs appreciated.  Radial pulses are 2+ and symmetric.  Abdomen: Soft and nontender with active bowel sounds.  MSK: No clubbing, cyanosis, or edema.  Neuro: Cranial nerves II through XII are grossly intact.  Psych: Pleasant affect.  Oriented x 3.    Results Review:     I reviewed the patient's new clinical results.    Lab Results (last 24 hours)       ** No results found for the last 24 hours. **            Imaging Results (Last 24 Hours)       ** No results found for the last 24 hours. **            Medication Review:   I have reviewed the patient's current medication list    Current Facility-Administered Medications:     Aquaphor Advanced Therapy ointment 1 Application, 1 Application, Topical, Q12H, Chris Millan DO, 1 Application at 10/09/24 2137    sennosides-docusate (PERICOLACE) 8.6-50 MG per tablet 2 tablet, 2 tablet, Oral, BID PRN **AND** polyethylene glycol (MIRALAX) packet 17 g, 17 g, Oral, Daily PRN **AND** bisacodyl (DULCOLAX) EC tablet 5 mg, 5 mg, " Oral, Daily PRN **AND** bisacodyl (DULCOLAX) suppository 10 mg, 10 mg, Rectal, Daily PRN, Chris Millan DO    Calcium Replacement - Follow Nurse / BPA Driven Protocol, , Does not apply, PRN, Chris Millan DO    Enoxaparin Sodium (LOVENOX) syringe 40 mg, 40 mg, Subcutaneous, Daily, Chris Millan DO, 40 mg at 10/09/24 0853    famotidine (PEPCID) tablet 20 mg, 20 mg, Oral, BID AC, Fuentes Shay MD, 20 mg at 10/09/24 1755    influenza vac split high-dose (FLUZONE HIGH DOSE) injection 0.5 mL, 0.5 mL, Intramuscular, During Hospitalization, Chris Millan DO    levETIRAcetam (KEPPRA) tablet 1,000 mg, 1,000 mg, Oral, Q12H, Chris Millan DO, 1,000 mg at 10/09/24 2137    LORazepam (ATIVAN) tablet 1 mg, 1 mg, Oral, Nightly PRN, Fuentes Shay MD    Magnesium Standard Dose Replacement - Follow Nurse / BPA Driven Protocol, , Does not apply, PRN, Chris Millan DO    methylPREDNISolone sodium succinate (SOLU-Medrol) 500 mg in sodium chloride 0.9 % 100 mL IVPB, 500 mg, Intravenous, Q8H, Fuentes Shay MD, Last Rate: 200 mL/hr at 10/10/24 0119, 500 mg at 10/10/24 0119    ondansetron ODT (ZOFRAN-ODT) disintegrating tablet 4 mg, 4 mg, Oral, Q6H PRN **OR** ondansetron (ZOFRAN) injection 4 mg, 4 mg, Intravenous, Q6H PRN, Chris Millan DO    Phosphorus Replacement - Follow Nurse / BPA Driven Protocol, , Does not apply, PRMonty VASQUEZ Paul A, DO    Potassium Replacement - Follow Nurse / BPA Driven Protocol, , Does not apply, PRNMonty Paul A, DO    [COMPLETED] Insert Peripheral IV, , , Once **AND** sodium chloride 0.9 % flush 10 mL, 10 mL, Intravenous, PRN, Loi Madrid APRN    sodium chloride 0.9 % flush 10 mL, 10 mL, Intravenous, Q12H, Chris Millan DO, 10 mL at 10/09/24 2137    sodium chloride 0.9 % flush 10 mL, 10 mL, Intravenous, PRN, Chris Millan DO    sodium chloride 0.9 % infusion 40 mL, 40 mL, Intravenous, PRN, Chris Millan DO      Assessment & Plan     Acute flare of CIDP:  Discussed with physical therapy, and they note slow improvement as well.  Discussed with Dr. Shay and he was going to decrease IV Solu-Medrol dose.  Continue to monitor.  Seizure disorder: No acute events.  Continue current regimen.    Plan for disposition: Anticipate home with caregivers when able.    Lenin Desai MD  10/10/24  09:25 EDT   ADL ; pt climbs 3-4 flights  stairs multiple times day

## 2024-10-10 NOTE — PLAN OF CARE
Goal Outcome Evaluation:  Plan of Care Reviewed With: patient           Outcome Evaluation: OT- Patient performed supine to sit with min assist. Patient sat EOB with SBA and was dependent for donning shoes and socks. Patient stood from EOB with mod assist X 2 however was unable to pivot to chair. Bed was moved and chair placed behind patient. Patient participated in B UE PROM and AROM exercises. Patient reports he plans to return home with 24/7 care at discharge.

## 2024-10-10 NOTE — THERAPY TREATMENT NOTE
Acute Care - Physical Therapy Treatment Note   Nelli Moya     Patient Name: Efren Christianson  : 1945  MRN: [id number]  Today's Date: 10/10/2024      Visit Dx:     ICD-10-CM ICD-9-CM   1. 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 Progressive focal motor weakness  R53.1 728.87   3. Long-term current use of intravenous immunoglobulin (IVIG)  Z79.899 V58.69   4. 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)     Difficulty walking     Hepatitis A     as a child    Seizures     Syncope     Urgency of urination      Past Surgical History:   Procedure Laterality Date    TRUNK LESION/CYST EXCISION N/A 8/3/2023    Procedure: excision of chest wall cyst and back cyst 10:00;  Surgeon: Kianna Oliva DO;  Location: [facility];  Service: General;  Laterality: N/A;     PT Assessment (Last 12 Hours)       PT Evaluation and Treatment       Row Name 10/10/24 0942          Physical Therapy Time and Intention    Subjective Information complains of  c/o decreased hand function, weakness  -JW     Document Type therapy note (daily note)  -JW     Mode of Treatment physical therapy  -JW     Patient Effort adequate  -JW       Row Name 10/10/24 0942          General Information    Patient Observations alert;cooperative;agree to therapy  -JW     Patient/Family/Caregiver Comments/Observations pt supine, agrees to therapy  -JW     Existing Precautions/Restrictions fall  -JW     Risks Reviewed patient:;increased discomfort  -JW     Benefits Reviewed patient:;improve function;increase independence;increase strength;increase balance  -JW     Barriers to Rehab medically complex;previous functional deficit   -       Row Name 10/10/24 0942          Pain    Pretreatment Pain Rating 0/10 - no pain  -     Posttreatment Pain Rating 0/10 - no pain  -       Row Name 10/10/24 0942          Cognition    Personal Safety Interventions gait belt;nonskid shoes/slippers when out of bed  -       Row Name 10/10/24 0942          Bed Mobility    Bed Mobility supine-sit  -     Supine-Sit [place] (Bed Mobility) minimum assist (75% patient effort)  -     Bed Mobility, Safety Issues decreased use of arms for pushing/pulling  -     Assistive Device (Bed Mobility) bed rails;head of bed elevated  -       Row Name 10/10/24 0942          Transfers    Comment, (Transfers) pt performs sit to stand from elevated bed surface with mod assist x2, attempted to perform stand pivot however pt unable to lift or advance left foot.  3rd person moved bed and placed recliner behind patient  -       Row Name 10/10/24 0942          Sit-Stand Transfer    Sit-Stand [place] (Transfers) moderate assist (50% patient effort);2 person assist;verbal cues  -       Row Name 10/10/24 0942          Plan of Care Review    Outcome Evaluation PT: Patient performs supine to sit with min assist and sit to stand from elevated bed surface with mod assist x2.  Patient unable to lift or pivot left foot to safely complete pivot transfer into recliner.  Patient with intermittent right knee buckling in standing requiring assistance to correct.  Third person required to move bed and place recliner behind patient.  Patient continues to report he plans to return home with 24/7 caregiver assistance at discharge.  Will continue to follow for therapy needs.  -       Row Name 10/10/24 0942          Positioning and Restraints    Pre-Treatment Position in bed  -     Post Treatment Position chair  -     In Chair reclined;call light within reach;encouraged to call for assist;with OT  -       Row Name 10/10/24 0942          Progress Summary (PT)    Progress  Toward Functional Goals (PT) progress toward functional goals is fair  -     Barriers to Overall Progress (PT) PLOF  -JW               User Key  (r) = Recorded By, (t) = Taken By, (c) = Cosigned By      Initials Name Provider Type    Devorah Whittington, PT Physical Therapist                    Physical Therapy Education       Title: PT OT SLP Therapies (In Progress)       Topic: Physical Therapy (In Progress)       Point: Mobility training (Done)       Learning Progress Summary             Patient Acceptance, E,TB, VU by  at 10/10/2024 1118    Acceptance, E,TB, VU by  at 10/9/2024 1052                         Point: Home exercise program (Not Started)       Learner Progress:  Not documented in this visit.                              User Key       Initials Effective Dates Name Provider Type Discipline     06/16/21 -  Supriya Paula PT Physical Therapist PT    STONE 06/16/21 -  Devorah Kramer PT Physical Therapist PT                  PT Recommendation and Plan  Anticipated Discharge Disposition (PT): home with assist, home with 24/7 care  Progress Summary (PT)  Progress Toward Functional Goals (PT): progress toward functional goals is fair  Barriers to Overall Progress (PT): PLOF  Outcome Evaluation: PT: Patient performs supine to sit with min assist and sit to stand from elevated bed surface with mod assist x2.  Patient unable to lift or pivot left foot to safely complete pivot transfer into recliner.  Patient with intermittent right knee buckling in standing requiring assistance to correct.  Third person required to move bed and place recliner behind patient.  Patient continues to report he plans to return home with 24/7 caregiver assistance at discharge.  Will continue to follow for therapy needs.   Outcome Measures       Row Name 10/10/24 1000 10/10/24 0941          How much help from another person do you currently need...    Turning from your back to your side while in flat bed without using  bedrails? -- 3  -JW     Moving from lying on back to sitting on the side of a flat bed without bedrails? -- 3  -JW     Moving to and from a bed to a chair (including a wheelchair)? -- 1  -JW     Standing up from a chair using your arms (e.g., wheelchair, bedside chair)? -- 1  -JW     Climbing 3-5 steps with a railing? -- 1  -JW     To walk in hospital room? -- 1  -JW     AM-PAC 6 Clicks Score (PT) -- 10  -JW     Highest Level of Mobility Goal -- 4 --> Transfer to chair/commode  -JW        How much help from another is currently needed...    Putting on and taking off regular lower body clothing? 1  -EN --     Bathing (including washing, rinsing, and drying) 1  -EN --     Toileting (which includes using toilet bed pan or urinal) 1  -EN --     Putting on and taking off regular upper body clothing 1  -EN --     Taking care of personal grooming (such as brushing teeth) 1  -EN --     Eating meals 1  -EN --     AM-PAC 6 Clicks Score (OT) 6  -EN --        Functional Assessment    Outcome Measure Options -- AM-[facility] 6 Clicks Basic Mobility (PT)  -               User Key  (r) = Recorded By, (t) = Taken By, (c) = Cosigned By      Initials Name Provider Type    Rach May, OTR Occupational Therapist    Devorah Whittington PT Physical Therapist                     Time Calculation:    PT Charges       Row Name 10/10/24 1119             Time Calculation    Start Time 0941  -      Stop Time 0956  -      Time Calculation (min) 15 min  -JW      PT Received On 10/10/24  -      PT - Next Appointment 10/11/24  -         Timed Charges    76273 - PT Therapeutic Exercise Minutes 15  -JW         Total Minutes    Timed Charges Total Minutes 15  -JW       Total Minutes 15  -JW                User Key  (r) = Recorded By, (t) = Taken By, (c) = Cosigned By      Initials Name Provider Type    Devorah Whittington PT Physical Therapist                  Therapy Charges for Today       Code Description Service Date Service  Provider Modifiers Qty    [id number] HC PT THER PROC EA 15 MIN 10/10/2024 Devorah Kramer, PT GP 1            PT G-Codes  Outcome Measure Options: AM-PAC 6 Clicks Basic Mobility (PT)  AM-PAC 6 Clicks Score (PT): 10  AM-PAC 6 Clicks Score (OT): 6    Devorah Kramer PT  10/10/2024

## 2024-10-10 NOTE — PLAN OF CARE
Goal Outcome Evaluation:              Outcome Evaluation: PT: Patient performs supine to sit with min assist and sit to stand from elevated bed surface with mod assist x2.  Patient unable to lift or pivot left foot to safely complete pivot transfer into recliner.  Patient with intermittent right knee buckling in standing requiring assistance to correct.  Third person required to move bed and place recliner behind patient.  Patient continues to report he plans to return home with 24/7 caregiver assistance at discharge.  Will continue to follow for therapy needs.      Anticipated Discharge Disposition (PT): home with assist, home with 24/7 care

## 2024-10-11 ENCOUNTER — APPOINTMENT (OUTPATIENT)
Dept: GENERAL RADIOLOGY | Facility: HOSPITAL | Age: 79
End: 2024-10-11
Payer: MEDICARE

## 2024-10-11 PROCEDURE — 97110 THERAPEUTIC EXERCISES: CPT

## 2024-10-11 PROCEDURE — 99232 SBSQ HOSP IP/OBS MODERATE 35: CPT | Performed by: PSYCHIATRY & NEUROLOGY

## 2024-10-11 PROCEDURE — 25010000002 ENOXAPARIN PER 10 MG: Performed by: FAMILY MEDICINE

## 2024-10-11 PROCEDURE — 25010000002 METHYLPREDNISOLONE PER 125 MG: Performed by: PSYCHIATRY & NEUROLOGY

## 2024-10-11 PROCEDURE — 99232 SBSQ HOSP IP/OBS MODERATE 35: CPT | Performed by: HOSPITALIST

## 2024-10-11 PROCEDURE — 97530 THERAPEUTIC ACTIVITIES: CPT

## 2024-10-11 PROCEDURE — 71046 X-RAY EXAM CHEST 2 VIEWS: CPT

## 2024-10-11 RX ORDER — PREDNISONE 20 MG/1
40 TABLET ORAL
Status: DISCONTINUED | OUTPATIENT
Start: 2024-10-14 | End: 2024-10-13 | Stop reason: HOSPADM

## 2024-10-11 RX ORDER — LABETALOL HYDROCHLORIDE 5 MG/ML
10 INJECTION, SOLUTION INTRAVENOUS EVERY 4 HOURS PRN
Status: DISCONTINUED | OUTPATIENT
Start: 2024-10-11 | End: 2024-10-13 | Stop reason: HOSPADM

## 2024-10-11 RX ADMIN — FAMOTIDINE 20 MG: 20 TABLET, FILM COATED ORAL at 18:11

## 2024-10-11 RX ADMIN — METHYLPREDNISOLONE SODIUM SUCCINATE 750 MG: 500 INJECTION INTRAMUSCULAR; INTRAVENOUS at 07:04

## 2024-10-11 RX ADMIN — FAMOTIDINE 20 MG: 20 TABLET, FILM COATED ORAL at 07:04

## 2024-10-11 RX ADMIN — WHITE PETROLATUM 41 % TOPICAL OINTMENT 1 APPLICATION: OINTMENT at 20:54

## 2024-10-11 RX ADMIN — Medication 10 ML: at 20:54

## 2024-10-11 RX ADMIN — WHITE PETROLATUM 41 % TOPICAL OINTMENT 1 APPLICATION: OINTMENT at 08:54

## 2024-10-11 RX ADMIN — METHYLPREDNISOLONE SODIUM SUCCINATE 500 MG: 500 INJECTION INTRAMUSCULAR; INTRAVENOUS at 16:38

## 2024-10-11 RX ADMIN — LEVETIRACETAM 1000 MG: 500 TABLET, FILM COATED ORAL at 08:54

## 2024-10-11 RX ADMIN — Medication 10 ML: at 08:54

## 2024-10-11 RX ADMIN — ENOXAPARIN SODIUM 40 MG: 40 INJECTION SUBCUTANEOUS at 08:54

## 2024-10-11 RX ADMIN — LEVETIRACETAM 1000 MG: 500 TABLET, FILM COATED ORAL at 20:54

## 2024-10-11 NOTE — PROGRESS NOTES
Patient Identification:  NAME:  Efren Christianson  Age:  79 y.o.   Sex:  male   :  1945   MRN:  [id number]       Chief complaint: CIDP, seizure disorder    History of present illness: He thinks he is doing okay.  He denies any hallucinations or confusion.  He has been seen by physical therapy who is working with him.  He does not want to go to a rehab facility.      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    Home medications:  Medications Prior to Admission   Medication Sig Dispense Refill Last Dose    Immune Globulin, Human, (GAMMAGARD IV) Infuse 1 g/kg into a venous catheter Every 30 (Thirty) Days.   9/10/2024    levETIRAcetam (KEPPRA) 1000 MG tablet Take 1 tablet by mouth Every 12 (Twelve) Hours.   10/8/2024 at 0800    multivitamin (THERAGRAN) tablet tablet Take  by mouth Daily.   10/8/2024 at 0800    ferrous sulfate 324 (65 Fe) MG tablet delayed-release EC tablet Take 1 tablet by mouth Daily With Breakfast. Three times a week       vitamin C (ASCORBIC ACID) 250 MG tablet Take 1 tablet by mouth Daily.           Hospital medications:  Aquaphor Advanced Therapy, 1 Application, Topical, Q12H  enoxaparin, 40 mg, Subcutaneous, Daily  famotidine, 20 mg, Oral, BID AC  levETIRAcetam, 1,000 mg, Oral, Q12H  [START ON 10/13/2024] methylPREDNISolone sodium succinate, 250 mg, Intravenous, Once  [START ON 10/12/2024] methylPREDNISolone sodium succinate, 500 mg, Intravenous, Once  [START ON 10/14/2024] predniSONE, 40 mg, Oral, Daily With Breakfast  sodium chloride, 10 mL, Intravenous, Q12H           senna-docusate sodium **AND** polyethylene glycol **AND** bisacodyl **AND** bisacodyl    Calcium Replacement - Follow Nurse / BPA Driven Protocol    influenza vaccine     LORazepam    Magnesium Standard Dose Replacement - Follow Nurse / BPA Driven Protocol    ondansetron ODT **OR** ondansetron    Phosphorus Replacement - Follow Nurse / BPA Driven Protocol    Potassium Replacement - Follow Nurse / BPA Driven Protocol    [COMPLETED] Insert Peripheral IV **AND** sodium chloride    sodium chloride    sodium chloride      Objective:  Vitals Ranges:   Temp:  [97.5 °F (36.4 °C)-98 °F (36.7 °C)] 97.9 °F (36.6 °C)  Heart Rate:  [68-98] 68  Resp:  [16-18] 18  BP: (152-172)/(76-92) 172/82      Physical Exam:  Normal cranial nerves II through VII good neck flexor strength still weak in all 4 extremities.  Good plantarflexion bilaterally.  Good dorsi flexion on the right cannot really dorsiflex the left ankle.  Reflexes absent toes mute or downgoing definitely not upgoing    Results review:   I reviewed the patient's new clinical results.    Data review:  Lab Results (last 24 hours)       ** No results found for the last 24 hours. **             Imaging:  Imaging Results (Last 24 Hours)       Procedure Component Value Units Date/Time    XR Chest PA & Lateral [[id number]] Collected: 10/11/24 1032     Updated: 10/11/24 1039    Narrative:      XR CHEST PA AND LATERAL    Date of Exam: 10/11/2024 10:28 AM EDT    Indication: Dyspnea    Comparison: 7/3/2023    Findings:  Unchanged cardiomediastinal silhouette. No focal airspace opacity, pleural effusion, or pneumothorax. Low lung volumes. 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 Osseous structures are   unremarkable.      Impression:      Impression:  No acute cardiopulmonary abnormality.      Electronically Signed: Martinez Deluna MD    10/11/2024 10:35 AM EDT    Workstation ID: [id number]               Assessment and Plan:       CIDP (chronic inflammatory demyelinating polyneuropathy)    At this point, he has received several days of 500 mg Solu-Medrol IV every 8 hours, today he got 750 mg IV x  1.  I have written for him to get Solu-Medrol 500 mg IV x 1 tomorrow 10/12/2024 and 250 mg on Sunday, 10/13/2024.  Then Solu-Medrol will be complete and starting on Monday, 10/14/2024 he will be on prednisone 40 mg p.o. every morning with breakfast indefinitely  He does not want to go to any type of rehab facility so he will end up being discharged home.  He will need follow-up with   Dr Reyes his neurologist for at least should contact him on Monday to set up when he will start the [place] infusion.  11:53 EDT

## 2024-10-11 NOTE — PLAN OF CARE
"Goal Outcome Evaluation:  Plan of Care Reviewed With: patient           Outcome Evaluation: PT: patient performs supine to sit transfer with CGA and sit to/from stand transfers x 2 from an elevated bed surface with max A x 2. Unable to manage a device for transfers due to UE weakness and poor  strength. Patient requires max A x 2 for static standing balance with posterior lean/propulsion. Unable to fully extend left knee in standing or advance LE's toward chair to initaite pivot transfer. Third person present to move bed and place recliner behind patient while in standing. Patient continues to report he will return home at discharge with continued assist from caregivers. States \"i don't want to go to a facility.\" Discussed/educated patient on transfer devices as well as mechanical lifts to aid with caregiver burden. Patient receptive. Will contine to see for therapy while in the hosptial to advance mobility as tolerated. Caregivers not present during sessions to confirm patients discharge plan to return home.      Anticipated Discharge Disposition (PT): home with assist, home with 24/7 care                        "

## 2024-10-11 NOTE — PROGRESS NOTES
"Hospitalist Team      Patient Care Team:  Winnie Murray as PCP - General (Nurse Practitioner)  Winnie Murray (Nurse Practitioner)        Chief Complaint: Follow-up CIDP flare    Subjective    Mr. Christianson reports he feels a little short of breath this morning, but otherwise has no complaints.  He is tolerating p.o.  Continues to work with physical therapy.    Objective    Vital Signs  Temp:  [97.5 °F (36.4 °C)-98 °F (36.7 °C)] 97.9 °F (36.6 °C)  Heart Rate:  [68-98] 68  Resp:  [16-18] 18  BP: (135-172)/(76-95) 172/82  Oxygen Therapy  SpO2: 94 %  Pulse Oximetry Type: Intermittent  Device (Oxygen Therapy): room air}    Flowsheet Rows      Flowsheet Row First Filed Value   Admission Height 172.7 cm (68\") Documented at 10/08/2024 1229   Admission Weight 70.8 kg (156 lb) Documented at 10/08/2024 1229            Physical Exam:    General: Appears in no acute distress.  Lungs: Breath sounds are clear throughout all fields.  Respirations are nonlabored.  CV: Regular rate and rhythm.  No murmur appreciated.  Radial pulses are 2+ and symmetric.  Abdomen: Soft nontender with active bowel sounds.  MSK: Motor strength is 5 out of 5 with flexion/dorsiflexion at the ankle.  Flexion extension at the knee is right greater than left  Neuro: Cranial nerves II through XII grossly intact.  Psych: Pleasant affect.  Oriented x 3.    Results Review:     I reviewed the patient's new clinical results.    Lab Results (last 24 hours)       ** No results found for the last 24 hours. **            Imaging Results (Last 24 Hours)       ** No results found for the last 24 hours. **            Medication Review:   I have reviewed the patient's current medication list    Current Facility-Administered Medications:     Aquaphor Advanced Therapy ointment 1 Application, 1 Application, Topical, Q12H, Chris Millan DO, 1 Application at 10/11/24 0854    sennosides-docusate (PERICOLACE) 8.6-50 MG per tablet 2 tablet, 2 tablet, Oral, BID PRN **AND** " polyethylene glycol (MIRALAX) packet 17 g, 17 g, Oral, Daily PRN **AND** bisacodyl (DULCOLAX) EC tablet 5 mg, 5 mg, Oral, Daily PRN **AND** bisacodyl (DULCOLAX) suppository 10 mg, 10 mg, Rectal, Daily PRN, Chris Millan DO    Calcium Replacement - Follow Nurse / BPA Driven Protocol, , Does not apply, PRN, Chris Millan DO    Enoxaparin Sodium (LOVENOX) syringe 40 mg, 40 mg, Subcutaneous, Daily, Chris Millan DO, 40 mg at 10/11/24 0854    famotidine (PEPCID) tablet 20 mg, 20 mg, Oral, BID AC, Fuentes Shay MD, 20 mg at 10/11/24 0704    influenza vac split high-dose (FLUZONE HIGH DOSE) injection 0.5 mL, 0.5 mL, Intramuscular, During Hospitalization, Chris Millan DO    levETIRAcetam (KEPPRA) tablet 1,000 mg, 1,000 mg, Oral, Q12H, Chris Millan DO, 1,000 mg at 10/11/24 0854    LORazepam (ATIVAN) tablet 1 mg, 1 mg, Oral, Nightly PRN, Fuentes Shay MD    Magnesium Standard Dose Replacement - Follow Nurse / BPA Driven Protocol, , Does not apply, PRN, Chris Millan DO    [START ON 10/12/2024] methylPREDNISolone sodium succinate (SOLU-Medrol) 500 mg in sodium chloride 0.9 % 100 mL IVPB, 500 mg, Intravenous, Q AM, Fuentes Shay MD    ondansetron ODT (ZOFRAN-ODT) disintegrating tablet 4 mg, 4 mg, Oral, Q6H PRN **OR** ondansetron (ZOFRAN) injection 4 mg, 4 mg, Intravenous, Q6H PRN, Chris Millan DO    Phosphorus Replacement - Follow Nurse / BPA Driven Protocol, , Does not apply, PRNMonty Paul A, DO    Potassium Replacement - Follow Nurse / BPA Driven Protocol, , Does not apply, PRN, Chris Millan DO    [COMPLETED] Insert Peripheral IV, , , Once **AND** sodium chloride 0.9 % flush 10 mL, 10 mL, Intravenous, PRN, Loi Madrid, APRN    sodium chloride 0.9 % flush 10 mL, 10 mL, Intravenous, Q12H, Chris Millan DO, 10 mL at 10/11/24 0854    sodium chloride 0.9 % flush 10 mL, 10 mL, Intravenous, PRN, Chris Millan,     sodium chloride 0.9 % infusion 40 mL, 40 mL, Intravenous,  CHRISTIE, Chris Millan, DO      Assessment & Plan     Acute flare of CIDP: Appreciate Dr. Shay's continued help.  Continue physical therapy.  Elevated blood pressure: I suspect we are starting to see some mineralocorticoid effects of the high-dose steroids.  Can add IV labetalol as needed given his heart rate.  Seizure disorder: No acute events on current regimen.  No change.    Plan for disposition: Home when completed course.    Lenin Desai MD  10/11/24  09:56 EDT

## 2024-10-11 NOTE — THERAPY TREATMENT NOTE
Acute Care - Physical Therapy Treatment Note   Nelli Moya     Patient Name: Efren Christianson  : 1945  MRN: [id number]  Today's Date: 10/11/2024      Visit Dx:     ICD-10-CM ICD-9-CM   1. 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 Progressive focal motor weakness  R53.1 728.87   3. Long-term current use of intravenous immunoglobulin (IVIG)  Z79.899 V58.69   4. 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)     Difficulty walking     Hepatitis A     as a child    Seizures     Syncope     Urgency of urination      Past Surgical History:   Procedure Laterality Date    TRUNK LESION/CYST EXCISION N/A 8/3/2023    Procedure: excision of chest wall cyst and back cyst 10:00;  Surgeon: Kianna Oliva DO;  Location: [facility];  Service: General;  Laterality: N/A;     PT Assessment (Last 12 Hours)       PT Evaluation and Treatment       Row Name 10/11/24 1048          Physical Therapy Time and Intention    Subjective Information complains of;weakness  -BP     Document Type therapy note (daily note)  -BP     Mode of Treatment physical therapy  -BP     Patient Effort adequate  -BP     Symptoms Noted During/After Treatment fatigue  -BP       Row Name 10/11/24 1048          General Information    Patient Profile Reviewed yes  -BP     Patient/Family/Caregiver Comments/Observations Patient supine in bed with HOB elevated. Patient agreeable to PT treatment.  -BP     Risks Reviewed patient:;LOB  -BP     Benefits Reviewed patient:;improve function;increase independence  -BP     Barriers to Rehab medically complex;previous functional deficit;physical barrier  -BP       Row Name 10/11/24 9793           Pain    Pre/Posttreatment Pain Comment No complaints of pain  -BP       Row Name 10/11/24 1045          Cognition    Personal Safety Interventions gait belt;nonskid shoes/slippers when out of bed  -BP       Row Name 10/11/24 1045          Bed Mobility    Bed Mobility supine-sit  -     Supine-Sit [place] (Bed Mobility) contact guard  -     Bed Mobility, Safety Issues decreased use of arms for pushing/pulling  -     Assistive Device (Bed Mobility) bed rails;head of bed elevated  -BP       Row Name 10/11/24 1045          Transfers    Transfers sit-stand transfer;stand-sit transfer  -     Comment, (Transfers) Performed sit to/from stand transfer from elevated bed surface with max A x 2. Unable to manage a device due to poor UE strength and  strength. Unable to attempt stand pivot transfer due to poor standing balance, unable to move LE's despite significant assist provided. Third person present to move bed and place recliner behind patient.  -BP       Row Name 10/11/24 1045          Sit-Stand Transfer    Sit-Stand [place] (Transfers) maximum assist (25% patient effort);2 person assist;verbal cues  -     Assistive Device (Sit-Stand Transfers) --  no device  -BP       Row Name 10/11/24 1045          Stand-Sit Transfer    Stand-Sit [place] (Transfers) maximum assist (25% patient effort);2 person assist  -BP       Row Name 10/11/24 1045          Squat Pivot Transfer    Comment, (Squat Pivot Transfer) unable to safely perform, third person present to move bed and place recliner behind patient.  -BP       Row Name 10/11/24 1045          Balance    Comment, Balance static sitting balance-SBA. Static standing balance-max A x 2 with postserior lean and unable to fully extend L knee in standing.  -BP       Row Name 10/11/24 1045          Motor Skills    Therapeutic Exercise --  Patient able to perform SLR bilaterally, instructed to perform throughout the day along with heel slides and ankle pumps   "-BP       Row Name 10/11/24 1045          Plan of Care Review    Plan of Care Reviewed With patient  -BP     Outcome Evaluation PT: patient performs supine to sit transfer with CGA and sit to/from stand transfers x 2 from an elevated bed surface with max A x 2. Unable to manage a device for transfers due to UE weakness and poor  strength. Patient requires max A x 2 for static standing balance with posterior lean/propulsion. Unable to fully extend left knee in standing or advance LE's toward chair to initaite pivot transfer. Third person present to move bed and place recliner behind patient while in standing. Patient continues to report he will return home at discharge with continued assist from caregivers. States \"i don't want to go to a facility.\" Discussed/educated patient on transfer devices as well as mechanical lifts to aid with caregiver burden. Patient receptive. Will contine to see for therapy while in the hosptial to advance mobility as tolerated. Caregivers not present during sessions to confirm patients discharge plan to return home.  -BP       Row Name 10/11/24 1045          Positioning and Restraints    Pre-Treatment Position in bed  -BP     Post Treatment Position chair  -BP     In Chair reclined;call light within reach;encouraged to call for assist;notified nsg  -BP       Row Name 10/11/24 1045          Progress Summary (PT)    Progress Toward Functional Goals (PT) progress toward functional goals is fair  -BP       Row Name 10/11/24 1045          Therapy Plan Review/Discharge Plan (PT)    Therapy Plan Review (PT) patient;risks/benefits reviewed;participants included  -BP     Patient/Family Concerns, Anticipated Discharge Disposition (PT) If caregivers are unable to provide amount of assist required for mobility, patient would need SNF at discharge.  -BP               User Key  (r) = Recorded By, (t) = Taken By, (c) = Cosigned By      Initials Name Provider Type    BP Supriya Paula, PT " "Physical Therapist                    Physical Therapy Education       Title: PT OT SLP Therapies (In Progress)       Topic: Physical Therapy (In Progress)       Point: Mobility training (Done)       Learning Progress Summary             Patient Acceptance, E,TB, VU by  at 10/11/2024 1140    Acceptance, E,TB, VU by  at 10/10/2024 1118    Acceptance, E,TB, VU by  at 10/9/2024 1052                         Point: Home exercise program (Not Started)       Learner Progress:  Not documented in this visit.                              User Key       Initials Effective Dates Name Provider Type Discipline     06/16/21 -  Supriya Paula, PT Physical Therapist PT    STONE 06/16/21 -  Devorah Kramer, PT Physical Therapist PT                  PT Recommendation and Plan  Anticipated Discharge Disposition (PT): home with assist, home with 24/7 care  Planned Therapy Interventions (PT): balance training, bed mobility training, patient/family education, transfer training, strengthening  Therapy Frequency (PT): 5 times/wk  Progress Summary (PT)  Progress Toward Functional Goals (PT): progress toward functional goals is fair  Plan of Care Reviewed With: patient  Outcome Evaluation: PT: patient performs supine to sit transfer with CGA and sit to/from stand transfers x 2 from an elevated bed surface with max A x 2. Unable to manage a device for transfers due to UE weakness and poor  strength. Patient requires max A x 2 for static standing balance with posterior lean/propulsion. Unable to fully extend left knee in standing or advance LE's toward chair to initaite pivot transfer. Third person present to move bed and place recliner behind patient while in standing. Patient continues to report he will return home at discharge with continued assist from caregivers. States \"i don't want to go to a facility.\" Discussed/educated patient on transfer devices as well as mechanical lifts to aid with caregiver burden. Patient receptive. " Will contine to see for therapy while in the hosptial to advance mobility as tolerated. Caregivers not present during sessions to confirm patients discharge plan to return home.   Outcome Measures       Row Name 10/11/24 1045 10/10/24 1000 10/10/24 0941       How much help from another person do you currently need...    Turning from your back to your side while in flat bed without using bedrails? 3  -BP -- 3  -JW    Moving from lying on back to sitting on the side of a flat bed without bedrails? 3  -BP -- 3  -JW    Moving to and from a bed to a chair (including a wheelchair)? 1  -BP -- 1  -JW    Standing up from a chair using your arms (e.g., wheelchair, bedside chair)? 1  -BP -- 1  -JW    Climbing 3-5 steps with a railing? 1  -BP -- 1  -JW    To walk in hospital room? 1  -BP -- 1  -JW    AM-PAC 6 Clicks Score (PT) 10  -BP -- 10  -JW    Highest Level of Mobility Goal 4 --> Transfer to chair/commode  -BP -- 4 --> Transfer to chair/commode  -JW       How much help from another is currently needed...    Putting on and taking off regular lower body clothing? -- 1  -EN --    Bathing (including washing, rinsing, and drying) -- 1  -EN --    Toileting (which includes using toilet bed pan or urinal) -- 1  -EN --    Putting on and taking off regular upper body clothing -- 1  -EN --    Taking care of personal grooming (such as brushing teeth) -- 1  -EN --    Eating meals -- 1  -EN --    AM-PAC 6 Clicks Score (OT) -- 6  -EN --       Functional Assessment    Outcome Measure Options AM-PAC 6 Clicks Basic Mobility (PT)  -BP -- AM-PAC 6 Clicks Basic Mobility (PT)  -JW              User Key  (r) = Recorded By, (t) = Taken By, (c) = Cosigned By      Initials Name Provider Type    EN Rach Gray, OTR Occupational Therapist    Supriya Collins, PT Physical Therapist    Devorah Whittington, SYLVIE Physical Therapist                     Time Calculation:    PT Charges       Row Name 10/11/24 1141             Time Calculation     Start Time 1045  -BP      Stop Time 1115  -BP      Time Calculation (min) 30 min  -BP      PT Received On 10/11/24  -BP      PT - Next Appointment 10/12/24  -BP         Timed Charges    72751 - PT Therapeutic Exercise Minutes 30  -BP         Total Minutes    Timed Charges Total Minutes 30  -BP       Total Minutes 30  -BP                User Key  (r) = Recorded By, (t) = Taken By, (c) = Cosigned By      Initials Name Provider Type    BP Supriya Paula, PT Physical Therapist                  Therapy Charges for Today       Code Description Service Date Service Provider Modifiers Qty    48846065279 HC PT THER PROC EA 15 MIN 10/11/2024 Supriya Paula, PT GP 2            PT G-Codes  Outcome Measure Options: AM-PAC 6 Clicks Basic Mobility (PT)  AM-PAC 6 Clicks Score (PT): 10  AM-PAC 6 Clicks Score (OT): 6    Supriya Paula PT  10/11/2024

## 2024-10-11 NOTE — THERAPY TREATMENT NOTE
Patient Name: Efren Christianson  : 1945    MRN: [id number]                              Today's Date: 10/11/2024       Admit Date: 10/8/2024    Visit Dx:     ICD-10-CM ICD-9-CM   1. 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 Progressive focal motor weakness  R53.1 728.87   3. Long-term current use of intravenous immunoglobulin (IVIG)  Z79.899 V58.69   4. 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)     Difficulty walking     Hepatitis A     as a child    Seizures     Syncope     Urgency of urination      Past Surgical History:   Procedure Laterality Date    TRUNK LESION/CYST EXCISION N/A 8/3/2023    Procedure: excision of chest wall cyst and back cyst 10:00;  Surgeon: Kianna Oliva DO;  Location: [facility];  Service: General;  Laterality: N/A;      General Information       Row Name 10/11/24 1130          OT Time and Intention    Document Type therapy note (daily note)  -SD     Mode of Treatment occupational therapy  -SD       Row Name 10/11/24 1130          General Information    Existing Precautions/Restrictions fall  -SD               User Key  (r) = Recorded By, (t) = Taken By, (c) = Cosigned By      Initials Name Provider Type    SD Francis Good OTR Occupational Therapist                     Mobility/ADL's       Row Name 10/11/24 1130          Bed Mobility    Bed Mobility supine-sit  -SD     Supine-Sit [place] (Bed Mobility) contact guard  -SD     Bed Mobility, Safety Issues decreased use of arms for pushing/pulling  -SD     Assistive Device (Bed Mobility) bed rails;head of bed elevated  -SD       Row Name 10/11/24 1130          Transfers    Transfers  sit-stand transfer;stand-sit transfer;bed-chair transfer  -SD       Row Name 10/11/24 1130          Bed-Chair Transfer    Bed-Chair [place] (Transfers) maximum assist (25% patient effort);2 person assist;verbal cues  -SD     Assistive Device (Bed-Chair Transfers) other (see comments)  hand held assist x 2. Pt not able to grasp a walker  -SD     Comment, (Bed-Chair Transfer) Pt was assisted to standing with max assist x 2 (hand held assist). bed was moved and chair was brought behind pt to manage the transer. Pt not able to manage a step for a stand step pivot transfer due to weakness and balance deficits.  -SD       Row Name 10/11/24 1130          Sit-Stand Transfer    Sit-Stand [place] (Transfers) maximum assist (25% patient effort);2 person assist  -SD       Row Name 10/11/24 1130          Stand-Sit Transfer    Stand-Sit [place] (Transfers) maximum assist (25% patient effort);2 person assist  -SD       Row Name 10/11/24 1130          Lower Body Dressing Assessment/Training    [place] Level (Lower Body Dressing) lower body dressing skills;doff;don;socks;shoes/slippers;dependent (less than 25% patient effort)  -SD               User Key  (r) = Recorded By, (t) = Taken By, (c) = Cosigned By      Initials Name Provider Type    SD Francis Good, TIFFANIER Occupational Therapist                   Obj/Interventions       Row Name 10/11/24 1134          Sensory Assessment (Somatosensory)    Sensory Assessment (Somatosensory) other (see comments)  hx of neuropathy in extremities  -SD       Row Name 10/11/24 1134          Shoulder (Therapeutic Exercise)    Shoulder (Therapeutic Exercise) PROM (passive range of motion);AAROM (active assistive range of motion)  -SD     Shoulder PROM (Therapeutic Exercise) bilateral;flexion;extension;aBduction;aDduction;external rotation;internal rotation;10 repetitions;sitting  -SD       Row Name 10/11/24 1134          Elbow/Forearm (Therapeutic Exercise)    Elbow/Forearm  (Therapeutic Exercise) AAROM (active assistive range of motion);PROM (passive range of motion)  -SD     Elbow/Forearm AAROM (Therapeutic Exercise) bilateral;flexion;extension;supination;pronation;sitting;10 repetitions;other (see comments)  therapist provided assistance  -SD       Row Name 10/11/24 1134          Wrist (Therapeutic Exercise)    Wrist (Therapeutic Exercise) AAROM (active assistive range of motion)  -SD     Wrist AAROM (Therapeutic Exercise) bilateral;flexion;extension;5 repetitions  -SD       Row Name 10/11/24 1134          Hand (Therapeutic Exercise)    Hand (Therapeutic Exercise) PROM (passive range of motion)  -SD     Hand PROM (Therapeutic Exercise) bilateral;finger flexion;finger extension;thumb flexion;thumb extension;5 repetitions  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 max assist x 2 for static standing balance (hand held assistance)  -SD               User Key  (r) = Recorded By, (t) = Taken By, (c) = Cosigned By      Initials Name Provider Type    SD Francis Good, OTR Occupational Therapist                   Goals/Plan       Row Name 10/11/24 1142          Transfer Goal 1 (OT)    Activity/Assistive Device (Transfer Goal 1, OT) bed-to-chair/chair-to-bed;other (see comments)  -SD     [place] Level/Cues Needed (Transfer Goal 1, OT) moderate assist (50-74% patient effort)  -SD     Time Frame (Transfer Goal 1, OT) by discharge  -SD     Progress/Outcome (Transfer Goal 1, OT) goal ongoing  -SD       Row Name 10/11/24 1142          ROM Goal 1 (OT)    ROM Goal 1 (OT) Pt to participate in BUE rom program (arom, a/arom and prom) to address rom /strength for basic self care tasks.  -SD     Time Frame (ROM Goal 1, OT) by discharge  -SD     Progress/Outcome (ROM Goal 1, OT) goal ongoing  -SD       Row Name 10/11/24 1142          Problem Specific Goal 1 (OT)     Problem Specific Goal 1 (OT) Pt to verbalize compensatory strategies/use of AE that can increase functional indpendence for basic adl tasks (self feeding and light grooming).  -SD     Time Frame (Problem Specific Goal 1, OT) by discharge  -SD     Strategies/Barriers (Problem Specific Goal 1, OT) education with compensatory strategies/use of AE to compensate for limited functional grasp, decresaed BUE rom and BUE weakness  -SD     Progress/Outcome (Problem Specific Goal 1, OT) goal ongoing  -SD       Row Name 10/11/24 1142          Therapy Assessment/Plan (OT)    Planned Therapy Interventions (OT) ROM/therapeutic exercise;transfer/mobility retraining;patient/caregiver education/training;functional balance retraining  -SD               User Key  (r) = Recorded By, (t) = Taken By, (c) = Cosigned By      Initials Name Provider Type    SD Francis Good, OTR Occupational Therapist                   Clinical Impression       Row Name 10/11/24 1137          Pain Assessment    Pretreatment Pain Rating 0/10 - no pain  -SD     Posttreatment Pain Rating 0/10 - no pain  -SD       Row Name 10/11/24 1132          Plan of Care Review    Plan of Care Reviewed With patient  -SD     Outcome Evaluation OT: Pt continues with significant weakness. He is not able to achieve a functional grasp and has minimal BUE active motion against gravity. Pt participated in PROM and A/AROM program of BUE's. Pt managed bed mobility with CGA. He required max assist x 2 for to stand from EOB with hand held assistance. Pt is dependent at this time for management of daily tasks. Pt states his plan is to return home with 24 hour caregivers. If pt does return home, rec HH services.  -SD       Row Name 10/11/24 1136          Positioning and Restraints    Pre-Treatment Position in bed  -SD     Post Treatment Position chair  -SD     In Chair reclined;call light within reach;encouraged to call for assist  modified call button placed within reach for pt  access  -SD               User Key  (r) = Recorded By, (t) = Taken By, (c) = Cosigned By      Initials Name Provider Type    Francis Moreno OTR Occupational Therapist                   Outcome Measures       Row Name 10/11/24 1143          How much help from another is currently needed...    Putting on and taking off regular lower body clothing? 1  -SD     Bathing (including washing, rinsing, and drying) 1  -SD     Toileting (which includes using toilet bed pan or urinal) 1  -SD     Putting on and taking off regular upper body clothing 1  -SD     Taking care of personal grooming (such as brushing teeth) 1  -SD     Eating meals 1  -SD     AM-PAC 6 Clicks Score (OT) 6  -SD       Row Name 10/11/24 1045          How much help from another person do you currently need...    Turning from your back to your side while in flat bed without using bedrails? 3  -BP     Moving from lying on back to sitting on the side of a flat bed without bedrails? 3  -BP     Moving to and from a bed to a chair (including a wheelchair)? 1  -BP     Standing up from a chair using your arms (e.g., wheelchair, bedside chair)? 1  -BP     Climbing 3-5 steps with a railing? 1  -BP     To walk in hospital room? 1  -BP     AM-PAC 6 Clicks Score (PT) 10  -BP     Highest Level of Mobility Goal 4 --> Transfer to chair/commode  -BP       Row Name 10/11/24 1143 10/11/24 1045       Functional Assessment    Outcome Measure Options AM-PAC 6 Clicks Daily Activity (OT)  -SD AM-PAC 6 Clicks Basic Mobility (PT)  -BP              User Key  (r) = Recorded By, (t) = Taken By, (c) = Cosigned By      Initials Name Provider Type    Francis Moreno OTR Occupational Therapist    BP Supriya Paula, SYLVIE Physical Therapist                    Occupational Therapy Education       Title: PT OT SLP Therapies (In Progress)       Topic: Occupational Therapy (Done)       Point: ADL training (Done)       Description:   Instruct learner(s) on proper safety adaptation and  remediation techniques during self care or transfers.   Instruct in proper use of assistive devices.                  Learning Progress Summary             Patient Acceptance, E,TB,D, VU,DU by SD at 10/11/2024 1129    Comment: Education regarding safety with bed mobility and transfers. Pt with profound weakness and requires signficant assistance for transfers.    Acceptance, E, VU by EN at 10/10/2024 1037    Comment: Educated on UE HEP and safety during functional transfers.    Acceptance, E, VU by SD at 10/9/2024 0943    Comment: Education regarding safety with bed mobility and transfers. Rec stand step pivot transfer with hand held assist of 2 at this time for safety. Pt needs assist with all adl's including self feeding. Notified nursing. Pt in agreement.                         Point: Home exercise program (Done)       Description:   Instruct learner(s) on appropriate technique for monitoring, assisting and/or progressing therapeutic exercises/activities.                  Learning Progress Summary             Patient Acceptance, E, VU by SD at 10/11/2024 1127    Comment: Education regarding benefits of BUE rom program. Pt with significant weakness and requires a/arom or prom raise BUE's against gravity. Pt states he performs a BUE rom program at home, yet his UE's are normally stronger.                                         User Key       Initials Effective Dates Name Provider Type Discipline    EN 06/16/21 -  Rach Gray OTR Occupational Therapist OT    SD 06/16/21 -  Francis Good OTR Occupational Therapist OT                  OT Recommendation and Plan  Planned Therapy Interventions (OT): ROM/therapeutic exercise, transfer/mobility retraining, patient/caregiver education/training, functional balance retraining  Therapy Frequency (OT): 5 times/wk  Plan of Care Review  Plan of Care Reviewed With: patient  Outcome Evaluation: OT: Pt continues with significant weakness. He is not able to achieve  a functional grasp and has minimal BUE active motion against gravity. Pt participated in PROM and A/AROM program of BUE's. Pt managed bed mobility with CGA. He required max assist x 2 for to stand from EOB with hand held assistance. Pt is dependent at this time for management of daily tasks. Pt states his plan is to return home with 24 hour caregivers. If pt does return home, rec  services.     Time Calculation:   Evaluation Complexity (OT)  Review Occupational Profile/Medical/Therapy History Complexity: expanded/moderate complexity  Assessment, Occupational Performance/Identification of Deficit Complexity: 3-5 performance deficits  Clinical Decision Making Complexity (OT): detailed assessment/moderate complexity  Overall Complexity of Evaluation (OT): moderate complexity     Time Calculation- OT       Row Name 10/11/24 1126             Time Calculation- OT    OT Start Time 1045  -SD         Timed Charges    10178 - OT Therapeutic Activity Minutes 25  -SD         Total Minutes    Timed Charges Total Minutes 25  -SD       Total Minutes 25  -SD                User Key  (r) = Recorded By, (t) = Taken By, (c) = Cosigned By      Initials Name Provider Type    SD Francis Good OTR Occupational Therapist                  Therapy Charges for Today       Code Description Service Date Service Provider Modifiers Qty    [id number]  OT THERAPEUTIC ACT EA 15 MIN 10/11/2024 Francis Good OTR GO 2                 ANNIE Colbert  10/11/2024

## 2024-10-11 NOTE — PLAN OF CARE
Problem: Adult Inpatient Plan of Care  Goal: Plan of Care Review  Recent Flowsheet Documentation  Taken 10/11/2024 1138 by Francis Good OTR  Plan of Care Reviewed With: patient  Outcome Evaluation: OT: Pt continues with significant weakness. He is not able to achieve a functional grasp and has minimal BUE active motion against gravity. Pt participated in PROM and A/AROM program of BUE's. Pt managed bed mobility with CGA. He required max assist x 2 for to stand from EOB with hand held assistance. Pt is dependent at this time for management of daily tasks. Pt states his plan is to return home with 24 hour caregivers. If pt does return home, rec  services.

## 2024-10-11 NOTE — PLAN OF CARE
Goal Outcome Evaluation:  Plan of Care Reviewed With: patient        Progress: improving  Outcome Evaluation: All VSS; pt eating/drinking well; good UOP; pt was able to work with PT and has been up in the chair this afternoon; IV solumedrol given as ordered; no c/o pain this shift.

## 2024-10-11 NOTE — PLAN OF CARE
Goal Outcome Evaluation:  Plan of Care Reviewed With: patient        Progress: improving  Outcome Evaluation: Vital signs stable. Oxygen remains stable on room air. No complaints of pain or nausea so far this shift. IV saline locked. Patient appears to have rested well overnight.

## 2024-10-12 LAB
ANION GAP SERPL CALCULATED.3IONS-SCNC: 11.8 MMOL/L (ref 5–15)
BUN SERPL-MCNC: 38 MG/DL (ref 8–23)
BUN/CREAT SERPL: 37.6 (ref 7–25)
CALCIUM SPEC-SCNC: 8.4 MG/DL (ref 8.6–10.5)
CHLORIDE SERPL-SCNC: 108 MMOL/L (ref 98–107)
CO2 SERPL-SCNC: 23.2 MMOL/L (ref 22–29)
CREAT SERPL-MCNC: 1.01 MG/DL (ref 0.76–1.27)
EGFRCR SERPLBLD CKD-EPI 2021: 76.6 ML/MIN/1.73
GLUCOSE SERPL-MCNC: 131 MG/DL (ref 65–99)
POTASSIUM SERPL-SCNC: 4.4 MMOL/L (ref 3.5–5.2)
SODIUM SERPL-SCNC: 143 MMOL/L (ref 136–145)

## 2024-10-12 PROCEDURE — 80048 BASIC METABOLIC PNL TOTAL CA: CPT | Performed by: HOSPITALIST

## 2024-10-12 PROCEDURE — 25010000002 ENOXAPARIN PER 10 MG: Performed by: FAMILY MEDICINE

## 2024-10-12 PROCEDURE — 97530 THERAPEUTIC ACTIVITIES: CPT

## 2024-10-12 PROCEDURE — 99232 SBSQ HOSP IP/OBS MODERATE 35: CPT | Performed by: HOSPITALIST

## 2024-10-12 PROCEDURE — 99231 SBSQ HOSP IP/OBS SF/LOW 25: CPT | Performed by: PSYCHIATRY & NEUROLOGY

## 2024-10-12 PROCEDURE — 25010000002 LABETALOL 5 MG/ML SOLUTION: Performed by: HOSPITALIST

## 2024-10-12 PROCEDURE — 25010000002 METHYLPREDNISOLONE PER 125 MG: Performed by: PSYCHIATRY & NEUROLOGY

## 2024-10-12 RX ADMIN — FAMOTIDINE 20 MG: 20 TABLET, FILM COATED ORAL at 16:45

## 2024-10-12 RX ADMIN — LABETALOL HYDROCHLORIDE 10 MG: 5 INJECTION, SOLUTION INTRAVENOUS at 21:55

## 2024-10-12 RX ADMIN — Medication 10 ML: at 08:58

## 2024-10-12 RX ADMIN — LEVETIRACETAM 1000 MG: 500 TABLET, FILM COATED ORAL at 20:44

## 2024-10-12 RX ADMIN — FAMOTIDINE 20 MG: 20 TABLET, FILM COATED ORAL at 07:04

## 2024-10-12 RX ADMIN — LEVETIRACETAM 1000 MG: 500 TABLET, FILM COATED ORAL at 08:58

## 2024-10-12 RX ADMIN — WHITE PETROLATUM 41 % TOPICAL OINTMENT 1 APPLICATION: OINTMENT at 08:58

## 2024-10-12 RX ADMIN — WHITE PETROLATUM 41 % TOPICAL OINTMENT 1 APPLICATION: OINTMENT at 20:44

## 2024-10-12 RX ADMIN — METHYLPREDNISOLONE SODIUM SUCCINATE 500 MG: 500 INJECTION INTRAMUSCULAR; INTRAVENOUS at 12:04

## 2024-10-12 RX ADMIN — ENOXAPARIN SODIUM 40 MG: 40 INJECTION SUBCUTANEOUS at 08:58

## 2024-10-12 RX ADMIN — Medication 10 ML: at 20:44

## 2024-10-12 NOTE — PLAN OF CARE
Goal Outcome Evaluation:              Outcome Evaluation: Pt rested through the night, denies any pain, N/V/D at this time. Pt vitals remain stable at this time.

## 2024-10-12 NOTE — PLAN OF CARE
Goal Outcome Evaluation:  Plan of Care Reviewed With: patient           Outcome Evaluation: OT: Pt participated BUE rom program (a/arom and prom) while seated in chair. Pt continues with significant weakness of BUE's with non functional grasp for bilateral hands. No significant changes. Pt states his plan is to return home with 24 hour caregivers. Rec daily rom program with caregiver assistance to prevent joint stiffness/contactures. Education provided regarding compensatory strategies/use of adaptive equipment to allow for increased adl participation once his BUE strength improves.    Anticipated Discharge Disposition (OT): home with 24/7 care

## 2024-10-12 NOTE — PROGRESS NOTES
Patient Identification:  NAME:  Efren Christianson  Age:  79 y.o.   Sex:  male   :  1945   MRN:  [id number]       Chief complaint: Exacerbation of CIDP, seizure disorder.    History of present illness: He thinks his legs are stronger and I agree, see my exam below.  He is tolerating the taper of the high-dose steroids Solu-Medrol that he has been on and will start on prednisone on Monday.      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    Home medications:  Medications Prior to Admission   Medication Sig Dispense Refill Last Dose/Taking    Immune Globulin, Human, (GAMMAGARD IV) Infuse 1 g/kg into a venous catheter Every 30 (Thirty) Days.   9/10/2024    levETIRAcetam (KEPPRA) 1000 MG tablet Take 1 tablet by mouth Every 12 (Twelve) Hours.   10/8/2024 at 0800    multivitamin (THERAGRAN) tablet tablet Take  by mouth Daily.   10/8/2024 at 0800    ferrous sulfate 324 (65 Fe) MG tablet delayed-release EC tablet Take 1 tablet by mouth Daily With Breakfast. Three times a week       vitamin C (ASCORBIC ACID) 250 MG tablet Take 1 tablet by mouth Daily.           Hospital medications:  Aquaphor Advanced Therapy, 1 Application, Topical, Q12H  enoxaparin, 40 mg, Subcutaneous, Daily  famotidine, 20 mg, Oral, BID AC  levETIRAcetam, 1,000 mg, Oral, Q12H  [START ON 10/13/2024] methylPREDNISolone sodium succinate, 250 mg, Intravenous, Once  [START ON 10/14/2024] predniSONE, 40 mg, Oral, Daily With Breakfast  sodium chloride, 10 mL, Intravenous, Q12H           senna-docusate sodium **AND** polyethylene glycol **AND** bisacodyl **AND** bisacodyl    Calcium Replacement - Follow Nurse / BPA Driven Protocol    influenza vaccine    labetalol    LORazepam    Magnesium Standard Dose  Replacement - Follow Nurse / BPA Driven Protocol    ondansetron ODT **OR** ondansetron    Phosphorus Replacement - Follow Nurse / BPA Driven Protocol    Potassium Replacement - Follow Nurse / BPA Driven Protocol    [COMPLETED] Insert Peripheral IV **AND** sodium chloride    sodium chloride    sodium chloride      Objective:  Vitals Ranges:   Temp:  [97.6 °F (36.4 °C)-98.1 °F (36.7 °C)] 98 °F (36.7 °C)  Heart Rate:  [] 91  Resp:  [16-20] 18  BP: (144-167)/(79-99) 156/92      Physical Exam:  Awake alert oriented x 3 normal cranial nerves II through VII.  No dysarthria can lift both upper extremities against gravity very minimal  strength but seems to straighten out his right fingers a little better better extension.  He can lift both knees up while he sitting in a chair and can kick out with both legs right greater than left.  Good dorsi and plantarflexion on the right better plantarflexion only on the left areflexic throughout, toes downgoing  Results review:   I reviewed the patient's new clinical results.    Data review:  Lab Results (last 24 hours)       Procedure Component Value Units Date/Time    Basic Metabolic Panel [[id number]]  (Abnormal) Collected: 10/12/24 0335    Specimen: Blood Updated: 10/12/24 0443     Glucose 131 mg/dL      BUN 38 mg/dL      Creatinine 1.01 mg/dL      Sodium 143 mmol/L      Potassium 4.4 mmol/L      Chloride 108 mmol/L      CO2 23.2 mmol/L      Calcium 8.4 mg/dL      BUN/Creatinine Ratio 37.6     Anion Gap 11.8 mmol/L      eGFR 76.6 mL/min/1.73     Narrative:      GFR Normal >60  Chronic Kidney Disease <60  Kidney Failure <15    The GFR formula is only valid for adults with stable renal function between ages 18 and 70.             Imaging:  Imaging Results (Last 24 Hours)       ** No results found for the last 24 hours. **               Assessment and Plan:       CIDP (chronic inflammatory demyelinating polyneuropathy)    Patient thinks his legs are a little stronger and I  agree.  He does have some swelling in the extremities which is secondary to the high-dose steroids.  He has been receiving otherwise he is awake alert doing well.  He will get 500 mg IV Solu-Medrol today, and 250 mg IV Solu-Medrol tomorrow and on Monday, 10/14/2024.  He will begin prednisone 40 mg p.o. daily indefinitely until he follows up with his outpatient neurologist      Fuentes Shay MD  10/12/24  12:41 EDT

## 2024-10-12 NOTE — PROGRESS NOTES
"Hospitalist Team      Patient Care Team:  Winnie Murray as PCP - General (Nurse Practitioner)  Winnie Murray (Nurse Practitioner)      Chief Complaint: Follow-up CIDP Flare    Subjective    No acute events overnight.  Mr. Lowery is up in the chair this morning and reports that his lower extremity strength seems better although he does say a couple times overnight and he had to have help to push himself up in bed.  He denies chest pain and dyspnea.  He is tolerating p.o.    Objective    Vital Signs  Temp:  [97.6 °F (36.4 °C)-98.1 °F (36.7 °C)] 98.1 °F (36.7 °C)  Heart Rate:  [] 77  Resp:  [16-20] 17  BP: (144-196)/(79-99) 167/93  Oxygen Therapy  SpO2: 93 %  Pulse Oximetry Type: Intermittent  Device (Oxygen Therapy): room air}    Flowsheet Rows      Flowsheet Row First Filed Value   Admission Height 172.7 cm (68\") Documented at 10/08/2024 1229   Admission Weight 70.8 kg (156 lb) Documented at 10/08/2024 1229            Physical Exam:    General: Appears stated age in no acute distress.  Lungs: Breath sounds are clear throughout all fields.  Respirations are non-labored.  CV: Regular rate and rhythm.  No murmurs appreciated.  Radial pulses are 2+ and symmetric.  Abdomen: Soft and nontender with active bowel sounds.  MSK: No clubbing, cyanosis, or edema.  Neuro: Cranial nerves II through XII are grossly intact.  Psych: Pleasant affect.  Oriented x 3.    Results Review:     I reviewed the patient's new clinical results.    Lab Results (last 24 hours)       Procedure Component Value Units Date/Time    Basic Metabolic Panel [[id number]]  (Abnormal) Collected: 10/12/24 0335    Specimen: Blood Updated: 10/12/24 0443     Glucose 131 mg/dL      BUN 38 mg/dL      Creatinine 1.01 mg/dL      Sodium 143 mmol/L      Potassium 4.4 mmol/L      Chloride 108 mmol/L      CO2 23.2 mmol/L      Calcium 8.4 mg/dL      BUN/Creatinine Ratio 37.6     Anion Gap 11.8 mmol/L      eGFR 76.6 mL/min/1.73     Narrative:      GFR Normal " >60  Chronic Kidney Disease <60  Kidney Failure <15    The GFR formula is only valid for adults with stable renal function between ages 18 and 70.            Imaging Results (Last 24 Hours)       Procedure Component Value Units Date/Time    XR Chest PA & Lateral [[id number]] Collected: 10/11/24 1032     Updated: 10/11/24 1039    Narrative:      XR CHEST PA AND LATERAL    Date of Exam: 10/11/2024 10:28 AM EDT    Indication: Dyspnea    Comparison: 7/3/2023    Findings:  Unchanged cardiomediastinal silhouette. No focal airspace opacity, pleural effusion, or pneumothorax. Low lung volumes. 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 Osseous structures are   unremarkable.      Impression:      Impression:  No acute cardiopulmonary abnormality.      Electronically Signed: Martinez Deluna MD    10/11/2024 10:35 AM EDT    Workstation ID: [id number]            X-ray reviewed personally by physician.      Medication Review:   I have reviewed the patient's current medication list    Current Facility-Administered Medications:     Aquaphor Advanced Therapy ointment 1 Application, 1 Application, Topical, Q12H, Chris Millan DO, 1 Application at 10/11/24 2054    sennosides-docusate (PERICOLACE) 8.6-50 MG per tablet 2 tablet, 2 tablet, Oral, BID PRN **AND** polyethylene glycol (MIRALAX) packet 17 g, 17 g, Oral, Daily PRN **AND** bisacodyl (DULCOLAX) EC tablet 5 mg, 5 mg, Oral, Daily PRN **AND** bisacodyl (DULCOLAX) suppository 10 mg, 10 mg, Rectal, Daily PRN, Chris Millan DO    Calcium Replacement - Follow Nurse / BPA Driven Protocol, , Does not apply, PRN, Chris Millan DO    Enoxaparin Sodium (LOVENOX) syringe 40 mg, 40 mg, Subcutaneous, Daily, Chris Millan DO, 40 mg at 10/11/24 0854    famotidine (PEPCID) tablet 20 mg, 20 mg, Oral, BID AC, Fuentes Shay MD, 20 mg at 10/12/24 0704    influenza vac split high-dose (FLUZONE HIGH DOSE) injection 0.5 mL, 0.5 mL,  Intramuscular, During Hospitalization, Chris Millan DO    labetalol (NORMODYNE,TRANDATE) injection 10 mg, 10 mg, Intravenous, Q4H PRN, Lenin Desai MD    levETIRAcetam (KEPPRA) tablet 1,000 mg, 1,000 mg, Oral, Q12H, Chris Millan DO, 1,000 mg at 10/11/24 2054    LORazepam (ATIVAN) tablet 1 mg, 1 mg, Oral, Nightly PRN, Fuentes Shay MD    Magnesium Standard Dose Replacement - Follow Nurse / BPA Driven Protocol, , Does not apply, PRN, Chris Millan DO    [START ON 10/13/2024] methylPREDNISolone sodium succinate (SOLU-Medrol) 250 mg in sodium chloride 0.9 % 100 mL IVPB, 250 mg, Intravenous, Once, Fuentes Shay MD    ondansetron ODT (ZOFRAN-ODT) disintegrating tablet 4 mg, 4 mg, Oral, Q6H PRN **OR** ondansetron (ZOFRAN) injection 4 mg, 4 mg, Intravenous, Q6H PRN, Chris Millan DO    Phosphorus Replacement - Follow Nurse / BPA Driven Protocol, , Does not apply, PRN, Chris Millan DO    Potassium Replacement - Follow Nurse / BPA Driven Protocol, , Does not apply, PRNMonty Paul A, DO    [START ON 10/14/2024] predniSONE (DELTASONE) tablet 40 mg, 40 mg, Oral, Daily With Breakfast, Fuentes Shay MD    [COMPLETED] Insert Peripheral IV, , , Once **AND** sodium chloride 0.9 % flush 10 mL, 10 mL, Intravenous, PRN, Loi Madrid APRN    sodium chloride 0.9 % flush 10 mL, 10 mL, Intravenous, Q12H, Chris Millan DO, 10 mL at 10/11/24 2054    sodium chloride 0.9 % flush 10 mL, 10 mL, Intravenous, PRN, Chris Millan DO    sodium chloride 0.9 % infusion 40 mL, 40 mL, Intravenous, PRN, Chris Millan DO      Assessment & Plan     Acute flare of CIDP: Continue steroid course as prescribed by Dr. Shay.  Elevated blood pressure: As noted yesterday, likely mineralocorticoid effect of the high-dose steroids.  Continues on as needed labetalol.  No formal diagnosis of hypertension.  Seizure disorder: No acute issues.  Continue current regimen.    Plan for disposition: Predicated on hospital  course.    Lenin Desai MD  10/12/24  08:32 EDT

## 2024-10-12 NOTE — THERAPY TREATMENT NOTE
Patient Name: Efren Christianson  : 1945    MRN: [id number]                              Today's Date: 10/12/2024       Admit Date: 10/8/2024    Visit Dx:     ICD-10-CM ICD-9-CM   1. 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 Progressive focal motor weakness  R53.1 728.87   3. Long-term current use of intravenous immunoglobulin (IVIG)  Z79.899 V58.69   4. 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)     Difficulty walking     Hepatitis A     as a child    Seizures     Syncope     Urgency of urination      Past Surgical History:   Procedure Laterality Date    TRUNK LESION/CYST EXCISION N/A 8/3/2023    Procedure: excision of chest wall cyst and back cyst 10:00;  Surgeon: Kianna Oliva DO;  Location: [facility];  Service: General;  Laterality: N/A;      General Information       Row Name 10/12/24 1040          OT Time and Intention    Subjective Information complains of;weakness  -SD     Document Type therapy note (daily note)  -SD     Mode of Treatment occupational therapy  -SD     Total Minutes, Occupational Therapy 20  -SD     Patient Effort good  -SD               User Key  (r) = Recorded By, (t) = Taken By, (c) = Cosigned By      Initials Name Provider Type    SD Francis Good, OTR Occupational Therapist                     Mobility/ADL's    No documentation.                  Obj/Interventions       Row Name 10/12/24 1041          Shoulder (Therapeutic Exercise)    Shoulder AAROM (Therapeutic Exercise) bilateral;flexion;extension;aBduction;aDduction;external rotation;internal rotation;10 repetitions;sitting  -SD       Row Name 10/12/24 1041          Elbow/Forearm  (Therapeutic Exercise)    Elbow/Forearm (Therapeutic Exercise) AAROM (active assistive range of motion)  -SD     Elbow/Forearm AAROM (Therapeutic Exercise) bilateral;flexion;extension;supination;pronation;sitting;10 repetitions  -SD       Row Name 10/12/24 1041          Wrist (Therapeutic Exercise)    Wrist (Therapeutic Exercise) AAROM (active assistive range of motion)  -SD     Wrist AAROM (Therapeutic Exercise) bilateral;flexion;extension;radial deviation;ulnar deviation;10 repetitions  -SD       Row Name 10/12/24 1041          Hand (Therapeutic Exercise)    Hand (Therapeutic Exercise) PROM (passive range of motion)  -SD     Hand PROM (Therapeutic Exercise) bilateral;finger flexion;finger extension;thumb flexion;thumb extension;10 repetitions  -SD               User Key  (r) = Recorded By, (t) = Taken By, (c) = Cosigned By      Initials Name Provider Type    Francis Moreno OTELY Occupational Therapist                   Goals/Plan       Row Name 10/12/24 1048          Transfer Goal 1 (OT)    Activity/Assistive Device (Transfer Goal 1, OT) bed-to-chair/chair-to-bed;other (see comments)  -SD     [place] Level/Cues Needed (Transfer Goal 1, OT) moderate assist (50-74% patient effort)  -SD     Time Frame (Transfer Goal 1, OT) by discharge  -SD     Progress/Outcome (Transfer Goal 1, OT) goal ongoing  pt in chair this am (10-12-24)  -SD       Row Name 10/12/24 1048          ROM Goal 1 (OT)    ROM Goal 1 (OT) Pt to participate in BUE rom program (arom, a/arom and prom) to address rom /strength for basic self care tasks.  -SD     Time Frame (ROM Goal 1, OT) by discharge  -SD     Progress/Outcome (ROM Goal 1, OT) goal met  Rec daily BUE rom program with caregiver assistance.  -SD       Row Name 10/12/24 1048          Problem Specific Goal 1 (OT)    Problem Specific Goal 1 (OT) Pt to verbalize compensatory strategies/use of AE that can increase functional indpendence for basic adl tasks (self feeding and light  grooming).  -SD     Time Frame (Problem Specific Goal 1, OT) by discharge  -SD     Strategies/Barriers (Problem Specific Goal 1, OT) education with compensatory strategies/use of AE to compensate for limited functional grasp, decresaed BUE rom and BUE weakness  -SD     Progress/Outcome (Problem Specific Goal 1, OT) goal ongoing  -SD       Row Name 10/12/24 1048          Therapy Assessment/Plan (OT)    Planned Therapy Interventions (OT) ROM/therapeutic exercise;patient/caregiver education/training;adaptive equipment training;BADL retraining  -SD               User Key  (r) = Recorded By, (t) = Taken By, (c) = Cosigned By      Initials Name Provider Type    SD Francis Good, TIFFANIER Occupational Therapist                   Clinical Impression       Row Name 10/12/24 1043          Pain Assessment    Pretreatment Pain Rating 0/10 - no pain  -SD     Posttreatment Pain Rating 0/10 - no pain  -SD       Row Name 10/12/24 1043          Plan of Care Review    Plan of Care Reviewed With patient  -SD     Outcome Evaluation OT: Pt participated in a/arom program for BUE's while seated in chair. Pt continues with significant weakness of BUE's with non functional grasp for bilateral hands. No significant changes. Pt states his plan is to return home with 24 hour caregivers. Rec daily rom program with caregiver assistance to prevent joint stiffness/contactures. Education provided regarding compensatory strategies/use of adaptive equipment to allow for increased adl participation once his BUE strength improves.  -SD       Row Name 10/12/24 1043          Therapy Plan Review/Discharge Plan (OT)    Anticipated Discharge Disposition (OT) home with 24/7 care  -SD       Row Name 10/12/24 1043          Positioning and Restraints    Pre-Treatment Position sitting in chair/recliner  -SD     Post Treatment Position chair  -SD     In Chair sitting;call light within reach;encouraged to call for assist  modified call button placed within reach of  pt  -SD               User Key  (r) = Recorded By, (t) = Taken By, (c) = Cosigned By      Initials Name Provider Type    Francis Moreno OTR Occupational Therapist                   Outcome Measures       Row Name 10/12/24 1050          How much help from another is currently needed...    Putting on and taking off regular lower body clothing? 1  -SD     Bathing (including washing, rinsing, and drying) 1  -SD     Toileting (which includes using toilet bed pan or urinal) 1  -SD     Putting on and taking off regular upper body clothing 1  -SD     Taking care of personal grooming (such as brushing teeth) 1  -SD     Eating meals 1  -SD     AM-PAC 6 Clicks Score (OT) 6  -SD               User Key  (r) = Recorded By, (t) = Taken By, (c) = Cosigned By      Initials Name Provider Type    Francis Moreno OTELY Occupational Therapist                    Occupational Therapy Education       Title: PT OT SLP Therapies (Done)       Topic: Occupational Therapy (Done)       Point: ADL training (Done)       Description:   Instruct learner(s) on proper safety adaptation and remediation techniques during self care or transfers.   Instruct in proper use of assistive devices.                  Learning Progress Summary            Patient Acceptance, E, VU by SD at 10/12/2024 1051    Comment: Education regarding BUE rom program (a/arom and prom). Rec daily BUE rom program with caregiver assistance. Education regarding compensatory strategies/use of AE to increase I with basic daily tasks.    Acceptance, E,TB,D, VU,DU by SD at 10/11/2024 1129    Comment: Education regarding safety with bed mobility and transfers. Pt with profound weakness and requires signficant assistance for transfers.    Acceptance, E, VU by EN at 10/10/2024 1037    Comment: Educated on UE HEP and safety during functional transfers.    Acceptance, E, VU by SD at 10/9/2024 0943    Comment: Education regarding safety with bed mobility and transfers. Rec stand  step pivot transfer with hand held assist of 2 at this time for safety. Pt needs assist with all adl's including self feeding. Notified nursing. Pt in agreement.                      Point: Home exercise program (Done)       Description:   Instruct learner(s) on appropriate technique for monitoring, assisting and/or progressing therapeutic exercises/activities.                  Learning Progress Summary            Patient Acceptance, E, VU by SD at 10/12/2024 1051    Comment: Education regarding BUE rom program (a/arom and prom). Rec daily BUE rom program with caregiver assistance. Education regarding compensatory strategies/use of AE to increase I with basic daily tasks.    Acceptance, E, VU by SD at 10/11/2024 1127    Comment: Education regarding benefits of BUE rom program. Pt with significant weakness and requires a/arom or prom raise BUE's against gravity. Pt states he performs a BUE rom program at home, yet his UE's are normally stronger.                                      User Key       Initials Effective Dates Name Provider Type Discipline    EN 06/16/21 -  Rach Gray, OTR Occupational Therapist OT    SD 06/16/21 -  Francis Good OTR Occupational Therapist OT                  OT Recommendation and Plan  Planned Therapy Interventions (OT): ROM/therapeutic exercise, patient/caregiver education/training, adaptive equipment training, BADL retraining  Therapy Frequency (OT): 5 times/wk  Plan of Care Review  Plan of Care Reviewed With: patient  Outcome Evaluation: OT: Pt participated in a/arom program for BUE's while seated in chair. Pt continues with significant weakness of BUE's with non functional grasp for bilateral hands. No significant changes. Pt states his plan is to return home with 24 hour caregivers. Rec daily rom program with caregiver assistance to prevent joint stiffness/contactures. Education provided regarding compensatory strategies/use of adaptive equipment to allow for increased  adl participation once his BUE strength improves.     Time Calculation:   Evaluation Complexity (OT)  Review Occupational Profile/Medical/Therapy History Complexity: expanded/moderate complexity  Assessment, Occupational Performance/Identification of Deficit Complexity: 3-5 performance deficits  Clinical Decision Making Complexity (OT): detailed assessment/moderate complexity  Overall Complexity of Evaluation (OT): moderate complexity     Time Calculation- OT       Row Name 10/12/24 1053             Time Calculation- OT    OT Start Time 1025  -SD         Timed Charges    04309 - OT Therapeutic Activity Minutes 16  -SD         Total Minutes    Timed Charges Total Minutes 16  -SD       Total Minutes 16  -SD                User Key  (r) = Recorded By, (t) = Taken By, (c) = Cosigned By      Initials Name Provider Type    SD Francis Good OTR Occupational Therapist                  Therapy Charges for Today       Code Description Service Date Service Provider Modifiers Qty    [id number] HC OT THERAPEUTIC ACT EA 15 MIN 10/12/2024 Francis Good OTR GO 1                 ANNIE Colbert  10/12/2024

## 2024-10-12 NOTE — PLAN OF CARE
Goal Outcome Evaluation:                 RN took over care at 1600, pt sitting up in chair watching TV. No complaints, awaiting dinner.

## 2024-10-13 VITALS
HEART RATE: 101 BPM | RESPIRATION RATE: 20 BRPM | BODY MASS INDEX: 23.59 KG/M2 | OXYGEN SATURATION: 93 % | WEIGHT: 155.65 LBS | HEIGHT: 68 IN | SYSTOLIC BLOOD PRESSURE: 164 MMHG | DIASTOLIC BLOOD PRESSURE: 77 MMHG | TEMPERATURE: 98.3 F

## 2024-10-13 PROCEDURE — 25010000002 ENOXAPARIN PER 10 MG: Performed by: FAMILY MEDICINE

## 2024-10-13 PROCEDURE — 99238 HOSP IP/OBS DSCHRG MGMT 30/<: CPT | Performed by: HOSPITALIST

## 2024-10-13 PROCEDURE — 99231 SBSQ HOSP IP/OBS SF/LOW 25: CPT | Performed by: PSYCHIATRY & NEUROLOGY

## 2024-10-13 PROCEDURE — 25010000002 METHYLPREDNISOLONE PER 125 MG: Performed by: PSYCHIATRY & NEUROLOGY

## 2024-10-13 RX ORDER — PANTOPRAZOLE SODIUM 40 MG/1
40 TABLET, DELAYED RELEASE ORAL DAILY
Qty: 30 TABLET | Refills: 0 | Status: SHIPPED | OUTPATIENT
Start: 2024-10-13 | End: 2024-11-12

## 2024-10-13 RX ORDER — PREDNISONE 20 MG/1
TABLET ORAL
Qty: 21 TABLET | Refills: 0 | Status: SHIPPED | OUTPATIENT
Start: 2024-10-14 | End: 2024-10-22 | Stop reason: HOSPADM

## 2024-10-13 RX ADMIN — WHITE PETROLATUM 41 % TOPICAL OINTMENT 1 APPLICATION: OINTMENT at 07:55

## 2024-10-13 RX ADMIN — LEVETIRACETAM 1000 MG: 500 TABLET, FILM COATED ORAL at 07:54

## 2024-10-13 RX ADMIN — FAMOTIDINE 20 MG: 20 TABLET, FILM COATED ORAL at 07:55

## 2024-10-13 RX ADMIN — ENOXAPARIN SODIUM 40 MG: 40 INJECTION SUBCUTANEOUS at 07:55

## 2024-10-13 RX ADMIN — Medication 10 ML: at 07:54

## 2024-10-13 RX ADMIN — SODIUM CHLORIDE 250 MG: 9 INJECTION, SOLUTION INTRAVENOUS at 06:58

## 2024-10-13 NOTE — DISCHARGE SUMMARY
Efren Christianson  1945  [id number]    Hospitalists Discharge Summary    Date of Admission: 10/8/2024  Date of Discharge:  10/13/2024    Primary Discharge Diagnoses:  Acute flare of CIDP  Elevated blood pressure    Secondary Discharge Diagnoses:  Seizure disorder    History of Present Illness (taken from H&P):  80 y/o male with hx of CIDP and seizures presents to [facility] ED for worsening weakness. Patient is followed by Dr Reyes and receives monthly IVIg infusions. He says he has been more week the last few days. Today, he woke up and was unable to do much for himself. His left hand is much weaker, and he is unable to feed himself. He usually walks with a walker, although recently he has been so weak, he is unable to ambulate. He denies any headaches, blurry vision, slurred speech, fevers, chills, nausea, vomiting, diarrhea, cough, chest pain, or SOB.     Hospital Course:  Mr. Christianson was admitted to the medical/surgical unit.  He was seen in consultation by neurology and started on high-dose Solu-Medrol.  Given this dose of Solu-Medrol, I did see rise in his blood pressure likely due to the slight mineralocorticoid effect of Solu-Medrol at these doses.  He was followed by physical therapy.  My understanding was his baseline as he just stands and pivots to a wheelchair.  He continued on IV Solu-Medrol which was then discontinued by neurology and placed on a prolonged steroid taper.  He will follow-up with his outpatient neurologist for continued IVIG infusions as well as continued therapy.  I did continue him on PPI therapy due to the steroid therapy for protection of his GI tract.    PCP  Patient Care Team:  Winnie Murray as PCP - General (Nurse Practitioner)  Winnie Murray (Nurse Practitioner)    Consults:   Consults       Date and Time Order Name Status Description    10/8/2024  2:35 PM Inpatient Neurology Consult Other (see comments) Completed             Operations and Procedures  Performed:       XR Chest PA & Lateral    Result Date: 10/11/2024  Narrative: XR CHEST PA AND LATERAL Date of Exam: 10/11/2024 10:28 AM EDT Indication: Dyspnea Comparison: 7/3/2023 Findings: Unchanged cardiomediastinal silhouette. No focal airspace opacity, pleural effusion, or pneumothorax. Low lung volumes. 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 Osseous structures are unremarkable.     Impression: Impression: No acute cardiopulmonary abnormality. Electronically Signed: Martinez Deluna MD  10/11/2024 10:35 AM EDT  Workstation ID: [id number]     Allergies:  has No Known Allergies.      Discharge Medications:     Discharge Medications        New Medications        Instructions Start Date   Aquaphor Advanced Therapy ointment ointment   1 Application, Topical, Every 12 Hours Scheduled      pantoprazole 40 MG EC tablet  Commonly known as: PROTONIX   40 mg, Oral, Daily      predniSONE 20 MG tablet  Commonly known as: DELTASONE   Take 2 tablets by mouth Daily With Breakfast for 7 days, THEN 1 tablet Daily With Breakfast for 7 days.   Start Date: October 14, 2024            Continue These Medications        Instructions Start Date   GAMMAGARD IV   1 g/kg, Intravenous, Every 30 Days      levETIRAcetam 1000 MG tablet  Commonly known as: KEPPRA   1 tablet, Oral, Every 12 Hours Scheduled      multivitamin tablet tablet   Oral, Daily             Stop These Medications      ferrous sulfate 324 (65 Fe) MG tablet delayed-release EC tablet     vitamin C 250 MG tablet  Commonly known as: ASCORBIC ACID              Last Lab Results:   Lab Results (most recent)       Procedure Component Value Units Date/Time    Basic Metabolic Panel [[id number]]  (Abnormal) Collected: 10/12/24 0335    Specimen: Blood Updated: 10/12/24 0443     Glucose 131 mg/dL      BUN 38 mg/dL      Creatinine 1.01 mg/dL      Sodium 143 mmol/L      Potassium 4.4 mmol/L      Chloride 108 mmol/L      CO2 23.2  mmol/L      Calcium 8.4 mg/dL      BUN/Creatinine Ratio 37.6     Anion Gap 11.8 mmol/L      eGFR 76.6 mL/min/1.73     Narrative:      GFR Normal >60  Chronic Kidney Disease <60  Kidney Failure <15    The GFR formula is only valid for adults with stable renal function between ages 18 and 70.    Basic Metabolic Panel [[id number]]  (Abnormal) Collected: 10/09/24 0556    Specimen: Blood Updated: 10/09/24 0727     Glucose 143 mg/dL      BUN 21 mg/dL      Creatinine 1.02 mg/dL      Sodium 136 mmol/L      Potassium 4.1 mmol/L      Chloride 102 mmol/L      CO2 22.8 mmol/L      Calcium 9.2 mg/dL      BUN/Creatinine Ratio 20.6     Anion Gap 11.2 mmol/L      eGFR 75.7 mL/min/1.73     Narrative:      GFR Normal >60  Chronic Kidney Disease <60  Kidney Failure <15    The GFR formula is only valid for adults with stable renal function between ages 18 and 70.    Magnesium [[id number]]  (Normal) Collected: 10/09/24 0556    Specimen: Blood Updated: 10/09/24 0727     Magnesium 2.2 mg/dL     Phosphorus [[id number]]  (Normal) Collected: 10/09/24 0556    Specimen: Blood Updated: 10/09/24 0727     Phosphorus 2.8 mg/dL     [place] Draw [[id number]] Collected: 10/08/24 1233    Specimen: Blood Updated: 10/08/24 1436    Narrative:      The following orders were created for panel order [place] Draw.  Procedure                               Abnormality         Status                     ---------                               -----------         ------                     Green Top (Gel)[[id number]]                                  Final result               Lavender Top[[id number]]                                     Final result               Red Top[[id number]]                                                                     Gold Top - SST[[id number]]                                   Final result               Green Top (No Gel)[[id number]]                                                          Floyd Top[[id number]]                                          Final result               Light Blue Top[[id number]]                                   Final result               Green Top (Gel)[[id number]]                                                               Please view results for these tests on the individual orders.    Urinalysis With Microscopic If Indicated (No Culture) - Urine, Clean Catch [[id number]]  (Normal) Collected: 10/08/24 1318    Specimen: Urine, Clean Catch Updated: 10/08/24 1348     Color, UA Yellow     Appearance, UA Clear     pH, UA 5.5     Specific Gravity, UA 1.015     Glucose, UA Negative     Ketones, UA Negative     Bilirubin, UA Negative     Blood, UA Negative     Protein, UA Negative     Leuk Esterase, UA Negative     Nitrite, UA Negative     Urobilinogen, UA 0.2 E.U./dL    Narrative:      Urine microscopic not indicated.    TSH [[id number]]  (Abnormal) Collected: 10/08/24 1233    Specimen: Blood Updated: 10/08/24 1308     TSH 4.350 uIU/mL     T4, Free [[id number]]  (Normal) Collected: 10/08/24 1233    Specimen: Blood Updated: 10/08/24 1308     Free T4 1.47 ng/dL     Narrative:      Results may be falsely increased if patient taking Biotin.      Comprehensive Metabolic Panel [[id number]] Collected: 10/08/24 1233    Specimen: Blood Updated: 10/08/24 1307     Glucose 94 mg/dL      BUN 21 mg/dL      Creatinine 1.00 mg/dL      Sodium 137 mmol/L      Potassium 4.2 mmol/L      Chloride 102 mmol/L      CO2 25.9 mmol/L      Calcium 9.4 mg/dL      Total Protein 7.9 g/dL      Albumin 4.2 g/dL      ALT (SGPT) 24 U/L      AST (SGOT) 32 U/L      Alkaline Phosphatase 70 U/L      Total Bilirubin 0.5 mg/dL      Globulin 3.7 gm/dL      A/G Ratio 1.1 g/dL      BUN/Creatinine Ratio 21.0     Anion Gap 9.1 mmol/L      eGFR 77.5 mL/min/1.73     Narrative:      GFR Normal >60  Chronic Kidney Disease <60  Kidney Failure <15    The GFR formula is only valid for adults with stable renal function between ages 18 and 70.    Phosphorus [[id number]]  (Abnormal)  Collected: 10/08/24 1233    Specimen: Blood Updated: 10/08/24 1307     Phosphorus 2.4 mg/dL     Magnesium [[id number]]  (Normal) Collected: 10/08/24 1233    Specimen: Blood Updated: 10/08/24 1307     Magnesium 2.0 mg/dL     Green Top (Gel) [[id number]] Collected: 10/08/24 1233    Specimen: Blood Updated: 10/08/24 1245     Extra Tube Hold for add-ons.     Comment: Auto resulted.       Lavender Top [[id number]] Collected: 10/08/24 1233    Specimen: Blood Updated: 10/08/24 1245     Extra Tube hold for add-on     Comment: Auto resulted       Gold Top - SST [[id number]] Collected: 10/08/24 1233    Specimen: Blood Updated: 10/08/24 1245     Extra Tube Hold for add-ons.     Comment: Auto resulted.       Floyd Top [[id number]] Collected: 10/08/24 1233    Specimen: Blood Updated: 10/08/24 1245     Extra Tube Hold for add-ons.     Comment: Auto resulted.       Light Blue Top [[id number]] Collected: 10/08/24 1233    Specimen: Blood Updated: 10/08/24 1245     Extra Tube Hold for add-ons.     Comment: Auto resulted       CBC & Differential [[id number]]  (Abnormal) Collected: 10/08/24 1233    Specimen: Blood Updated: 10/08/24 1244    Narrative:      The following orders were created for panel order CBC & Differential.  Procedure                               Abnormality         Status                     ---------                               -----------         ------                     CBC Auto Differential[[id number]]        Abnormal            Final result                 Please view results for these tests on the individual orders.    CBC Auto Differential [[id number]]  (Abnormal) Collected: 10/08/24 1233    Specimen: Blood Updated: 10/08/24 1244     WBC 6.21 10*3/mm3      RBC 4.59 10*6/mm3      Hemoglobin 15.0 g/dL      Hematocrit 44.3 %      MCV 96.5 fL      MCH 32.7 pg      MCHC 33.9 g/dL      RDW 12.0 %      RDW-SD 43.5 fl      MPV 9.9 fL      Platelets 167 10*3/mm3      Neutrophil % 65.7 %      Lymphocyte % 22.4 %      Monocyte  % 10.5 %      Eosinophil % 0.6 %      Basophil % 0.6 %      Immature Grans % 0.2 %      Neutrophils, Absolute 4.08 10*3/mm3      Lymphocytes, Absolute 1.39 10*3/mm3      Monocytes, Absolute 0.65 10*3/mm3      Eosinophils, Absolute 0.04 10*3/mm3      Basophils, Absolute 0.04 10*3/mm3      Immature Grans, Absolute 0.01 10*3/mm3           Imaging Results (Most Recent)       Procedure Component Value Units Date/Time    XR Chest PA & Lateral [[id number]] Collected: 10/11/24 1032     Updated: 10/11/24 1039    Narrative:      XR CHEST PA AND LATERAL    Date of Exam: 10/11/2024 10:28 AM EDT    Indication: Dyspnea    Comparison: 7/3/2023    Findings:  Unchanged cardiomediastinal silhouette. No focal airspace opacity, pleural effusion, or pneumothorax. Low lung volumes. 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 Osseous structures are   unremarkable.      Impression:      Impression:  No acute cardiopulmonary abnormality.      Electronically Signed: Martinez Deluna MD    10/11/2024 10:35 AM EDT    Workstation ID: [id number]            PROCEDURES      Condition on Discharge:  Stable    Physical Exam at Discharge  Vital Signs  Temp:  [97.3 °F (36.3 °C)-98.3 °F (36.8 °C)] 98.3 °F (36.8 °C)  Heart Rate:  [] 101  Resp:  [16-20] 20  BP: (143-189)/(72-94) 164/77    Physical Exam:  Physical Exam   Constitutional: Patient appears in no acute distress   Cardiovascular: Regular rate, regular rhythm, S1 normal and S2 normal.  Exam reveals no gallop and no friction rub.  No murmur heard.  Pulmonary/Chest: Lungs are clear to auscultation bilaterally. No respiratory distress. No wheezes. No rhonchi. No rales.   Abdominal: Soft. Bowel sounds are normal. There is no tenderness.   Extremities: No edema.   Neurological: Cranial nerves II-XII are grossly intact with no focal deficits.    Discharge Disposition  Home w/ caregivers    Visiting Nurse:    Yes     Home PT/OT:  Yes     Home  Safety Evaluation:  Yes     DME  None new    Discharge Diet:      Dietary Orders (From admission, onward)       Start     Ordered    10/08/24 1433  Diet: Regular/House; Fluid Consistency: Thin (IDDSI 0)  Diet Effective Now        References:    Diet Order [place]   Question Answer Comment   Diets: Regular/House    Fluid Consistency: Thin (IDDSI 0)        10/08/24 1434                    Activity at Discharge:  As tolerated      Follow-up Appointments  Future Appointments   Date Time Provider Department Center   10/15/2024  1:30 PM ROOM 3 [facility]   10/22/2024  1:00 PM ROOM 3 [facility]   10/29/2024  1:30 PM ROOM 2 [facility]   12/13/2024 11:20 AM Efraín Reyes MD [facility]     Additional Instructions for the Follow-ups that You Need to Schedule       Ambulatory Referral to Home Health   As directed      Face to Face Visit Date: 10/13/2024   Follow-up provider for Plan of Care?: I treated the patient in an acute care facility and will not continue treatment after discharge.   Follow-up provider: WINNIE DENT [[id number]]   Reason/Clinical Findings: chronic inflammatory demyelinating polyneuritis exacerbation   Describe mobility limitations that make leaving home difficult: chronic inflammatory demyelinating polyneuritis exacerbation   Nursing/Therapeutic Services Requested: Skilled Nursing Physical Therapy Occupational Therapy   Skilled nursing orders: Medication education Cardiopulmonary assessments Other   PT orders: Therapeutic exercise Transfer training Home safety assessment   Occupational orders: Activities of daily living Home safety assessment Strengthening Energy conservation   Frequency: 1 Week 1        Discharge Follow-up with PCP   As directed       Currently Documented PCP:    Winnie Dent    PCP Phone Number:    [phone]     Follow Up Details: 1 week        Discharge Follow-up with Specified Provider: Dr. Wen; 2 Weeks   As directed       To: Dr. Wen   Follow Up: 2 Weeks                Test Results Pending at Discharge  None     Lenin Desai MD  10/13/24  15:28 EDT    Time: <30 minutes

## 2024-10-13 NOTE — PLAN OF CARE
Goal Outcome Evaluation:              Outcome Evaluation: Pt had a small bowel movement tonight, pt was given PRN labatolol tonight due elevated B/P. Pt has rested well through the night. Pt vitals remain stable at this time.

## 2024-10-13 NOTE — CASE MANAGEMENT/SOCIAL WORK
Continued Stay Note  [place]     Patient Name: Efren Christianson  MRN: [id number]  Today's Date: 10/13/2024    Admit Date: 10/8/2024    Plan: Home w/caregivers and [facility]   Discharge Plan       Row Name 10/13/24 1600       Plan    Plan Home w/caregivers and [facility]    Plan Comments Pt has orders for home today.  Pt wanting HH.  He is asking for [facility] as he has had them again.  I made referral in Epic, called Samia and gave her the referral, faxed order, neuro progress note, AVS, and face sheet.  The dc summary is not ready at this time.  Met with pt, his caregivers, answered their questions, advised them that if [facility] did not accept him for HH to call Dr. Retana and her office can assist in getting another HH agency.  pt in agreement.  Will follow. Thanks, Janeth FAN    Final Discharge Disposition Code 06 - home with home health care    Final Note Home w/HH                   Discharge Codes    No documentation.                 Expected Discharge Date and Time       Expected Discharge Date Expected Discharge Time    Oct 13, 2024               Janeth Ortiz

## 2024-10-13 NOTE — PROGRESS NOTES
Patient Identification:  NAME:  Efren Christianson  Age:  79 y.o.   Sex:  male   :  1945   MRN:  [id number]       Chief complaint: Exacerbation of CIDP, seizure disorder    History of present illness: He is doing very well swallowing well.  No double vision change in speech.  He thinks his leg strength is a little bit better.      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    Home medications:  Medications Prior to Admission   Medication Sig Dispense Refill Last Dose/Taking    Immune Globulin, Human, (GAMMAGARD IV) Infuse 1 g/kg into a venous catheter Every 30 (Thirty) Days.   9/10/2024    levETIRAcetam (KEPPRA) 1000 MG tablet Take 1 tablet by mouth Every 12 (Twelve) Hours.   10/8/2024 at 0800    multivitamin (THERAGRAN) tablet tablet Take  by mouth Daily.   10/8/2024 at 0800    ferrous sulfate 324 (65 Fe) MG tablet delayed-release EC tablet Take 1 tablet by mouth Daily With Breakfast. Three times a week       vitamin C (ASCORBIC ACID) 250 MG tablet Take 1 tablet by mouth Daily.           Hospital medications:  Aquaphor Advanced Therapy, 1 Application, Topical, Q12H  enoxaparin, 40 mg, Subcutaneous, Daily  famotidine, 20 mg, Oral, BID AC  levETIRAcetam, 1,000 mg, Oral, Q12H  [START ON 10/14/2024] predniSONE, 40 mg, Oral, Daily With Breakfast  sodium chloride, 10 mL, Intravenous, Q12H           senna-docusate sodium **AND** polyethylene glycol **AND** bisacodyl **AND** bisacodyl    Calcium Replacement - Follow Nurse / BPA Driven Protocol    influenza vaccine    labetalol    LORazepam    Magnesium Standard Dose Replacement - Follow Nurse / BPA Driven Protocol    ondansetron ODT **OR** ondansetron    Phosphorus Replacement - Follow Nurse / BPA Driven Protocol     Potassium Replacement - Follow Nurse / BPA Driven Protocol    [COMPLETED] Insert Peripheral IV **AND** sodium chloride    sodium chloride    sodium chloride      Objective:  Vitals Ranges:   Temp:  [97.3 °F (36.3 °C)-98.3 °F (36.8 °C)] 98.3 °F (36.8 °C)  Heart Rate:  [] 101  Resp:  [16-20] 20  BP: (143-189)/(72-94) 164/77      Physical Exam:  Awake alert he can lift both arms up right proximal is a bit stronger than the left.  Minimal  but seems to extend the right fingers a little better.  Can lift both legs right greater than left areflexic.  Toes downgoing    Results review:   I reviewed the patient's new clinical results.    Data review:  Lab Results (last 24 hours)       ** No results found for the last 24 hours. **             Imaging:  Imaging Results (Last 24 Hours)       ** No results found for the last 24 hours. **               Assessment and Plan:       CIDP (chronic inflammatory demyelinating polyneuropathy)    He has done very well with the IV Solu-Medrol tomorrow.  He will start prednisone 40 mg p.o. daily indefinitely until his outpatient neurologist desires to taper it any lower.  He will be following up with Dr. Reyes regarding starting on Vyvgart soon.  He also knows to take Pepcid every morning with his prednisone  Thanks      Fuentes Shay MD  10/13/24  12:47 EDT

## 2024-10-13 NOTE — PLAN OF CARE
Goal Outcome Evaluation:            Pt up to chair with assistance. Pt agreeable and eager to go home, admits to having caregivers and support at home.

## 2024-10-13 NOTE — CASE MANAGEMENT/SOCIAL WORK
Case Management Discharge Note      Final Note: Home w/HH         Selected Continued Care - Discharged on 10/13/2024 Admission date: 10/8/2024 - Discharge disposition: Home-Health Care Svc      Destination    No services have been selected for the patient.                Durable Medical Equipment    No services have been selected for the patient.                Dialysis/Infusion    No services have been selected for the patient.                Home Medical Care    No services have been selected for the patient.                Therapy    No services have been selected for the patient.                Community Resources    No services have been selected for the patient.                Community & DME    No services have been selected for the patient.                         Final Discharge Disposition Code: 06 - home with home health care

## 2024-10-13 NOTE — NURSING NOTE
Pt ready for DC, Lavern RESENDEZ contacted nurses station. Informed her of DC, states she will be here within the hour.

## 2024-10-14 ENCOUNTER — READMISSION MANAGEMENT (OUTPATIENT)
Dept: CALL CENTER | Facility: HOSPITAL | Age: 79
End: 2024-10-14
Payer: MEDICARE

## 2024-10-14 ENCOUNTER — TELEPHONE (OUTPATIENT)
Dept: NEUROLOGY | Facility: CLINIC | Age: 79
End: 2024-10-14
Payer: MEDICARE

## 2024-10-14 ENCOUNTER — TELEPHONE (OUTPATIENT)
Dept: SOCIAL WORK | Facility: HOSPITAL | Age: 79
End: 2024-10-14
Payer: MEDICARE

## 2024-10-14 NOTE — TELEPHONE ENCOUNTER
Rec'd call from [facility]  who states they are able to accept patient for PT/Nsg services, they do not have OT available at this time. Rhina states she will contact patient to update.

## 2024-10-14 NOTE — OUTREACH NOTE
Prep Survey      Flowsheet Row Responses   [facility] facility patient discharged from? [facility]   Is LACE score < 7 ? No   Eligibility Readm Mgmt   Discharge diagnosis CIDP (chronic inflammatory demyelinating polyneuropathy)   Does the patient have one of the following disease processes/diagnoses(primary or secondary)? Other   Does the patient have Home health ordered? Yes   What is the Home health agency?  [facility]   Is there a DME ordered? No   Medication alerts for this patient see avs   Prep survey completed? Yes            Juanita LEWIS - Registered Nurse

## 2024-10-14 NOTE — TELEPHONE ENCOUNTER
Lavern returned call. Confirmed pt did receive taper prednisone after discharge. Lavern reports patient is worse than before arrived at ED last week - cannot stand and cannot use arms and is reporting a lot of swelling in his hands. Spoke with Dr. Reyes who advises he has spoken with pharmacy to get Vyvgart infusion scheduled asap. Per Lavern, pt has an appt tomorrow for infusion.

## 2024-10-15 ENCOUNTER — HOSPITAL ENCOUNTER (OUTPATIENT)
Dept: INFUSION THERAPY | Facility: HOSPITAL | Age: 79
Discharge: HOME | End: 2024-10-15
Payer: MEDICARE

## 2024-10-15 DIAGNOSIS — G61.81 CIDP (CHRONIC INFLAMMATORY DEMYELINATING POLYNEUROPATHY): Primary | ICD-10-CM

## 2024-10-16 ENCOUNTER — READMISSION MANAGEMENT (OUTPATIENT)
Dept: CALL CENTER | Facility: HOSPITAL | Age: 79
End: 2024-10-16
Payer: MEDICARE

## 2024-10-16 ENCOUNTER — APPOINTMENT (OUTPATIENT)
Dept: GENERAL RADIOLOGY | Facility: HOSPITAL | Age: 79
End: 2024-10-16
Payer: MEDICARE

## 2024-10-16 ENCOUNTER — APPOINTMENT (OUTPATIENT)
Dept: CT IMAGING | Facility: HOSPITAL | Age: 79
End: 2024-10-16
Payer: MEDICARE

## 2024-10-16 ENCOUNTER — HOSPITAL ENCOUNTER (INPATIENT)
Facility: HOSPITAL | Age: 79
LOS: 6 days | Discharge: NURSING, REHAB OR LONG-TERM CARE | End: 2024-10-22
Attending: STUDENT IN AN ORGANIZED HEALTH CARE EDUCATION/TRAINING PROGRAM | Admitting: HOSPITALIST
Payer: MEDICARE

## 2024-10-16 ENCOUNTER — HOSPITAL ENCOUNTER (OUTPATIENT)
Dept: INFUSION THERAPY | Facility: HOSPITAL | Age: 79
Discharge: HOME | End: 2024-10-16
Admitting: PSYCHIATRY & NEUROLOGY
Payer: MEDICARE

## 2024-10-16 VITALS
RESPIRATION RATE: 24 BRPM | SYSTOLIC BLOOD PRESSURE: 168 MMHG | HEART RATE: 102 BPM | TEMPERATURE: 98.8 F | OXYGEN SATURATION: 93 % | DIASTOLIC BLOOD PRESSURE: 107 MMHG

## 2024-10-16 DIAGNOSIS — G61.81 CIDP (CHRONIC INFLAMMATORY DEMYELINATING POLYNEUROPATHY): Primary | ICD-10-CM

## 2024-10-16 DIAGNOSIS — M62.81 MUSCLE WEAKNESS: ICD-10-CM

## 2024-10-16 DIAGNOSIS — R06.00 DYSPNEA, UNSPECIFIED TYPE: Primary | ICD-10-CM

## 2024-10-16 LAB
ALBUMIN SERPL-MCNC: 3.9 G/DL (ref 3.5–5.2)
ALBUMIN/GLOB SERPL: 1.1 G/DL
ALP SERPL-CCNC: 65 U/L (ref 39–117)
ALT SERPL W P-5'-P-CCNC: 88 U/L (ref 1–41)
ANION GAP SERPL CALCULATED.3IONS-SCNC: 12.1 MMOL/L (ref 5–15)
AST SERPL-CCNC: 33 U/L (ref 1–40)
ATMOSPHERIC PRESS: 750 MMHG
BACTERIA UR QL AUTO: ABNORMAL /HPF
BASE EXCESS BLDV CALC-SCNC: 6.3 MMOL/L (ref 0–2)
BASOPHILS # BLD AUTO: 0.02 10*3/MM3 (ref 0–0.2)
BASOPHILS NFR BLD AUTO: 0.1 % (ref 0–1.5)
BDY SITE: ABNORMAL
BILIRUB SERPL-MCNC: 0.7 MG/DL (ref 0–1.2)
BILIRUB UR QL STRIP: NEGATIVE
BODY TEMPERATURE: 37
BUN SERPL-MCNC: 30 MG/DL (ref 8–23)
BUN/CREAT SERPL: 34.1 (ref 7–25)
CALCIUM SPEC-SCNC: 9 MG/DL (ref 8.6–10.5)
CHLORIDE SERPL-SCNC: 100 MMOL/L (ref 98–107)
CK SERPL-CCNC: 71 U/L (ref 20–200)
CLARITY UR: CLEAR
CO2 SERPL-SCNC: 24.9 MMOL/L (ref 22–29)
COLOR UR: YELLOW
CREAT SERPL-MCNC: 0.88 MG/DL (ref 0.76–1.27)
D DIMER PPP FEU-MCNC: 0.81 MCGFEU/ML (ref 0–0.77)
DEPRECATED RDW RBC AUTO: 42.6 FL (ref 37–54)
EGFRCR SERPLBLD CKD-EPI 2021: 88.6 ML/MIN/1.73
EOSINOPHIL # BLD AUTO: 0.04 10*3/MM3 (ref 0–0.4)
EOSINOPHIL NFR BLD AUTO: 0.3 % (ref 0.3–6.2)
ERYTHROCYTE [DISTWIDTH] IN BLOOD BY AUTOMATED COUNT: 12.3 % (ref 12.3–15.4)
GEN 5 2HR TROPONIN T REFLEX: 15 NG/L
GLOBULIN UR ELPH-MCNC: 3.4 GM/DL
GLUCOSE SERPL-MCNC: 87 MG/DL (ref 65–99)
GLUCOSE UR STRIP-MCNC: NEGATIVE MG/DL
HCO3 BLDV-SCNC: 30.8 MMOL/L (ref 22–28)
HCT VFR BLD AUTO: 46 % (ref 37.5–51)
HGB BLD-MCNC: 16.1 G/DL (ref 13–17.7)
HGB BLDA-MCNC: 16.8 G/DL (ref 14–18)
HGB UR QL STRIP.AUTO: ABNORMAL
HYALINE CASTS UR QL AUTO: ABNORMAL /LPF
IMM GRANULOCYTES # BLD AUTO: 0.09 10*3/MM3 (ref 0–0.05)
IMM GRANULOCYTES NFR BLD AUTO: 0.6 % (ref 0–0.5)
INR PPP: 0.99 (ref 0.9–1.1)
KETONES UR QL STRIP: ABNORMAL
LEUKOCYTE ESTERASE UR QL STRIP.AUTO: NEGATIVE
LYMPHOCYTES # BLD AUTO: 1.96 10*3/MM3 (ref 0.7–3.1)
LYMPHOCYTES NFR BLD AUTO: 13.5 % (ref 19.6–45.3)
Lab: ABNORMAL
MCH RBC QN AUTO: 33.3 PG (ref 26.6–33)
MCHC RBC AUTO-ENTMCNC: 35 G/DL (ref 31.5–35.7)
MCV RBC AUTO: 95 FL (ref 79–97)
MODALITY: ABNORMAL
MONOCYTES # BLD AUTO: 1.41 10*3/MM3 (ref 0.1–0.9)
MONOCYTES NFR BLD AUTO: 9.7 % (ref 5–12)
NEUTROPHILS NFR BLD AUTO: 11.01 10*3/MM3 (ref 1.7–7)
NEUTROPHILS NFR BLD AUTO: 75.8 % (ref 42.7–76)
NITRITE UR QL STRIP: NEGATIVE
NRBC BLD AUTO-RTO: 0 /100 WBC (ref 0–0.2)
NT-PROBNP SERPL-MCNC: 199 PG/ML (ref 0–1800)
PCO2 BLDV: 42.3 MM HG (ref 41–51)
PH BLDV: 7.47 PH UNITS (ref 7.32–7.42)
PH UR STRIP.AUTO: 6 [PH] (ref 4.5–8)
PLATELET # BLD AUTO: 158 10*3/MM3 (ref 140–450)
PMV BLD AUTO: 10 FL (ref 6–12)
PO2 BLDV: 55.9 MM HG (ref 27–53)
POTASSIUM SERPL-SCNC: 4 MMOL/L (ref 3.5–5.2)
PROCALCITONIN SERPL-MCNC: 0.08 NG/ML (ref 0–0.25)
PROT SERPL-MCNC: 7.3 G/DL (ref 6–8.5)
PROT UR QL STRIP: NEGATIVE
PROTHROMBIN TIME: 13.4 SECONDS (ref 12.1–15)
QT INTERVAL: 374 MS
QTC INTERVAL: 466 MS
RBC # BLD AUTO: 4.84 10*6/MM3 (ref 4.14–5.8)
RBC # UR STRIP: ABNORMAL /HPF
REF LAB TEST METHOD: ABNORMAL
SAO2 % BLDCOV: 89.8 % (ref 45–75)
SODIUM SERPL-SCNC: 137 MMOL/L (ref 136–145)
SP GR UR STRIP: 1.02 (ref 1–1.03)
SQUAMOUS #/AREA URNS HPF: ABNORMAL /HPF
TROPONIN T DELTA: 0 NG/L
TROPONIN T SERPL HS-MCNC: 15 NG/L
UROBILINOGEN UR QL STRIP: ABNORMAL
WBC # UR STRIP: ABNORMAL /HPF
WBC NRBC COR # BLD AUTO: 14.53 10*3/MM3 (ref 3.4–10.8)

## 2024-10-16 PROCEDURE — 94799 UNLISTED PULMONARY SVC/PX: CPT

## 2024-10-16 PROCEDURE — 25010000002 IMMUNE GLOBULIN (HUMAN) 5 GM/50ML SOLUTION: Performed by: PSYCHIATRY & NEUROLOGY

## 2024-10-16 PROCEDURE — G0463 HOSPITAL OUTPT CLINIC VISIT: HCPCS

## 2024-10-16 PROCEDURE — 83880 ASSAY OF NATRIURETIC PEPTIDE: CPT | Performed by: STUDENT IN AN ORGANIZED HEALTH CARE EDUCATION/TRAINING PROGRAM

## 2024-10-16 PROCEDURE — 99223 1ST HOSP IP/OBS HIGH 75: CPT | Performed by: INTERNAL MEDICINE

## 2024-10-16 PROCEDURE — 93010 ELECTROCARDIOGRAM REPORT: CPT | Performed by: INTERNAL MEDICINE

## 2024-10-16 PROCEDURE — 93005 ELECTROCARDIOGRAM TRACING: CPT | Performed by: STUDENT IN AN ORGANIZED HEALTH CARE EDUCATION/TRAINING PROGRAM

## 2024-10-16 PROCEDURE — 81001 URINALYSIS AUTO W/SCOPE: CPT | Performed by: STUDENT IN AN ORGANIZED HEALTH CARE EDUCATION/TRAINING PROGRAM

## 2024-10-16 PROCEDURE — 84145 PROCALCITONIN (PCT): CPT | Performed by: STUDENT IN AN ORGANIZED HEALTH CARE EDUCATION/TRAINING PROGRAM

## 2024-10-16 PROCEDURE — 36415 COLL VENOUS BLD VENIPUNCTURE: CPT

## 2024-10-16 PROCEDURE — 63710000001 DIPHENHYDRAMINE PER 50 MG: Performed by: INTERNAL MEDICINE

## 2024-10-16 PROCEDURE — 94761 N-INVAS EAR/PLS OXIMETRY MLT: CPT

## 2024-10-16 PROCEDURE — 25510000001 IOPAMIDOL PER 1 ML: Performed by: STUDENT IN AN ORGANIZED HEALTH CARE EDUCATION/TRAINING PROGRAM

## 2024-10-16 PROCEDURE — 71045 X-RAY EXAM CHEST 1 VIEW: CPT

## 2024-10-16 PROCEDURE — 70450 CT HEAD/BRAIN W/O DYE: CPT

## 2024-10-16 PROCEDURE — 82803 BLOOD GASES ANY COMBINATION: CPT

## 2024-10-16 PROCEDURE — 71275 CT ANGIOGRAPHY CHEST: CPT

## 2024-10-16 PROCEDURE — 85379 FIBRIN DEGRADATION QUANT: CPT | Performed by: STUDENT IN AN ORGANIZED HEALTH CARE EDUCATION/TRAINING PROGRAM

## 2024-10-16 PROCEDURE — 82550 ASSAY OF CK (CPK): CPT | Performed by: STUDENT IN AN ORGANIZED HEALTH CARE EDUCATION/TRAINING PROGRAM

## 2024-10-16 PROCEDURE — 84484 ASSAY OF TROPONIN QUANT: CPT | Performed by: STUDENT IN AN ORGANIZED HEALTH CARE EDUCATION/TRAINING PROGRAM

## 2024-10-16 PROCEDURE — 25010000002 IMMUNE GLOBULIN (HUMAN) 20 GM/200ML SOLUTION: Performed by: PSYCHIATRY & NEUROLOGY

## 2024-10-16 PROCEDURE — 85025 COMPLETE CBC W/AUTO DIFF WBC: CPT | Performed by: STUDENT IN AN ORGANIZED HEALTH CARE EDUCATION/TRAINING PROGRAM

## 2024-10-16 PROCEDURE — 99285 EMERGENCY DEPT VISIT HI MDM: CPT | Performed by: STUDENT IN AN ORGANIZED HEALTH CARE EDUCATION/TRAINING PROGRAM

## 2024-10-16 PROCEDURE — 80053 COMPREHEN METABOLIC PANEL: CPT | Performed by: STUDENT IN AN ORGANIZED HEALTH CARE EDUCATION/TRAINING PROGRAM

## 2024-10-16 PROCEDURE — 85610 PROTHROMBIN TIME: CPT | Performed by: STUDENT IN AN ORGANIZED HEALTH CARE EDUCATION/TRAINING PROGRAM

## 2024-10-16 RX ORDER — DIPHENHYDRAMINE HCL 25 MG
25 CAPSULE ORAL ONCE
OUTPATIENT
Start: 2024-10-21

## 2024-10-16 RX ORDER — FAMOTIDINE 10 MG/ML
20 INJECTION, SOLUTION INTRAVENOUS DAILY
Status: DISCONTINUED | OUTPATIENT
Start: 2024-10-16 | End: 2024-10-17

## 2024-10-16 RX ORDER — DIPHENHYDRAMINE HCL 25 MG
25 CAPSULE ORAL ONCE
Status: DISCONTINUED | OUTPATIENT
Start: 2024-10-16 | End: 2024-10-18 | Stop reason: HOSPADM

## 2024-10-16 RX ORDER — ACETAMINOPHEN 650 MG/1
650 SUPPOSITORY RECTAL EVERY 4 HOURS PRN
Status: DISCONTINUED | OUTPATIENT
Start: 2024-10-16 | End: 2024-10-22 | Stop reason: HOSPADM

## 2024-10-16 RX ORDER — ONDANSETRON 4 MG/1
4 TABLET, ORALLY DISINTEGRATING ORAL EVERY 6 HOURS PRN
Status: DISCONTINUED | OUTPATIENT
Start: 2024-10-16 | End: 2024-10-22 | Stop reason: HOSPADM

## 2024-10-16 RX ORDER — ACETAMINOPHEN 325 MG/1
650 TABLET ORAL DAILY
Status: DISCONTINUED | OUTPATIENT
Start: 2024-10-16 | End: 2024-10-17

## 2024-10-16 RX ORDER — SODIUM CHLORIDE 9 MG/ML
20 INJECTION, SOLUTION INTRAVENOUS ONCE
OUTPATIENT
Start: 2024-10-21

## 2024-10-16 RX ORDER — SODIUM CHLORIDE 0.9 % (FLUSH) 0.9 %
10 SYRINGE (ML) INJECTION EVERY 12 HOURS SCHEDULED
Status: DISCONTINUED | OUTPATIENT
Start: 2024-10-16 | End: 2024-10-22 | Stop reason: HOSPADM

## 2024-10-16 RX ORDER — NITROGLYCERIN 0.4 MG/1
0.4 TABLET SUBLINGUAL
Status: DISCONTINUED | OUTPATIENT
Start: 2024-10-16 | End: 2024-10-16 | Stop reason: SDUPTHER

## 2024-10-16 RX ORDER — DIPHENHYDRAMINE HCL 25 MG
25 CAPSULE ORAL DAILY
Status: DISCONTINUED | OUTPATIENT
Start: 2024-10-16 | End: 2024-10-17

## 2024-10-16 RX ORDER — LEVETIRACETAM 500 MG/1
1000 TABLET ORAL EVERY 12 HOURS SCHEDULED
Status: DISCONTINUED | OUTPATIENT
Start: 2024-10-16 | End: 2024-10-22 | Stop reason: HOSPADM

## 2024-10-16 RX ORDER — NITROGLYCERIN 0.4 MG/1
0.4 TABLET SUBLINGUAL
Status: DISCONTINUED | OUTPATIENT
Start: 2024-10-16 | End: 2024-10-22 | Stop reason: HOSPADM

## 2024-10-16 RX ORDER — SODIUM CHLORIDE 0.9 % (FLUSH) 0.9 %
10 SYRINGE (ML) INJECTION AS NEEDED
Status: DISCONTINUED | OUTPATIENT
Start: 2024-10-16 | End: 2024-10-22 | Stop reason: HOSPADM

## 2024-10-16 RX ORDER — LABETALOL HYDROCHLORIDE 5 MG/ML
10 INJECTION, SOLUTION INTRAVENOUS EVERY 4 HOURS PRN
Status: DISCONTINUED | OUTPATIENT
Start: 2024-10-16 | End: 2024-10-22 | Stop reason: HOSPADM

## 2024-10-16 RX ORDER — ONDANSETRON 2 MG/ML
4 INJECTION INTRAMUSCULAR; INTRAVENOUS EVERY 6 HOURS PRN
Status: DISCONTINUED | OUTPATIENT
Start: 2024-10-16 | End: 2024-10-22 | Stop reason: HOSPADM

## 2024-10-16 RX ORDER — CETIRIZINE HYDROCHLORIDE 10 MG/1
10 TABLET ORAL DAILY
Status: DISCONTINUED | OUTPATIENT
Start: 2024-10-16 | End: 2024-10-17

## 2024-10-16 RX ORDER — PANTOPRAZOLE SODIUM 40 MG/1
40 TABLET, DELAYED RELEASE ORAL DAILY
Status: DISCONTINUED | OUTPATIENT
Start: 2024-10-16 | End: 2024-10-22 | Stop reason: HOSPADM

## 2024-10-16 RX ORDER — IOPAMIDOL 755 MG/ML
100 INJECTION, SOLUTION INTRAVASCULAR
Status: COMPLETED | OUTPATIENT
Start: 2024-10-16 | End: 2024-10-16

## 2024-10-16 RX ORDER — ACETAMINOPHEN 325 MG/1
650 TABLET ORAL EVERY 4 HOURS PRN
Status: DISCONTINUED | OUTPATIENT
Start: 2024-10-16 | End: 2024-10-22 | Stop reason: HOSPADM

## 2024-10-16 RX ORDER — ACETAMINOPHEN 325 MG/1
650 TABLET ORAL ONCE
OUTPATIENT
Start: 2024-10-21

## 2024-10-16 RX ORDER — ACETAMINOPHEN 325 MG/1
650 TABLET ORAL ONCE
Status: DISCONTINUED | OUTPATIENT
Start: 2024-10-16 | End: 2024-10-18 | Stop reason: HOSPADM

## 2024-10-16 RX ADMIN — PANTOPRAZOLE SODIUM 40 MG: 40 TABLET, DELAYED RELEASE ORAL at 16:06

## 2024-10-16 RX ADMIN — IMMUNE GLOBULIN INFUSION (HUMAN) 20 G: 100 INJECTION, SOLUTION INTRAVENOUS; SUBCUTANEOUS at 15:24

## 2024-10-16 RX ADMIN — Medication 10 ML: at 21:36

## 2024-10-16 RX ADMIN — DIPHENHYDRAMINE HYDROCHLORIDE 25 MG: 25 CAPSULE ORAL at 14:39

## 2024-10-16 RX ADMIN — ACETAMINOPHEN 650 MG: 325 TABLET ORAL at 14:40

## 2024-10-16 RX ADMIN — FAMOTIDINE 20 MG: 10 INJECTION, SOLUTION INTRAVENOUS at 14:39

## 2024-10-16 RX ADMIN — MUPIROCIN 1 APPLICATION: 20 OINTMENT TOPICAL at 16:06

## 2024-10-16 RX ADMIN — IOPAMIDOL 100 ML: 755 INJECTION, SOLUTION INTRAVENOUS at 10:39

## 2024-10-16 RX ADMIN — IMMUNE GLOBULIN INFUSION (HUMAN) 5 G: 100 INJECTION, SOLUTION INTRAVENOUS; SUBCUTANEOUS at 14:44

## 2024-10-16 RX ADMIN — CETIRIZINE HYDROCHLORIDE 10 MG: 10 TABLET, FILM COATED ORAL at 14:39

## 2024-10-16 RX ADMIN — WHITE PETROLATUM 41 % TOPICAL OINTMENT 1 APPLICATION: OINTMENT at 21:36

## 2024-10-16 RX ADMIN — LEVETIRACETAM 1000 MG: 500 TABLET, FILM COATED ORAL at 16:06

## 2024-10-16 NOTE — OUTREACH NOTE
Medical Week 1 Survey      Flowsheet Row Responses   [facility] facility patient discharged from? [place]   Does the patient have one of the following disease processes/diagnoses(primary or secondary)? Other   Week 1 attempt successful? No   Unsuccessful attempts Attempt 1   Revoke Readmitted            Sarah CUEVA - Registered Nurse

## 2024-10-16 NOTE — ED PROVIDER NOTES
Subjective     History provided by:  Parent   see MDM     Review of Systems   Constitutional:  Negative for fever.   Respiratory:  Positive for shortness of breath.    Cardiovascular:  Negative for chest pain.   Gastrointestinal:  Negative for abdominal pain, diarrhea, nausea and vomiting.   Genitourinary:  Negative for dysuria and flank pain.   Musculoskeletal:  Negative for back pain.   Skin:  Positive for rash.   Neurological:  Positive for weakness and numbness. Negative for headaches.       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)     Difficulty walking     Hepatitis A     as a child    Seizures     Syncope     Urgency of urination        No Known Allergies    Past Surgical History:   Procedure Laterality Date    TRUNK LESION/CYST EXCISION N/A 8/3/2023    Procedure: excision of chest wall cyst and back cyst 10:00;  Surgeon: Kianna Oliva DO;  Location: [facility] OR;  Service: General;  Laterality: N/A;       Family History   Problem Relation Age of Onset    Cancer Mother     Cancer Father     Coronary artery disease Brother     Multiple sclerosis Paternal Cousin     Multiple sclerosis Paternal Cousin        Social History     Socioeconomic History    Marital status: Single   Tobacco Use    Smoking status: Never    Smokeless tobacco: Never   Vaping Use    Vaping status: Never Used   Substance and Sexual Activity    Alcohol use: Yes     Comment: occasional    Drug use: Never    Sexual activity: Defer           Objective   Physical Exam  Vitals and nursing note reviewed.   Constitutional:       Appearance: Normal appearance. He is not ill-appearing.   HENT:      Head: Atraumatic.      Right Ear: External ear normal.      Left Ear: External ear normal.      Nose: Nose normal.      Mouth/Throat:      Mouth: Mucous membranes are moist.      Pharynx: Oropharynx is clear.   Eyes:      Conjunctiva/sclera: Conjunctivae normal.   Cardiovascular:       Rate and Rhythm: Normal rate and regular rhythm.      Pulses: Normal pulses.      Heart sounds: Normal heart sounds.   Pulmonary:      Effort: Pulmonary effort is normal. No respiratory distress.      Breath sounds: Normal breath sounds.      Comments: He has clear lung sounds and normal heart sound to me.  No increased work of breathing  Abdominal:      General: Abdomen is flat. There is no distension.      Palpations: Abdomen is soft.      Tenderness: There is no abdominal tenderness. There is no right CVA tenderness, left CVA tenderness, guarding or rebound.      Comments: He has no tenderness, no guarding.  No rebound tenderness.  No CVA tenderness.   Musculoskeletal:         General: No swelling.      Cervical back: Neck supple.   Skin:     General: Skin is warm and dry.      Findings: Rash present.      Comments: He has dry patches on the lower abdomen   Neurological:      Mental Status: He is alert and oriented to person, place, and time. Mental status is at baseline.      Motor: Weakness present.      Coordination: Coordination abnormal.   Psychiatric:         Behavior: Behavior normal.         Procedures           ED Course  ED Course as of 10/16/24 1344   Wed Oct 16, 2024   0923 ECG 12 Lead Dyspnea  I reviewed the EKG myself and the patient has sinus rhythm.  Heart rate is 93, QTc is 466.  KY interval is 124.  Patient has right bundle branch block.  No STEMI, or abnormal ST elevation or depressions.  No ischemia. [DL]   0951 WBC(!): 14.53  He has some mild leukocytosis.  Likely because of steroid use. [DL]   0954 pH, Venous(!): 7.470  The patient with no acidosis [DL]   0954 pCO2, Venous: 42.3  CO2 is normal [DL]   1020 NIF is -23 per RT.  [DL]   1020 proBNP: 199.0  This is not consistent with CHF [DL]   1020 Creatine Kinase: 71  This is not consistent with rhabdomyolysis [DL]   1021 HS Troponin T: 15  The patient with normal high-sensitivity troponin.  Low suspicion for ACS.  Will repeat [DL]   1021  Procalcitonin: 0.08  Less likely to be bacterial pneumonia  [DL]   1021 Ketones, UA(!): 15 mg/dL (1+)  Some mild dehydration. [DL]   1047 CT Head Without Contrast  IMPRESSION:  1.No evidence for acute intracranial abnormality.  2.Nonspecific white matter changes are noted with associated diffuse volume loss. These findings are likely related to chronic small vessel ischemic changes and/or age-related changes.   [DL]   1051 XR Chest 1 View  IMPRESSION:  Chronic changes are noted which appear stable. There is no definitive evidence for acute cardiopulmonary process.   [DL]   1129 CT Angiogram Chest  1.No evidence for pulmonary embolism.  2.Increasing densities are noted within the bilateral lung bases suggesting worsening atelectasis or infiltrates. The findings may indicate developing bibasilar pneumonia.   [DL]   1141 I spoke with Dr. Shay, neurology.  He states that the patient already received steroids so there would not be more that he would recommend besides the IVIG.  He is unsure whether the patient would benefit from plasmapheresis.  He recommends that I speak with Dr. Reyes, the patient's outpatient neurologist to discuss the plan. [DL]   1147 I spoke with Dr. Ayala, the patient's outpatient neurologist.  He recommends the patient gets 5 days of IVIG daily in the inpatient setting as he is much weaker than before now.  I will relay this to Dr. Shay. [DL]   1151 Spoke with Dr. Shay again, he rec's 5d of daily IVIG 30g/day.  [DL]   1156 Spoke with Dr. Claire, hospitalist, who kindly accepted the admission to the ICU for observation as his NIF is quite low at -23.  [DL]   1158 I spoke with Brandon, pharmacist, who will put in the IVIG for us.  Per Brandon, pharmacy, Dr. Ayala had ordered 25g/day so we'll follow that plan.  [DL]   1225 I came to bedside to update the daughter and patient regarding admission and they agree.  The daughter is very appreciative. [DL]      ED Course User Index  [DL]  Stef Jo MD                                             Medical Decision Making  This is a 79-year-old male patient, history of CIDP, seizure, who came to the emergency room from the infusion clinic with his daughter for shortness of breath, low oxygen saturation and weakness.    The patient has a lengthy history of CIDP.  He is followed by Dr. Boucher, neurologist in the outpatient setting.  He is getting monthly IVIG infusion.    On October 8, the patient came to the emergency room for worsening weakness.  He was admitted to internal medicine for inpatient IVIG.  He was seen by Dr. Shay, our inpatient neurologist.  He was recommended to start steroids.  He did get Solu-Medrol and oral steroids and was discharged home.  His speech and strength appear to be improved per inpatient note.  However, per the daughter at bedside, she said that his weakness is still the same and in fact worse.  He has left-sided weakness, more in the right side.  He used to be able to stand before October 8 but now he is not even able to do so.  Today, the daughter took him to IVIG outpatient.    At the clinic, he was found to have oxygen at 90%.  He was put on 2 L of nasal cannula and improved to 93 to 94%.  IVIG was not given because he was so weak and having low oxygen.  He was transferred to the ER.    The patient denies any chest pain, nausea, vomiting, diarrhea, abdominal pain.  However, he does feel cramps in his lower abdomen with rashes.    On physical exam, the patient appears chronically ill.  He has clear lung sounds and normal heart sound to me.  Radial pulses are equal bilaterally.  He is alert, oriented x 4, conversing appropriately.  He does have weakness in bilateral arms.  He was not able to lift his arms against gravity for a long period of time.  He was not able to pull himself up in bed.  He does have weakness on the left leg, 3 out of 5.  5 out of 5 on the right leg.  He has no tenderness to the C, T and  L-spine with no step-off.  No CVA tenderness.  His abdomen is soft, nontender.  He does have dry patches of papules on his abdomen.  Not itchy.  However the scaly skin is present.  No evidence of cellulitis.    Vital signs are notable for heart rate of low 100s.  Respiration is 18.  Oxygen is 94% on room air with good waveform.  He does have high blood pressure at 191/111.    Assessment: The patient with CIDP.  Recently admitted to the hospital.  He did receive steroids but not IVIG due to insurance issue.  It appears that the patient is not significantly improved from this hospitalization.  I query whether this is progression of his disease.  Per chart review, the patient has not received plasma exchange therapy.  Per Dr. Shay's note, the patient was having good swallowing function as well as cranial nerves so he was not a candidate for this.  He did receive steroids.  But per daughter, the steroids seem to be making him worse.    The daughter was adamant that IVIG is needed for him and will make him feel better.  She was also adamant that when he was at the [facility], inpatient service provided him with IVIG.  I am unclear whether IVIG is going to be the mainstay of treatment for him at this time and whether abruptly stopping his steroid is wise.  I will defer this decision to my neurology colleague.    As per from the emergency perspective and his low oxygen saturation with mild tachycardia today, I am concerned about PE.  There are multiple factors that raise the suspicion, including his age, his immobilization due to weakness now, mild tachycardia, mild desaturation in the low 90s.    Another diagnosis that I was considering for this gentleman was poor muscle function, especially of the diaphragm, likely from the progression of his muscle weakness.  For that reason, I ordered for negative inspiratory force to be done by respiratory therapy.    I did consider ACS, given his age of 79.   Reassuringly, his EKG is nonischemic.  Does show right bundle branch block.  No previous EKG to compare to.  His heart score is 3 for his age and cold abilities.    Otherwise, there is no evidence of pneumonia, pneumothorax, pleural effusion, COPD, asthma based on history, physical exam and chest x-ray.    Workup: CT PE, troponin, EKG, CBC, CMP, negative inspiratory force    Amount and/or Complexity of Data Reviewed  Labs: ordered.  Radiology: ordered.  ECG/medicine tests: ordered. Decision-making details documented in ED Course.      Updates: The patient with 2 negative troponins at 15 with a delta troponin of 0.  Urine test with mild ketones but otherwise no evidence of UTI.  VBG with mildly elevated at pH at 7.47 but no retained CO2.  Procalcitonin is normal at 0.08.  CT scan with questionable atelectasis versus pneumonia but from my humble opinion, I think this is atelectasis as the Pro-Ernie was normal and the patient is not having a cough, fever or crackles.  His white blood cell count is elevated at 14.5 which could be from the steroid he has been on.  CT head is unremarkable.  I discussed the case with Dr. Shay and Dr. Reyes, inpatient and outpatient neurologist, respectively.  The patient was admitted for 5 days of inpatient IVIG.  I then spoke with Brandon, pharmacist who will be able to order these for the patient while he the patient is admitted.  I then admitted the patient to the ICU under Dr. Claire for airway watch due to NIF score of -23, concerning for weakness in respiration.      Authorized and Performed by: Stef Jo MD  Total critical care time: Approximately 36 minutes    Due to a high probability of clinically significant, life threatening deterioration, the patient required my highest level of preparedness to intervene emergently and I personally spent this critical care time directly and personally managing the patient. This critical care time included obtaining a history; examining  the patient; pulse oximetry; ordering and review of studies; arranging urgent treatment with development of a management plan; evaluation of patient's response to treatment; frequent reassessment; and, discussions with other providers.    This critical care time was performed to assess and manage the high probability of imminent, life-threatening deterioration that could result in multi-organ failure. It was exclusive of separately billable procedures and treating other patients and teaching time.    Please see MDM section and the rest of the note for further information on patient assessment and treatment.        Please note that portions of this note were completed with a voice recognition program.         Final diagnoses:   Dyspnea, unspecified type   Muscle weakness       ED Disposition  ED Disposition       ED Disposition   Decision to Admit    Condition   --    Comment   Level of Care: Critical Care [6]   Diagnosis: Dyspnea [482497]   Admitting Physician: FAY LYONS [[id number]]   Certification: I Certify That Inpatient Hospital Services Are Medically Necessary For Greater Than 2 Midnights                 No follow-up provider specified.       Medication List      No changes were made to your prescriptions during this visit.            Stef Jo MD  10/16/24 9501

## 2024-10-16 NOTE — NURSING NOTE
Per Nataliya in [facility] pts IVIG is started at 50ml for first 30 minutes, increase to 100ml for 30 minutes and then max out 150ml. Check vitals each time IVIG is advanced.    Pt takes pepcid 20mg iv, cetirizine 10mg, benadryl 25mg and tylenol 650mg as pre meds before taking IVIG.

## 2024-10-16 NOTE — NURSING NOTE
Pt arrived to [facility] for appt. Pt's oxygen was at 90% and put on 2L nasal canula. O2 went to 93%. Notified MD and was told to send pt to ER. Informed pt and family. Report given to ER via [facility] [place], House supervisor. Pt transferred to ER at 8:50 AM.

## 2024-10-16 NOTE — H&P
[facility]ICAL GROUP HOSPITALIST     Winnie Murray    CHIEF COMPLAINT:     Weakness     HISTORY OF PRESENT ILLNESS:  Efren Christianson is a 79 y.o. male with a past medical history of CIDP and seizures who presented to [facility] ED for worsening weakness and decreased oxygen levels. He is followed outpatient by Dr Reyes and receives monthly IVIG infusions. Patient was just recently admitted and discharged from this facility for similar presentation, he had been on steroids during that admission and was discharged, with plan to receive IVIG outpatient today, however, given his increased weakness and low oxygen levels he was sent to the ED. He was found to be profoundly weak, Dr. Jo discussed with neurology who recommends to admit for IVIG and if no improvement may ultimately require plasmapheresis.   Patient is sitting up in bed during my exam, states he is able to move his right hand and right lower extremity slight, but left side is profoundly weak. He denies any chest pain, shortness of breath, or difficulty swallowing. He denies any fever or chills.   Nursing reports patient and his POA have refused any further steroids.        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)     Difficulty walking     Hepatitis A     as a child    Seizures     Syncope     Urgency of urination      Past Surgical History:   Procedure Laterality Date    TRUNK LESION/CYST EXCISION N/A 8/3/2023    Procedure: excision of chest wall cyst and back cyst 10:00;  Surgeon: Kianna Oliva DO;  Location: [facility];  Service: General;  Laterality: N/A;     Family History   Problem Relation Age of Onset    Cancer Mother     Cancer Father     Coronary artery disease Brother     Multiple sclerosis Paternal Cousin     Multiple sclerosis Paternal Cousin      Social History     Tobacco Use    Smoking status: Never    Smokeless tobacco: Never   Vaping Use  "   Vaping status: Never Used   Substance Use Topics    Alcohol use: Yes     Comment: occasional    Drug use: Never     Medications Prior to Admission   Medication Sig Dispense Refill Last Dose/Taking    Emollient (Aquaphor Advanced Therapy) ointment ointment Apply 1 Application topically to the appropriate area as directed Every 12 (Twelve) Hours.   10/15/2024    Immune Globulin, Human, (GAMMAGARD IV) Infuse 1 g/kg into a venous catheter Every 30 (Thirty) Days.   Past Month    levETIRAcetam (KEPPRA) 1000 MG tablet Take 1 tablet by mouth Every 12 (Twelve) Hours.   10/15/2024    multivitamin (THERAGRAN) tablet tablet Take  by mouth Daily.   10/15/2024    pantoprazole (PROTONIX) 40 MG EC tablet Take 1 tablet by mouth Daily for 30 days. 30 tablet 0 10/15/2024    predniSONE (DELTASONE) 20 MG tablet Take 2 tablets by mouth Daily With Breakfast for 7 days, THEN 1 tablet Daily With Breakfast for 7 days. 21 tablet 0 10/15/2024     Allergies:  Patient has no known allergies.    Immunization History   Administered Date(s) Administered    COVID-19 (MICHAEL) 11/01/2021    DTaP 5 01/19/2023    Fluzone High-Dose 65+yrs 10/18/2021, 11/04/2022    Influenza, Unspecified 12/12/2023           REVIEW OF SYSTEMS:    Please see the above history of present illness for pertinent positives and negatives.  The remainder of the patient's systems have been reviewed and are negative.     Vital Signs  Temp:  [98.4 °F (36.9 °C)-98.8 °F (37.1 °C)] 98.5 °F (36.9 °C)  Heart Rate:  [] 91  Resp:  [20-24] 20  BP: (156-191)/() 165/91    Oxygen Therapy  SpO2: 92 %  Pulse Oximetry Type: Continuous  Device (Oxygen Therapy): room air  Device (Oxygen Therapy) (Infant): room air}    Body mass index is 24.68 kg/m².    Flowsheet Rows      Flowsheet Row First Filed Value   Admission Height 172.7 cm (67.99\") Documented at 10/16/2024 0858   Admission Weight 73.6 kg (162 lb 4.8 oz) Documented at 10/16/2024 0858                 Physical Exam:    Physical " Exam  Vitals reviewed.   Constitutional:       General: He is not in acute distress.     Comments: Chronically ill appearing   HENT:      Head: Normocephalic and atraumatic.      Mouth/Throat:      Mouth: Mucous membranes are moist.   Cardiovascular:      Rate and Rhythm: Normal rate and regular rhythm.   Pulmonary:      Effort: Pulmonary effort is normal. No respiratory distress.      Breath sounds: No wheezing.   Abdominal:      General: Bowel sounds are normal.      Palpations: Abdomen is soft.   Musculoskeletal:      Right lower leg: No edema.      Left lower leg: No edema.   Neurological:      Mental Status: He is alert and oriented to person, place, and time.      Comments: RUE strength 3+/5 , RLE 4+/5, LLE 3/5   Psychiatric:         Mood and Affect: Mood normal.         Behavior: Behavior normal.         Results Review:    I reviewed the patient's new clinical results.  Lab Results (most recent)       Procedure Component Value Units Date/Time    High Sensitivity Troponin T 2Hr [[id number]]  (Normal) Collected: 10/16/24 1222    Specimen: Blood Updated: 10/16/24 1240     HS Troponin T 15 ng/L      Troponin T Delta 0 ng/L     Narrative:      High Sensitive Troponin T Reference Range:  <14.0 ng/L- Negative Female for AMI  <22.0 ng/L- Negative Male for AMI  >=14 - Abnormal Female indicating possible myocardial injury.  >=22 - Abnormal Male indicating possible myocardial injury.   Clinicians would have to utilize clinical acumen, EKG, Troponin, and serial changes to determine if it is an Acute Myocardial Infarction or myocardial injury due to an underlying chronic condition.         Urinalysis, Microscopic Only - Urine, Clean Catch [[id number]]  (Abnormal) Collected: 10/16/24 1007    Specimen: Urine, Clean Catch Updated: 10/16/24 1102     RBC, UA 6-10 /HPF      WBC, UA 0-2 /HPF      Bacteria, UA Trace /HPF      Squamous Epithelial Cells, UA 0-2 /HPF      Hyaline Casts, UA None Seen /LPF      Methodology Manual Light  "Microscopy    Procalcitonin [[id number]]  (Normal) Collected: 10/16/24 0935    Specimen: Blood Updated: 10/16/24 1019     Procalcitonin 0.08 ng/mL     Narrative:      As a Marker for Sepsis (Non-Neonates):    1. <0.5 ng/mL represents a low risk of severe sepsis and/or septic shock.  2. >2 ng/mL represents a high risk of severe sepsis and/or septic shock.    As a Marker for Lower Respiratory Tract Infections that require antibiotic therapy:    PCT on Admission    Antibiotic Therapy       6-12 Hrs later    >0.5                Strongly Recommended  >0.25 - <0.5        Recommended   0.1 - 0.25          Discouraged              Remeasure/reassess PCT  <0.1                Strongly Discouraged     Remeasure/reassess PCT    As 28 day mortality risk marker: \"Change in Procalcitonin Result\" (>80% or <=80%) if Day 0 (or Day 1) and Day 4 values are available. Refer to [email or web address]    Change in PCT <=80%  A decrease of PCT levels below or equal to 80% defines a positive change in PCT test result representing a higher risk for 28-day all-cause mortality of patients diagnosed with severe sepsis for septic shock.    Change in PCT >80%  A decrease of PCT levels of more than 80% defines a negative change in PCT result representing a lower risk for 28-day all-cause mortality of patients diagnosed with severe sepsis or septic shock.       Urinalysis With Microscopic If Indicated (No Culture) - Urine, Clean Catch [[id number]]  (Abnormal) Collected: 10/16/24 1007    Specimen: Urine, Clean Catch Updated: 10/16/24 1018     Color, UA Yellow     Appearance, UA Clear     pH, UA 6.0     Specific Gravity, UA 1.025     Glucose, UA Negative     Ketones, UA 15 mg/dL (1+)     Bilirubin, UA Negative     Blood, UA Moderate (2+)     Protein, UA Negative     Leuk Esterase, UA Negative     Nitrite, UA Negative     Urobilinogen, UA 0.2 E.U./dL    High Sensitivity Troponin T [[id number]]  (Normal) Collected: 10/16/24 0935    Specimen: " Blood Updated: 10/16/24 1016     HS Troponin T 15 ng/L     Narrative:      High Sensitive Troponin T Reference Range:  <14.0 ng/L- Negative Female for AMI  <22.0 ng/L- Negative Male for AMI  >=14 - Abnormal Female indicating possible myocardial injury.  >=22 - Abnormal Male indicating possible myocardial injury.   Clinicians would have to utilize clinical acumen, EKG, Troponin, and serial changes to determine if it is an Acute Myocardial Infarction or myocardial injury due to an underlying chronic condition.         Comprehensive Metabolic Panel [[id number]]  (Abnormal) Collected: 10/16/24 0935    Specimen: Blood Updated: 10/16/24 1016     Glucose 87 mg/dL      BUN 30 mg/dL      Creatinine 0.88 mg/dL      Sodium 137 mmol/L      Potassium 4.0 mmol/L      Chloride 100 mmol/L      CO2 24.9 mmol/L      Calcium 9.0 mg/dL      Total Protein 7.3 g/dL      Albumin 3.9 g/dL      ALT (SGPT) 88 U/L      AST (SGOT) 33 U/L      Alkaline Phosphatase 65 U/L      Total Bilirubin 0.7 mg/dL      Globulin 3.4 gm/dL      A/G Ratio 1.1 g/dL      BUN/Creatinine Ratio 34.1     Anion Gap 12.1 mmol/L      eGFR 88.6 mL/min/1.73     Narrative:      GFR Normal >60  Chronic Kidney Disease <60  Kidney Failure <15    The GFR formula is only valid for adults with stable renal function between ages 18 and 70.    CK [[id number]]  (Normal) Collected: 10/16/24 0935    Specimen: Blood Updated: 10/16/24 1016     Creatine Kinase 71 U/L     BNP [[id number]]  (Normal) Collected: 10/16/24 0935    Specimen: Blood Updated: 10/16/24 1016     proBNP 199.0 pg/mL     Narrative:      This assay is used as an aid in the diagnosis of individuals suspected of having heart failure. It can be used as an aid in the diagnosis of acute decompensated heart failure (ADHF) in patients presenting with signs and symptoms of ADHF to the emergency department (ED). In addition, NT-proBNP of <300 pg/mL indicates ADHF is not likely.    Age Range Result Interpretation  NT-proBNP  "Concentration (pg/mL:      <50             Positive            >450                   Gray                 300-450                    Negative             <300    50-75           Positive            >900                  Gray                300-900                  Negative            <300      >75             Positive            >1800                  Gray                300-1800                  Negative            <300    Protime-INR [[id number]]  (Normal) Collected: 10/16/24 0935    Specimen: Blood Updated: 10/16/24 1008     Protime 13.4 Seconds      INR 0.99    Narrative:      Therapeutic Ranges for INR: 2.0-3.0 (PT 20-30)                              2.5-3.5 (PT 25-34)    D-dimer, Quantitative [[id number]]  (Abnormal) Collected: 10/16/24 0935    Specimen: Blood Updated: 10/16/24 1008     D-Dimer, Quantitative 0.81 MCGFEU/mL     Narrative:      According to the assay 's published package insert, a normal (<0.50 MCGFEU/mL) D-dimer result in conjunction with a non-high clinical probability assessment, excludes deep vein thrombosis (DVT) and pulmonary embolism (PE) with high sensitivity.    D-dimer values increase with age and this can make VTE exclusion of an older population difficult. To address this, the American College of [employer], based on best available evidence and recent guidelines, recommends that clinicians use age-adjusted D-dimer thresholds in patients greater than 50 years of age with: a) a low probability of PE who do not meet all Pulmonary Embolism Rule Out Criteria, or b) in those with intermediate probability of PE.   The formula for an age-adjusted D-dimer cut-off is \"age/100\".  For example, a 60 year old patient would have an age-adjusted cut-off of 0.60 MCGFEU/mL and an 80 year old 0.80 MCGFEU/mL.    Blood Gas, Venous - [[id number]]  (Abnormal) Collected: 10/16/24 0945    Specimen: Venous Blood Updated: 10/16/24 0952     Site Nurse/Dr Draw     pH, Venous 7.470 pH Units      pCO2, " Venous 42.3 mm Hg      pO2, Venous 55.9 mm Hg      Comment: 83 Value above reference range        HCO3, Venous 30.8 mmol/L      Comment: 83 Value above reference range        Base Excess, Venous 6.3 mmol/L      Comment: 83 Value above reference range        O2 Saturation, Venous 89.8 %      Comment: 83 Value above reference range        Hemoglobin, Blood Gas 16.8 g/dL      Temperature 37.0     Barometric Pressure for Blood Gas 750 mmHg      Modality RA     Collected by [id number]     Comment: Meter: [id number]     :  [id number]       CBC & Differential [[id number]]  (Abnormal) Collected: 10/16/24 0935    Specimen: Blood Updated: 10/16/24 0945    Narrative:      The following orders were created for panel order CBC & Differential.  Procedure                               Abnormality         Status                     ---------                               -----------         ------                     CBC Auto Differential[[id number]]        Abnormal            Final result                 Please view results for these tests on the individual orders.    CBC Auto Differential [[id number]]  (Abnormal) Collected: 10/16/24 0935    Specimen: Blood Updated: 10/16/24 0944     WBC 14.53 10*3/mm3      RBC 4.84 10*6/mm3      Hemoglobin 16.1 g/dL      Hematocrit 46.0 %      MCV 95.0 fL      MCH 33.3 pg      MCHC 35.0 g/dL      RDW 12.3 %      RDW-SD 42.6 fl      MPV 10.0 fL      Platelets 158 10*3/mm3      Neutrophil % 75.8 %      Lymphocyte % 13.5 %      Monocyte % 9.7 %      Eosinophil % 0.3 %      Basophil % 0.1 %      Immature Grans % 0.6 %      Neutrophils, Absolute 11.01 10*3/mm3      Lymphocytes, Absolute 1.96 10*3/mm3      Monocytes, Absolute 1.41 10*3/mm3      Eosinophils, Absolute 0.04 10*3/mm3      Basophils, Absolute 0.02 10*3/mm3      Immature Grans, Absolute 0.09 10*3/mm3      nRBC 0.0 /100 WBC             Imaging Results (Most Recent)       Procedure Component Value Units Date/Time    CT Angiogram Chest  [[id number]] Collected: 10/16/24 1046     Updated: 10/16/24 1117    Narrative:      CT ANGIOGRAM CHEST    Date of Exam: 10/16/2024 10:20 AM EDT    Indication: 79-year-old male patient, history of CIDP, seizure.  Recent hospitalization.  Now immobilized.  Weakness.  Desaturation and shortness of breath.  Please evaluate for PE.    Comparison: 7/3/2023    Technique: CTA of the chest was performed before and after the uneventful intravenous administration of iodinated contrast. Reconstructed coronal and sagittal images were also obtained. In addition, a 3-D volume rendered image was created for   interpretation. Automated exposure control and iterative reconstruction methods were used.      FINDINGS:  No significant focal filling defects are identified to indicate the presence of pulmonary embolism.    Increasing densities are noted within the bilateral lung bases suggesting atelectasis versus developing bibasilar infiltrates or bibasilar pneumonia. The findings are more pronounced in the left lower lobe. No pleural effusion is seen.    No significant hilar, mediastinal, or axillary lymphadenopathy is observed. The heart and great vessels are stable. Mild coronary artery calcifications are identified. The thyroid gland is stable. The esophagus is unremarkable. There is a prominent   fat-containing right anterior diaphragmatic hernia extending into the right anterior lower mediastinum. This finding is stable.    The limited evaluation of the upper abdomen demonstrates no evidence for acute abnormality.    No acute osseous abnormalities are observed.    The subcutaneous cystic lesion seen near the midline on the prior CT has resolved.      Impression:      1.No evidence for pulmonary embolism.  2.Increasing densities are noted within the bilateral lung bases suggesting worsening atelectasis or infiltrates. The findings may indicate developing bibasilar pneumonia.        Electronically Signed: Bandar Man MD     10/16/2024 11:13 AM EDT    Workstation ID: [id number]    XR Chest 1 View [[id number]] Collected: 10/16/24 1044     Updated: 10/16/24 1049    Narrative:      XR CHEST 1 VW    Date of Exam: 10/16/2024 10:41 AM EDT    Indication: 79-year-old male patient, saturation is in the low 90s.  Shortness of breath.    Comparison: CT chest 10/16/2024. Chest x-ray 10/11/2024. CT chest 7/3/2023    FINDINGS:  No definitive new consolidations or pleural effusions are observed. There is chronic elevation of left hemidiaphragm. The cardiac silhouette and mediastinum are stable. No acute osseous abnormalities are identified. An ovoid external density is seen   along the lower aspect of the chest. This finding is better appreciated on the prior CT and suggest a large chronic subcutaneous cystic lesion. There is also evidence for a lower right anterior mediastinal diaphragmatic hernia on the prior CT.      Impression:      Chronic changes are noted which appear stable. There is no definitive evidence for acute cardiopulmonary process.        Electronically Signed: Bandar Man MD    10/16/2024 10:46 AM EDT    Workstation ID: [id number]    CT Head Without Contrast [[id number]] Collected: 10/16/24 1042     Updated: 10/16/24 1047    Narrative:      CT HEAD WO CONTRAST    Date of Exam: 10/16/2024 10:19 AM EDT    Indication: 79-year-old male patient, history of CDI PE, now with left-sided weakness.  Please evaluate for stroke.    Comparison: None available.    Technique: Axial CT images were obtained of the head without contrast administration.  Coronal reconstructions were performed.  Automated exposure control and iterative reconstruction methods were used.      FINDINGS:  There is no evidence for acute intracranial hemorrhage. No definitive acute focal ischemia is identified. Nonspecific white matter changes are noted likely related to chronic small vessel ischemic changes and age-related changes. Associated diffuse   volume loss is observed.  There is no evidence for abnormal cerebral edema. There is no mass effect or midline shift. The ventricular system is nondilated. The basal cisterns are patent. The skull is intact. The paranasal sinuses and mastoid air cells are   clear.      Impression:      1.No evidence for acute intracranial abnormality.  2.Nonspecific white matter changes are noted with associated diffuse volume loss. These findings are likely related to chronic small vessel ischemic changes and/or age-related changes.        Electronically Signed: Bandar Man MD    10/16/2024 10:43 AM EDT    Workstation ID: [id number]          reviewed    ECG/EMG Results (most recent)       Procedure Component Value Units Date/Time    ECG 12 Lead Dyspnea [[id number]] Collected: 10/16/24 0921     Updated: 10/16/24 0922     QT Interval 374 ms      QTC Interval 466 ms     Narrative:      HEART RATE=93  bpm  RR Amhcqdho=353  ms  NC Lrdbfcns=884  ms  P Horizontal Axis=-12  deg  P Front Axis=39  deg  QRSD Rwbeipaq=078  ms  QT Nwiiicne=769  ms  SRxC=752  ms  QRS Axis=-18  deg  T Wave Axis=9  deg  - ABNORMAL ECG -  Sinus rhythm  Right bundle branch block  Date and Time of Study:2024-10-16 09:21:35          reviewed    Assessment & Plan   Active Hospital Problems    Diagnosis  POA    **Dyspnea [R06.00]  Yes      Resolved Hospital Problems   No resolved problems to display.     Acute flare of CIDP  - per ED neurology recommends to continue IVIG x 5 days and if no improvement may ultimately require transfer for PLEX  - I have added all of the recommended pre-medication orders (Pepcid, cetrizine, benadryl, and tylenol)  - patient and POA are refusing any further steroids, so will hold off for now and defer to neurology if these should be resumed  - Dr. Shay consulted in the ED and appreciate his assistance with this patient  - PT/OT as appropriate   - will monitor in ICU overnight given nif results    Leukocytosis  - afebrile  - likely related to recent steroid use  -  procal wnl   - monitor for any signs of infection     Seizure disorder  - resume keppra, patient apparently did not take his dose this morning and is asking this be restarted     Hypertension  - patient's blood pressures have apparently been running on the high side, this was previously felt to be worsened by steroids which are now on hold  - will add prn labetalol for now  - monitor with routine vs and make adjustments as needed     DVT Prophylaxis  - SCDs    Code Status  - Full Code  - POA - Lavern Ga has been consulted.    I discussed the patient's findings and my recommendations with patient.       J Luis Claire DO  10/16/24  14:25 EDT      Note disclaimer: At [facility], we believe that sharing information builds trust and better relationships. You are receiving this note because you recently visited [facility]. It is possible you will see health information before a provider has talked with you about it. This kind of information can be easy to misunderstand. To help you fully understand what it means for your health, we urge you to discuss this note with your provider.

## 2024-10-17 LAB
ANION GAP SERPL CALCULATED.3IONS-SCNC: 9.4 MMOL/L (ref 5–15)
BASOPHILS # BLD AUTO: 0.01 10*3/MM3 (ref 0–0.2)
BASOPHILS NFR BLD AUTO: 0.1 % (ref 0–1.5)
BUN SERPL-MCNC: 27 MG/DL (ref 8–23)
BUN/CREAT SERPL: 31 (ref 7–25)
CALCIUM SPEC-SCNC: 8.2 MG/DL (ref 8.6–10.5)
CHLORIDE SERPL-SCNC: 100 MMOL/L (ref 98–107)
CLUMPED PLATELETS: PRESENT
CO2 SERPL-SCNC: 24.6 MMOL/L (ref 22–29)
CREAT SERPL-MCNC: 0.87 MG/DL (ref 0.76–1.27)
DEPRECATED RDW RBC AUTO: 43.3 FL (ref 37–54)
EGFRCR SERPLBLD CKD-EPI 2021: 88.9 ML/MIN/1.73
EOSINOPHIL # BLD AUTO: 0.06 10*3/MM3 (ref 0–0.4)
EOSINOPHIL NFR BLD AUTO: 0.7 % (ref 0.3–6.2)
ERYTHROCYTE [DISTWIDTH] IN BLOOD BY AUTOMATED COUNT: 12.2 % (ref 12.3–15.4)
GLUCOSE SERPL-MCNC: 85 MG/DL (ref 65–99)
HCT VFR BLD AUTO: 41.5 % (ref 37.5–51)
HGB BLD-MCNC: 14.3 G/DL (ref 13–17.7)
IMM GRANULOCYTES # BLD AUTO: 0.07 10*3/MM3 (ref 0–0.05)
IMM GRANULOCYTES NFR BLD AUTO: 0.8 % (ref 0–0.5)
LYMPHOCYTES # BLD AUTO: 1.68 10*3/MM3 (ref 0.7–3.1)
LYMPHOCYTES NFR BLD AUTO: 19.5 % (ref 19.6–45.3)
MAGNESIUM SERPL-MCNC: 2.1 MG/DL (ref 1.6–2.4)
MCH RBC QN AUTO: 33.3 PG (ref 26.6–33)
MCHC RBC AUTO-ENTMCNC: 34.5 G/DL (ref 31.5–35.7)
MCV RBC AUTO: 96.7 FL (ref 79–97)
MONOCYTES # BLD AUTO: 0.8 10*3/MM3 (ref 0.1–0.9)
MONOCYTES NFR BLD AUTO: 9.3 % (ref 5–12)
NEUTROPHILS NFR BLD AUTO: 5.99 10*3/MM3 (ref 1.7–7)
NEUTROPHILS NFR BLD AUTO: 69.6 % (ref 42.7–76)
NRBC BLD AUTO-RTO: 0 /100 WBC (ref 0–0.2)
PLATELET # BLD AUTO: 147 10*3/MM3 (ref 140–450)
PMV BLD AUTO: 10 FL (ref 6–12)
POTASSIUM SERPL-SCNC: 3.7 MMOL/L (ref 3.5–5.2)
RBC # BLD AUTO: 4.29 10*6/MM3 (ref 4.14–5.8)
RBC MORPH BLD: NORMAL
SODIUM SERPL-SCNC: 134 MMOL/L (ref 136–145)
WBC MORPH BLD: NORMAL
WBC NRBC COR # BLD AUTO: 8.61 10*3/MM3 (ref 3.4–10.8)

## 2024-10-17 PROCEDURE — 85007 BL SMEAR W/DIFF WBC COUNT: CPT | Performed by: INTERNAL MEDICINE

## 2024-10-17 PROCEDURE — 63710000001 PREDNISONE PER 5 MG

## 2024-10-17 PROCEDURE — 25010000002 IMMUNE GLOBULIN (HUMAN) 5 GM/50ML SOLUTION: Performed by: PSYCHIATRY & NEUROLOGY

## 2024-10-17 PROCEDURE — 80048 BASIC METABOLIC PNL TOTAL CA: CPT | Performed by: INTERNAL MEDICINE

## 2024-10-17 PROCEDURE — 25010000002 LABETALOL 5 MG/ML SOLUTION: Performed by: INTERNAL MEDICINE

## 2024-10-17 PROCEDURE — 25010000002 IMMUNE GLOBULIN (HUMAN) 20 GM/200ML SOLUTION: Performed by: PSYCHIATRY & NEUROLOGY

## 2024-10-17 PROCEDURE — 94761 N-INVAS EAR/PLS OXIMETRY MLT: CPT

## 2024-10-17 PROCEDURE — 30233S1 TRANSFUSION OF NONAUTOLOGOUS GLOBULIN INTO PERIPHERAL VEIN, PERCUTANEOUS APPROACH: ICD-10-PCS | Performed by: PSYCHIATRY & NEUROLOGY

## 2024-10-17 PROCEDURE — 25010000002 ENOXAPARIN PER 10 MG: Performed by: PSYCHIATRY & NEUROLOGY

## 2024-10-17 PROCEDURE — 99232 SBSQ HOSP IP/OBS MODERATE 35: CPT | Performed by: INTERNAL MEDICINE

## 2024-10-17 PROCEDURE — 63710000001 DIPHENHYDRAMINE PER 50 MG: Performed by: INTERNAL MEDICINE

## 2024-10-17 PROCEDURE — 83735 ASSAY OF MAGNESIUM: CPT | Performed by: INTERNAL MEDICINE

## 2024-10-17 PROCEDURE — 85025 COMPLETE CBC W/AUTO DIFF WBC: CPT | Performed by: INTERNAL MEDICINE

## 2024-10-17 PROCEDURE — 94799 UNLISTED PULMONARY SVC/PX: CPT

## 2024-10-17 PROCEDURE — 99221 1ST HOSP IP/OBS SF/LOW 40: CPT | Performed by: PSYCHIATRY & NEUROLOGY

## 2024-10-17 RX ORDER — DIPHENHYDRAMINE HCL 25 MG
25 CAPSULE ORAL DAILY
Status: DISCONTINUED | OUTPATIENT
Start: 2024-10-18 | End: 2024-10-22 | Stop reason: HOSPADM

## 2024-10-17 RX ORDER — ACETAMINOPHEN 325 MG/1
650 TABLET ORAL DAILY
Status: DISCONTINUED | OUTPATIENT
Start: 2024-10-18 | End: 2024-10-22 | Stop reason: HOSPADM

## 2024-10-17 RX ORDER — CETIRIZINE HYDROCHLORIDE 10 MG/1
10 TABLET ORAL DAILY
Status: DISCONTINUED | OUTPATIENT
Start: 2024-10-18 | End: 2024-10-22 | Stop reason: HOSPADM

## 2024-10-17 RX ORDER — FAMOTIDINE 10 MG/ML
20 INJECTION, SOLUTION INTRAVENOUS DAILY
Status: DISCONTINUED | OUTPATIENT
Start: 2024-10-18 | End: 2024-10-22 | Stop reason: HOSPADM

## 2024-10-17 RX ORDER — PREDNISONE 10 MG/1
TABLET ORAL
Status: COMPLETED
Start: 2024-10-17 | End: 2024-10-17

## 2024-10-17 RX ORDER — PREDNISONE 10 MG/1
20 TABLET ORAL
Status: DISCONTINUED | OUTPATIENT
Start: 2024-10-17 | End: 2024-10-21

## 2024-10-17 RX ORDER — ENOXAPARIN SODIUM 100 MG/ML
40 INJECTION SUBCUTANEOUS EVERY 24 HOURS
Status: DISCONTINUED | OUTPATIENT
Start: 2024-10-17 | End: 2024-10-22 | Stop reason: HOSPADM

## 2024-10-17 RX ADMIN — MUPIROCIN 1 APPLICATION: 20 OINTMENT TOPICAL at 20:31

## 2024-10-17 RX ADMIN — LABETALOL HYDROCHLORIDE 10 MG: 5 INJECTION, SOLUTION INTRAVENOUS at 09:08

## 2024-10-17 RX ADMIN — FAMOTIDINE 20 MG: 10 INJECTION, SOLUTION INTRAVENOUS at 14:55

## 2024-10-17 RX ADMIN — Medication 10 ML: at 20:32

## 2024-10-17 RX ADMIN — IMMUNE GLOBULIN INFUSION (HUMAN) 5 G: 100 INJECTION, SOLUTION INTRAVENOUS; SUBCUTANEOUS at 15:18

## 2024-10-17 RX ADMIN — WHITE PETROLATUM 41 % TOPICAL OINTMENT 1 APPLICATION: OINTMENT at 20:33

## 2024-10-17 RX ADMIN — Medication 10 ML: at 08:45

## 2024-10-17 RX ADMIN — LABETALOL HYDROCHLORIDE 10 MG: 5 INJECTION, SOLUTION INTRAVENOUS at 20:25

## 2024-10-17 RX ADMIN — ENOXAPARIN SODIUM 40 MG: 100 INJECTION SUBCUTANEOUS at 16:14

## 2024-10-17 RX ADMIN — CETIRIZINE HYDROCHLORIDE 10 MG: 10 TABLET, FILM COATED ORAL at 14:55

## 2024-10-17 RX ADMIN — PANTOPRAZOLE SODIUM 40 MG: 40 TABLET, DELAYED RELEASE ORAL at 08:45

## 2024-10-17 RX ADMIN — PREDNISONE 20 MG: 10 TABLET ORAL at 16:14

## 2024-10-17 RX ADMIN — LEVETIRACETAM 1000 MG: 500 TABLET, FILM COATED ORAL at 20:28

## 2024-10-17 RX ADMIN — LEVETIRACETAM 1000 MG: 500 TABLET, FILM COATED ORAL at 08:45

## 2024-10-17 RX ADMIN — DIPHENHYDRAMINE HYDROCHLORIDE 25 MG: 25 CAPSULE ORAL at 14:55

## 2024-10-17 RX ADMIN — ACETAMINOPHEN 650 MG: 325 TABLET ORAL at 08:45

## 2024-10-17 RX ADMIN — WHITE PETROLATUM 41 % TOPICAL OINTMENT 1 APPLICATION: OINTMENT at 08:45

## 2024-10-17 RX ADMIN — IMMUNE GLOBULIN INFUSION (HUMAN) 20 G: 100 INJECTION, SOLUTION INTRAVENOUS; SUBCUTANEOUS at 16:04

## 2024-10-17 NOTE — NURSING NOTE
RN spoke to pt and MAL Puckett at bedside about taking steroids. Family was agreeable to take the steroids with the IVIG. Dr. Shay contacted, steroids ordered.   Special Stains Stage 1 - Results: Base On Clearance Noted Above

## 2024-10-17 NOTE — CASE MANAGEMENT/SOCIAL WORK
Continued Stay Note  CORK[facility]     Patient Name: Efren Christianson  MRN: [id number]  Today's Date: 10/17/2024    Admit Date: 10/16/2024    Plan: plan home with care givers/current with [facility]   Discharge Plan       Row Name 10/17/24 1139       Plan    Plan plan home with care givers/current with [facility]    Patient/Family in Agreement with Plan yes    Plan Comments Spoke with patient at bedside. CM is familiar with patient from previous admission. He verifies no changes in information from admission earlier this month. Patient lives in a home with a couple that are his Caregivers. They assist as needed with ADLs. Lately patient has been stand/pivot with assist only. Previously he was able to ambulate with either a cane or rw and care giver assistance. Patient has a rw and straight cane. He is current with [facility] - [facility] notified of patient admission.He does not have a living will. He sees Winnie Murray as PCP. He uses [facility] [place] and denies issues obtaining medications. He will use [facility] pharmacy and denies issues obtaining medications. SDOH and readmit assessment completed. CM # placed on white board, will follow for dc needs.                   Discharge Codes    No documentation.                       Balaji Richards RN

## 2024-10-17 NOTE — PLAN OF CARE
Goal Outcome Evaluation:           Progress: improving  Outcome Evaluation: PT a/o x4, able to move left side a little bit more. PT agreeable to start steroids with IVIG. PRN labetol given during morning assessment.                              Normal vision: sees adequately in most situations; can see medication labels, newsprint

## 2024-10-17 NOTE — PLAN OF CARE
Goal Outcome Evaluation:  Plan of Care Reviewed With: patient        Progress: improving  Outcome Evaluation: pt a&o x4, admitted for CIPD exasteration, generalized weakness and left side unable to overcome gravity, patient received IVIG during dayshift, pt using urinal and bedpan to void, no complaints of pain, on room air and VSS

## 2024-10-17 NOTE — PROGRESS NOTES
Patient Care Team:  Winnie Murray as PCP - General (Nurse Practitioner)  Winnie Murray (Nurse Practitioner)    Date: 10/17/2024  Patient Name: Efren Christianson  : 1945  MRN: [id number]  Date of admission: 10/16/2024  Room/Bed: ICU3/1      Subjective   Subjective         Chief Complaint: f/u weakness    Summary:Efren Christianson is a 79 y.o. male with a past medical history of CIDP and seizures who presented to [facility] ED for worsening weakness and decreased oxygen levels. He is followed outpatient by Dr Reyes and receives monthly IVIG infusions. Patient was just recently admitted and discharged from this facility for similar presentation, he had been on steroids during that admission and was discharged, with plan to receive IVIG outpatient today, however, given his increased weakness and low oxygen levels he was sent to the ED. He was found to be profoundly weak, Dr. Jo discussed with neurology who recommends to admit for IVIG and if no improvement may ultimately require plasmapheresis.   Patient is sitting up in bed during my exam, states he is able to move his right hand and right lower extremity slight, but left side is profoundly weak. He denies any chest pain, shortness of breath, or difficulty swallowing. He denies any fever or chills.   Nursing reports patient and his POA have refused any further steroids.     Interval Followup: Patient is lying in bed, states his weakness is about the same. Denies any other complaints. Nursing reports no acute overnight events.       Review of Systems   Neurological:  Positive for weakness.         Objective   Objective       Vital Signs  Temp:  [97.6 °F (36.4 °C)-98.6 °F (37 °C)] 97.6 °F (36.4 °C)  Heart Rate:  [] 81  Resp:  [18-22] 18  BP: (106-201)/() 140/87  Oxygen Therapy  SpO2: 92 %  Pulse Oximetry Type: Continuous  Device (Oxygen Therapy): room air  Device (Oxygen Therapy) (Infant): room air  Flowsheet Rows      Flowsheet Row  "First Filed Value   Admission Height 172.7 cm (67.99\") Documented at 10/16/2024 0858   Admission Weight 73.6 kg (162 lb 4.8 oz) Documented at 10/16/2024 0858          Intake & Output (last 3 days)         10/14 0701  10/15 0700 10/15 0701  10/16 0700 10/16 0701  10/17 0700 10/17 0701  10/18 0700    P.O.   480 240    IV Piggyback   250     Total Intake(mL/kg)   730 (9.9) 240 (3.3)    Urine (mL/kg/hr)   775     Stool   0     Total Output   775     Net   -45 +240            Urine Unmeasured Occurrence   200 x     Stool Unmeasured Occurrence   3 x           Lines, Drains & Airways       Active LDAs       Name Placement date Placement time Site Days    Peripheral IV 10/16/24 0934 Right Antecubital 10/16/24  0934  Antecubital  1                      Physical Exam  Vitals reviewed.   Constitutional:       General: He is not in acute distress.     Appearance: He is normal weight.   HENT:      Head: Normocephalic and atraumatic.      Mouth/Throat:      Mouth: Mucous membranes are moist.   Cardiovascular:      Rate and Rhythm: Normal rate and regular rhythm.   Pulmonary:      Effort: Pulmonary effort is normal. No respiratory distress.      Breath sounds: No wheezing or rales.   Abdominal:      General: Bowel sounds are normal.      Palpations: Abdomen is soft.   Musculoskeletal:      Right lower leg: No edema.      Left lower leg: No edema.   Skin:     General: Skin is warm and dry.   Neurological:      Mental Status: He is alert. Mental status is at baseline.      Comments: RUE 4/5, LUE 3/5, LLE 3/5, RLE 5/5   Psychiatric:         Mood and Affect: Mood normal.         Behavior: Behavior normal.           Results Review:      Results from last 7 days   Lab Units 10/17/24  0624 10/16/24  0935   WBC 10*3/mm3 8.61 14.53*   HEMOGLOBIN g/dL 14.3 16.1   HEMATOCRIT % 41.5 46.0   PLATELETS 10*3/mm3 147 158     Results from last 7 days   Lab Units 10/17/24  0624 10/16/24  0935 10/12/24  0335   SODIUM mmol/L 134* 137 143   POTASSIUM " "mmol/L 3.7 4.0 4.4   CHLORIDE mmol/L 100 100 108*   CO2 mmol/L 24.6 24.9 23.2   BUN mg/dL 27* 30* 38*   CREATININE mg/dL 0.87 0.88 1.01   CALCIUM mg/dL 8.2* 9.0 8.4*   BILIRUBIN mg/dL  --  0.7  --    ALK PHOS U/L  --  65  --    ALT (SGPT) U/L  --  88*  --    AST (SGOT) U/L  --  33  --    GLUCOSE mg/dL 85 87 131*       Results from last 7 days   Lab Units 10/17/24  0624   MAGNESIUM mg/dL 2.1       No results found for: \"BLOODCX\"    No results found for: \"MRSACX\"    No results found for: \"STOOLCX\"    No results found for: \"URINECX\"    No results found for: \"WOUNDCX\"    Imaging Results (Last 24 Hours)       ** No results found for the last 24 hours. **            I have personally reviewed the patient's new imaging and lab results.          ECG/EMG Results (most recent)       Procedure Component Value Units Date/Time    ECG 12 Lead Dyspnea [[id number]] Collected: 10/16/24 0921     Updated: 10/16/24 1613     QT Interval 374 ms      QTC Interval 466 ms     Narrative:      HEART RATE=93  bpm  RR Vbfclfyf=217  ms  IL Hatzhejt=685  ms  P Horizontal Axis=-12  deg  P Front Axis=39  deg  QRSD Pzlhgcua=313  ms  QT Gcinnfgp=405  ms  KMaF=035  ms  QRS Axis=-18  deg  T Wave Axis=9  deg  - ABNORMAL ECG -  Sinus rhythm  Right bundle branch block  NO PRIOR TRACING AVAILABLE FOR COMPARISON  Electronically Signed By: Jerrica Sánchez ([facility]) 2024-10-16 16:12:39  Date and Time of Study:2024-10-16 09:21:35                    Medication Review:     Scheduled Meds:acetaminophen, 650 mg, Oral, Daily  Aquaphor Advanced Therapy, 1 Application, Topical, Q12H  cetirizine, 10 mg, Oral, Daily  diphenhydrAMINE, 25 mg, Oral, Daily  enoxaparin, 40 mg, Subcutaneous, Q24H  Famotidine (PF), 20 mg, Intravenous, Daily  immune globulin (human), 20 g, Intravenous, Q24H   And  immune globulin (human), 5 g, Intravenous, Q24H  levETIRAcetam, 1,000 mg, Oral, Q12H  mupirocin, 1 Application, Each Nare, BID  pantoprazole, 40 mg, Oral, Daily  sodium chloride, 10 mL, " Intravenous, Q12H      Continuous Infusions:   PRN Meds:.  acetaminophen **OR** acetaminophen    labetalol    nitroglycerin    ondansetron ODT **OR** ondansetron    sodium chloride      I have reviewed the patient's current medication list    Assessment & Plan   Assessment / Plan       Active Hospital Problems    Diagnosis  POA    **Dyspnea [R06.00]  Yes       Acute flare of CIDP  - per ED neurology recommends to continue IVIG x 5 days and if no improvement may ultimately require transfer for PLEX  - continue pre-medication orders (Pepcid, cetrizine, benadryl, and tylenol)  - patient and POA are refusing any further steroids, so will hold off for now and defer to neurology if these should be resumed  - Dr. Shay consulted in the ED and appreciate his assistance with this patient  - PT/OT as appropriate      Leukocytosis- resolved   - afebrile  - likely related to recent steroid use  - procal wnl   - monitor for any signs of infection      Seizure disorder  - continue keppra       Hypertension  - patient's blood pressures have apparently been running on the high side, this was previously felt to be worsened by steroids which are now on hold  - continue prn labetalol for now  - may require addition of oral blood pressure regimen   - continue to monitor with routine vs and make adjustments as needed      DVT Prophylaxis  - SCDs     Code Status  - Full Code  - POA - Lavern Ga has been consulted.     I discussed the patient's findings and my recommendations with patient.       Plan for disposition: pending progress     J Luis Claire DO  10/17/24  12:00 EDT          Note disclaimer: At [facility], we believe that sharing information builds trust and better relationships. You are receiving this note because you recently visited [facility]. It is possible you will see health information before a provider has talked with you about it. This kind of information can be easy to misunderstand. To help  you fully understand what it means for your health, we urge you to discuss this note with your provider.

## 2024-10-17 NOTE — CONSULTS
Patient Identification:  NAME:  Efren Christianson  Age:  79 y.o.   Sex:  male   :  1945   MRN:  [id number]       Chief complaint: Weaker, reason for consult increased weakness in a patient with CIDP some shortness of breath    History of present illness: Patient is very familiar to me.  79-year-old right-handed white male with a history of CIDP.  He had some worsening last week and generalized weakness left side greater than right side which was noted by his daughter and he came to the hospital.  He received steroids at high dose for several days and was tapered off to a prednisone dose 40 mg daily.  He did not want to go to some type of facility but instead wanted to go home.  He came back yesterday to get an IVIG infusion and was doing worse seem to be weaker than he was, and may have been a bit short of breath as well.  Since he has come to the hospital.  He has undergone IVIG infusion yesterday with 25 g of Gammagard and plans are to give him this.  25 g Gammagard IV daily for 5 days total.  This will be the duration his current symptoms are generalized increased weakness did feel a little short of breath yesterday but does not feel short of breath today and his O2 saturations currently are 94%.  He denies double vision or change in swallowing.  He does feel weaker in the extremities.  Recall this is a patient who was having increased weakness in his extremities.  When he came in last week.  He was given IV steroids which he thought might of helped his legs a little bit.  Location is as noted.  Duration is noted.  Modifying factors IV steroids changed over to prednisone but he has talked to his daughter and he is not wanting to take steroids anymore.  Basically, they seem to think that the steroids have made him worse but I am not convinced of that.  I believe that the IVIG.  We are now planning will be more beneficial acutely in this patient than steroids would have been but I have explained that  steroids are still the cornerstone of treatment for patients with CIDP.    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    Home medications:  Medications Prior to Admission   Medication Sig Dispense Refill Last Dose/Taking    Emollient (Aquaphor Advanced Therapy) ointment ointment Apply 1 Application topically to the appropriate area as directed Every 12 (Twelve) Hours.   10/15/2024    Immune Globulin, Human, (GAMMAGARD IV) Infuse 1 g/kg into a venous catheter Every 30 (Thirty) Days.   Past Month    levETIRAcetam (KEPPRA) 1000 MG tablet Take 1 tablet by mouth Every 12 (Twelve) Hours.   10/15/2024    multivitamin (THERAGRAN) tablet tablet Take  by mouth Daily.   10/15/2024    pantoprazole (PROTONIX) 40 MG EC tablet Take 1 tablet by mouth Daily for 30 days. 30 tablet 0 10/15/2024    predniSONE (DELTASONE) 20 MG tablet Take 2 tablets by mouth Daily With Breakfast for 7 days, THEN 1 tablet Daily With Breakfast for 7 days. 21 tablet 0 10/15/2024        Hospital medications:  acetaminophen, 650 mg, Oral, Daily  Aquaphor Advanced Therapy, 1 Application, Topical, Q12H  cetirizine, 10 mg, Oral, Daily  diphenhydrAMINE, 25 mg, Oral, Daily  enoxaparin, 40 mg, Subcutaneous, Q24H  Famotidine (PF), 20 mg, Intravenous, Daily  immune globulin (human), 20 g, Intravenous, Q24H   And  immune globulin (human), 5 g, Intravenous, Q24H  levETIRAcetam, 1,000 mg, Oral, Q12H  mupirocin, 1 Application, Each Nare,  BID  pantoprazole, 40 mg, Oral, Daily  sodium chloride, 10 mL, Intravenous, Q12H           acetaminophen **OR** acetaminophen    labetalol    nitroglycerin    ondansetron ODT **OR** ondansetron    sodium chloride    Family history:  Family History   Problem Relation Age of Onset    Cancer Mother     Cancer Father     Coronary artery disease Brother     Multiple sclerosis Paternal Cousin     Multiple sclerosis Paternal Cousin        Social history:  Social History     Tobacco Use    Smoking status: Never    Smokeless tobacco: Never   Vaping Use    Vaping status: Never Used   Substance Use Topics    Alcohol use: Yes     Comment: occasional    Drug use: Never       Review of systems:    He does not feel short of breath at this time.  He denies having any difficulty swallowing or any double vision.  He does feel like he is weaker in all 4 extremities but more so on the left side which has been previous.  He denies change in vision, double vision ENT complaints, chest pain bowel bladder incontinence fever chills or rash.  He does not have any real bowel bladder changes.    Objective:  Vitals Ranges:   Temp:  [97.6 °F (36.4 °C)-98.6 °F (37 °C)] 97.6 °F (36.4 °C)  Heart Rate:  [] 81  Resp:  [18-22] 18  BP: (106-201)/() 140/87      Physical Exam:  He is awake alert lying in bed and very pleasant.  Fund of knowledge good attention span concentration good recent remote memory good language function normal.  Recent remote memory is good.  Pupils 4 with some reaction bilaterally extraocular movements full without nystagmus nasolabial folds palate and tongue are symmetrical.  His neck flexors are excellent.  Motor he cannot really lift the arms against gravity left arm is still weaker than the right.  He does not have  strength on either side which is typical for his last exam.  He can lift both legs off the bed to gravity even though he has SCDs in place.  He can wiggle the toes bilaterally.  No atrophy or  fasciculations.  Reflexes absent toes downgoing.  Sensation normal light touch face arms legs coordination Station and gait.  He cannot perform.  Heart regular without murmur neck supple without bruits extremities no clubbing cyanosis or significant edema visual visual acuity is normal    Results review:   I reviewed the patient's new clinical results.    Data review:  Lab Results (last 24 hours)       Procedure Component Value Units Date/Time    CBC & Differential [[id number]]  (Abnormal) Collected: 10/17/24 0624    Specimen: Blood Updated: 10/17/24 0718    Narrative:      The following orders were created for panel order CBC & Differential.  Procedure                               Abnormality         Status                     ---------                               -----------         ------                     CBC Auto Differential[[id number]]        Abnormal            Final result               Scan Slide[[id number]]                                       Final result                 Please view results for these tests on the individual orders.    Scan Slide [[id number]] Collected: 10/17/24 0624    Specimen: Blood Updated: 10/17/24 0718     RBC Morphology Normal     WBC Morphology Normal     Clumped Platelets Present    Magnesium [[id number]]  (Normal) Collected: 10/17/24 0624    Specimen: Blood Updated: 10/17/24 0649     Magnesium 2.1 mg/dL     Basic Metabolic Panel [[id number]]  (Abnormal) Collected: 10/17/24 0624    Specimen: Blood Updated: 10/17/24 0649     Glucose 85 mg/dL      BUN 27 mg/dL      Creatinine 0.87 mg/dL      Sodium 134 mmol/L      Potassium 3.7 mmol/L      Chloride 100 mmol/L      CO2 24.6 mmol/L      Calcium 8.2 mg/dL      BUN/Creatinine Ratio 31.0     Anion Gap 9.4 mmol/L      eGFR 88.9 mL/min/1.73     Narrative:      GFR Normal >60  Chronic Kidney Disease <60  Kidney Failure <15    The GFR formula is only valid for adults with stable renal function between ages 18 and 70.    CBC Auto  Differential [[id number]]  (Abnormal) Collected: 10/17/24 0624    Specimen: Blood Updated: 10/17/24 0640     WBC 8.61 10*3/mm3      RBC 4.29 10*6/mm3      Hemoglobin 14.3 g/dL      Hematocrit 41.5 %      MCV 96.7 fL      MCH 33.3 pg      MCHC 34.5 g/dL      RDW 12.2 %      RDW-SD 43.3 fl      MPV 10.0 fL      Platelets 147 10*3/mm3      Neutrophil % 69.6 %      Lymphocyte % 19.5 %      Monocyte % 9.3 %      Eosinophil % 0.7 %      Basophil % 0.1 %      Immature Grans % 0.8 %      Neutrophils, Absolute 5.99 10*3/mm3      Lymphocytes, Absolute 1.68 10*3/mm3      Monocytes, Absolute 0.80 10*3/mm3      Eosinophils, Absolute 0.06 10*3/mm3      Basophils, Absolute 0.01 10*3/mm3      Immature Grans, Absolute 0.07 10*3/mm3      nRBC 0.0 /100 WBC     High Sensitivity Troponin T 2Hr [[id number]]  (Normal) Collected: 10/16/24 1222    Specimen: Blood Updated: 10/16/24 1240     HS Troponin T 15 ng/L      Troponin T Delta 0 ng/L     Narrative:      High Sensitive Troponin T Reference Range:  <14.0 ng/L- Negative Female for AMI  <22.0 ng/L- Negative Male for AMI  >=14 - Abnormal Female indicating possible myocardial injury.  >=22 - Abnormal Male indicating possible myocardial injury.   Clinicians would have to utilize clinical acumen, EKG, Troponin, and serial changes to determine if it is an Acute Myocardial Infarction or myocardial injury due to an underlying chronic condition.                  Imaging:  Imaging Results (Last 24 Hours)       ** No results found for the last 24 hours. **               Assessment and Plan:     Exacerbation of CIDP.  This patient received high-dose steroids and was discharged home on 40 mg prednisone daily when he went for his next IVIG treatment.  He was having some dyspnea and he has been admitted to receive 5 days of IVIG therapy.  There is no evidence that he has pneumonia or a pulmonary embolism.  He has SCDs on but I am going to restart the 40 mg subcu Lovenox that he was on previously when he  "was here this past week.  At this point his daughter does not like him on the steroids and he is \"afraid of the steroids\".  It is important to note I absolutely do not think the steroids made him worse.  They may not have helped much acutely however.    The effect of the steroids is to help him in the long run by suppressing his ability to produce more of the proteins that are attacking his myelin sheaths.  He is definitely weaker than he was when I last saw him so I think the 5 days of IVIG makes a lot of sense.  Hopefully we can avoid having to perform plasma exchange.  Subsequent to these 5 days of treatment.  It goes against my thinking that we would stop the steroids at this time but based upon his wishes and those of his daughter,.  I will continue to hold his prednisone.  Again, steroids are the cornerstone of the long-term treatment for CIDP, but for now we will not restart them.  It is possible at some point he will start Vyvgart as an outpatient but for now he will get the 5 days of IVIG which I am hoping will be associated with some acute improvement.  Thanks    Fuentes Shay MD  10/17/24  11:56 EDT   "

## 2024-10-17 NOTE — CASE MANAGEMENT/SOCIAL WORK
Continued Stay Note  [facility]     Patient Name: Efren Christianson  MRN: [id number]  Today's Date: 10/17/2024    Admit Date: 10/16/2024    Plan: plan home with care givers/current with [facility]   Discharge Plan       Row Name 10/17/24 1139       Plan    Plan plan home with care givers/current with [facility]    Patient/Family in Agreement with Plan yes    Plan Comments Spoke with patient at bedside. CM is familiar with patient from previous admission. He verifies no changes in information from admission earlier this month. Patient lives in a home with a couple that are his Caregivers. They assist as needed with ADLs. Lately patient has been stand/pivot with assist only. Previously he was able to ambulate with either a cane or rw and care giver assistance. Patient has a rw and straight cane. He is current with [facility] notified of patient admission.He does not have a living will.he sees Winnie Murray as PCP. He uses [facility] and denies issues obtaining medications. He iwll use [facility] pharmacy and denies issues obtaining medications. SDOH and readmit assessment completed. CM # placed on white board, will follow for dc needs.                   Discharge Codes    No documentation.                       Balaji Richards RN

## 2024-10-18 LAB
ANION GAP SERPL CALCULATED.3IONS-SCNC: 6.8 MMOL/L (ref 5–15)
BASOPHILS # BLD AUTO: 0 10*3/MM3 (ref 0–0.2)
BASOPHILS NFR BLD AUTO: 0 % (ref 0–1.5)
BUN SERPL-MCNC: 25 MG/DL (ref 8–23)
BUN/CREAT SERPL: 26 (ref 7–25)
CALCIUM SPEC-SCNC: 8.3 MG/DL (ref 8.6–10.5)
CHLORIDE SERPL-SCNC: 101 MMOL/L (ref 98–107)
CO2 SERPL-SCNC: 26.2 MMOL/L (ref 22–29)
CREAT SERPL-MCNC: 0.96 MG/DL (ref 0.76–1.27)
DEPRECATED RDW RBC AUTO: 42.9 FL (ref 37–54)
EGFRCR SERPLBLD CKD-EPI 2021: 81.4 ML/MIN/1.73
EOSINOPHIL # BLD AUTO: 0.01 10*3/MM3 (ref 0–0.4)
EOSINOPHIL NFR BLD AUTO: 0.1 % (ref 0.3–6.2)
ERYTHROCYTE [DISTWIDTH] IN BLOOD BY AUTOMATED COUNT: 12 % (ref 12.3–15.4)
GLUCOSE SERPL-MCNC: 115 MG/DL (ref 65–99)
HCT VFR BLD AUTO: 39.1 % (ref 37.5–51)
HGB BLD-MCNC: 13.4 G/DL (ref 13–17.7)
IMM GRANULOCYTES # BLD AUTO: 0.02 10*3/MM3 (ref 0–0.05)
IMM GRANULOCYTES NFR BLD AUTO: 0.3 % (ref 0–0.5)
LYMPHOCYTES # BLD AUTO: 0.84 10*3/MM3 (ref 0.7–3.1)
LYMPHOCYTES NFR BLD AUTO: 10.8 % (ref 19.6–45.3)
MAGNESIUM SERPL-MCNC: 2.3 MG/DL (ref 1.6–2.4)
MCH RBC QN AUTO: 33 PG (ref 26.6–33)
MCHC RBC AUTO-ENTMCNC: 34.3 G/DL (ref 31.5–35.7)
MCV RBC AUTO: 96.3 FL (ref 79–97)
MONOCYTES # BLD AUTO: 0.59 10*3/MM3 (ref 0.1–0.9)
MONOCYTES NFR BLD AUTO: 7.6 % (ref 5–12)
NEUTROPHILS NFR BLD AUTO: 6.35 10*3/MM3 (ref 1.7–7)
NEUTROPHILS NFR BLD AUTO: 81.2 % (ref 42.7–76)
NRBC BLD AUTO-RTO: 0 /100 WBC (ref 0–0.2)
PLAT MORPH BLD: NORMAL
PLATELET # BLD AUTO: 160 10*3/MM3 (ref 140–450)
PMV BLD AUTO: 9.8 FL (ref 6–12)
POTASSIUM SERPL-SCNC: 4.3 MMOL/L (ref 3.5–5.2)
RBC # BLD AUTO: 4.06 10*6/MM3 (ref 4.14–5.8)
RBC MORPH BLD: NORMAL
SODIUM SERPL-SCNC: 134 MMOL/L (ref 136–145)
WBC MORPH BLD: NORMAL
WBC NRBC COR # BLD AUTO: 7.81 10*3/MM3 (ref 3.4–10.8)

## 2024-10-18 PROCEDURE — 97162 PT EVAL MOD COMPLEX 30 MIN: CPT

## 2024-10-18 PROCEDURE — 25010000002 IMMUNE GLOBULIN (HUMAN) 5 GM/50ML SOLUTION: Performed by: PSYCHIATRY & NEUROLOGY

## 2024-10-18 PROCEDURE — 63710000001 DIPHENHYDRAMINE PER 50 MG: Performed by: INTERNAL MEDICINE

## 2024-10-18 PROCEDURE — 94761 N-INVAS EAR/PLS OXIMETRY MLT: CPT

## 2024-10-18 PROCEDURE — 99232 SBSQ HOSP IP/OBS MODERATE 35: CPT | Performed by: PSYCHIATRY & NEUROLOGY

## 2024-10-18 PROCEDURE — 99232 SBSQ HOSP IP/OBS MODERATE 35: CPT | Performed by: INTERNAL MEDICINE

## 2024-10-18 PROCEDURE — 80048 BASIC METABOLIC PNL TOTAL CA: CPT | Performed by: INTERNAL MEDICINE

## 2024-10-18 PROCEDURE — 25010000002 ENOXAPARIN PER 10 MG: Performed by: PSYCHIATRY & NEUROLOGY

## 2024-10-18 PROCEDURE — 25010000002 LABETALOL 5 MG/ML SOLUTION: Performed by: INTERNAL MEDICINE

## 2024-10-18 PROCEDURE — 85007 BL SMEAR W/DIFF WBC COUNT: CPT | Performed by: INTERNAL MEDICINE

## 2024-10-18 PROCEDURE — 63710000001 PREDNISONE PER 5 MG: Performed by: PSYCHIATRY & NEUROLOGY

## 2024-10-18 PROCEDURE — 25010000002 IMMUNE GLOBULIN (HUMAN) 20 GM/200ML SOLUTION: Performed by: PSYCHIATRY & NEUROLOGY

## 2024-10-18 PROCEDURE — 97166 OT EVAL MOD COMPLEX 45 MIN: CPT

## 2024-10-18 PROCEDURE — 94799 UNLISTED PULMONARY SVC/PX: CPT

## 2024-10-18 PROCEDURE — 83735 ASSAY OF MAGNESIUM: CPT | Performed by: INTERNAL MEDICINE

## 2024-10-18 PROCEDURE — 85025 COMPLETE CBC W/AUTO DIFF WBC: CPT | Performed by: INTERNAL MEDICINE

## 2024-10-18 RX ORDER — LOSARTAN POTASSIUM 50 MG/1
50 TABLET ORAL DAILY
Status: DISCONTINUED | OUTPATIENT
Start: 2024-10-18 | End: 2024-10-22 | Stop reason: HOSPADM

## 2024-10-18 RX ADMIN — ACETAMINOPHEN 650 MG: 325 TABLET ORAL at 16:05

## 2024-10-18 RX ADMIN — Medication 10 ML: at 08:19

## 2024-10-18 RX ADMIN — IMMUNE GLOBULIN INFUSION (HUMAN) 20 G: 100 INJECTION, SOLUTION INTRAVENOUS; SUBCUTANEOUS at 16:23

## 2024-10-18 RX ADMIN — IMMUNE GLOBULIN INFUSION (HUMAN) 5 G: 100 INJECTION, SOLUTION INTRAVENOUS; SUBCUTANEOUS at 15:58

## 2024-10-18 RX ADMIN — LEVETIRACETAM 1000 MG: 500 TABLET, FILM COATED ORAL at 08:21

## 2024-10-18 RX ADMIN — ENOXAPARIN SODIUM 40 MG: 100 INJECTION SUBCUTANEOUS at 16:03

## 2024-10-18 RX ADMIN — WHITE PETROLATUM 41 % TOPICAL OINTMENT 1 APPLICATION: OINTMENT at 08:22

## 2024-10-18 RX ADMIN — PREDNISONE 20 MG: 10 TABLET ORAL at 08:19

## 2024-10-18 RX ADMIN — MUPIROCIN 1 APPLICATION: 20 OINTMENT TOPICAL at 21:46

## 2024-10-18 RX ADMIN — FAMOTIDINE 20 MG: 10 INJECTION, SOLUTION INTRAVENOUS at 15:59

## 2024-10-18 RX ADMIN — PANTOPRAZOLE SODIUM 40 MG: 40 TABLET, DELAYED RELEASE ORAL at 08:21

## 2024-10-18 RX ADMIN — LEVETIRACETAM 1000 MG: 500 TABLET, FILM COATED ORAL at 21:20

## 2024-10-18 RX ADMIN — Medication 10 ML: at 21:20

## 2024-10-18 RX ADMIN — DIPHENHYDRAMINE HYDROCHLORIDE 25 MG: 25 CAPSULE ORAL at 15:59

## 2024-10-18 RX ADMIN — WHITE PETROLATUM 41 % TOPICAL OINTMENT 1 APPLICATION: OINTMENT at 21:46

## 2024-10-18 RX ADMIN — LABETALOL HYDROCHLORIDE 10 MG: 5 INJECTION, SOLUTION INTRAVENOUS at 08:19

## 2024-10-18 RX ADMIN — LOSARTAN POTASSIUM 50 MG: 50 TABLET, FILM COATED ORAL at 14:45

## 2024-10-18 RX ADMIN — CETIRIZINE HYDROCHLORIDE 10 MG: 10 TABLET, FILM COATED ORAL at 16:05

## 2024-10-18 RX ADMIN — MUPIROCIN 1 APPLICATION: 20 OINTMENT TOPICAL at 08:22

## 2024-10-18 NOTE — CONSULTS
Patient request and nurse referral for Advanced Directive literature for review. Patient requested prayer and engaged theological discussion.

## 2024-10-18 NOTE — PLAN OF CARE
Goal Outcome Evaluation:  Plan of Care Reviewed With: patient           Outcome Evaluation: PT Evaluation Complete: patient performs supine to/from sit transfer with max/dependent x 2. He is able to maintain static sitting balance- with CGA/min A and dynamic with mod/max A. Patient discharged from the hospital on 10/13, seen by PT during that admission. Significantly weaker this admission, admitted for 5 days of IVIG. Patient is non ambulatory at baseline, performed stand pivot transfers with caregiver assist prior to admission on 10/8. R LE active ROM WFL with gross MMT of 4/5. Unable to lift L LE against gravity, no passive ROM restrictions. Unable to safely perform pivot transfers due to profound weakness in UE's and LE's. Patient would benefit from physical therapy for strengthening. Mobility is significantly limited by weakness from CIDP. Will initiate LE therapeutic exercise program and attempt mobility training as indicated. Recommend glen lift with nursing for out of bed transfers. Recommend SNF at discharge, patient agreeable. Notified .    Anticipated Discharge Disposition (PT): skilled nursing facility

## 2024-10-18 NOTE — THERAPY EVALUATION
Patient Name: Efren Christianson  : 1945    MRN: [id number]                              Today's Date: 10/18/2024       Admit Date: 10/16/2024    Visit Dx:     ICD-10-CM ICD-9-CM   1. Dyspnea, unspecified type  R06.00 786.09   2. 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)     Difficulty walking     Hepatitis A     as a child    Seizures     Syncope     Urgency of urination      Past Surgical History:   Procedure Laterality Date    TRUNK LESION/CYST EXCISION N/A 8/3/2023    Procedure: excision of chest wall cyst and back cyst 10:00;  Surgeon: Kianna Oliva DO;  Location: [facility];  Service: General;  Laterality: N/A;      General Information       Row Name 10/18/24 1254          Physical Therapy Time and Intention    Document Type evaluation  -BP     Mode of Treatment physical therapy  -BP       Row Name 10/18/24 1252          General Information    Patient Profile Reviewed yes  Patient well known to therapist. Discharged on 10/13. Patient admitted for SOA and worsened weakness. Patient admitted for 5 days of IVIG for CIDP. He will remain in the hospital for infusions.  -BP     Prior Level of Function --  Patient reports mobility and strength vary depending on infusions. He reports at most recently, he has been performing stand pivot transfers only with assist from caregivers. Caregivers assist with ADLs.  -BP     Existing Precautions/Restrictions fall;seizures  neuropathy in all extremities  -BP     Barriers to Rehab previous functional deficit;medically complex;impaired sensation  -BP       Row Name 10/18/24 0701          Living Environment    People in Home other (see comments)  Lives with a  and wife  who provide all care.  -BP       Row Name 10/18/24 1254          Cognition    Orientation Status (Cognition) oriented x 3  -BP       Row Name 10/18/24 1254          Safety Issues/Impairments Affecting Functional Mobility    Safety Issues Affecting Function (Mobility) insight into deficits/self-awareness  -BP     Impairments Affecting Function (Mobility) sensation/sensory awareness;muscle tone abnormal;strength;postural/trunk control  -BP               User Key  (r) = Recorded By, (t) = Taken By, (c) = Cosigned By      Initials Name Provider Type    BP Supriya Paula, PT Physical Therapist                   Mobility       Row Name 10/18/24 1319          Bed Mobility    Bed Mobility supine-sit;sit-supine;scooting/bridging  -BP     Scooting/Bridging [place] (Bed Mobility) dependent (less than 25% patient effort);2 person assist  -BP     Supine-Sit [place] (Bed Mobility) maximum assist (25% patient effort);2 person assist  -BP     Sit-Supine [place] (Bed Mobility) dependent (less than 25% patient effort);2 person assist  -BP     Assistive Device (Bed Mobility) head of bed elevated  -BP     Comment, (Bed Mobility) Able to initiate LE movement toward EOB however requires max A x 2 to complete.  -BP       Row Name 10/18/24 1319          Transfers    Comment, (Transfers) Unable to attempt transfer to chair due to profound weakness and decreased sensation.  -BP       Row Name 10/18/24 1319          Gait/Stairs (Locomotion)    Comment, (Gait/Stairs) Patient has not ambulated in approximately one month  -BP               User Key  (r) = Recorded By, (t) = Taken By, (c) = Cosigned By      Initials Name Provider Type    Supriya Collins PT Physical Therapist                   Obj/Interventions       Row Name 10/18/24 1325          Range of Motion Comprehensive    Comment, General Range of Motion R knee AROM WFL. R hip flexion WFL. R ankle AROM WFL.  Unable to actively DF L ankle, Flex L hip in sitting or  supine. L knee passive ROM functional.  -BP       Row Name 10/18/24 1325          Strength Comprehensive (MMT)    Comment, General Manual Muscle Testing (MMT) Assessment R LE gross MMT 4/5. Able to complete full LAQ in sitting without assist and SLR in supine. Quad set noted on L, Trace in L hip. No active L ankle DF. Able to complete SAQ on the L with assist.  -BP       Row Name 10/18/24 1325          Balance    Comment, Balance static sitting-CGA/min A. Dynamic sitting balance-mod/max. posterior lean noted with dynamic activity.  -BP       Row Name 10/18/24 1325          Sensory Assessment (Somatosensory)    Sensory Assessment (Somatosensory) --  Impaired sensation in all extremities  -BP               User Key  (r) = Recorded By, (t) = Taken By, (c) = Cosigned By      Initials Name Provider Type    BP Supriya Paula, PT Physical Therapist                   Goals/Plan       Row Name 10/18/24 2349          Bed Mobility Goal 1 (PT)    Activity/Assistive Device (Bed Mobility Goal 1, PT) bed mobility activities, all  -BP     [place] Level/Cues Needed (Bed Mobility Goal 1, PT) moderate assist (50-74% patient effort)  -BP     Time Frame (Bed Mobility Goal 1, PT) 5 days  -BP     Progress/Outcomes (Bed Mobility Goal 1, PT) new goal  -BP       Row Name 10/18/24 0206          Patient Education Goal (PT)    Activity (Patient Education Goal, PT) LE ther ex program  -BP     [place]/Cues/Accuracy (Memory Goal 2, PT) demonstrates adequately;verbalizes understanding  -BP     Time Frame (Patient Education Goal, PT) 5 days  -BP     Barriers (Patient Education Goal, PT) CIDP  -BP     Progress/Outcome (Patient Education Goal, PT) new goal  -BP       Row Name 10/18/24 8904          Therapy Assessment/Plan (PT)    Planned Therapy Interventions (PT) balance training;bed mobility training;home exercise program;patient/family education;transfer training;strengthening  -BP               User Key  (r) = Recorded By, (t) = Taken  By, (c) = Cosigned By      Initials Name Provider Type    BP Supriya Paula, PT Physical Therapist                   Clinical Impression       Row Name 10/18/24 1329          Pain    Pre/Posttreatment Pain Comment No complaints of pain  -BP       Row Name 10/18/24 1325          Plan of Care Review    Plan of Care Reviewed With patient  -BP     Outcome Evaluation PT Evaluation Complete: patient performs supine to/from sit transfer with max/dependent x 2. He is able to maintain static sitting balance- with CGA/min A and dynamic with mod/max A. Patient discharged from the hospital on 10/13, seen by PT during that admission. Significantly weaker this admission, admitted for 5 days of IVIG. Patient is non ambulatory at baseline, performed stand pivot transfers with caregiver assist prior to admission on 10/8. R LE active ROM WFL with gross MMT of 4/5. Unable to lift L LE against gravity, no passive ROM restrictions. Unable to safely perform pivot transfers due to profound weakness in UE's and LE's. Patient would benefit from physical therapy for strengthening. Mobility is significantly limited by weakness from CIDP. Will initiate LE therapeutic exercise program and attempt mobility training as indicated. Recommend glen lift with nursing for out of bed transfers. Recommend SNF at discharge, patient agreeable. Notified .  -BP       Row Name 10/18/24 1325          Therapy Assessment/Plan (PT)    Rehab Potential (PT) limited  -BP     Criteria for Skilled Interventions Met (PT) yes;meets criteria  -BP     Therapy Frequency (PT) 6 times/wk  -BP     Predicted Duration of Therapy Intervention (PT) 5 days  -BP       Row Name 10/18/24 1328          Positioning and Restraints    Pre-Treatment Position in bed  -BP     Post Treatment Position bed  -BP     In Bed notified nsg;call light within reach;encouraged to call for assist;with nsg  -BP               User Key  (r) = Recorded By, (t) = Taken By, (c) = Cosigned By       Initials Name Provider Type    BP Supriya Paula, PT Physical Therapist                   Outcome Measures       Row Name 10/18/24 1340          How much help from another person do you currently need...    Turning from your back to your side while in flat bed without using bedrails? 1  -BP     Moving from lying on back to sitting on the side of a flat bed without bedrails? 1  -BP     Moving to and from a bed to a chair (including a wheelchair)? 1  -BP     Standing up from a chair using your arms (e.g., wheelchair, bedside chair)? 1  -BP     Climbing 3-5 steps with a railing? 1  -BP     To walk in hospital room? 1  -BP     AM-PAC 6 Clicks Score (PT) 6  -BP     Highest Level of Mobility Goal 2 --> Bed activities/dependent transfer  -BP       Row Name 10/18/24 1340 10/18/24 0855       Functional Assessment    Outcome Measure Options AM-PAC 6 Clicks Basic Mobility (PT)  -BP AM-PAC 6 Clicks Daily Activity (OT)  -SD              User Key  (r) = Recorded By, (t) = Taken By, (c) = Cosigned By      Initials Name Provider Type    SD Francis Good OTR Occupational Therapist    BP Supriya Paula, SYLVIE Physical Therapist                                 Physical Therapy Education       Title: PT OT SLP Therapies (In Progress)       Topic: Physical Therapy (In Progress)       Point: Mobility training (Done)       Learning Progress Summary            Patient Acceptance, E,TB, VU by  at 10/18/2024 1340                      Point: Home exercise program (Not Started)       Learner Progress:  Not documented in this visit.                              User Key       Initials Effective Dates Name Provider Type Discipline     06/16/21 -  Supriya Paula, SYLVIE Physical Therapist PT                  PT Recommendation and Plan  Planned Therapy Interventions (PT): balance training, bed mobility training, home exercise program, patient/family education, transfer training, strengthening  Outcome Evaluation: PT Evaluation  Complete: patient performs supine to/from sit transfer with max/dependent x 2. He is able to maintain static sitting balance- with CGA/min A and dynamic with mod/max A. Patient discharged from the hospital on 10/13, seen by PT during that admission. Significantly weaker this admission, admitted for 5 days of IVIG. Patient is non ambulatory at baseline, performed stand pivot transfers with caregiver assist prior to admission on 10/8. R LE active ROM WFL with gross MMT of 4/5. Unable to lift L LE against gravity, no passive ROM restrictions. Unable to safely perform pivot transfers due to profound weakness in UE's and LE's. Patient would benefit from physical therapy for strengthening. Mobility is significantly limited by weakness from CIDP. Will initiate LE therapeutic exercise program and attempt mobility training as indicated. Recommend glen lift with nursing for out of bed transfers. Recommend SNF at discharge, patient agreeable. Notified .     Time Calculation:   PT Evaluation Complexity  History, PT Evaluation Complexity: 3 or more personal factors and/or comorbidities  Examination of Body Systems (PT Eval Complexity): total of 4 or more elements  Clinical Presentation (PT Evaluation Complexity): evolving  Clinical Decision Making (PT Evaluation Complexity): moderate complexity  Overall Complexity (PT Evaluation Complexity): moderate complexity     PT Charges       Row Name 10/18/24 1341             Time Calculation    Start Time 1120  -BP      PT Received On 10/18/24  -BP      PT - Next Appointment 10/19/24  -BP                User Key  (r) = Recorded By, (t) = Taken By, (c) = Cosigned By      Initials Name Provider Type    BP Supriya Paula, PT Physical Therapist                  Therapy Charges for Today       Code Description Service Date Service Provider Modifiers Qty    [id number]  PT EVAL MOD COMPLEXITY 3 10/18/2024 Supriya Paula, PT GP 1            PT G-Codes  Outcome Measure  Options: AM-PAC 6 Clicks Basic Mobility (PT)  AM-PAC 6 Clicks Score (PT): 6  PT Discharge Summary  Anticipated Discharge Disposition (PT): skilled nursing facility    Supriya Paula, PT  10/18/2024

## 2024-10-18 NOTE — PLAN OF CARE
Problem: Adult Inpatient Plan of Care  Goal: Plan of Care Review  Recent Flowsheet Documentation  Taken 10/18/2024 1336 by Francis Good OTELY  Outcome Evaluation: Occupational therapy. Pt admitted with increased weakness and SOA. Pt has hx of CIDP, seizures and neuropathy in extremities. Pt lives with 24 hour caregivers that assist him with adl tasks and transfers. He typically is able to manage self feeding independently after set up assistance and peform stand pivot transfers to his w/c with assistance of caregivers. Pt reports weakness in his hands is typical, yet he has been able to achieve a functional grasp until recently. Pt reports progressive weakness for several weeks and his mobility has declined. Pt is currently dependent for adl's. He demonstrates significant weakness of BUE's. He demonstrates arom against gravity only for right elbow flexion. BUE strength otherwise is poor to trace. Pt managed supine to sitting transfer to EOB with mod assist. He maintained static sitting balance with CGA/min assist and dynamic sitting balance with mod/max assist. Pt was dependent for sitting to supine transfer and to scoot up in bed. Rec OT services for education with BUE rom program to prevent joint stiffness/contractures, education with edema management of BUE's (mod edema in left hand), and improve overall strength. Rec SNF upon discharge.

## 2024-10-18 NOTE — PROGRESS NOTES
Patient Identification:  NAME:  Efren Christianson  Age:  79 y.o.   Sex:  male   :  1945   MRN:  [id number]       Chief complaint: Exacerbation of CIDP, IVIG infusions    History of present illness: He has been receiving the IVIG appropriately.  Yesterday we went ahead and restarted him back on some low-dose prednisone 20 mg p.o. every morning after both he and his power of  agreed with this.      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    Home medications:  Medications Prior to Admission   Medication Sig Dispense Refill Last Dose/Taking    Emollient (Aquaphor Advanced Therapy) ointment ointment Apply 1 Application topically to the appropriate area as directed Every 12 (Twelve) Hours.   10/15/2024    Immune Globulin, Human, (GAMMAGARD IV) Infuse 1 g/kg into a venous catheter Every 30 (Thirty) Days.   Past Month    levETIRAcetam (KEPPRA) 1000 MG tablet Take 1 tablet by mouth Every 12 (Twelve) Hours.   10/15/2024    multivitamin (THERAGRAN) tablet tablet Take  by mouth Daily.   10/15/2024    pantoprazole (PROTONIX) 40 MG EC tablet Take 1 tablet by mouth Daily for 30 days. 30 tablet 0 10/15/2024    predniSONE (DELTASONE) 20 MG tablet Take 2 tablets by mouth Daily With Breakfast for 7 days, THEN 1 tablet Daily With Breakfast for 7 days. 21 tablet 0 10/15/2024        Hospital medications:  acetaminophen, 650 mg, Oral, Daily  Aquaphor Advanced Therapy, 1 Application, Topical, Q12H  cetirizine, 10 mg, Oral, Daily  diphenhydrAMINE, 25 mg, Oral, Daily  enoxaparin, 40 mg, Subcutaneous, Q24H  Famotidine (PF), 20 mg, Intravenous, Daily  immune globulin (human), 20 g, Intravenous, Q24H   And  immune globulin (human), 5 g, Intravenous, Q24H  levETIRAcetam, 1,000 mg,  Oral, Q12H  mupirocin, 1 Application, Each Nare, BID  pantoprazole, 40 mg, Oral, Daily  predniSONE, 20 mg, Oral, Daily With Breakfast  sodium chloride, 10 mL, Intravenous, Q12H           acetaminophen **OR** acetaminophen    labetalol    nitroglycerin    ondansetron ODT **OR** ondansetron    sodium chloride      Objective:  Vitals Ranges:   Temp:  [97.4 °F (36.3 °C)-98.6 °F (37 °C)] 97.9 °F (36.6 °C)  Heart Rate:  [70-93] 87  Resp:  [16-23] 20  BP: (121-185)/() 151/101      Physical Exam:  Awake alert speech is good tongue protrusion good smile is symmetrical.  He can raise the right arm up about to breast level.  He cannot raise the left arm.  He can kick out pretty well with the right lower extremity and raise the right knee has much more trouble doing it on the left.  He is areflexic throughout    Results review:   I reviewed the patient's new clinical results.    Data review:  Lab Results (last 24 hours)       Procedure Component Value Units Date/Time    CBC & Differential [[id number]]  (Abnormal) Collected: 10/18/24 0553    Specimen: Blood Updated: 10/18/24 0708    Narrative:      The following orders were created for panel order CBC & Differential.  Procedure                               Abnormality         Status                     ---------                               -----------         ------                     CBC Auto Differential[[id number]]        Abnormal            Final result               Scan Slide[[id number]]                   Normal              Final result                 Please view results for these tests on the individual orders.    Scan Slide [[id number]]  (Normal) Collected: 10/18/24 0553    Specimen: Blood Updated: 10/18/24 0708     RBC Morphology Normal     WBC Morphology Normal     Platelet Morphology Normal    Magnesium [[id number]]  (Normal) Collected: 10/18/24 0553    Specimen: Blood Updated: 10/18/24 0616     Magnesium 2.3 mg/dL     Basic Metabolic Panel [[id number]]   (Abnormal) Collected: 10/18/24 0553    Specimen: Blood Updated: 10/18/24 0616     Glucose 115 mg/dL      BUN 25 mg/dL      Creatinine 0.96 mg/dL      Sodium 134 mmol/L      Potassium 4.3 mmol/L      Chloride 101 mmol/L      CO2 26.2 mmol/L      Calcium 8.3 mg/dL      BUN/Creatinine Ratio 26.0     Anion Gap 6.8 mmol/L      eGFR 81.4 mL/min/1.73     Narrative:      GFR Normal >60  Chronic Kidney Disease <60  Kidney Failure <15    The GFR formula is only valid for adults with stable renal function between ages 18 and 70.    CBC Auto Differential [[id number]]  (Abnormal) Collected: 10/18/24 0553    Specimen: Blood Updated: 10/18/24 0607     WBC 7.81 10*3/mm3      RBC 4.06 10*6/mm3      Hemoglobin 13.4 g/dL      Hematocrit 39.1 %      MCV 96.3 fL      MCH 33.0 pg      MCHC 34.3 g/dL      RDW 12.0 %      RDW-SD 42.9 fl      MPV 9.8 fL      Platelets 160 10*3/mm3      Neutrophil % 81.2 %      Lymphocyte % 10.8 %      Monocyte % 7.6 %      Eosinophil % 0.1 %      Basophil % 0.0 %      Immature Grans % 0.3 %      Neutrophils, Absolute 6.35 10*3/mm3      Lymphocytes, Absolute 0.84 10*3/mm3      Monocytes, Absolute 0.59 10*3/mm3      Eosinophils, Absolute 0.01 10*3/mm3      Basophils, Absolute 0.00 10*3/mm3      Immature Grans, Absolute 0.02 10*3/mm3      nRBC 0.0 /100 WBC              Imaging:  Imaging Results (Last 24 Hours)       ** No results found for the last 24 hours. **               Assessment and Plan:     Basically he seems about the same to me strength wise.  Left side is still weaker than his right side.   are really absent on both sides.  We have restarted the prednisone at 20 mg p.o. every morning this seems like a perfect dose for him to stay on for the time being and should have minimal side effects.  He is receiving total of 5 days of IVIG.  Today is day 3.  He is more amenable at this point to going to a rehab facility which I think is going to be absolutely necessary.  I do not think he can go home and  be taking care of adequately.  There.  There would be risk for fall.  So, for now we continue the IVIG, he is back on prednisone 20 mg p.o. every morning, and he will likely at some point be going to a rehab facility.  He has SCDs and is getting subcu Lovenox.  His blood pressures have been running a little bit on the high side and that is being addressed appropriately       Fuentes Shay MD  10/18/24  11:56 EDT

## 2024-10-18 NOTE — THERAPY EVALUATION
Patient Name: Efren Christianson  : 1945    MRN: [id number]                              Today's Date: 10/18/2024       Admit Date: 10/16/2024    Visit Dx:     ICD-10-CM ICD-9-CM   1. Dyspnea, unspecified type  R06.00 786.09   2. 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)     Difficulty walking     Hepatitis A     as a child    Seizures     Syncope     Urgency of urination      Past Surgical History:   Procedure Laterality Date    TRUNK LESION/CYST EXCISION N/A 8/3/2023    Procedure: excision of chest wall cyst and back cyst 10:00;  Surgeon: Kianna Oliva DO;  Location: [facility];  Service: General;  Laterality: N/A;      General Information       Row Name 10/18/24 1200          OT Time and Intention    Subjective Information complains of;weakness  -SD     Document Type evaluation  -SD     Mode of Treatment occupational therapy  -SD     Total Minutes, Occupational Therapy 40  -SD     Patient Effort adequate  -SD       Row Name 10/18/24 1200          General Information    Patient Profile Reviewed yes  79 yom with a PMHX of CIDP and seizures who presented to [facility] ED for worsening weakness and decreased oxygen levels. Pt lives with 24 hour caregivers.  -SD     Prior Level of Function dependent:;ADL's  -SD     Existing Precautions/Restrictions fall;other (see comments);seizures  hx of neuropathy of extremities  -SD     Barriers to Rehab previous functional deficit;medically complex;impaired sensation  -SD       Row Name 10/18/24 1200          Occupational Profile    Reason for Services/Referral (Occupational Profile) decreased adl status  -SD     Successful Occupations (Occupational Profile) Pt had been  I with self feeding after set up assistance and participated with stand pivot transfers until the last few weeks.  -SD     Occupational History/Life Experiences (Occupational Profile) Pt with hx of CIPD with progressive decline in function over the years. Pt now dependent for self care tasks  -SD     Environmental Supports and Barriers (Occupational Profile) pt lives with caregivers (his POA and her )  -SD     Patient Goals (Occupational Profile) get stronger. Pt interested in transfer to a rehab facility to improve strength to eventually return home with caregivers  -SD       Row Name 10/18/24 1200          Living Environment    People in Home --  Patient lives with caregivers,  and wife that alternate providing 24/7 care  -SD       Row Name 10/18/24 1200          Cognition    Orientation Status (Cognition) oriented x 4  -SD               User Key  (r) = Recorded By, (t) = Taken By, (c) = Cosigned By      Initials Name Provider Type    SD Francsi Good OTELY Occupational Therapist                     Mobility/ADL's       Row Name 10/18/24 1200          Bed Mobility    Bed Mobility supine-sit;sit-supine;scooting/bridging  -SD     Scooting/Bridging [place] (Bed Mobility) dependent (less than 25% patient effort);2 person assist  -SD     Supine-Sit [place] (Bed Mobility) maximum assist (25% patient effort)  pt initiated movement of BLE's toward EOB. He needed significant assist to bring trunk from supine to sitting position  -SD     Sit-Supine [place] (Bed Mobility) dependent (less than 25% patient effort);2 person assist  -SD     Bed Mobility, Safety Issues decreased use of arms for pushing/pulling;decreased use of legs for bridging/pushing;impaired trunk control for bed mobility  -SD       Row Name 10/18/24 1200          Transfers    Comment, (Transfers) Pt unable to safely manage a stand pivot transfer due to profound weakness  -SD       Row Name 10/18/24 1200          Functional  Mobility    Functional Mobility- Comment Pt unable to ambulate. He has been limited to use of a w/c for functional mobility for approx 1 month.  -SD       Row Name 10/18/24 1200          Activities of Daily Living    BADL Assessment/Intervention other (see comments)  Pt dependent for adl's  -SD               User Key  (r) = Recorded By, (t) = Taken By, (c) = Cosigned By      Initials Name Provider Type    SD Francis Good, OTR Occupational Therapist                   Obj/Interventions       Row Name 10/18/24 1200          Sensory Assessment (Somatosensory)    Sensory Assessment (Somatosensory) other (see comments)  Pt with hx of neuropathy in extremities  -SD       Row Name 10/18/24 1200          Range of Motion Comprehensive    Comment, General Range of Motion Pt with significant weakness of BUE's. Pt demonstrated partial arom of right elbow against gravity. Otherwise, pt not able to move BUE's against gravity.  -SD       Row Name 10/18/24 1200          Strength Comprehensive (MMT)    Comment, General Manual Muscle Testing (MMT) Assessment right UE. Pt utilized scapular elevation when attempting shoulder rom (no shoulder rom against gravity),  elbow 2+/5 for flexion, 2-/5 for supination, 1/5 for pronation/wrist. 0/5 for bilateral hands  -SD       Row Name 10/18/24 1200          Motor Skills    Therapeutic Exercise other (see comments)  Education provided with pt/nursing regarding benefits of PROM program for BUE's to prevent joint stiffness and elevation of BUE's to address edema (pt with mod edema of left hand).  -SD       Row Name 10/18/24 1200          Balance    Balance Assessment sitting static balance;sitting dynamic balance  -SD     Static Sitting Balance contact guard;minimal assist  -SD     Dynamic Sitting Balance moderate assist;maximum assist  -SD     Position, Sitting Balance sitting edge of bed  -SD     Comment, Balance CGA/min assist for static sitting, mod/max assist for dynamic sitting balance  (posterior lean)  -SD               User Key  (r) = Recorded By, (t) = Taken By, (c) = Cosigned By      Initials Name Provider Type    Francis Moreno OTR Occupational Therapist                   Goals/Plan       Row Name 10/18/24 1406          ROM Goal 1 (OT)    ROM Goal 1 (OT) Pt to participate in BUE rom program to prevent joint stiffness/contractures.  -SD     Time Frame (ROM Goal 1, OT) by discharge  -SD     Progress/Outcome (ROM Goal 1, OT) new goal  -SD       Row Name 10/18/24 1402          Problem Specific Goal 1 (OT)    Problem Specific Goal 1 (OT) Pt to improve trunk control to maintain sitting balace at EOB with min assist during light functional activity.  -SD     Time Frame (Problem Specific Goal 1, OT) by discharge  -SD     Progress/Outcome (Problem Specific Goal 1, OT) new goal  -SD       Row Name 10/18/24 1403          Therapy Assessment/Plan (OT)    Planned Therapy Interventions (OT) ROM/therapeutic exercise;patient/caregiver education/training;BADL retraining;functional balance retraining;transfer/mobility retraining  -SD               User Key  (r) = Recorded By, (t) = Taken By, (c) = Cosigned By      Initials Name Provider Type    Francis Moreno, OTR Occupational Therapist                   Clinical Impression       Row Name 10/18/24 0762          Pain Assessment    Pretreatment Pain Rating 0/10 - no pain  -SD     Posttreatment Pain Rating 0/10 - no pain  -SD       Row Name 10/18/24 1335          Plan of Care Review    Plan of Care Reviewed With patient  -SD     Outcome Evaluation Occupational therapy. Pt admitted with increased weakness and SOA. Pt has hx of CIDP, seizures and neuropathy in extremities. Pt lives with 24 hour caregivers that assist him with adl tasks and transfers. He typically is able to manage self feeding independently after set up assistance and peform stand pivot transfers to his w/c with assistance of caregivers. Pt reports weakness in his hands is typical, yet  he has been able to achieve a functional grasp until recently. Pt reports progressive weakness for several weeks and his mobility has declined. Pt is currently dependent for adl's. He demonstrates significant weakness of BUE's. He demonstrates arom against gravity only for right elbow flexion. BUE strength otherwise is poor to trace. Pt managed supine to sitting transfer to EOB with mod assist. He maintained static sitting balance with CGA/min assist and dynamic sitting balance with mod/max assist. Pt was dependent for sitting to supine transfer and to scoot up in bed. Rec OT services for education with BUE rom program to prevent joint stiffness/contractures, education with edema management of BUE's (mod edema in left hand), and improve overall strength. Rec SNF upon discharge.  -SD       Row Name 10/18/24 5961          Therapy Assessment/Plan (OT)    Patient/Family Therapy Goal Statement (OT) go to rehab  -SD     Rehab Potential (OT) limited  -SD     Criteria for Skilled Therapeutic Interventions Met (OT) yes;skilled treatment is necessary  -SD     Therapy Frequency (OT) other (see comments)  OT prn for education with rom program, edema management and progress with functional activity as tolerated.  -SD     Predicted Duration of Therapy Intervention (OT) 1 week  -SD       Row Name 10/18/24 2835          Therapy Plan Review/Discharge Plan (OT)    Anticipated Discharge Disposition (OT) skilled nursing facility  24 hour care is needed  -SD       Row Name 10/18/24 0491          Positioning and Restraints    Pre-Treatment Position in bed  -SD     Post Treatment Position bed  -SD     In Bed with nsg;supine;call light within reach;RUE elevated;LUE elevated;SCD pump applied;heels elevated;LLE elevated;RLE elevated  -SD               User Key  (r) = Recorded By, (t) = Taken By, (c) = Cosigned By      Initials Name Provider Type    Francis Moreno, OTR Occupational Therapist                   Outcome Measures       Row  Name 10/18/24 1406          How much help from another is currently needed...    Putting on and taking off regular lower body clothing? 1  -SD     Bathing (including washing, rinsing, and drying) 1  -SD     Toileting (which includes using toilet bed pan or urinal) 1  -SD     Putting on and taking off regular upper body clothing 1  -SD     Taking care of personal grooming (such as brushing teeth) 1  -SD     Eating meals 1  -SD     AM-PAC 6 Clicks Score (OT) 6  -SD       Row Name 10/18/24 1340          How much help from another person do you currently need...    Turning from your back to your side while in flat bed without using bedrails? 1  -BP     Moving from lying on back to sitting on the side of a flat bed without bedrails? 1  -BP     Moving to and from a bed to a chair (including a wheelchair)? 1  -BP     Standing up from a chair using your arms (e.g., wheelchair, bedside chair)? 1  -BP     Climbing 3-5 steps with a railing? 1  -BP     To walk in hospital room? 1  -BP     AM-PAC 6 Clicks Score (PT) 6  -BP     Highest Level of Mobility Goal 2 --> Bed activities/dependent transfer  -BP       Row Name 10/18/24 1340 10/18/24 0855       Functional Assessment    Outcome Measure Options AM-PAC 6 Clicks Basic Mobility (PT)  -BP AM-PAC 6 Clicks Daily Activity (OT)  -SD              User Key  (r) = Recorded By, (t) = Taken By, (c) = Cosigned By      Initials Name Provider Type    Francis Moreno, OTR Occupational Therapist    Supriya Collins, PT Physical Therapist                    Occupational Therapy Education       Title: PT OT SLP Therapies (In Progress)       Topic: Occupational Therapy (Done)       Point: ADL training (Done)       Description:   Instruct learner(s) on proper safety adaptation and remediation techniques during self care or transfers.   Instruct in proper use of assistive devices.                  Learning Progress Summary            Patient Acceptance, E, VU by SD at 10/18/2024 1401     Comment: Education with safety with bed mobility. Rec mechanical device for transfers at this time secondary to pt's profound weakness. Pt in agreement.                      Point: Home exercise program (Done)       Description:   Instruct learner(s) on appropriate technique for monitoring, assisting and/or progressing therapeutic exercises/activities.                  Learning Progress Summary            Patient Acceptance, E,TB,D, VU by SD at 10/18/2024 1407    Comment: Education regarding BUE rom program and elevation of BUE's to address edema. Education with pt and nurse. Will continue with education program.   Other Acceptance, E,TB,D, VU by SD at 10/18/2024 1407    Comment: Education regarding BUE rom program and elevation of BUE's to address edema. Education with pt and nurse. Will continue with education program.                                      User Key       Initials Effective Dates Name Provider Type Discipline    SD 06/16/21 -  Francis Good, OTR Occupational Therapist OT                  OT Recommendation and Plan  Planned Therapy Interventions (OT): ROM/therapeutic exercise, patient/caregiver education/training, BADL retraining, functional balance retraining, transfer/mobility retraining  Therapy Frequency (OT): other (see comments) (OT prn for education with rom program, edema management and progress with functional activity as tolerated.)  Plan of Care Review  Plan of Care Reviewed With: patient  Outcome Evaluation: Occupational therapy. Pt admitted with increased weakness and SOA. Pt has hx of CIDP, seizures and neuropathy in extremities. Pt lives with 24 hour caregivers that assist him with adl tasks and transfers. He typically is able to manage self feeding independently after set up assistance and peform stand pivot transfers to his w/c with assistance of caregivers. Pt reports weakness in his hands is typical, yet he has been able to achieve a functional grasp until recently. Pt reports  progressive weakness for several weeks and his mobility has declined. Pt is currently dependent for adl's. He demonstrates significant weakness of BUE's. He demonstrates arom against gravity only for right elbow flexion. BUE strength otherwise is poor to trace. Pt managed supine to sitting transfer to EOB with mod assist. He maintained static sitting balance with CGA/min assist and dynamic sitting balance with mod/max assist. Pt was dependent for sitting to supine transfer and to scoot up in bed. Rec OT services for education with BUE rom program to prevent joint stiffness/contractures, education with edema management of BUE's (mod edema in left hand), and improve overall strength. Rec SNF upon discharge.     Time Calculation:   Evaluation Complexity (OT)  Review Occupational Profile/Medical/Therapy History Complexity: expanded/moderate complexity  Assessment, Occupational Performance/Identification of Deficit Complexity: 3-5 performance deficits  Clinical Decision Making Complexity (OT): detailed assessment/moderate complexity  Overall Complexity of Evaluation (OT): moderate complexity     Time Calculation- OT       Row Name 10/18/24 1412             Time Calculation- OT    OT Start Time 1120  -SD      OT Stop Time 1200  -SD      OT Time Calculation (min) 40 min  -SD         Untimed Charges    OT Eval/Re-eval Minutes 40  -SD         Total Minutes    Untimed Charges Total Minutes 40  -SD       Total Minutes 40  -SD                User Key  (r) = Recorded By, (t) = Taken By, (c) = Cosigned By      Initials Name Provider Type    Francis Moreno OTR Occupational Therapist                  Therapy Charges for Today       Code Description Service Date Service Provider Modifiers Qty    [id number] HC OT EVAL MOD COMPLEXITY 3 10/18/2024 Francis Good OTR GO 1                 ANNIE Colbert  10/18/2024

## 2024-10-18 NOTE — CASE MANAGEMENT/SOCIAL WORK
Continued Stay Note  CORKY Rosado     Patient Name: Efren Christianson  MRN: [id number]  Today's Date: 10/18/2024    Admit Date: 10/16/2024    Plan: plan home with care givers/current with [facility]   Discharge Plan       Row Name 10/18/24 0914       Plan    Plan Comments CM received a call from [facility] with [facility] and they can accept patient at discharge but will need a new HH order to start care. CM will follow.                   Discharge Codes    No documentation.                 Expected Discharge Date and Time       Expected Discharge Date Expected Discharge Time    Oct 20, 2024               Kristin Paz RN

## 2024-10-18 NOTE — PLAN OF CARE
Goal Outcome Evaluation:           Progress: improving  Outcome Evaluation: patient reports much better day as strength is improving. he is able to move Rt arm some. was able to sit on side of bed but aleksandra to transfer to chair. will need glen to transfer. appetitie is good, he is a total feeder and requires total care for ADL's. VSS. no soa or distress. recieved dose # 3 of IVIG. POA has been updated.

## 2024-10-18 NOTE — PROGRESS NOTES
Patient Care Team:  Winnie Murray as PCP - General (Nurse Practitioner)  Winnie Murray (Nurse Practitioner)    Date: 10/18/2024  Patient Name: Efren Christianson  : 1945  MRN: [id number]  Date of admission: 10/16/2024  Room/Bed: ICU3/1      Subjective   Subjective         Chief Complaint: f/u weakness    Summary:Efren Christianson is a 79 y.o. male with a past medical history of CIDP and seizures who presented to [facility] ED for worsening weakness and decreased oxygen levels. He is followed outpatient by Dr Reyes and receives monthly IVIG infusions. Patient was just recently admitted and discharged from this facility for similar presentation, he had been on steroids during that admission and was discharged, with plan to receive IVIG outpatient today, however, given his increased weakness and low oxygen levels he was sent to the ED. He was found to be profoundly weak, Dr. Jo discussed with neurology who recommends to admit for IVIG and if no improvement may ultimately require plasmapheresis.   Patient is sitting up in bed during my exam, states he is able to move his right hand and right lower extremity slight, but left side is profoundly weak. He denies any chest pain, shortness of breath, or difficulty swallowing. He denies any fever or chills.   Nursing reports patient and his POA have refused any further steroids.     Interval Followup: Patient is lying in bed, states his weakness is about the same. Denies any other complaints. Nursing reports no acute overnight events.       Review of Systems   Neurological:  Positive for weakness.         Objective   Objective       Vital Signs  Temp:  [97.4 °F (36.3 °C)-98.6 °F (37 °C)] 97.9 °F (36.6 °C)  Heart Rate:  [70-93] 76  Resp:  [16-23] 20  BP: (121-185)/() 169/112  Oxygen Therapy  SpO2: 95 %  Pulse Oximetry Type: Continuous  Device (Oxygen Therapy): room air  Device (Oxygen Therapy) (Infant): room air  Flowsheet Rows      Flowsheet Row  "First Filed Value   Admission Height 172.7 cm (67.99\") Documented at 10/16/2024 0858   Admission Weight 73.6 kg (162 lb 4.8 oz) Documented at 10/16/2024 0858          Intake & Output (last 3 days)         10/14 0701  10/15 0700 10/15 0701  10/16 0700 10/16 0701  10/17 0700 10/17 0701  10/18 0700    P.O.   480 240    IV Piggyback   250     Total Intake(mL/kg)   730 (9.9) 240 (3.3)    Urine (mL/kg/hr)   775     Stool   0     Total Output   775     Net   -45 +240            Urine Unmeasured Occurrence   200 x     Stool Unmeasured Occurrence   3 x           Lines, Drains & Airways       Active LDAs       Name Placement date Placement time Site Days    Peripheral IV 10/16/24 0934 Right Antecubital 10/16/24  0934  Antecubital  1                      Physical Exam  Vitals reviewed.   Constitutional:       General: He is not in acute distress.     Appearance: He is normal weight.   HENT:      Head: Normocephalic and atraumatic.      Mouth/Throat:      Mouth: Mucous membranes are moist.   Cardiovascular:      Rate and Rhythm: Normal rate and regular rhythm.   Pulmonary:      Effort: Pulmonary effort is normal. No respiratory distress.      Breath sounds: No wheezing or rales.   Abdominal:      General: Bowel sounds are normal.      Palpations: Abdomen is soft.   Musculoskeletal:      Right lower leg: No edema.      Left lower leg: No edema.   Skin:     General: Skin is warm and dry.   Neurological:      Mental Status: He is alert. Mental status is at baseline.      Comments: RUE 4/5, LUE 3/5, LLE 3/5, RLE 5/5   Psychiatric:         Mood and Affect: Mood normal.         Behavior: Behavior normal.           Results Review:      Results from last 7 days   Lab Units 10/18/24  0553 10/17/24  0624 10/16/24  0935   WBC 10*3/mm3 7.81 8.61 14.53*   HEMOGLOBIN g/dL 13.4 14.3 16.1   HEMATOCRIT % 39.1 41.5 46.0   PLATELETS 10*3/mm3 160 147 158     Results from last 7 days   Lab Units 10/18/24  0553 10/17/24  0624 10/16/24  0935   SODIUM " "mmol/L 134* 134* 137   POTASSIUM mmol/L 4.3 3.7 4.0   CHLORIDE mmol/L 101 100 100   CO2 mmol/L 26.2 24.6 24.9   BUN mg/dL 25* 27* 30*   CREATININE mg/dL 0.96 0.87 0.88   CALCIUM mg/dL 8.3* 8.2* 9.0   BILIRUBIN mg/dL  --   --  0.7   ALK PHOS U/L  --   --  65   ALT (SGPT) U/L  --   --  88*   AST (SGOT) U/L  --   --  33   GLUCOSE mg/dL 115* 85 87       Results from last 7 days   Lab Units 10/18/24  0553 10/17/24  0624   MAGNESIUM mg/dL 2.3 2.1       No results found for: \"BLOODCX\"    No results found for: \"MRSACX\"    No results found for: \"STOOLCX\"    No results found for: \"URINECX\"    No results found for: \"WOUNDCX\"    Imaging Results (Last 24 Hours)       ** No results found for the last 24 hours. **            I have personally reviewed the patient's new imaging and lab results.          ECG/EMG Results (most recent)       Procedure Component Value Units Date/Time    ECG 12 Lead Dyspnea [[id number]] Collected: 10/16/24 0921     Updated: 10/16/24 1613     QT Interval 374 ms      QTC Interval 466 ms     Narrative:      HEART RATE=93  bpm  RR Kwupzdpz=849  ms  DE Kbnontsx=427  ms  P Horizontal Axis=-12  deg  P Front Axis=39  deg  QRSD Ejadbpkx=338  ms  QT Uclrozje=885  ms  JWxA=978  ms  QRS Axis=-18  deg  T Wave Axis=9  deg  - ABNORMAL ECG -  Sinus rhythm  Right bundle branch block  NO PRIOR TRACING AVAILABLE FOR COMPARISON  Electronically Signed By: Jerrica Sánchez ([facility]) 2024-10-16 16:12:39  Date and Time of Study:2024-10-16 09:21:35                    Medication Review:     Scheduled Meds:acetaminophen, 650 mg, Oral, Daily  Aquaphor Advanced Therapy, 1 Application, Topical, Q12H  cetirizine, 10 mg, Oral, Daily  diphenhydrAMINE, 25 mg, Oral, Daily  enoxaparin, 40 mg, Subcutaneous, Q24H  Famotidine (PF), 20 mg, Intravenous, Daily  immune globulin (human), 20 g, Intravenous, Q24H   And  immune globulin (human), 5 g, Intravenous, Q24H  levETIRAcetam, 1,000 mg, Oral, Q12H  mupirocin, 1 Application, Each Nare, " BID  pantoprazole, 40 mg, Oral, Daily  predniSONE, 20 mg, Oral, Daily With Breakfast  sodium chloride, 10 mL, Intravenous, Q12H      Continuous Infusions:   PRN Meds:.  acetaminophen **OR** acetaminophen    labetalol    nitroglycerin    ondansetron ODT **OR** ondansetron    sodium chloride      I have reviewed the patient's current medication list    Assessment & Plan   Assessment / Plan       Active Hospital Problems    Diagnosis  POA    **Dyspnea [R06.00]  Yes       Acute flare of CIDP  - per ED neurology recommends to continue IVIG x 5 days and if no improvement may ultimately require transfer for PLEX  - continue pre-medication orders (Pepcid, cetrizine, benadryl, and tylenol)  - patient is now agreeable to steroids so these have been resumed   - Dr. Shay consulted in the ED and appreciate his assistance with this patient  - PT/OT       Hypertension  - will start losartan 50mg   - continue prn labetalol for now  - continue to monitor with routine vs and make adjustments as needed     Leukocytosis- resolved   - afebrile  - likely related to recent steroid use  - procal wnl     Seizure disorder  - continue keppra      DVT Prophylaxis  - SCDs     Code Status  - Full Code  - POA - Lavern Ga has been consulted.     PT/OT consulted. Patient will likely need to go to rehab at this point.     Plan for disposition: pending progress     J Luis Claire,   10/18/24  13:02 EDT      Note disclaimer: At [facility], we believe that sharing information builds trust and better relationships. You are receiving this note because you recently visited [facility]. It is possible you will see health information before a provider has talked with you about it. This kind of information can be easy to misunderstand. To help you fully understand what it means for your health, we urge you to discuss this note with your provider.

## 2024-10-19 PROCEDURE — 63710000001 PREDNISONE PER 5 MG: Performed by: PSYCHIATRY & NEUROLOGY

## 2024-10-19 PROCEDURE — 97530 THERAPEUTIC ACTIVITIES: CPT

## 2024-10-19 PROCEDURE — 97110 THERAPEUTIC EXERCISES: CPT

## 2024-10-19 PROCEDURE — 25010000002 ENOXAPARIN PER 10 MG: Performed by: PSYCHIATRY & NEUROLOGY

## 2024-10-19 PROCEDURE — 25010000002 IMMUNE GLOBULIN (HUMAN) 20 GM/200ML SOLUTION: Performed by: PSYCHIATRY & NEUROLOGY

## 2024-10-19 PROCEDURE — 25010000002 IMMUNE GLOBULIN (HUMAN) 5 GM/50ML SOLUTION: Performed by: PSYCHIATRY & NEUROLOGY

## 2024-10-19 PROCEDURE — 99232 SBSQ HOSP IP/OBS MODERATE 35: CPT | Performed by: HOSPITALIST

## 2024-10-19 PROCEDURE — 94799 UNLISTED PULMONARY SVC/PX: CPT

## 2024-10-19 PROCEDURE — 63710000001 DIPHENHYDRAMINE PER 50 MG: Performed by: INTERNAL MEDICINE

## 2024-10-19 RX ADMIN — ACETAMINOPHEN 650 MG: 325 TABLET ORAL at 16:01

## 2024-10-19 RX ADMIN — IMMUNE GLOBULIN INFUSION (HUMAN) 20 G: 100 INJECTION, SOLUTION INTRAVENOUS; SUBCUTANEOUS at 16:51

## 2024-10-19 RX ADMIN — LEVETIRACETAM 1000 MG: 500 TABLET, FILM COATED ORAL at 08:02

## 2024-10-19 RX ADMIN — WHITE PETROLATUM 41 % TOPICAL OINTMENT 1 APPLICATION: OINTMENT at 08:04

## 2024-10-19 RX ADMIN — WHITE PETROLATUM 41 % TOPICAL OINTMENT 1 APPLICATION: OINTMENT at 21:46

## 2024-10-19 RX ADMIN — LOSARTAN POTASSIUM 50 MG: 50 TABLET, FILM COATED ORAL at 08:04

## 2024-10-19 RX ADMIN — LEVETIRACETAM 1000 MG: 500 TABLET, FILM COATED ORAL at 21:45

## 2024-10-19 RX ADMIN — MUPIROCIN 1 APPLICATION: 20 OINTMENT TOPICAL at 21:46

## 2024-10-19 RX ADMIN — Medication 10 ML: at 15:52

## 2024-10-19 RX ADMIN — FAMOTIDINE 20 MG: 10 INJECTION, SOLUTION INTRAVENOUS at 08:02

## 2024-10-19 RX ADMIN — IMMUNE GLOBULIN INFUSION (HUMAN) 5 G: 100 INJECTION, SOLUTION INTRAVENOUS; SUBCUTANEOUS at 15:52

## 2024-10-19 RX ADMIN — CETIRIZINE HYDROCHLORIDE 10 MG: 10 TABLET, FILM COATED ORAL at 08:03

## 2024-10-19 RX ADMIN — ACETAMINOPHEN 650 MG: 325 TABLET ORAL at 08:02

## 2024-10-19 RX ADMIN — Medication 10 ML: at 21:45

## 2024-10-19 RX ADMIN — DIPHENHYDRAMINE HYDROCHLORIDE 25 MG: 25 CAPSULE ORAL at 16:00

## 2024-10-19 RX ADMIN — ENOXAPARIN SODIUM 40 MG: 100 INJECTION SUBCUTANEOUS at 15:52

## 2024-10-19 RX ADMIN — PANTOPRAZOLE SODIUM 40 MG: 40 TABLET, DELAYED RELEASE ORAL at 08:03

## 2024-10-19 RX ADMIN — MUPIROCIN 1 APPLICATION: 20 OINTMENT TOPICAL at 08:04

## 2024-10-19 RX ADMIN — Medication 10 ML: at 08:02

## 2024-10-19 RX ADMIN — PREDNISONE 20 MG: 10 TABLET ORAL at 07:52

## 2024-10-19 RX ADMIN — DIPHENHYDRAMINE HYDROCHLORIDE 25 MG: 25 CAPSULE ORAL at 08:03

## 2024-10-19 NOTE — PROGRESS NOTES
Patient Care Team:  Winnie Murray as PCP - General (Nurse Practitioner)  Winnie Murray (Nurse Practitioner)    Date: 10/19/2024  Patient Name: Efren Christianson  : 1945  MRN: [id number]  Date of admission: 10/16/2024  Room/Bed: ICU3/1      Subjective   Subjective         Chief Complaint: f/u weakness    Summary:Efren Christianson is a 79 y.o. male with a past medical history of CIDP and seizures who presented to [facility] ED for worsening weakness and decreased oxygen levels. He is followed outpatient by Dr Reyes and receives monthly IVIG infusions. Patient was just recently admitted and discharged from this facility for similar presentation, he had been on steroids during that admission and was discharged, with plan to receive IVIG outpatient today, however, given his increased weakness and low oxygen levels he was sent to the ED. He was found to be profoundly weak, Dr. Jo discussed with neurology who recommends to admit for IVIG and if no improvement may ultimately require plasmapheresis.   Patient is sitting up in bed during my exam, states he is able to move his right hand and right lower extremity slight, but left side is profoundly weak. He denies any chest pain, shortness of breath, or difficulty swallowing. He denies any fever or chills.   Nursing reports patient and his POA have refused any further steroids.     Interval Followup: Patient is lying in bed, states his weakness is about the same. Denies any other complaints. Nursing reports no acute overnight events.     10/19/2024  Pt says he able to move right upper and lower extremity somewhat more today as compare to yesterday. Left side just has started to improve, able to little bit. IVIG dose 4 today.      Review of Systems   Neurological:  Positive for weakness.         Objective   Objective       Vital Signs  Temp:  [97.5 °F (36.4 °C)-99 °F (37.2 °C)] 97.5 °F (36.4 °C)  Heart Rate:  [74-91] 83  Resp:  [18-22] 20  BP:  "139169)/( 147/78  Oxygen Therapy  SpO2: 95 %  Pulse Oximetry Type: Intermittent  Device (Oxygen Therapy): room air  Device (Oxygen Therapy) (Infant): room air  Flowsheet Rows      Flowsheet Row First Filed Value   Admission Height 172.7 cm (67.99\") Documented at 10/16/2024 0858   Admission Weight 73.6 kg (162 lb 4.8 oz) Documented at 10/16/2024 0858          Intake & Output (last 3 days)         10/14 0701  10/15 0700 10/15 0701  10/16 0700 10/16 0701  10/17 0700 10/17 0701  10/18 0700    P.O.   480 240    IV Piggyback   250     Total Intake(mL/kg)   730 (9.9) 240 (3.3)    Urine (mL/kg/hr)   775     Stool   0     Total Output   775     Net   -45 +240            Urine Unmeasured Occurrence   200 x     Stool Unmeasured Occurrence   3 x           Lines, Drains & Airways       Active LDAs       Name Placement date Placement time Site Days    Peripheral IV 10/16/24 0934 Right Antecubital 10/16/24  0934  Antecubital  1                      Physical Exam  Vitals reviewed.   Constitutional:       General: He is not in acute distress.     Appearance: He is normal weight.   HENT:      Head: Normocephalic and atraumatic.      Mouth/Throat:      Mouth: Mucous membranes are moist.   Cardiovascular:      Rate and Rhythm: Normal rate and regular rhythm.   Pulmonary:      Effort: Pulmonary effort is normal. No respiratory distress.      Breath sounds: No wheezing or rales.   Abdominal:      General: Bowel sounds are normal.      Palpations: Abdomen is soft.   Musculoskeletal:      Right lower leg: No edema.      Left lower leg: No edema.   Skin:     General: Skin is warm and dry.   Neurological:      Mental Status: He is alert. Mental status is at baseline.      Comments: RUE 4/5, LUE 3/5, LLE 3/5, RLE 5/5   Psychiatric:         Mood and Affect: Mood normal.         Behavior: Behavior normal.           Results Review:      Results from last 7 days   Lab Units 10/18/24  0553 10/17/24  0624 10/16/24  0935   WBC 10*3/mm3 7.81 " "8.61 14.53*   HEMOGLOBIN g/dL 13.4 14.3 16.1   HEMATOCRIT % 39.1 41.5 46.0   PLATELETS 10*3/mm3 160 147 158     Results from last 7 days   Lab Units 10/18/24  0553 10/17/24  0624 10/16/24  0935   SODIUM mmol/L 134* 134* 137   POTASSIUM mmol/L 4.3 3.7 4.0   CHLORIDE mmol/L 101 100 100   CO2 mmol/L 26.2 24.6 24.9   BUN mg/dL 25* 27* 30*   CREATININE mg/dL 0.96 0.87 0.88   CALCIUM mg/dL 8.3* 8.2* 9.0   BILIRUBIN mg/dL  --   --  0.7   ALK PHOS U/L  --   --  65   ALT (SGPT) U/L  --   --  88*   AST (SGOT) U/L  --   --  33   GLUCOSE mg/dL 115* 85 87       Results from last 7 days   Lab Units 10/18/24  0553 10/17/24  0624   MAGNESIUM mg/dL 2.3 2.1       No results found for: \"BLOODCX\"    No results found for: \"MRSACX\"    No results found for: \"STOOLCX\"    No results found for: \"URINECX\"    No results found for: \"WOUNDCX\"    Imaging Results (Last 24 Hours)       ** No results found for the last 24 hours. **            I have personally reviewed the patient's new imaging and lab results.          ECG/EMG Results (most recent)       Procedure Component Value Units Date/Time    ECG 12 Lead Dyspnea [[id number]] Collected: 10/16/24 0921     Updated: 10/16/24 1613     QT Interval 374 ms      QTC Interval 466 ms     Narrative:      HEART RATE=93  bpm  RR Pxdcaiww=011  ms  [place] Dtjbvjjj=676  ms  P Horizontal Axis=-12  deg  P Front Axis=39  deg  QRSD Oyvumfer=912  ms  QT Qzsagysy=511  ms  IArI=272  ms  QRS Axis=-18  deg  T Wave Axis=9  deg  - ABNORMAL ECG -  Sinus rhythm  Right bundle branch block  NO PRIOR TRACING AVAILABLE FOR COMPARISON  Electronically Signed By: Jerrica Sánchez ([facility]) 2024-10-16 16:12:39  Date and Time of Study:2024-10-16 09:21:35                    Medication Review:     Scheduled Meds:acetaminophen, 650 mg, Oral, Daily  Aquaphor Advanced Therapy, 1 Application, Topical, Q12H  cetirizine, 10 mg, Oral, Daily  diphenhydrAMINE, 25 mg, Oral, Daily  enoxaparin, 40 mg, Subcutaneous, Q24H  Famotidine (PF), 20 mg, " Intravenous, Daily  immune globulin (human), 20 g, Intravenous, Q24H   And  immune globulin (human), 5 g, Intravenous, Q24H  levETIRAcetam, 1,000 mg, Oral, Q12H  losartan, 50 mg, Oral, Daily  mupirocin, 1 Application, Each Nare, BID  pantoprazole, 40 mg, Oral, Daily  predniSONE, 20 mg, Oral, Daily With Breakfast  sodium chloride, 10 mL, Intravenous, Q12H      Continuous Infusions:   PRN Meds:.  acetaminophen **OR** acetaminophen    labetalol    nitroglycerin    ondansetron ODT **OR** ondansetron    sodium chloride      I have reviewed the patient's current medication list    Assessment & Plan   Assessment / Plan       Active Hospital Problems    Diagnosis  POA    **Dyspnea [R06.00]  Yes       Acute flare of CIDP, improving slowly.   - per ED neurology recommends to continue IVIG x 5 days and if no improvement may ultimately require transfer for PLEX  - continue pre-medication orders (Pepcid, cetrizine, benadryl, and tylenol)  - on oral prednisone as per neurology service recommendation.  - Dr. Shay consulted in the ED and appreciate his assistance with this patient  - PT/OT       Hypertension  - on  losartan 50mg   - continue prn labetalol for now  - continue to monitor with routine vs and make adjustments as needed     Leukocytosis- resolved   - afebrile  - likely related to recent steroid use  - procal wnl     Seizure disorder  - continue keppra      DVT Prophylaxis  - SCDs     Code Status  - Full Code  - POA - Lavern Ga has been consulted.     PT/OT consulted. Patient will likely need to go to rehab at this point.     Plan for disposition: pending progress     Sushila Ramos MD  10/19/24  08:51 EDT      Note disclaimer: At [facility], we believe that sharing information builds trust and better relationships. You are receiving this note because you recently visited [facility]. It is possible you will see health information before a provider has talked with you about it. This kind of  information can be easy to misunderstand. To help you fully understand what it means for your health, we urge you to discuss this note with your provider.

## 2024-10-19 NOTE — CONSULTS
initiated follow-up hospitality/spiritual care visit to inquire about Advanced Directive which patient has not reviewed. Follow-up planned.

## 2024-10-19 NOTE — PLAN OF CARE
Goal Outcome Evaluation:  Plan of Care Reviewed With: patient        Progress: improving  Outcome Evaluation: OT- Patient performed supine to sit with min/mod assist X 2. Patient performed static sitting with CGA and dynamic sitting activity with CGA/min assist (posterior lean). Patient participated in B UE A/AROM exercises (shoulder, elbow, forearm, wrist and hand) X 5.  Patient plans to go to SNF at discharge. Continue to recommend glen lift for transfers.

## 2024-10-19 NOTE — THERAPY TREATMENT NOTE
Acute Care - Occupational Therapy Treatment Note   Nelli oMya     Patient Name: Efren Christianson  : 1945  MRN: [id number]  Today's Date: 10/19/2024             Admit Date: 10/16/2024       ICD-10-CM ICD-9-CM   1. Dyspnea, unspecified type  R06.00 786.09   2. 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)     Difficulty walking     Hepatitis A     as a child    Seizures     Syncope     Urgency of urination      Past Surgical History:   Procedure Laterality Date    TRUNK LESION/CYST EXCISION N/A 8/3/2023    Procedure: excision of chest wall cyst and back cyst 10:00;  Surgeon: Kianna Oliva DO;  Location: [facility];  Service: General;  Laterality: N/A;         OT ASSESSMENT FLOWSHEET (Last 12 Hours)       OT Evaluation and Treatment       Row Name 10/19/24 0833                   OT Time and Intention    Subjective Information no complaints  -EN        Document Type therapy note (daily note)  -EN        Mode of Treatment occupational therapy  -EN        Patient Effort good  -EN           General Information    Patient/Family/Caregiver Comments/Observations Patient reclined in bed, agreed to therapy.  -EN        General Observations of Patient Left hand edema, educated on positioning hand on pillows above heart.  -EN        Risks Reviewed patient:;LOB  -EN        Benefits Reviewed patient:;improve function;increase independence;increase strength;increase balance  -EN        Barriers to Rehab previous functional deficit;medically complex;impaired sensation  -EN           Pain Assessment    Pretreatment Pain Rating 0/10 - no pain  -EN        Posttreatment Pain Rating 0/10 - no pain  -EN           Bed Mobility     Supine-Sit [place] (Bed Mobility) minimum assist (75% patient effort);moderate assist (50% patient effort);2 person assist  -EN        Sit-Supine [place] (Bed Mobility) maximum assist (25% patient effort);2 person assist  -EN        Bed Mobility, Safety Issues decreased use of arms for pushing/pulling;decreased use of legs for bridging/pushing;impaired trunk control for bed mobility  -EN        Assistive Device (Bed Mobility) head of bed elevated;repositioning sheet  -EN           Motor Skills    Therapeutic Exercise shoulder;elbow/forearm;wrist;hand  Patient participated in A/AROM X 5 B shoulder, elbow, forearm, wrist and hand/thumb  -EN           Balance    Static Sitting Balance contact guard  -EN        Dynamic Sitting Balance contact guard;minimal assist  -EN        Position, Sitting Balance sitting edge of bed  -EN        Comment, Balance Patient sat EOB and participate in UE/LE ROM exercises. Patient required occasional min assist (posterior lean) during UE ROM exercises.  -EN           Plan of Care Review    Plan of Care Reviewed With patient  -EN        Progress improving  -EN        Outcome Evaluation OT- Patient performed supine to sit with min/mod assist X 2. Patient performed static sitting with CGA and dynamic sitting activity with CGA/min assist (posterior lean). Patient participated in B UE A/AROM exercises (shoulder, elbow, forearm, wrist and hand) X 5.  Patient plans to go to SNF at discharge. Continue to recommend glen lift for transfers.  -EN           Positioning and Restraints    Pre-Treatment Position in bed  -EN        Post Treatment Position bed  hands elevated on pillows, touch call light placed by right hand  -EN        In Bed notified nsg;call light within reach;encouraged to call for assist;with nsg  CNA in room  -EN           Progress Summary (OT)    Progress Toward Functional Goals (OT) progress toward functional goals as expected  -EN        Daily Progress Summary (OT)  improving  -EN                  User Key  (r) = Recorded By, (t) = Taken By, (c) = Cosigned By      Initials Name Effective Dates    EN Rach Gray OTR 06/16/21 -                      Occupational Therapy Education       Title: PT OT SLP Therapies (In Progress)       Topic: Occupational Therapy (Done)       Point: ADL training (Done)       Description:   Instruct learner(s) on proper safety adaptation and remediation techniques during self care or transfers.   Instruct in proper use of assistive devices.                  Learning Progress Summary            Patient Acceptance, E, VU by EN at 10/19/2024 0932    Comment: UE ROM exercises, edema control for left hand    Acceptance, E, VU by SD at 10/18/2024 1406    Comment: Education with safety with bed mobility. Rec mechanical device for transfers at this time secondary to pt's profound weakness. Pt in agreement.                      Point: Home exercise program (Done)       Description:   Instruct learner(s) on appropriate technique for monitoring, assisting and/or progressing therapeutic exercises/activities.                  Learning Progress Summary            Patient Acceptance, E, VU by EN at 10/19/2024 0932    Comment: UE ROM exercises, edema control for left hand    Acceptance, E,TB,D, VU by SD at 10/18/2024 1407    Comment: Education regarding BUE rom program and elevation of BUE's to address edema. Education with pt and nurse. Will continue with education program.   Other Acceptance, E,TB,D, VU by SD at 10/18/2024 1407    Comment: Education regarding BUE rom program and elevation of BUE's to address edema. Education with pt and nurse. Will continue with education program.                                      User Key       Initials Effective Dates Name Provider Type Discipline    EN 06/16/21 -  Rach Gray OTR Occupational Therapist OT    SD 06/16/21 -  Francis Good OTELY Occupational Therapist OT                      OT  Recommendation and Plan     Progress Toward Functional Goals (OT): progress toward functional goals as expected  Plan of Care Review  Plan of Care Reviewed With: patient  Progress: improving  Outcome Evaluation: OT- Patient performed supine to sit with min/mod assist X 2. Patient performed static sitting with CGA and dynamic sitting activity with CGA/min assist (posterior lean). Patient participated in B UE A/AROM exercises (shoulder, elbow, forearm, wrist and hand) X 5.  Patient plans to go to SNF at discharge. Continue to recommend glen lift for transfers.       Outcome Measures       Row Name 10/19/24 0833             How much help from another is currently needed...    Putting on and taking off regular lower body clothing? 1  -EN      Bathing (including washing, rinsing, and drying) 1  -EN      Toileting (which includes using toilet bed pan or urinal) 1  -EN      Putting on and taking off regular upper body clothing 1  -EN      Taking care of personal grooming (such as brushing teeth) 1  -EN      Eating meals 1  -EN      AM-PAC 6 Clicks Score (OT) 6  -EN                User Key  (r) = Recorded By, (t) = Taken By, (c) = Cosigned By      Initials Name Provider Type    Rach May OTR Occupational Therapist                    Time Calculation:    Time Calculation- OT       Row Name 10/19/24 0936             Time Calculation- OT    OT Start Time 0832  -EN      OT Stop Time 0847  -EN      OT Time Calculation (min) 15 min  -EN         Timed Charges    70334 - OT Therapeutic Activity Minutes 15  -EN         Total Minutes    Timed Charges Total Minutes 15  -EN       Total Minutes 15  -EN                User Key  (r) = Recorded By, (t) = Taken By, (c) = Cosigned By      Initials Name Provider Type    Rach May OTELY Occupational Therapist                  Therapy Charges for Today       Code Description Service Date Service Provider Modifiers Qty    68142301640  OT THERAPEUTIC ACT EA 15 MIN  10/19/2024 Rach Gray, OTR GO 1                 Rach Gray, OTELY  10/19/2024

## 2024-10-19 NOTE — PLAN OF CARE
Goal Outcome Evaluation:  Plan of Care Reviewed With: patient        Progress: improving  Outcome Evaluation: day # 4 IVIG treatment/infusion. pt strength remains very weak but slow improvement from previous day. Denies any pain . no soa noted poonam room air. Appetite is good. He is totally dependent for all ADL's. Remains pleasant and upbeat. Bp is much improved now on Cozaar PO . last Bp 136/79

## 2024-10-19 NOTE — PLAN OF CARE
Goal Outcome Evaluation:  Plan of Care Reviewed With: patient  Plan of Care Reviewed With: patient           Outcome Evaluation: PT: Patient performs supine to sit transfer with min/mod A x 2 and sit to supine transfer with max A x 2. Patient able to maintain static sitting balance with CGA and dynamic with CGA/min A with occasional posterior lean. Patient with improved movement of L LE, able to perform full LAQ in sitting. Patient performs bilateral LE seated LAQ and hip flexion x 5. Patient with improved dynamic sitting balance and LE strength today vs previous session. Will continue to follow for therapy. Continue to recommend glen lift for out of bed transfers. Notified RN. Continue to recommend SNF at discharge. Patient agreeable.    Anticipated Discharge Disposition (PT): skilled nursing facility

## 2024-10-19 NOTE — THERAPY TREATMENT NOTE
Acute Care - Physical Therapy Treatment Note   Nelli Moya     Patient Name: Efren Christianson  : 1945  MRN: [id number]  Today's Date: 10/19/2024      Visit Dx:     ICD-10-CM ICD-9-CM   1. Dyspnea, unspecified type  R06.00 786.09   2. 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)     Difficulty walking     Hepatitis A     as a child    Seizures     Syncope     Urgency of urination      Past Surgical History:   Procedure Laterality Date    TRUNK LESION/CYST EXCISION N/A 8/3/2023    Procedure: excision of chest wall cyst and back cyst 10:00;  Surgeon: Kianna Oliva DO;  Location: [facility] OR;  Service: General;  Laterality: N/A;     PT Assessment (Last 12 Hours)       PT Evaluation and Treatment       Row Name 10/19/24 0832          Physical Therapy Time and Intention    Subjective Information no complaints  -BP     Document Type therapy note (daily note)  -BP     Mode of Treatment physical therapy  -BP     Patient Effort good  -BP       Row Name 10/19/24 0832          General Information    Patient Profile Reviewed yes  -BP     Patient/Family/Caregiver Comments/Observations Patient supine in bed with HOB elevated. Patient agreeable.  -BP     Risks Reviewed patient:;LOB  -BP     Benefits Reviewed patient:;improve function;increase independence;increase strength  -BP     Barriers to Rehab medically complex;previous functional deficit;impaired sensation  -BP       Row Name 10/19/24 0832          Pain    Pretreatment Pain Rating 0/10 - no pain  -BP     Posttreatment Pain Rating 0/10 - no pain  -BP       Row Name 10/19/24 0832          Cognition    Personal Safety Interventions fall prevention program maintained  -BP        Row Name 10/19/24 0832          Bed Mobility    Supine-Sit [place] (Bed Mobility) minimum assist (75% patient effort);moderate assist (50% patient effort);verbal cues  -     Sit-Supine [place] (Bed Mobility) maximum assist (25% patient effort);2 person assist  -     Bed Mobility, Safety Issues decreased use of arms for pushing/pulling;decreased use of legs for bridging/pushing;impaired trunk control for bed mobility  -     Assistive Device (Bed Mobility) head of bed elevated;repositioning sheet  -       Row Name 10/19/24 0832          Transfers    Comment, (Transfers) deferred transfers due to profound UE weakness. Continue to recommend Jack left for transfers to chair. Notified RN.  -       Row Name 10/19/24 0832          Safety Issues/Impairments Affecting Functional Mobility    Safety Issues Affecting Function (Mobility) insight into deficits/self-awareness  -       Row Name 10/19/24 0832          Balance    Comment, Balance static sitting balance-CGA. Dynamic sitting balance-CGA/min A. Posterior lean during UE ROM exercises which required min A to correct.  -       Row Name 10/19/24 0832          Motor Skills    Therapeutic Exercise --  seated B LE LAQ, hip flexion x 5. R ankle pumps x 10. No active L ankle DF. Passive ROM to left ankle. Patient able to perform SLR bilaterally in bed.  -       Row Name 10/19/24 0832          Plan of Care Review    Plan of Care Reviewed With patient  -BP     Outcome Evaluation PT: Patient performs supine to sit transfer with min/mod A x 2 and sit to supine transfer with max A x 2. Patient able to maintain static sitting balance with CGA and dynamic with CGA/min A with occasional posterior lean. Patient with improved movement of L LE, able to perform full LAQ in sitting. Patient performs bilateral LE seated LAQ and hip flexion x 5. Patient with improved dynamic sitting balance and LE strength today vs previous session. Will continue to follow for  therapy. Continue to recommend glen lift for out of bed transfers. Notified RN. Continue to recommend SNF at discharge. Patient agreeable.  -BP       Row Name 10/19/24 0832          Positioning and Restraints    Pre-Treatment Position in bed  -BP     Post Treatment Position bed  -BP     In Bed notified nsg;supine;call light within reach;encouraged to call for assist  with PCA.  -BP               User Key  (r) = Recorded By, (t) = Taken By, (c) = Cosigned By      Initials Name Provider Type    BP Supriya Paula, PT Physical Therapist                    Physical Therapy Education       Title: PT OT SLP Therapies (Done)       Topic: Physical Therapy (Done)       Point: Mobility training (Done)       Learning Progress Summary            Patient Acceptance, E,TB, VU by BP at 10/19/2024 0942    Acceptance, E,TB, VU by BP at 10/18/2024 1340                      Point: Home exercise program (Done)       Learning Progress Summary            Patient Acceptance, E,TB, VU by BP at 10/19/2024 0942                                      User Key       Initials Effective Dates Name Provider Type Discipline    BP 06/16/21 -  Supriya Paula, PT Physical Therapist PT                  PT Recommendation and Plan  Anticipated Discharge Disposition (PT): skilled nursing facility  Planned Therapy Interventions (PT): balance training, bed mobility training, home exercise program, patient/family education, transfer training, strengthening  Therapy Frequency (PT): 6 times/wk  Plan of Care Reviewed With: patient  Outcome Evaluation: PT: Patient performs supine to sit transfer with min/mod A x 2 and sit to supine transfer with max A x 2. Patient able to maintain static sitting balance with CGA and dynamic with CGA/min A with occasional posterior lean. Patient with improved movement of L LE, able to perform full LAQ in sitting. Patient performs bilateral LE seated LAQ and hip flexion x 5. Patient with improved dynamic sitting balance and  LE strength today vs previous session. Will continue to follow for therapy. Continue to recommend glen lift for out of bed transfers. Notified RN. Continue to recommend SNF at discharge. Patient agreeable.   Outcome Measures       Row Name 10/19/24 0833 10/19/24 0832          How much help from another person do you currently need...    Turning from your back to your side while in flat bed without using bedrails? -- 2  -BP     Moving from lying on back to sitting on the side of a flat bed without bedrails? -- 2  -BP     Moving to and from a bed to a chair (including a wheelchair)? -- 1  -BP     Standing up from a chair using your arms (e.g., wheelchair, bedside chair)? -- 1  -BP     Climbing 3-5 steps with a railing? -- 1  -BP     To walk in hospital room? -- 1  -BP     AM-PAC 6 Clicks Score (PT) -- 8  -BP        How much help from another is currently needed...    Putting on and taking off regular lower body clothing? 1  -EN --     Bathing (including washing, rinsing, and drying) 1  -EN --     Toileting (which includes using toilet bed pan or urinal) 1  -EN --     Putting on and taking off regular upper body clothing 1  -EN --     Taking care of personal grooming (such as brushing teeth) 1  -EN --     Eating meals 1  -EN --     AM-PAC 6 Clicks Score (OT) 6  -EN --        Functional Assessment    Outcome Measure Options -- AM-PAC 6 Clicks Basic Mobility (PT)  -BP               User Key  (r) = Recorded By, (t) = Taken By, (c) = Cosigned By      Initials Name Provider Type    EN Rach Gray, OTR Occupational Therapist    BP Supriya Paula, PT Physical Therapist                     Time Calculation:    PT Charges       Row Name 10/19/24 0943             Time Calculation    Start Time 0832  -BP      Stop Time 0847  -BP      Time Calculation (min) 15 min  -BP      PT Received On 10/19/24  -BP      PT - Next Appointment 10/20/24  -BP         Timed Charges    18638 - PT Therapeutic Exercise Minutes 15  -BP          Total Minutes    Timed Charges Total Minutes 15  -BP       Total Minutes 15  -BP                User Key  (r) = Recorded By, (t) = Taken By, (c) = Cosigned By      Initials Name Provider Type    BP Supriya Paula, PT Physical Therapist                  Therapy Charges for Today       Code Description Service Date Service Provider Modifiers Qty    19162486228 HC PT EVAL MOD COMPLEXITY 3 10/18/2024 Supriya Paula, PT GP 1    66652121211 HC PT THER PROC EA 15 MIN 10/19/2024 Supriya Paula, PT GP 1            PT G-Codes  Outcome Measure Options: AM-PAC 6 Clicks Basic Mobility (PT)  AM-PAC 6 Clicks Score (PT): 8  AM-PAC 6 Clicks Score (OT): 6    Supriya Paula PT  10/19/2024

## 2024-10-20 LAB
ANION GAP SERPL CALCULATED.3IONS-SCNC: 5.8 MMOL/L (ref 5–15)
BASOPHILS # BLD AUTO: 0.03 10*3/MM3 (ref 0–0.2)
BASOPHILS NFR BLD AUTO: 0.4 % (ref 0–1.5)
BUN SERPL-MCNC: 29 MG/DL (ref 8–23)
BUN/CREAT SERPL: 31.9 (ref 7–25)
CALCIUM SPEC-SCNC: 8.3 MG/DL (ref 8.6–10.5)
CHLORIDE SERPL-SCNC: 104 MMOL/L (ref 98–107)
CO2 SERPL-SCNC: 25.2 MMOL/L (ref 22–29)
CREAT SERPL-MCNC: 0.91 MG/DL (ref 0.76–1.27)
DEPRECATED RDW RBC AUTO: 44.2 FL (ref 37–54)
EGFRCR SERPLBLD CKD-EPI 2021: 86.8 ML/MIN/1.73
EOSINOPHIL # BLD AUTO: 0.02 10*3/MM3 (ref 0–0.4)
EOSINOPHIL NFR BLD AUTO: 0.3 % (ref 0.3–6.2)
ERYTHROCYTE [DISTWIDTH] IN BLOOD BY AUTOMATED COUNT: 12.1 % (ref 12.3–15.4)
GLUCOSE SERPL-MCNC: 84 MG/DL (ref 65–99)
HCT VFR BLD AUTO: 39.5 % (ref 37.5–51)
HGB BLD-MCNC: 13.4 G/DL (ref 13–17.7)
IMM GRANULOCYTES # BLD AUTO: 0.02 10*3/MM3 (ref 0–0.05)
IMM GRANULOCYTES NFR BLD AUTO: 0.3 % (ref 0–0.5)
LYMPHOCYTES # BLD AUTO: 1.47 10*3/MM3 (ref 0.7–3.1)
LYMPHOCYTES NFR BLD AUTO: 21.5 % (ref 19.6–45.3)
MCH RBC QN AUTO: 33.3 PG (ref 26.6–33)
MCHC RBC AUTO-ENTMCNC: 33.9 G/DL (ref 31.5–35.7)
MCV RBC AUTO: 98 FL (ref 79–97)
MONOCYTES # BLD AUTO: 0.63 10*3/MM3 (ref 0.1–0.9)
MONOCYTES NFR BLD AUTO: 9.2 % (ref 5–12)
NEUTROPHILS NFR BLD AUTO: 4.66 10*3/MM3 (ref 1.7–7)
NEUTROPHILS NFR BLD AUTO: 68.3 % (ref 42.7–76)
PLATELET # BLD AUTO: 131 10*3/MM3 (ref 140–450)
PMV BLD AUTO: 9.5 FL (ref 6–12)
POTASSIUM SERPL-SCNC: 4.1 MMOL/L (ref 3.5–5.2)
RBC # BLD AUTO: 4.03 10*6/MM3 (ref 4.14–5.8)
SODIUM SERPL-SCNC: 135 MMOL/L (ref 136–145)
WBC NRBC COR # BLD AUTO: 6.83 10*3/MM3 (ref 3.4–10.8)

## 2024-10-20 PROCEDURE — 25010000002 ENOXAPARIN PER 10 MG: Performed by: PSYCHIATRY & NEUROLOGY

## 2024-10-20 PROCEDURE — 97110 THERAPEUTIC EXERCISES: CPT

## 2024-10-20 PROCEDURE — 63710000001 DIPHENHYDRAMINE PER 50 MG: Performed by: INTERNAL MEDICINE

## 2024-10-20 PROCEDURE — 94762 N-INVAS EAR/PLS OXIMTRY CONT: CPT

## 2024-10-20 PROCEDURE — 85025 COMPLETE CBC W/AUTO DIFF WBC: CPT | Performed by: HOSPITALIST

## 2024-10-20 PROCEDURE — 99232 SBSQ HOSP IP/OBS MODERATE 35: CPT | Performed by: HOSPITALIST

## 2024-10-20 PROCEDURE — 25010000002 IMMUNE GLOBULIN (HUMAN) 20 GM/200ML SOLUTION: Performed by: PSYCHIATRY & NEUROLOGY

## 2024-10-20 PROCEDURE — 80048 BASIC METABOLIC PNL TOTAL CA: CPT | Performed by: HOSPITALIST

## 2024-10-20 PROCEDURE — 63710000001 PREDNISONE PER 5 MG: Performed by: PSYCHIATRY & NEUROLOGY

## 2024-10-20 PROCEDURE — 94799 UNLISTED PULMONARY SVC/PX: CPT

## 2024-10-20 PROCEDURE — 94761 N-INVAS EAR/PLS OXIMETRY MLT: CPT

## 2024-10-20 PROCEDURE — 25010000002 IMMUNE GLOBULIN (HUMAN) 5 GM/50ML SOLUTION: Performed by: PSYCHIATRY & NEUROLOGY

## 2024-10-20 RX ADMIN — FAMOTIDINE 20 MG: 10 INJECTION, SOLUTION INTRAVENOUS at 08:46

## 2024-10-20 RX ADMIN — Medication 10 ML: at 08:47

## 2024-10-20 RX ADMIN — CETIRIZINE HYDROCHLORIDE 10 MG: 10 TABLET, FILM COATED ORAL at 15:29

## 2024-10-20 RX ADMIN — Medication 10 ML: at 21:46

## 2024-10-20 RX ADMIN — PREDNISONE 20 MG: 10 TABLET ORAL at 08:46

## 2024-10-20 RX ADMIN — PANTOPRAZOLE SODIUM 40 MG: 40 TABLET, DELAYED RELEASE ORAL at 08:46

## 2024-10-20 RX ADMIN — DIPHENHYDRAMINE HYDROCHLORIDE 25 MG: 25 CAPSULE ORAL at 15:29

## 2024-10-20 RX ADMIN — IMMUNE GLOBULIN INFUSION (HUMAN) 20 G: 100 INJECTION, SOLUTION INTRAVENOUS; SUBCUTANEOUS at 16:15

## 2024-10-20 RX ADMIN — ENOXAPARIN SODIUM 40 MG: 100 INJECTION SUBCUTANEOUS at 15:33

## 2024-10-20 RX ADMIN — MUPIROCIN 1 APPLICATION: 20 OINTMENT TOPICAL at 08:47

## 2024-10-20 RX ADMIN — MUPIROCIN 1 APPLICATION: 20 OINTMENT TOPICAL at 21:46

## 2024-10-20 RX ADMIN — LOSARTAN POTASSIUM 50 MG: 50 TABLET, FILM COATED ORAL at 08:47

## 2024-10-20 RX ADMIN — WHITE PETROLATUM 41 % TOPICAL OINTMENT 1 APPLICATION: OINTMENT at 08:47

## 2024-10-20 RX ADMIN — WHITE PETROLATUM 41 % TOPICAL OINTMENT 1 APPLICATION: OINTMENT at 21:46

## 2024-10-20 RX ADMIN — LEVETIRACETAM 1000 MG: 500 TABLET, FILM COATED ORAL at 08:46

## 2024-10-20 RX ADMIN — IMMUNE GLOBULIN INFUSION (HUMAN) 5 G: 100 INJECTION, SOLUTION INTRAVENOUS; SUBCUTANEOUS at 15:31

## 2024-10-20 RX ADMIN — ACETAMINOPHEN 650 MG: 325 TABLET ORAL at 15:28

## 2024-10-20 RX ADMIN — LEVETIRACETAM 1000 MG: 500 TABLET, FILM COATED ORAL at 21:46

## 2024-10-20 NOTE — PLAN OF CARE
Goal Outcome Evaluation:           Progress: improving  Outcome Evaluation: Use of Jack lift to get patient up to chair. IVIG given. Telemetry noted NSR. Pt denies SOA at rest. Room air. IV saline locked. Pt is unable to feed himself but has a good appetite. Needs assistance w urinal but is continent. All care explained. All questions answered. Soft touch call light within reach.

## 2024-10-20 NOTE — THERAPY TREATMENT NOTE
Acute Care - Physical Therapy Treatment Note   Nelli Moya     Patient Name: Efren Christianson  : 1945  MRN: [id number]  Today's Date: 10/20/2024      Visit Dx:     ICD-10-CM ICD-9-CM   1. Dyspnea, unspecified type  R06.00 786.09   2. 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)     Difficulty walking     Hepatitis A     as a child    Seizures     Syncope     Urgency of urination      Past Surgical History:   Procedure Laterality Date    TRUNK LESION/CYST EXCISION N/A 8/3/2023    Procedure: excision of chest wall cyst and back cyst 10:00;  Surgeon: Kianna Oliva DO;  Location: [facility] OR;  Service: General;  Laterality: N/A;     PT Assessment (Last 12 Hours)       PT Evaluation and Treatment       Row Name 10/20/24 0088          Physical Therapy Time and Intention    Subjective Information no complaints  -BP     Document Type therapy note (daily note)  -BP     Patient Effort good  -BP     Symptoms Noted During/After Treatment fatigue  -BP       Row Name 10/20/24 2404          General Information    Patient Profile Reviewed yes  -BP     Patient/Family/Caregiver Comments/Observations Patient supine in bed with HOB elevated. Patient agreeable to PT treatment.  -BP     Existing Precautions/Restrictions fall;seizures  -BP     Risks Reviewed patient:;increased discomfort  -BP     Benefits Reviewed patient:;increase strength  -BP     Barriers to Rehab medically complex;previous functional deficit;impaired sensation  -BP       Row Name 10/20/24 0206          Pain    Pre/Posttreatment Pain Comment Patient denies pain  -BP       Row Name 10/20/24 0826          Cognition    Personal Safety Interventions fall prevention  program maintained  -BP       Row Name 10/20/24 0830          Bed Mobility    Comment, (Bed Mobility) HEP only  -BP       Row Name 10/20/24 0830          Transfers    Comment, (Transfers) HEP only  -BP       Row Name 10/20/24 0830          Gait/Stairs (Locomotion)    Comment, (Gait/Stairs) Non ambulatory at baseline.  -BP       Row Name 10/20/24 0830          Balance    Comment, Balance HEP only  -BP       Row Name 10/20/24 0830          Motor Skills    Therapeutic Exercise --  B SLR, heel slides, hip ABD/ADD, quad sets x 5. R ankle active DF with resisted PF x 5. Increased active motion of L LE noted.  -BP       Row Name 10/20/24 0830          Plan of Care Review    Plan of Care Reviewed With patient  -BP     Outcome Evaluation PT: Perfomed bilateral LE HEP only today. Patient performed bilateral SLR, heel slides, quad sets, and hip ABD/ADD x 5 reps. He performe R ankle active DF with resisted PF x 5. Patient with overall improved active motion in bilateral LE's, however does fatigue quickly. Continue to recommend glen lift for out of bed transfers. Will continue to see for therapy to progress strength and mobility as tolerated. Continue to recommend SNF at discharge, patient agreeable.  -BP       Row Name 10/20/24 0830          Positioning and Restraints    Pre-Treatment Position in bed  -BP     Post Treatment Position bed  -BP     In Bed notified nsg;supine;call light within reach;encouraged to call for assist;with nsg  -BP       Row Name 10/20/24 0830          Progress Summary (PT)    Progress Toward Functional Goals (PT) progress toward functional goals is gradual  -BP       Row Name 10/20/24 0830          Therapy Plan Review/Discharge Plan (PT)    Therapy Plan Review (PT) patient;risks/benefits reviewed;participants included  -BP               User Key  (r) = Recorded By, (t) = Taken By, (c) = Cosigned By      Initials Name Provider Type    Supriya Collins, PT Physical Therapist                     Physical Therapy Education       Title: PT OT SLP Therapies (Done)       Topic: Physical Therapy (Done)       Point: Mobility training (Done)       Learning Progress Summary            Patient Acceptance, E,TB, VU by  at 10/19/2024 0942    Acceptance, E,TB, VU by  at 10/18/2024 1340                      Point: Home exercise program (Done)       Learning Progress Summary            Patient Acceptance, E,TB, VU by  at 10/20/2024 0949    Acceptance, E,TB, VU by  at 10/19/2024 0942                                      User Key       Initials Effective Dates Name Provider Type Discipline     06/16/21 -  Supriya Paula, PT Physical Therapist PT                  PT Recommendation and Plan  Anticipated Discharge Disposition (PT): skilled nursing facility  Planned Therapy Interventions (PT): balance training, bed mobility training, home exercise program, patient/family education, transfer training, strengthening  Therapy Frequency (PT): 6 times/wk  Progress Summary (PT)  Progress Toward Functional Goals (PT): progress toward functional goals is gradual  Plan of Care Reviewed With: patient  Outcome Evaluation: PT: Perfomed bilateral LE HEP only today. Patient performed bilateral SLR, heel slides, quad sets, and hip ABD/ADD x 5 reps. He performe R ankle active DF with resisted PF x 5. Patient with overall improved active motion in bilateral LE's, however does fatigue quickly. Continue to recommend glen lift for out of bed transfers. Will continue to see for therapy to progress strength and mobility as tolerated. Continue to recommend SNF at discharge, patient agreeable.   Outcome Measures       Row Name 10/20/24 0830 10/19/24 0833 10/19/24 0832       How much help from another person do you currently need...    Turning from your back to your side while in flat bed without using bedrails? 2  -BP -- 2  -BP    Moving from lying on back to sitting on the side of a flat bed without bedrails? 2  -BP -- 2  -BP     Moving to and from a bed to a chair (including a wheelchair)? 1  -BP -- 1  -BP    Standing up from a chair using your arms (e.g., wheelchair, bedside chair)? 1  -BP -- 1  -BP    Climbing 3-5 steps with a railing? 1  -BP -- 1  -BP    To walk in hospital room? 1  -BP -- 1  -BP    AM-PAC 6 Clicks Score (PT) 8  -BP -- 8  -BP       How much help from another is currently needed...    Putting on and taking off regular lower body clothing? -- 1  -EN --    Bathing (including washing, rinsing, and drying) -- 1  -EN --    Toileting (which includes using toilet bed pan or urinal) -- 1  -EN --    Putting on and taking off regular upper body clothing -- 1  -EN --    Taking care of personal grooming (such as brushing teeth) -- 1  -EN --    Eating meals -- 1  -EN --    AM-PAC 6 Clicks Score (OT) -- 6  -EN --       Functional Assessment    Outcome Measure Options AM-PAC 6 Clicks Basic Mobility (PT)  -BP -- AM-PAC 6 Clicks Basic Mobility (PT)  -BP              User Key  (r) = Recorded By, (t) = Taken By, (c) = Cosigned By      Initials Name Provider Type    Rach May, OTR Occupational Therapist    Supriya Collins PT Physical Therapist                     Time Calculation:    PT Charges       Row Name 10/20/24 0949             Time Calculation    Start Time 0830  -BP      Stop Time 0846  -BP      Time Calculation (min) 16 min  -BP      PT Received On 10/20/24  -BP      PT - Next Appointment 10/21/24  -BP         Timed Charges    74962 - PT Therapeutic Exercise Minutes 16  -BP         Total Minutes    Timed Charges Total Minutes 16  -BP       Total Minutes 16  -BP                User Key  (r) = Recorded By, (t) = Taken By, (c) = Cosigned By      Initials Name Provider Type    Supriya Collins PT Physical Therapist                  Therapy Charges for Today       Code Description Service Date Service Provider Modifiers Qty    09846466939 HC PT THER PROC EA 15 MIN 10/19/2024 Supriya Paula, PT GP 1     [id number]  PT THER PROC EA 15 MIN 10/20/2024 Supriya Paula, PT GP 1            PT G-Codes  Outcome Measure Options: AM-PAC 6 Clicks Basic Mobility (PT)  AM-PAC 6 Clicks Score (PT): 8  AM-PAC 6 Clicks Score (OT): 6    Supriya Paula PT  10/20/2024

## 2024-10-20 NOTE — PLAN OF CARE
Goal Outcome Evaluation:  Plan of Care Reviewed With: patient  Plan of Care Reviewed With: patient           Outcome Evaluation: PT: Perfomed bilateral LE HEP only today. Patient performed bilateral SLR, heel slides, quad sets, and hip ABD/ADD x 5 reps. He performe R ankle active DF with resisted PF x 5. Patient with overall improved active motion in bilateral LE's, however does fatigue quickly. Continue to recommend glen lift for out of bed transfers. Will continue to see for therapy to progress strength and mobility as tolerated. Continue to recommend SNF at discharge, patient agreeable.    Anticipated Discharge Disposition (PT): skilled nursing facility

## 2024-10-20 NOTE — CONSULTS
Nurse referral / initiated follow-up spiritual care/hospitality visit with patient and care givers.

## 2024-10-20 NOTE — PROGRESS NOTES
Patient Care Team:  Winnie Murray as PCP - General (Nurse Practitioner)  Winnie Murray (Nurse Practitioner)    Date: 10/20/2024  Patient Name: Efren Christianson  : 1945  MRN: [id number]  Date of admission: 10/16/2024  Room/Bed: ICU3/1      Subjective   Subjective         Chief Complaint: f/u weakness    Summary:Efren Christianson is a 79 y.o. male with a past medical history of CIDP and seizures who presented to [facility] ED for worsening weakness and decreased oxygen levels. He is followed outpatient by Dr Reyes and receives monthly IVIG infusions. Patient was just recently admitted and discharged from this facility for similar presentation, he had been on steroids during that admission and was discharged, with plan to receive IVIG outpatient today, however, given his increased weakness and low oxygen levels he was sent to the ED. He was found to be profoundly weak, Dr. Jo discussed with neurology who recommends to admit for IVIG and if no improvement may ultimately require plasmapheresis.   Patient is sitting up in bed during my exam, states he is able to move his right hand and right lower extremity slight, but left side is profoundly weak. He denies any chest pain, shortness of breath, or difficulty swallowing. He denies any fever or chills.   Nursing reports patient and his POA have refused any further steroids.     Interval Followup: Patient is lying in bed, states his weakness is about the same. Denies any other complaints. Nursing reports no acute overnight events.     10/19/2024  Pt says he able to move right upper and lower extremity somewhat more today as compare to yesterday. Left side just has started to improve, able to little bit. IVIG dose 4 today.    10/20/2024  Patient feels much better with weakness involving the right upper and lower extremity as compared to yesterday, still having significant weakness involving the right upper and lower but slightly better today.   "Patient will receive the IVIG fifth dose today.      Review of Systems   Neurological:  Positive for weakness.         Objective   Objective       Vital Signs  Temp:  [97.3 °F (36.3 °C)-98.2 °F (36.8 °C)] 97.4 °F (36.3 °C)  Heart Rate:  [69-89] 81  Resp:  [15-20] 18  BP: (130-172)/(79-95) 172/91  Oxygen Therapy  SpO2: 96 %  Pulse Oximetry Type: Continuous  Device (Oxygen Therapy): room air  Device (Oxygen Therapy) (Infant): room air  Flowsheet Rows      Flowsheet Row First Filed Value   Admission Height 172.7 cm (67.99\") Documented at 10/16/2024 0858   Admission Weight 73.6 kg (162 lb 4.8 oz) Documented at 10/16/2024 0858          Intake & Output (last 3 days)         10/14 0701  10/15 0700 10/15 0701  10/16 0700 10/16 0701  10/17 0700 10/17 0701  10/18 0700    P.O.   480 240    IV Piggyback   250     Total Intake(mL/kg)   730 (9.9) 240 (3.3)    Urine (mL/kg/hr)   775     Stool   0     Total Output   775     Net   -45 +240            Urine Unmeasured Occurrence   200 x     Stool Unmeasured Occurrence   3 x           Lines, Drains & Airways       Active LDAs       Name Placement date Placement time Site Days    Peripheral IV 10/16/24 0934 Right Antecubital 10/16/24  0934  Antecubital  1                      Physical Exam  Vitals reviewed.   Constitutional:       General: He is not in acute distress.     Appearance: He is normal weight.   HENT:      Head: Normocephalic and atraumatic.      Mouth/Throat:      Mouth: Mucous membranes are moist.   Cardiovascular:      Rate and Rhythm: Normal rate and regular rhythm.   Pulmonary:      Effort: Pulmonary effort is normal. No respiratory distress.      Breath sounds: No wheezing or rales.   Abdominal:      General: Bowel sounds are normal.      Palpations: Abdomen is soft.   Musculoskeletal:      Right lower leg: No edema.      Left lower leg: No edema.   Skin:     General: Skin is warm and dry.   Neurological:      Mental Status: He is alert. Mental status is at baseline. " "     Comments: RUE 4/5, LUE 3/5, LLE 3/5, RLE 5/5   Psychiatric:         Mood and Affect: Mood normal.         Behavior: Behavior normal.           Results Review:      Results from last 7 days   Lab Units 10/20/24  0649 10/18/24  0553 10/17/24  0624   WBC 10*3/mm3 6.83 7.81 8.61   HEMOGLOBIN g/dL 13.4 13.4 14.3   HEMATOCRIT % 39.5 39.1 41.5   PLATELETS 10*3/mm3 131* 160 147     Results from last 7 days   Lab Units 10/20/24  0649 10/18/24  0553 10/17/24  0624 10/16/24  0935   SODIUM mmol/L 135* 134* 134* 137   POTASSIUM mmol/L 4.1 4.3 3.7 4.0   CHLORIDE mmol/L 104 101 100 100   CO2 mmol/L 25.2 26.2 24.6 24.9   BUN mg/dL 29* 25* 27* 30*   CREATININE mg/dL 0.91 0.96 0.87 0.88   CALCIUM mg/dL 8.3* 8.3* 8.2* 9.0   BILIRUBIN mg/dL  --   --   --  0.7   ALK PHOS U/L  --   --   --  65   ALT (SGPT) U/L  --   --   --  88*   AST (SGOT) U/L  --   --   --  33   GLUCOSE mg/dL 84 115* 85 87       Results from last 7 days   Lab Units 10/18/24  0553 10/17/24  0624   MAGNESIUM mg/dL 2.3 2.1       No results found for: \"BLOODCX\"    No results found for: \"MRSACX\"    No results found for: \"STOOLCX\"    No results found for: \"URINECX\"    No results found for: \"WOUNDCX\"    Imaging Results (Last 24 Hours)       ** No results found for the last 24 hours. **            I have personally reviewed the patient's new imaging and lab results.          ECG/EMG Results (most recent)       Procedure Component Value Units Date/Time    ECG 12 Lead Dyspnea [[id number]] Collected: 10/16/24 0921     Updated: 10/16/24 1613     QT Interval 374 ms      QTC Interval 466 ms     Narrative:      HEART RATE=93  bpm  RR Qibfdjld=066  ms  ND Fnmkhzgh=533  ms  P Horizontal Axis=-12  deg  P Front Axis=39  deg  QRSD Cyxpgqof=533  ms  QT Jwpdjklx=700  ms  JPrL=265  ms  QRS Axis=-18  deg  T Wave Axis=9  deg  - ABNORMAL ECG -  Sinus rhythm  Right bundle branch block  NO PRIOR TRACING AVAILABLE FOR COMPARISON  Electronically Signed By: Jerrica Sánchez ([facility]) 2024-10-16 " 16:12:39  Date and Time of Study:2024-10-16 09:21:35                    Medication Review:     Scheduled Meds:acetaminophen, 650 mg, Oral, Daily  Aquaphor Advanced Therapy, 1 Application, Topical, Q12H  cetirizine, 10 mg, Oral, Daily  diphenhydrAMINE, 25 mg, Oral, Daily  enoxaparin, 40 mg, Subcutaneous, Q24H  Famotidine (PF), 20 mg, Intravenous, Daily  immune globulin (human), 20 g, Intravenous, Q24H   And  immune globulin (human), 5 g, Intravenous, Q24H  levETIRAcetam, 1,000 mg, Oral, Q12H  losartan, 50 mg, Oral, Daily  mupirocin, 1 Application, Each Nare, BID  pantoprazole, 40 mg, Oral, Daily  predniSONE, 20 mg, Oral, Daily With Breakfast  sodium chloride, 10 mL, Intravenous, Q12H      Continuous Infusions:   PRN Meds:.•  acetaminophen **OR** acetaminophen  •  labetalol  •  nitroglycerin  •  ondansetron ODT **OR** ondansetron  •  sodium chloride      I have reviewed the patient's current medication list    Assessment & Plan   Assessment / Plan       Active Hospital Problems    Diagnosis  POA   • **Dyspnea [R06.00]  Yes       Acute flare of CIDP, improving slowly.   - per ED neurology recommends to continue IVIG x 5 days (fifth dose today) and if no improvement may ultimately require transfer for PLEX  - continue pre-medication orders (Pepcid, cetrizine, benadryl, and tylenol)  - on oral prednisone as per neurology service recommendation.  - Dr. Shay consulted in the ED and appreciate his assistance with this patient  - PT/OT       Hypertension  - on  losartan 50mg   - continue prn labetalol for now  - continue to monitor with routine vs and make adjustments as needed     Leukocytosis- resolved   - afebrile  - likely related to recent steroid use  - procal wnl     Seizure disorder  - continue keppra      DVT Prophylaxis  - SCDs     Code Status  - Full Code  - POA - Lavern Ga has been consulted.     PT/OT consulted. Patient will likely need to go to rehab at this point.     Plan for disposition:  pending progress     Sushila Ramos MD  10/20/24  07:45 EDT      Note disclaimer: At [facility], we believe that sharing information builds trust and better relationships. You are receiving this note because you recently visited [facility]. It is possible you will see health information before a provider has talked with you about it. This kind of information can be easy to misunderstand. To help you fully understand what it means for your health, we urge you to discuss this note with your provider.

## 2024-10-21 PROBLEM — E43 SEVERE MALNUTRITION: Status: ACTIVE | Noted: 2024-10-21

## 2024-10-21 LAB
ANION GAP SERPL CALCULATED.3IONS-SCNC: 4 MMOL/L (ref 5–15)
BASOPHILS # BLD AUTO: 0.01 10*3/MM3 (ref 0–0.2)
BASOPHILS NFR BLD AUTO: 0.2 % (ref 0–1.5)
BUN SERPL-MCNC: 31 MG/DL (ref 8–23)
BUN/CREAT SERPL: 34.4 (ref 7–25)
CALCIUM SPEC-SCNC: 8.1 MG/DL (ref 8.6–10.5)
CHLORIDE SERPL-SCNC: 102 MMOL/L (ref 98–107)
CO2 SERPL-SCNC: 28 MMOL/L (ref 22–29)
CREAT SERPL-MCNC: 0.9 MG/DL (ref 0.76–1.27)
DEPRECATED RDW RBC AUTO: 44.8 FL (ref 37–54)
EGFRCR SERPLBLD CKD-EPI 2021: 88 ML/MIN/1.73
EOSINOPHIL # BLD AUTO: 0.01 10*3/MM3 (ref 0–0.4)
EOSINOPHIL NFR BLD AUTO: 0.2 % (ref 0.3–6.2)
ERYTHROCYTE [DISTWIDTH] IN BLOOD BY AUTOMATED COUNT: 12.4 % (ref 12.3–15.4)
GLUCOSE SERPL-MCNC: 81 MG/DL (ref 65–99)
HCT VFR BLD AUTO: 37.6 % (ref 37.5–51)
HGB BLD-MCNC: 12.6 G/DL (ref 13–17.7)
IMM GRANULOCYTES # BLD AUTO: 0.02 10*3/MM3 (ref 0–0.05)
IMM GRANULOCYTES NFR BLD AUTO: 0.3 % (ref 0–0.5)
LYMPHOCYTES # BLD AUTO: 1.37 10*3/MM3 (ref 0.7–3.1)
LYMPHOCYTES NFR BLD AUTO: 21.9 % (ref 19.6–45.3)
MCH RBC QN AUTO: 33.2 PG (ref 26.6–33)
MCHC RBC AUTO-ENTMCNC: 33.5 G/DL (ref 31.5–35.7)
MCV RBC AUTO: 98.9 FL (ref 79–97)
MONOCYTES # BLD AUTO: 0.65 10*3/MM3 (ref 0.1–0.9)
MONOCYTES NFR BLD AUTO: 10.4 % (ref 5–12)
NEUTROPHILS NFR BLD AUTO: 4.21 10*3/MM3 (ref 1.7–7)
NEUTROPHILS NFR BLD AUTO: 67 % (ref 42.7–76)
NRBC BLD AUTO-RTO: 0 /100 WBC (ref 0–0.2)
PLAT MORPH BLD: NORMAL
PLATELET # BLD AUTO: 145 10*3/MM3 (ref 140–450)
PMV BLD AUTO: 9.9 FL (ref 6–12)
POTASSIUM SERPL-SCNC: 4 MMOL/L (ref 3.5–5.2)
RBC # BLD AUTO: 3.8 10*6/MM3 (ref 4.14–5.8)
RBC MORPH BLD: NORMAL
SODIUM SERPL-SCNC: 134 MMOL/L (ref 136–145)
WBC MORPH BLD: NORMAL
WBC NRBC COR # BLD AUTO: 6.27 10*3/MM3 (ref 3.4–10.8)

## 2024-10-21 PROCEDURE — 94761 N-INVAS EAR/PLS OXIMETRY MLT: CPT

## 2024-10-21 PROCEDURE — 99231 SBSQ HOSP IP/OBS SF/LOW 25: CPT | Performed by: PSYCHIATRY & NEUROLOGY

## 2024-10-21 PROCEDURE — 63710000001 PREDNISONE PER 5 MG: Performed by: PSYCHIATRY & NEUROLOGY

## 2024-10-21 PROCEDURE — 97110 THERAPEUTIC EXERCISES: CPT

## 2024-10-21 PROCEDURE — 97530 THERAPEUTIC ACTIVITIES: CPT

## 2024-10-21 PROCEDURE — 99232 SBSQ HOSP IP/OBS MODERATE 35: CPT | Performed by: HOSPITALIST

## 2024-10-21 PROCEDURE — 85007 BL SMEAR W/DIFF WBC COUNT: CPT | Performed by: HOSPITALIST

## 2024-10-21 PROCEDURE — 80048 BASIC METABOLIC PNL TOTAL CA: CPT | Performed by: HOSPITALIST

## 2024-10-21 PROCEDURE — 85025 COMPLETE CBC W/AUTO DIFF WBC: CPT | Performed by: HOSPITALIST

## 2024-10-21 PROCEDURE — 25010000002 ENOXAPARIN PER 10 MG: Performed by: PSYCHIATRY & NEUROLOGY

## 2024-10-21 RX ORDER — PREDNISONE 20 MG/1
20 TABLET ORAL
Status: DISCONTINUED | OUTPATIENT
Start: 2024-10-22 | End: 2024-10-22 | Stop reason: HOSPADM

## 2024-10-21 RX ADMIN — PREDNISONE 20 MG: 10 TABLET ORAL at 08:41

## 2024-10-21 RX ADMIN — LEVETIRACETAM 1000 MG: 500 TABLET, FILM COATED ORAL at 21:18

## 2024-10-21 RX ADMIN — ENOXAPARIN SODIUM 40 MG: 100 INJECTION SUBCUTANEOUS at 16:16

## 2024-10-21 RX ADMIN — Medication 10 ML: at 08:42

## 2024-10-21 RX ADMIN — Medication 10 ML: at 21:19

## 2024-10-21 RX ADMIN — MUPIROCIN 1 APPLICATION: 20 OINTMENT TOPICAL at 10:38

## 2024-10-21 RX ADMIN — WHITE PETROLATUM 41 % TOPICAL OINTMENT 1 APPLICATION: OINTMENT at 08:42

## 2024-10-21 RX ADMIN — MUPIROCIN 1 APPLICATION: 20 OINTMENT TOPICAL at 21:19

## 2024-10-21 RX ADMIN — LOSARTAN POTASSIUM 50 MG: 50 TABLET, FILM COATED ORAL at 08:41

## 2024-10-21 RX ADMIN — WHITE PETROLATUM 41 % TOPICAL OINTMENT 1 APPLICATION: OINTMENT at 21:19

## 2024-10-21 RX ADMIN — LEVETIRACETAM 1000 MG: 500 TABLET, FILM COATED ORAL at 08:41

## 2024-10-21 RX ADMIN — PANTOPRAZOLE SODIUM 40 MG: 40 TABLET, DELAYED RELEASE ORAL at 08:41

## 2024-10-21 NOTE — CASE MANAGEMENT/SOCIAL WORK
Spoke with Devorah/PT regarding patient level of activity today. She states patient was sitting up in recliner when PT/OT arrived and decision was made to perform exercises without  attempting to stand patient due to how quickly he fatigues. Spoke with Yoana MCBRIDE who states she and assistants x 2 moved patient from bed to recliner with max assist. Patient was essentially totally dependent during transfer. PT noted some improvement in strength during sitting exercises. CM will continue to follow.

## 2024-10-21 NOTE — CASE MANAGEMENT/SOCIAL WORK
Post-Acute Authorization Submission      Post Acute Pre-Cert Documentation  Request Submitted by Facility - Type:: Hospital  Post-Acute Authorization Type Submitted:: SNF  Date Post Acute Pre-Cert Inititated per Facility: 10/21/24  Accepting Facility: [facility] Discharge Date Requested: 10/22/24  All Clinicals Submitted?: Yes  Had Accepting Facility at Time of Submission: Yes  Response Communicated to:: , Accepting Facility Liaison  Authorization Number:: [id number]              ANUJA Mendoza

## 2024-10-21 NOTE — PLAN OF CARE
Goal Outcome Evaluation:  Plan of Care Reviewed With: patient        Progress: improving  Outcome Evaluation: VSS, patient on room air with no SOB, uses urinal in bed, tele shows NSR, no complaints of pain, left side having more weakness than right side, no complaints of pain at this time

## 2024-10-21 NOTE — PLAN OF CARE
Goal Outcome Evaluation:  Plan of Care Reviewed With: patient        Progress: improving  Outcome Evaluation: Pt up to chair today by using gait belt and assist x 3. BM today. Oral steroids. Telemetry noted NSR. Room Air. Patient transferred from ICU#3 to 1418. All belongings transferred to MS with patient. Soft touch call light in use and within reach. All care explained.  All questions answered. Pt verb understanding

## 2024-10-21 NOTE — PLAN OF CARE
Goal Outcome Evaluation:              Outcome Evaluation: PT: Patient in recliner upon arrival.  Patient reports feeling better today and states he feels stronger than when he arrived.  Patient performs 10 reps of LE exercises including SLR, heel slides, quad sets, and hip abduction/adduction.  Patient able to complete right ankle plantarflexion/dorsiflexion x10 reps.  Patient continues to display difficulty with active left ankle dorsiflexion/plantarflexion.  Continue to recommend SNF at discharge.    Anticipated Discharge Disposition (PT): skilled nursing facility

## 2024-10-21 NOTE — THERAPY TREATMENT NOTE
Patient Name: Efren Christianson  : 1945    MRN: [id number]                              Today's Date: 10/21/2024       Admit Date: 10/16/2024    Visit Dx:     ICD-10-CM ICD-9-CM   1. Dyspnea, unspecified type  R06.00 786.09   2. 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)     Difficulty walking     Hepatitis A     as a child    Seizures     Syncope     Urgency of urination      Past Surgical History:   Procedure Laterality Date    TRUNK LESION/CYST EXCISION N/A 8/3/2023    Procedure: excision of chest wall cyst and back cyst 10:00;  Surgeon: Kianna Oliva DO;  Location: [facility];  Service: General;  Laterality: N/A;      General Information       Row Name 10/21/24 1145          OT Time and Intention    Subjective Information no complaints  pt still reports weakness, yet he feels it has improved  -SD     Document Type therapy note (daily note)  -SD     Mode of Treatment occupational therapy  -SD     Total Minutes, Occupational Therapy 15  -SD     Patient Effort adequate  -SD     Symptoms Noted During/After Treatment none  -SD               User Key  (r) = Recorded By, (t) = Taken By, (c) = Cosigned By      Initials Name Provider Type    Francis Moreno OTR Occupational Therapist                     Mobility/ADL's       Row Name 10/21/24 1145          Bed Mobility    Comment, (Bed Mobility) pt up in recliner this am.  -SD       Row Name 10/21/24 1145          Transfers    Comment, (Transfers) pt just transfered from bed to recliner with assistance of nursing staff.  -SD               User Key  (r) = Recorded By, (t) = Taken By, (c) = Cosigned By      Initials Name Provider Type    Francis Moreno OTR  Occupational Therapist                   Obj/Interventions       Row Name 10/21/24 1145          Range of Motion Comprehensive    Comment, General Range of Motion pt demonstrated partial flexion/extension of digits of both hands today, and he demonstrated increased active wrist/forearm motion of BUE's  (partial rom of wrist/forearm with gravity eliminated for left and some partial ctive rom of distal RUE against gravity,  -SD       Row Name 10/21/24 1145          Strength Comprehensive (MMT)    Comment, General Manual Muscle Testing (MMT) Assessment Right shouder 2+/5, right elbow 3-/5, right wrist 2+/5, right hand 24-50% active digit motion for digits 3,4 and 5. Left shoulder 2/5, left elbow 2+/5, left wrist 2/5, left hand < 25% active digit motion  -SD       Row Name 10/21/24 114          Shoulder (Therapeutic Exercise)    Shoulder (Therapeutic Exercise) AAROM (active assistive range of motion);PROM (passive range of motion)  -SD     Shoulder AAROM (Therapeutic Exercise) bilateral;flexion;extension;sitting;10 repetitions;aBduction;aDduction  -SD     Shoulder PROM (Therapeutic Exercise) bilateral;external rotation;internal rotation;sitting  -SD       Row Name 10/21/24 1145          Elbow/Forearm (Therapeutic Exercise)    Elbow/Forearm (Therapeutic Exercise) AROM (active range of motion);AAROM (active assistive range of motion)  -SD     Elbow/Forearm AROM (Therapeutic Exercise) right;flexion;extension;supination;pronation;sitting;10 repetitions  -SD     Elbow/Forearm AAROM (Therapeutic Exercise) bilateral;flexion;extension;supination;pronation;sitting;10 repetitions  -SD       Row Name 10/21/24 1145          Wrist (Therapeutic Exercise)    Wrist (Therapeutic Exercise) AAROM (active assistive range of motion)  -SD     Wrist AAROM (Therapeutic Exercise) bilateral;flexion;extension;radial deviation;ulnar deviation;10 repetitions  -SD       Row Name 10/21/24 1145          Hand (Therapeutic Exercise)    Hand  (Therapeutic Exercise) AROM (active range of motion);PROM (passive range of motion)  -SD     Hand AROM/AAROM (Therapeutic Exercise) bilateral;AROM (active range of motion);AAROM (active assistive range of motion);finger flexion;finger extension;thumb flexion;thumb opposition;10 repetitions  -SD     Hand PROM (Therapeutic Exercise) bilateral;finger flexion;finger extension;finger aBduction;finger aDduction;thumb opposition;thumb flexion;thumb extension;10 repetitions  -SD       Row Name 10/21/24 1145          Motor Skills    Therapeutic Exercise shoulder;elbow/forearm;wrist;hand  -SD               User Key  (r) = Recorded By, (t) = Taken By, (c) = Cosigned By      Initials Name Provider Type    Francis Moreno OTR Occupational Therapist                   Goals/Plan       Row Name 10/21/24 1225          ROM Goal 1 (OT)    ROM Goal 1 (OT) Pt to participate in BUE rom program to prevent joint stiffness/contractures.  -SD     Time Frame (ROM Goal 1, OT) by discharge  -SD     Progress/Outcome (ROM Goal 1, OT) good progress toward goal  -SD       Row Name 10/21/24 1225          Problem Specific Goal 1 (OT)    Problem Specific Goal 1 (OT) Pt to improve trunk control to maintain sitting balace at EOB with min assist during light functional activity.  -SD     Time Frame (Problem Specific Goal 1, OT) by discharge  -SD     Progress/Outcome (Problem Specific Goal 1, OT) goal ongoing  -SD       Row Name 10/21/24 1225          Therapy Assessment/Plan (OT)    Planned Therapy Interventions (OT) ROM/therapeutic exercise;patient/caregiver education/training  -SD               User Key  (r) = Recorded By, (t) = Taken By, (c) = Cosigned By      Initials Name Provider Type    Francis Moreno OTR Occupational Therapist                   Clinical Impression       Row Name 10/21/24 1219          Pain Assessment    Pretreatment Pain Rating 0/10 - no pain  -SD     Posttreatment Pain Rating 0/10 - no pain  -SD       Row Name  10/21/24 1219          Plan of Care Review    Plan of Care Reviewed With patient  -SD     Outcome Evaluation Occupational therapy. Pt reports he has noticed some increased active motion of his upper extremities today. He continues with significant weakness, yet he demonstrated increased arom against gravity for RUE and increased rom of LUE's in  gravity eliminated plane. Pt also demonstrated partial active rom of bilateral hands. Pt is dependent for management of adl's at this time. Rec SNF upon discharge.  -SD       Row Name 10/21/24 1219          Therapy Assessment/Plan (OT)    Patient/Family Therapy Goal Statement (OT) go to rehab  -SD       Row Name 10/21/24 1219          Therapy Plan Review/Discharge Plan (OT)    Anticipated Discharge Disposition (OT) skilled nursing facility  -SD       Row Name 10/21/24 1219          Positioning and Restraints    Pre-Treatment Position sitting in chair/recliner  -SD     Post Treatment Position chair  -SD     In Chair reclined;call light within reach;encouraged to call for assist  soft call system  -SD               User Key  (r) = Recorded By, (t) = Taken By, (c) = Cosigned By      Initials Name Provider Type    Francis Moreno, OTR Occupational Therapist                   Outcome Measures       Row Name 10/21/24 0835 10/21/24 0500       How much help from another person do you currently need...    Turning from your back to your side while in flat bed without using bedrails? 2  -TC 2  -DH    Moving from lying on back to sitting on the side of a flat bed without bedrails? 2  -TC 2  -DH    Moving to and from a bed to a chair (including a wheelchair)? 1  -TC 1  -DH    Standing up from a chair using your arms (e.g., wheelchair, bedside chair)? 1  -TC 1  -DH    Climbing 3-5 steps with a railing? 1  -TC 1  -DH    To walk in hospital room? 1  -TC 1  -DH    AM-PAC 6 Clicks Score (PT) 8  -TC 8  -DH    Highest Level of Mobility Goal 3 --> Sit at edge of bed  -TC 3 --> Sit at edge  of bed  -DH              User Key  (r) = Recorded By, (t) = Taken By, (c) = Cosigned By      Initials Name Provider Type    TC Yoana Ta, RN Registered Nurse     Kem Haro, RN Registered Nurse                    Occupational Therapy Education       Title: PT OT SLP Therapies (Done)       Topic: Occupational Therapy (Done)       Point: ADL training (Done)       Description:   Instruct learner(s) on proper safety adaptation and remediation techniques during self care or transfers.   Instruct in proper use of assistive devices.                  Learning Progress Summary            Patient Acceptance, E, VU by EN at 10/19/2024 0932    Comment: UE ROM exercises, edema control for left hand    Acceptance, E, VU by SD at 10/18/2024 1406    Comment: Education with safety with bed mobility. Rec mechanical device for transfers at this time secondary to pt's profound weakness. Pt in agreement.                      Point: Home exercise program (Done)       Description:   Instruct learner(s) on appropriate technique for monitoring, assisting and/or progressing therapeutic exercises/activities.                  Learning Progress Summary            Patient Acceptance, E, VU by SD at 10/21/2024 1226    Comment: Education regarding BUE rom program.    Acceptance, E, VU by EN at 10/19/2024 0932    Comment: UE ROM exercises, edema control for left hand    Acceptance, E,TB,D, VU by SD at 10/18/2024 1407    Comment: Education regarding BUE rom program and elevation of BUE's to address edema. Education with pt and nurse. Will continue with education program.   Other Acceptance, E,TB,D, VU by SD at 10/18/2024 1407    Comment: Education regarding BUE rom program and elevation of BUE's to address edema. Education with pt and nurse. Will continue with education program.                                      User Key       Initials Effective Dates Name Provider Type Discipline    EN 06/16/21 -  Rach Gray OTR Occupational  Therapist OT    SD 06/16/21 -  Francis Good OTR Occupational Therapist OT                  OT Recommendation and Plan  Planned Therapy Interventions (OT): ROM/therapeutic exercise, patient/caregiver education/training  Therapy Frequency (OT): other (see comments) (OT prn for education with rom program, edema management and progress with functional activity as tolerated.)  Plan of Care Review  Plan of Care Reviewed With: patient  Outcome Evaluation: Occupational therapy. Pt reports he has noticed some increased active motion of his upper extremities today. He continues with significant weakness, yet he demonstrated increased arom against gravity for RUE and increased rom of LUE's in  gravity eliminated plane. Pt also demonstrated partial active rom of bilateral hands. Pt is dependent for management of adl's at this time. Rec SNF upon discharge.     Time Calculation:   Evaluation Complexity (OT)  Review Occupational Profile/Medical/Therapy History Complexity: expanded/moderate complexity  Assessment, Occupational Performance/Identification of Deficit Complexity: 3-5 performance deficits  Clinical Decision Making Complexity (OT): detailed assessment/moderate complexity  Overall Complexity of Evaluation (OT): moderate complexity     Time Calculation- OT       Row Name 10/21/24 1227             Time Calculation- OT    OT Start Time 1130  -SD         Timed Charges    90056 - OT Therapeutic Activity Minutes 15  -SD         Total Minutes    Timed Charges Total Minutes 15  -SD       Total Minutes 15  -SD                User Key  (r) = Recorded By, (t) = Taken By, (c) = Cosigned By      Initials Name Provider Type    Francis Moreno OTR Occupational Therapist                  Therapy Charges for Today       Code Description Service Date Service Provider Modifiers Qty    11851671586  OT THERAPEUTIC ACT EA 15 MIN 10/21/2024 Francis Good OTR GO 1                 ANNIE Colbert  10/21/2024

## 2024-10-21 NOTE — PROGRESS NOTES
"Hospitalist Team      Patient Care Team:  Winnie Murray as PCP - General (Nurse Practitioner)  Winnie Murray (Nurse Practitioner)      Chief Complaint:  Follow-up CIDP Flare    Subjective    Mr. Christianson reports he is feeling well this morning.  Weakness may be a little better.  He is tolerating p.o.  He denies chest pain and dyspnea.  He does note, however, that sometimes when he is talking too much he cannot feel short of breath.      Objective    Vital Signs  Temp:  [97.2 °F (36.2 °C)-98.3 °F (36.8 °C)] 97.9 °F (36.6 °C)  Heart Rate:  [61-95] 75  Resp:  [16-20] 20  BP: (136-160)/(80-99) 153/92  Oxygen Therapy  SpO2: 96 %  Pulse Oximetry Type: Continuous  Device (Oxygen Therapy): room air  Device (Oxygen Therapy) (Infant): room air  Flow (L/min) (Oxygen Therapy): 2}    Flowsheet Rows      Flowsheet Row First Filed Value   Admission Height 172.7 cm (67.99\") Documented at 10/16/2024 0858   Admission Weight 73.6 kg (162 lb 4.8 oz) Documented at 10/16/2024 0858          Physical Exam:    General: Appears in no acute distress.  Lungs: Breath sounds are clear throughout all fields.  Respirations are nonlabored.  Excursion is normal.  CV: Regular rate and rhythm.  No murmurs appreciated.  Radial pulses are 2+ and symmetric.  Abdomen: Soft and nontender with active bowel sounds.  MSK: No clubbing, cyanosis, or edema.  Neuro: Cranial nerves II through XII are grossly intact.  Psych: Pleasant affect.  Oriented x 3.    Results Review:     I reviewed the patient's new clinical results.    Lab Results (last 24 hours)       Procedure Component Value Units Date/Time    CBC & Differential [[id number]]  (Abnormal) Collected: 10/21/24 0700    Specimen: Blood Updated: 10/21/24 0821    Narrative:      The following orders were created for panel order CBC & Differential.  Procedure                               Abnormality         Status                     ---------                               -----------         ------             "         CBC Auto Differential[[id number]]        Abnormal            Final result               Scan Slide[[id number]]                   Normal              Final result                 Please view results for these tests on the individual orders.    Scan Slide [[id number]]  (Normal) Collected: 10/21/24 0700    Specimen: Blood Updated: 10/21/24 0821     RBC Morphology Normal     WBC Morphology Normal     Platelet Morphology Normal    Basic Metabolic Panel [[id number]]  (Abnormal) Collected: 10/21/24 0700    Specimen: Blood Updated: 10/21/24 0747     Glucose 81 mg/dL      BUN 31 mg/dL      Creatinine 0.90 mg/dL      Sodium 134 mmol/L      Potassium 4.0 mmol/L      Chloride 102 mmol/L      CO2 28.0 mmol/L      Calcium 8.1 mg/dL      BUN/Creatinine Ratio 34.4     Anion Gap 4.0 mmol/L      eGFR 88.0 mL/min/1.73     Narrative:      GFR Normal >60  Chronic Kidney Disease <60  Kidney Failure <15    The GFR formula is only valid for adults with stable renal function between ages 18 and 70.    CBC Auto Differential [[id number]]  (Abnormal) Collected: 10/21/24 0700    Specimen: Blood Updated: 10/21/24 0719     WBC 6.27 10*3/mm3      RBC 3.80 10*6/mm3      Hemoglobin 12.6 g/dL      Hematocrit 37.6 %      MCV 98.9 fL      MCH 33.2 pg      MCHC 33.5 g/dL      RDW 12.4 %      RDW-SD 44.8 fl      MPV 9.9 fL      Platelets 145 10*3/mm3      Neutrophil % 67.0 %      Lymphocyte % 21.9 %      Monocyte % 10.4 %      Eosinophil % 0.2 %      Basophil % 0.2 %      Immature Grans % 0.3 %      Neutrophils, Absolute 4.21 10*3/mm3      Lymphocytes, Absolute 1.37 10*3/mm3      Monocytes, Absolute 0.65 10*3/mm3      Eosinophils, Absolute 0.01 10*3/mm3      Basophils, Absolute 0.01 10*3/mm3      Immature Grans, Absolute 0.02 10*3/mm3      nRBC 0.0 /100 WBC             Imaging Results (Last 24 Hours)       ** No results found for the last 24 hours. **              Medication Review:   I have reviewed the patient's current medication list    Current  Facility-Administered Medications:     acetaminophen (TYLENOL) tablet 650 mg, 650 mg, Oral, Q4H PRN, 650 mg at 10/19/24 1601 **OR** acetaminophen (TYLENOL) suppository 650 mg, 650 mg, Rectal, Q4H PRN, Claire, Birrilla Winnie, DO    acetaminophen (TYLENOL) tablet 650 mg, 650 mg, Oral, Daily, Claire, Birrilla Winnie, DO, 650 mg at 10/20/24 1528    Aquaphor Advanced Therapy ointment 1 Application, 1 Application, Topical, Q12H, Claire, Birrilla Winnie, DO, 1 Application at 10/21/24 0842    cetirizine (zyrTEC) tablet 10 mg, 10 mg, Oral, Daily, Claire, Birrilla Winnie, DO, 10 mg at 10/20/24 1529    diphenhydrAMINE (BENADRYL) capsule 25 mg, 25 mg, Oral, Daily, Claire, Birrilla Winnie, DO, 25 mg at 10/20/24 1529    Enoxaparin Sodium (LOVENOX) syringe 40 mg, 40 mg, Subcutaneous, Q24H, Fuentes Shay MD, 40 mg at 10/20/24 1533    Famotidine (PF) (PEPCID) injection 20 mg, 20 mg, Intravenous, Daily, Claire, Birrilla Winnie, DO, 20 mg at 10/20/24 0846    labetalol (NORMODYNE,TRANDATE) injection 10 mg, 10 mg, Intravenous, Q4H PRN, Claire, Birrilla Winnie, DO, 10 mg at 10/18/24 0819    levETIRAcetam (KEPPRA) tablet 1,000 mg, 1,000 mg, Oral, Q12H, Claire, Birrilla Winnie, DO, 1,000 mg at 10/21/24 0841    losartan (COZAAR) tablet 50 mg, 50 mg, Oral, Daily, Claire, Birrilla Winnie, DO, 50 mg at 10/21/24 0841    nitroglycerin (NITROSTAT) SL tablet 0.4 mg, 0.4 mg, Sublingual, Q5 Min PRN, Claire, Birrilla Winnie, DO    ondansetron ODT (ZOFRAN-ODT) disintegrating tablet 4 mg, 4 mg, Oral, Q6H PRN **OR** ondansetron (ZOFRAN) injection 4 mg, 4 mg, Intravenous, Q6H PRN, J Luis Claire DO    pantoprazole (PROTONIX) EC tablet 40 mg, 40 mg, Oral, Daily, J Luis Claire DO, 40 mg at 10/21/24 0841    predniSONE (DELTASONE) tablet 20 mg, 20 mg, Oral, Daily With Breakfast, Fuentes Shay MD, 20 mg at 10/21/24 0841    sodium chloride 0.9 % flush 10 mL, 10 mL, Intravenous, Q12H, J Luis Claire DO, 10 mL at 10/21/24 0842    sodium  chloride 0.9 % flush 10 mL, 10 mL, Intravenous, PRN, Claire, J Luis Zhou, DO, 10 mL at 10/19/24 2297      Assessment & Plan     CIDP Flare: Discussed with Dr. Shay.  Last day of IVIG today.  Recommends continuing prednisone 20 mg daily.  OT reports better for performance today as well.  Seizure disorder: No acute issues.  No change to current regimen.    Plan for disposition: Awaiting precert.    Lenin Desai MD  10/21/24  09:31 EDT

## 2024-10-21 NOTE — CASE MANAGEMENT/SOCIAL WORK
Continued Stay Note  [facility] [place]     Patient Name: Efren Christianson  MRN: [id number]  Today's Date: 10/21/2024    Admit Date: 10/16/2024    Plan: SNF referrals - will need precert   Discharge Plan       Row Name 10/21/24 1034       Plan    Plan SNF referrals - will need precert    Patient/Family in Agreement with Plan yes    Plan Comments Follow up with weekend CM who states family firs choice for referral is [facility] and second choice is [facility]. Spoke with Bucky/[facility] liaison who states referral rec'd via [facility] and his infor states no beds available at [facility], but he will contact the facility directly to verify and return call to CM. Spoke with Elsy/[facility] - She is undable to view referral via epic - Clinicals sent via email - await return call for ability to accept. Patients insurance will require a precert. Rec'd voice message from patients care giver, Lavern Carrera, requesting update regarding dc plans. CM will return call once more information is available. Will continue to follow.                   Discharge Codes    No documentation.                 Expected Discharge Date and Time       Expected Discharge Date Expected Discharge Time    Oct 21, 2024               Balaji Richards RN

## 2024-10-21 NOTE — CASE MANAGEMENT/SOCIAL WORK
Continued Stay Note  [place] [place]     Patient Name: Efren Christianson  MRN: [id number]  Today's Date: 10/21/2024    Admit Date: 10/16/2024    Plan: [facility] SNF - await precert   Discharge Plan       Row Name 10/21/24 1052       Plan    Plan [facility] SNF - await precert    Patient/Family in Agreement with Plan yes    Plan Comments Rec'd call from [facility]ucky[facility] liaison who states beds available at [facility], verified with Dr Desai that patient will not transfer to [facility] to continue treatment. Mrogan/[facility] notified to initiate precert. Spoke with patients care giver, Lavern Carrera, via phone to update. IMM updated. LVM for Bucky/Juan weiner that precert has been started for [facility].  Spoke with Xochitl[facility] to notify patient will dc to [facility] - [facility] will follow at the facility. CM will continue to follow.      Row Name 10/21/24 1034       Plan    Plan SNF referrals - will need precert    Patient/Family in Agreement with Plan yes    Plan Comments Follow up with weekend CM who states family firs choice for referral is [facility] and second choice is [facility]. Spoke with Bucky[facility] liaison who states referral rec'd via [facility] and his infor states no beds available at [facility], but he will contact the facility directly to verify and return call to CM. Spoke with Elsy/[facility] - She is undable to view referral via epic - Clinicals sent via email - await return call for ability to accept. Patients insurance will require a precert. Rec'd voice message from patients care giver, Lavern Carrera, requesting update regarding dc plans. CM will return call once more information is available. Will continue to follow.                   Discharge Codes    No documentation.                 Expected Discharge Date and Time       Expected Discharge Date Expected Discharge Time    Oct 21, 2024               Balaji Richards  RN

## 2024-10-21 NOTE — CASE MANAGEMENT/SOCIAL WORK
Continued Stay Note  [facility]     Patient Name: Efren Christianson  MRN: [id number]  Today's Date: 10/21/2024    Admit Date: 10/16/2024    Plan: [facility]- precert obtained- effective 10/22   Discharge Plan       Row Name 10/21/24 1545       Plan    Plan [facility]- precert obtained- effective 10/22    Patient/Family in Agreement with Plan yes    Plan Comments Per Van/Jessica precert has been obtained effective 10/22 for [facility]. Spoke with [facility] liaison who confirms ability to accept patient tomorrow. CALL REPORT TO  [phone]. Anticipate transport via EMS. LVM for patients care giver, Lavern Carrera to update, please update her once EMS is scheduled. Dr Desai updated. CM will follow through [place].                   Discharge Codes    No documentation.                 Expected Discharge Date and Time       Expected Discharge Date Expected Discharge Time    Oct 21, 2024               Balaji Richards RN

## 2024-10-21 NOTE — PROGRESS NOTES
"  Patient Identification:  NAME:  Efren Christianson  Age:  79 y.o.   Sex:  male   :  1945   MRN:  [id number]       Chief complaint: Exacerbation of CIDP, treatment with IVIG consecutive treatment x 5 days    History of present illness: \"I am stronger\".  He definitely feels like his arms are moving better and his legs are a bit stronger as well.  I agree, see my exam.  He has no shortness of breath.  No dysarthria and no dysphagia.      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    Home medications:  Medications Prior to Admission   Medication Sig Dispense Refill Last Dose/Taking    Emollient (Aquaphor Advanced Therapy) ointment ointment Apply 1 Application topically to the appropriate area as directed Every 12 (Twelve) Hours.   10/15/2024    Immune Globulin, Human, (GAMMAGARD IV) Infuse 1 g/kg into a venous catheter Every 30 (Thirty) Days.   Past Month    levETIRAcetam (KEPPRA) 1000 MG tablet Take 1 tablet by mouth Every 12 (Twelve) Hours.   10/15/2024    multivitamin (THERAGRAN) tablet tablet Take  by mouth Daily.   10/15/2024    pantoprazole (PROTONIX) 40 MG EC tablet Take 1 tablet by mouth Daily for 30 days. 30 tablet 0 10/15/2024    predniSONE (DELTASONE) 20 MG tablet Take 2 tablets by mouth Daily With Breakfast for 7 days, THEN 1 tablet Daily With Breakfast for 7 days. 21 tablet 0 10/15/2024        Hospital medications:  acetaminophen, 650 mg, Oral, Daily  Aquaphor Advanced Therapy, 1 Application, Topical, Q12H  cetirizine, 10 mg, Oral, Daily  diphenhydrAMINE, 25 mg, Oral, Daily  enoxaparin, 40 mg, Subcutaneous, Q24H  Famotidine (PF), 20 mg, Intravenous, Daily  levETIRAcetam, 1,000 mg, Oral, Q12H  losartan, 50 mg, Oral, Daily  mupirocin, 1 Application, Each " Nare, BID  pantoprazole, 40 mg, Oral, Daily  predniSONE, 20 mg, Oral, Daily With Breakfast  sodium chloride, 10 mL, Intravenous, Q12H           acetaminophen **OR** acetaminophen    labetalol    nitroglycerin    ondansetron ODT **OR** ondansetron    sodium chloride      Objective:  Vitals Ranges:   Temp:  [97.7 °F (36.5 °C)-98.3 °F (36.8 °C)] 97.8 °F (36.6 °C)  Heart Rate:  [61-94] 81  Resp:  [16-20] 18  BP: (122-160)/(72-97) 122/72      Physical Exam:  Awake alert and oriented x 3.  Language is strong.  He is not short of breath.  O2 saturations 95%.  No dyspnea at all.  He can raise the right arm better than he did several days ago.  Still has difficulty lifting the left arm but that may be a bit improved as well.  He has no  on either side but seems to straighten out the right fingers a bit more.  Lower extremity strength has definitely improved.  He can lift both legs off of the resting position while he is sitting in a chair.  This is improved from several days ago.  His plantarflexion is excellent bilaterally.  He does not have dorsiflexion of the left ankle but does have good dorsiflexion of the right ankle.  He is areflexic.  Toes are mute    Results review:   I reviewed the patient's new clinical results.    Data review:  Lab Results (last 24 hours)       Procedure Component Value Units Date/Time    CBC & Differential [[id number]]  (Abnormal) Collected: 10/21/24 0700    Specimen: Blood Updated: 10/21/24 0821    Narrative:      The following orders were created for panel order CBC & Differential.  Procedure                               Abnormality         Status                     ---------                               -----------         ------                     CBC Auto Differential[[id number]]        Abnormal            Final result               Scan Slide[[id number]]                   Normal              Final result                 Please view results for these tests on the individual orders.     Scan Slide [[id number]]  (Normal) Collected: 10/21/24 0700    Specimen: Blood Updated: 10/21/24 0821     RBC Morphology Normal     WBC Morphology Normal     Platelet Morphology Normal    Basic Metabolic Panel [[id number]]  (Abnormal) Collected: 10/21/24 0700    Specimen: Blood Updated: 10/21/24 0747     Glucose 81 mg/dL      BUN 31 mg/dL      Creatinine 0.90 mg/dL      Sodium 134 mmol/L      Potassium 4.0 mmol/L      Chloride 102 mmol/L      CO2 28.0 mmol/L      Calcium 8.1 mg/dL      BUN/Creatinine Ratio 34.4     Anion Gap 4.0 mmol/L      eGFR 88.0 mL/min/1.73     Narrative:      GFR Normal >60  Chronic Kidney Disease <60  Kidney Failure <15    The GFR formula is only valid for adults with stable renal function between ages 18 and 70.    CBC Auto Differential [[id number]]  (Abnormal) Collected: 10/21/24 0700    Specimen: Blood Updated: 10/21/24 0719     WBC 6.27 10*3/mm3      RBC 3.80 10*6/mm3      Hemoglobin 12.6 g/dL      Hematocrit 37.6 %      MCV 98.9 fL      MCH 33.2 pg      MCHC 33.5 g/dL      RDW 12.4 %      RDW-SD 44.8 fl      MPV 9.9 fL      Platelets 145 10*3/mm3      Neutrophil % 67.0 %      Lymphocyte % 21.9 %      Monocyte % 10.4 %      Eosinophil % 0.2 %      Basophil % 0.2 %      Immature Grans % 0.3 %      Neutrophils, Absolute 4.21 10*3/mm3      Lymphocytes, Absolute 1.37 10*3/mm3      Monocytes, Absolute 0.65 10*3/mm3      Eosinophils, Absolute 0.01 10*3/mm3      Basophils, Absolute 0.01 10*3/mm3      Immature Grans, Absolute 0.02 10*3/mm3      nRBC 0.0 /100 WBC              Imaging:  Imaging Results (Last 24 Hours)       ** No results found for the last 24 hours. **               Assessment and Plan:     First of all, the patient has had definite motor improvement since he has received the 5 days consecutively of the IVIG.  He feels stronger and by my exam.  He is stronger.  Therefore I think we should definitely continue prednisone 20 mg p.o. daily with breakfast indefinitely.  I would not taper  this medication.  Secondly, he is done with the IVIG and I would NOT be in favor of plasma pheresis.  It would basically be removing the IVIG that we have given him and which has been associated with a definite slow improvement.    Therefore I think the best is going to be to let him be discharged where he will be able to have rehab and time.  He knows that if he is getting worse.  He can let everyone know and he can always come back for more IVIG or if plasma exchange would be beneficial at that time.    Recall we have talked about getting him started on the Vyvgart, and that could be started as an outpatient procedure.  I will contact  and let him know what our plans are at this point.      Fuentes Shay MD  10/21/24  12:23 EDT

## 2024-10-21 NOTE — PROGRESS NOTES
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     Pt declines Boost Plus with meals at this time.Encouraged request snacks from floor.    Will cont to follow and monitor.    Reason for Assessment       Row Name 10/21/24 1138          Reason for Assessment    Reason For Assessment per organizational policy  LOS     Diagnosis other (see comments)  Acute Flare-up CIDP, HTN hx sz                    Nutrition/Diet History       Row Name 10/21/24 1130          Nutrition/Diet History    Typical Intake (Food/Fluid/EN/PN) Spoke w pt at chairside. Pt reports slow progress. Normally can feed self and do some meal prep but not with this. Has gained wt and done better since living with caregivers. NKFA. Denies issue chew/swallowing. Being hungry was foreign he didn't realize it at first. Eats 3 times per day at home. Eating well here, declines boost, feels like he is eating well, hard to be hungry for next meal but steroids have helped.                    Labs/Tests/Procedures/Meds       Row Name 10/21/24 1132          Labs/Procedures/Meds    Lab Results Reviewed reviewed     Lab Results Comments BUn 31 H        Diagnostic Tests/Procedures    Diagnostic Test/Procedure Reviewed reviewed        Medications    Pertinent Medications Reviewed reviewed     Pertinent Medications Comments prednisone                    Physical Findings       Row Name 10/21/24 1133          Physical Findings    Overall Physical Appearance nutrition exam complete: temporal, clavicle, acromion muscle loss, upper arm and orbital fat loss; legs limited used so difficult to tell nutritional/usage loss                    Estimated/Assessed  Needs - Anthropometrics       Row Name 10/21/24 1140          Anthropometrics    Calculation Weight 68 kg (149 lb 14.6 oz)        Estimated/Assessed Needs    Additional Documentation Estimated Calorie Needs (Group);Fluid Requirements (Group);Protein Requirements (Group)        Estimated Calorie Needs    Estimated Calorie Require (kcal/day) 2040 kcal ( 30 kcal/kg )     Estimated Calorie Need Method kcal/kg        Protein Requirements    Est Protein Requirement Amount (gms/kg) 1.5 gm protein  102 gm pro        Fluid Requirements    Estimated Fluid Requirement Method RDA Method  2040 ml                    Nutrition Prescription Ordered       Row Name 10/21/24 1140          Nutrition Prescription PO    Current PO Diet Regular                    Evaluation of Received Nutrient/Fluid Intake       Row Name 10/21/24 1140          Fluid Intake Evaluation    Oral Fluid (mL) 1170  ave x 3, 57%        PO Evaluation    Number of Meals 6     % PO Intake 95                    Malnutrition Severity Assessment       Row Name 10/21/24 1141          Malnutrition Severity Assessment    Malnutrition Type Chronic Disease - Related Malnutrition        Insufficient Energy Intake     Insufficient Energy Intake  <75% of est. energy requirement for > or equal to 1 month        Muscle Loss    [place] Severe - deep hollowing/scooping, lack of muscle to touch, facial bones well defined     Clavicle Bone Region Severe - protruding prominent bone     Acromion Bone Region Severe - squared shoulders, bones, and acromion process protrusion prominent     Patellar Region Severe - prominent bone, square looking, very little muscle definition     Anterior Thigh Region --  difficult to tell b/c limited usage     Posterior Calf Region --  difficult to tell b/c limited usage        Fat Loss    Orbital Region  Moderate -  somewhat hollowness, slightly dark circles     Upper Arm Region Severe - mostly skin, very little space between folds, fingers touch         Criteria Met (Must meet criteria for severity in at least 2 of these categories: M Wasting, Fat Loss, Fluid, Secondary Signs, Wt. 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                          Intervention Goal       Row Name 10/21/24 1142          Intervention Goal    General Meet nutritional needs for age/condition     PO Establish PO;PO intake (%)     PO Intake % 75 %  or greater                    Nutrition Intervention       Row Name 10/21/24 1143          Nutrition Intervention    RD/Tech Action Follow Tx progress;Encourage intake;Supplement offered/refused                      Education/Evaluation       Row Name 10/21/24 1143          Education    Education Provided education regarding  Edu on reg diet. Edu on ONS. Edu on snacks/shakes. Edu on nutrition exam. Edu on pro sources.        Monitor/Evaluation    Monitor Per protocol;I&O;PO intake;Pertinent labs;Weight;Symptoms     Education Follow-up Other (comment)  pt verbalized but declined supplment.                     Electronically signed by:  Stephanie Mendez RD  10/21/24 11:44 EDT

## 2024-10-21 NOTE — CASE MANAGEMENT/SOCIAL WORK
Post-Acute Authorization Submission      Post Acute Pre-Cert Documentation  Request Submitted by Facility - Type:: Hospital  Post-Acute Authorization Type Submitted:: SNF  Date Post Acute Pre-Cert Inititated per Facility: 10/21/24  Date Post Acute Pre-Cert Completed: 10/21/24  Accepting Facility: [facility] Discharge Date Requested: 10/22/24  All Clinicals Submitted?: Yes  Had Accepting Facility at Time of Submission: Yes  Response Received from Insurance?: Approval  Response Communicated to:: , Accepting Facility Liaison, Accepting Facility Auth Department  Authorization Number:: [id number]  Post Acute Pre-Cert Initiated Comment: VALID TO ADMIT UPTO 10/25/24.              Van Doll, PCT

## 2024-10-21 NOTE — PLAN OF CARE
Problem: Adult Inpatient Plan of Care  Goal: Plan of Care Review  Recent Flowsheet Documentation  Taken 10/21/2024 1219 by Francis Good, ANNIE  Outcome Evaluation: Occupational therapy. Pt reports he has noticed some increased active motion of his upper extremities today. He continues with significant weakness, yet he demonstrated increased arom against gravity for RUE and increased rom of LUE's in  gravity eliminated plane. Pt also demonstrated partial active rom of bilateral hands. Pt is dependent for management of adl's at this time. Rec SNF upon discharge.

## 2024-10-22 VITALS
HEIGHT: 68 IN | SYSTOLIC BLOOD PRESSURE: 141 MMHG | RESPIRATION RATE: 16 BRPM | HEART RATE: 87 BPM | OXYGEN SATURATION: 94 % | WEIGHT: 149.91 LBS | DIASTOLIC BLOOD PRESSURE: 80 MMHG | TEMPERATURE: 97.5 F | BODY MASS INDEX: 22.72 KG/M2

## 2024-10-22 PROCEDURE — 63710000001 DIPHENHYDRAMINE PER 50 MG: Performed by: INTERNAL MEDICINE

## 2024-10-22 PROCEDURE — 63710000001 PREDNISONE PER 1 MG: Performed by: PSYCHIATRY & NEUROLOGY

## 2024-10-22 PROCEDURE — 94799 UNLISTED PULMONARY SVC/PX: CPT

## 2024-10-22 PROCEDURE — 99238 HOSP IP/OBS DSCHRG MGMT 30/<: CPT | Performed by: HOSPITALIST

## 2024-10-22 RX ORDER — PREDNISONE 20 MG/1
20 TABLET ORAL
Start: 2024-10-22 | End: 2024-11-21

## 2024-10-22 RX ADMIN — DIPHENHYDRAMINE HYDROCHLORIDE 25 MG: 25 CAPSULE ORAL at 08:46

## 2024-10-22 RX ADMIN — Medication 10 ML: at 08:47

## 2024-10-22 RX ADMIN — PREDNISONE 20 MG: 20 TABLET ORAL at 08:45

## 2024-10-22 RX ADMIN — LEVETIRACETAM 1000 MG: 500 TABLET, FILM COATED ORAL at 08:45

## 2024-10-22 RX ADMIN — LOSARTAN POTASSIUM 50 MG: 50 TABLET, FILM COATED ORAL at 08:45

## 2024-10-22 RX ADMIN — MUPIROCIN 1 APPLICATION: 20 OINTMENT TOPICAL at 08:46

## 2024-10-22 RX ADMIN — CETIRIZINE HYDROCHLORIDE 10 MG: 10 TABLET, FILM COATED ORAL at 08:45

## 2024-10-22 RX ADMIN — ACETAMINOPHEN 650 MG: 325 TABLET ORAL at 08:45

## 2024-10-22 RX ADMIN — PANTOPRAZOLE SODIUM 40 MG: 40 TABLET, DELAYED RELEASE ORAL at 08:46

## 2024-10-22 RX ADMIN — WHITE PETROLATUM 41 % TOPICAL OINTMENT 1 APPLICATION: OINTMENT at 08:47

## 2024-10-22 NOTE — PLAN OF CARE
Goal Outcome Evaluation:  Plan of Care Reviewed With: patient        Progress: improving  Outcome Evaluation: pt A&OX4, VSS, room air, patient turned Q2, assisted with urinal, no complaints of pain, plan to transfer to rehab today

## 2024-10-22 NOTE — DISCHARGE SUMMARY
Efren Christianson  1945  [id number]    Hospitalists Discharge Summary    Date of Admission: 10/16/2024  Date of Discharge:  10/22/2024    Primary Discharge Diagnoses:  CIDP Flare  Elevated Blood Pressure  Severe chronic disease related malnutrition.    Secondary Discharge Diagnoses:  Seizure Disorder    History of Present Illness (taken from H&P):  Efren Christianson is a 79 y.o. male with a past medical history of CIDP and seizures who presented to [facility] ED for worsening weakness and decreased oxygen levels. He is followed outpatient by Dr Reyes and receives monthly IVIG infusions. Patient was just recently admitted and discharged from this facility for similar presentation, he had been on steroids during that admission and was discharged, with plan to receive IVIG outpatient today, however, given his increased weakness and low oxygen levels he was sent to the ED. He was found to be profoundly weak, Dr. Jo discussed with neurology who recommends to admit for IVIG and if no improvement may ultimately require plasmapheresis.   Patient is sitting up in bed during my exam, states he is able to move his right hand and right lower extremity slight, but left side is profoundly weak. He denies any chest pain, shortness of breath, or difficulty swallowing. He denies any fever or chills.   Nursing reports patient and his POA have refused any further steroids.     Hospital Course:  Mr. Christianson was admitted to the medical/surgical unit.  He was continued on IVIG for 5 days.  He was followed by PT and OT as well as Dr. Shay with neurology.  Dr. Shay recommended he continue on 20 mg of prednisone daily indefinitely for now.  Also, as noted previously, his blood pressures had been trending a little higher.  I felt this was initially due to the very high dose of Solu-Medrol he had been on previously that likely exacerbated this.  I am holding his losartan at discharge.  Would like him to follow-up with  his primary care doctor in 1 week to have his BP rechecked.  If not at goal, can certainly add back therapy however I am concerned with morbidity from hypotensive episodes in his particular clinical context.  Also of note, given the daily prednisone usage, I am adding a calcium and vitamin D supplement.    PCP  Patient Care Team:  Winnie Murray as PCP - General (Nurse Practitioner)  Winnie Murray (Nurse Practitioner)    Consults:   Consults       Date and Time Order Name Status Description    10/16/2024 11:52 AM Inpatient Neurology Consult General Completed     10/8/2024  2:35 PM Inpatient Neurology Consult Other (see comments) Completed             Operations and Procedures Performed:       CT Angiogram Chest    Result Date: 10/16/2024  Narrative: CT ANGIOGRAM CHEST Date of Exam: 10/16/2024 10:20 AM EDT Indication: 79-year-old male patient, history of CIDP, seizure.  Recent hospitalization.  Now immobilized.  Weakness.  Desaturation and shortness of breath.  Please evaluate for PE. Comparison: 7/3/2023 Technique: CTA of the chest was performed before and after the uneventful intravenous administration of iodinated contrast. Reconstructed coronal and sagittal images were also obtained. In addition, a 3-D volume rendered image was created for interpretation. Automated exposure control and iterative reconstruction methods were used. FINDINGS: No significant focal filling defects are identified to indicate the presence of pulmonary embolism. Increasing densities are noted within the bilateral lung bases suggesting atelectasis versus developing bibasilar infiltrates or bibasilar pneumonia. The findings are more pronounced in the left lower lobe. No pleural effusion is seen. No significant hilar, mediastinal, or axillary lymphadenopathy is observed. The heart and great vessels are stable. Mild coronary artery calcifications are identified. The thyroid gland is stable. The esophagus is unremarkable. There is a prominent  fat-containing right anterior diaphragmatic hernia extending into the right anterior lower mediastinum. This finding is stable. The limited evaluation of the upper abdomen demonstrates no evidence for acute abnormality. No acute osseous abnormalities are observed. The subcutaneous cystic lesion seen near the midline on the prior CT has resolved.     Impression: 1.No evidence for pulmonary embolism. 2.Increasing densities are noted within the bilateral lung bases suggesting worsening atelectasis or infiltrates. The findings may indicate developing bibasilar pneumonia. Electronically Signed: Bandar Man MD  10/16/2024 11:13 AM EDT  Workstation ID: [id number]    XR Chest 1 View    Result Date: 10/16/2024  Narrative: XR CHEST 1 VW Date of Exam: 10/16/2024 10:41 AM EDT Indication: 79-year-old male patient, saturation is in the low 90s.  Shortness of breath. Comparison: CT chest 10/16/2024. Chest x-ray 10/11/2024. CT chest 7/3/2023 FINDINGS: No definitive new consolidations or pleural effusions are observed. There is chronic elevation of left hemidiaphragm. The cardiac silhouette and mediastinum are stable. No acute osseous abnormalities are identified. An ovoid external density is seen along the lower aspect of the chest. This finding is better appreciated on the prior CT and suggest a large chronic subcutaneous cystic lesion. There is also evidence for a lower right anterior mediastinal diaphragmatic hernia on the prior CT.     Impression: Chronic changes are noted which appear stable. There is no definitive evidence for acute cardiopulmonary process. Electronically Signed: Bandar Man MD  10/16/2024 10:46 AM EDT  Workstation ID: [id number]    CT Head Without Contrast    Result Date: 10/16/2024  Narrative: CT HEAD WO CONTRAST Date of Exam: 10/16/2024 10:19 AM EDT Indication: 79-year-old male patient, history of CDI PE, now with left-sided weakness.  Please evaluate for stroke. Comparison: None available. Technique:  Axial CT images were obtained of the head without contrast administration.  Coronal reconstructions were performed.  Automated exposure control and iterative reconstruction methods were used. FINDINGS: There is no evidence for acute intracranial hemorrhage. No definitive acute focal ischemia is identified. Nonspecific white matter changes are noted likely related to chronic small vessel ischemic changes and age-related changes. Associated diffuse volume loss is observed. There is no evidence for abnormal cerebral edema. There is no mass effect or midline shift. The ventricular system is nondilated. The basal cisterns are patent. The skull is intact. The paranasal sinuses and mastoid air cells are  clear.     Impression: 1.No evidence for acute intracranial abnormality. 2.Nonspecific white matter changes are noted with associated diffuse volume loss. These findings are likely related to chronic small vessel ischemic changes and/or age-related changes. Electronically Signed: Bandar Man MD  10/16/2024 10:43 AM EDT  Workstation ID: [id number]    XR Chest PA & Lateral    Result Date: 10/11/2024  Narrative: XR CHEST PA AND LATERAL Date of Exam: 10/11/2024 10:28 AM EDT Indication: Dyspnea Comparison: 7/3/2023 Findings: Unchanged cardiomediastinal silhouette. No focal airspace opacity, pleural effusion, or pneumothorax. Low lung volumes. 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 Osseous structures are unremarkable.     Impression: Impression: No acute cardiopulmonary abnormality. Electronically Signed: Martinez Deluna MD  10/11/2024 10:35 AM EDT  Workstation ID: [id number]     Allergies:  has No Known Allergies.    Robin  reviewed    Discharge Medications:     Discharge Medications        New Medications        Instructions Start Date   Calcium Citrate-Vitamin D 250-2.5 MG-MCG per tablet   1 tablet, Oral, 2 Times Daily             Changes to Medications         Instructions Start Date   predniSONE 20 MG tablet  Commonly known as: DELTASONE  What changed: See the new instructions.   20 mg, Oral, Daily With Breakfast             Continue These Medications        Instructions Start Date   Aquaphor Advanced Therapy ointment ointment   1 Application, Topical, Every 12 Hours Scheduled      GAMMAGARD IV   1 g/kg, Every 30 Days      levETIRAcetam 1000 MG tablet  Commonly known as: KEPPRA   1 tablet, Every 12 Hours Scheduled      multivitamin tablet tablet   Daily      pantoprazole 40 MG EC tablet  Commonly known as: PROTONIX   40 mg, Oral, Daily               Last Lab Results:   Lab Results (most recent)       Procedure Component Value Units Date/Time    CBC & Differential [[id number]]  (Abnormal) Collected: 10/21/24 0700    Specimen: Blood Updated: 10/21/24 0821    Narrative:      The following orders were created for panel order CBC & Differential.  Procedure                               Abnormality         Status                     ---------                               -----------         ------                     CBC Auto Differential[[id number]]        Abnormal            Final result               Scan Slide[[id number]]                   Normal              Final result                 Please view results for these tests on the individual orders.    Scan Slide [[id number]]  (Normal) Collected: 10/21/24 0700    Specimen: Blood Updated: 10/21/24 0821     RBC Morphology Normal     WBC Morphology Normal     Platelet Morphology Normal    Basic Metabolic Panel [[id number]]  (Abnormal) Collected: 10/21/24 0700    Specimen: Blood Updated: 10/21/24 0747     Glucose 81 mg/dL      BUN 31 mg/dL      Creatinine 0.90 mg/dL      Sodium 134 mmol/L      Potassium 4.0 mmol/L      Chloride 102 mmol/L      CO2 28.0 mmol/L      Calcium 8.1 mg/dL      BUN/Creatinine Ratio 34.4     Anion Gap 4.0 mmol/L      eGFR 88.0 mL/min/1.73     Narrative:      GFR Normal >60  Chronic Kidney Disease  <60  Kidney Failure <15    The GFR formula is only valid for adults with stable renal function between ages 18 and 70.    CBC Auto Differential [[id number]]  (Abnormal) Collected: 10/21/24 0700    Specimen: Blood Updated: 10/21/24 0719     WBC 6.27 10*3/mm3      RBC 3.80 10*6/mm3      Hemoglobin 12.6 g/dL      Hematocrit 37.6 %      MCV 98.9 fL      MCH 33.2 pg      MCHC 33.5 g/dL      RDW 12.4 %      RDW-SD 44.8 fl      MPV 9.9 fL      Platelets 145 10*3/mm3      Neutrophil % 67.0 %      Lymphocyte % 21.9 %      Monocyte % 10.4 %      Eosinophil % 0.2 %      Basophil % 0.2 %      Immature Grans % 0.3 %      Neutrophils, Absolute 4.21 10*3/mm3      Lymphocytes, Absolute 1.37 10*3/mm3      Monocytes, Absolute 0.65 10*3/mm3      Eosinophils, Absolute 0.01 10*3/mm3      Basophils, Absolute 0.01 10*3/mm3      Immature Grans, Absolute 0.02 10*3/mm3      nRBC 0.0 /100 WBC     Basic Metabolic Panel [[id number]]  (Abnormal) Collected: 10/20/24 0649    Specimen: Blood Updated: 10/20/24 0733     Glucose 84 mg/dL      BUN 29 mg/dL      Creatinine 0.91 mg/dL      Sodium 135 mmol/L      Potassium 4.1 mmol/L      Chloride 104 mmol/L      CO2 25.2 mmol/L      Calcium 8.3 mg/dL      BUN/Creatinine Ratio 31.9     Anion Gap 5.8 mmol/L      eGFR 86.8 mL/min/1.73     Narrative:      GFR Normal >60  Chronic Kidney Disease <60  Kidney Failure <15    The GFR formula is only valid for adults with stable renal function between ages 18 and 70.    CBC & Differential [[id number]]  (Abnormal) Collected: 10/20/24 0649    Specimen: Blood Updated: 10/20/24 0727    Narrative:      The following orders were created for panel order CBC & Differential.  Procedure                               Abnormality         Status                     ---------                               -----------         ------                     CBC Auto Differential[[id number]]        Abnormal            Final result               Scan Slide[[id number]]                                                                     Please view results for these tests on the individual orders.    CBC Auto Differential [[id number]]  (Abnormal) Collected: 10/20/24 0649    Specimen: Blood Updated: 10/20/24 0727     WBC 6.83 10*3/mm3      RBC 4.03 10*6/mm3      Hemoglobin 13.4 g/dL      Hematocrit 39.5 %      MCV 98.0 fL      MCH 33.3 pg      MCHC 33.9 g/dL      RDW 12.1 %      RDW-SD 44.2 fl      MPV 9.5 fL      Platelets 131 10*3/mm3      Neutrophil % 68.3 %      Lymphocyte % 21.5 %      Monocyte % 9.2 %      Eosinophil % 0.3 %      Basophil % 0.4 %      Immature Grans % 0.3 %      Neutrophils, Absolute 4.66 10*3/mm3      Lymphocytes, Absolute 1.47 10*3/mm3      Monocytes, Absolute 0.63 10*3/mm3      Eosinophils, Absolute 0.02 10*3/mm3      Basophils, Absolute 0.03 10*3/mm3      Immature Grans, Absolute 0.02 10*3/mm3     Scan Slide [[id number]]  (Normal) Collected: 10/18/24 0553    Specimen: Blood Updated: 10/18/24 0708     RBC Morphology Normal     WBC Morphology Normal     Platelet Morphology Normal    Magnesium [[id number]]  (Normal) Collected: 10/18/24 0553    Specimen: Blood Updated: 10/18/24 0616     Magnesium 2.3 mg/dL     Magnesium [[id number]]  (Normal) Collected: 10/17/24 0624    Specimen: Blood Updated: 10/17/24 0649     Magnesium 2.1 mg/dL     High Sensitivity Troponin T 2Hr [[id number]]  (Normal) Collected: 10/16/24 1222    Specimen: Blood Updated: 10/16/24 1240     HS Troponin T 15 ng/L      Troponin T Delta 0 ng/L     Narrative:      High Sensitive Troponin T Reference Range:  <14.0 ng/L- Negative Female for AMI  <22.0 ng/L- Negative Male for AMI  >=14 - Abnormal Female indicating possible myocardial injury.  >=22 - Abnormal Male indicating possible myocardial injury.   Clinicians would have to utilize clinical acumen, EKG, Troponin, and serial changes to determine if it is an Acute Myocardial Infarction or myocardial injury due to an underlying chronic condition.         Urinalysis,  "Microscopic Only - Urine, Clean Catch [[id number]]  (Abnormal) Collected: 10/16/24 1007    Specimen: Urine, Clean Catch Updated: 10/16/24 1102     RBC, UA 6-10 /HPF      WBC, UA 0-2 /HPF      Bacteria, UA Trace /HPF      Squamous Epithelial Cells, UA 0-2 /HPF      Hyaline Casts, UA None Seen /LPF      Methodology Manual Light Microscopy    Procalcitonin [[id number]]  (Normal) Collected: 10/16/24 0935    Specimen: Blood Updated: 10/16/24 1019     Procalcitonin 0.08 ng/mL     Narrative:      As a Marker for Sepsis (Non-Neonates):    1. <0.5 ng/mL represents a low risk of severe sepsis and/or septic shock.  2. >2 ng/mL represents a high risk of severe sepsis and/or septic shock.    As a Marker for Lower Respiratory Tract Infections that require antibiotic therapy:    PCT on Admission    Antibiotic Therapy       6-12 Hrs later    >0.5                Strongly Recommended  >0.25 - <0.5        Recommended   0.1 - 0.25          Discouraged              Remeasure/reassess PCT  <0.1                Strongly Discouraged     Remeasure/reassess PCT    As 28 day mortality risk marker: \"Change in Procalcitonin Result\" (>80% or <=80%) if Day 0 (or Day 1) and Day 4 values are available. Refer to http://[email or web address]-pct-calculator.com    Change in PCT <=80%  A decrease of PCT levels below or equal to 80% defines a positive change in PCT test result representing a higher risk for 28-day all-cause mortality of patients diagnosed with severe sepsis for septic shock.    Change in PCT >80%  A decrease of PCT levels of more than 80% defines a negative change in PCT result representing a lower risk for 28-day all-cause mortality of patients diagnosed with severe sepsis or septic shock.       Urinalysis With Microscopic If Indicated (No Culture) - Urine, Clean Catch [[id number]]  (Abnormal) Collected: 10/16/24 1007    Specimen: Urine, Clean Catch Updated: 10/16/24 1018     Color, UA Yellow     Appearance, UA Clear     pH, UA 6.0     Specific " Gravity, UA 1.025     Glucose, UA Negative     Ketones, UA 15 mg/dL (1+)     Bilirubin, UA Negative     Blood, UA Moderate (2+)     Protein, UA Negative     Leuk Esterase, UA Negative     Nitrite, UA Negative     Urobilinogen, UA 0.2 E.U./dL    High Sensitivity Troponin T [[id number]]  (Normal) Collected: 10/16/24 0935    Specimen: Blood Updated: 10/16/24 1016     HS Troponin T 15 ng/L     Narrative:      High Sensitive Troponin T Reference Range:  <14.0 ng/L- Negative Female for AMI  <22.0 ng/L- Negative Male for AMI  >=14 - Abnormal Female indicating possible myocardial injury.  >=22 - Abnormal Male indicating possible myocardial injury.   Clinicians would have to utilize clinical acumen, EKG, Troponin, and serial changes to determine if it is an Acute Myocardial Infarction or myocardial injury due to an underlying chronic condition.         Comprehensive Metabolic Panel [[id number]]  (Abnormal) Collected: 10/16/24 0935    Specimen: Blood Updated: 10/16/24 1016     Glucose 87 mg/dL      BUN 30 mg/dL      Creatinine 0.88 mg/dL      Sodium 137 mmol/L      Potassium 4.0 mmol/L      Chloride 100 mmol/L      CO2 24.9 mmol/L      Calcium 9.0 mg/dL      Total Protein 7.3 g/dL      Albumin 3.9 g/dL      ALT (SGPT) 88 U/L      AST (SGOT) 33 U/L      Alkaline Phosphatase 65 U/L      Total Bilirubin 0.7 mg/dL      Globulin 3.4 gm/dL      A/G Ratio 1.1 g/dL      BUN/Creatinine Ratio 34.1     Anion Gap 12.1 mmol/L      eGFR 88.6 mL/min/1.73     Narrative:      GFR Normal >60  Chronic Kidney Disease <60  Kidney Failure <15    The GFR formula is only valid for adults with stable renal function between ages 18 and 70.    CK [[id number]]  (Normal) Collected: 10/16/24 0935    Specimen: Blood Updated: 10/16/24 1016     Creatine Kinase 71 U/L     BNP [[id number]]  (Normal) Collected: 10/16/24 0935    Specimen: Blood Updated: 10/16/24 1016     proBNP 199.0 pg/mL     Narrative:      This assay is used as an aid in the diagnosis of  individuals suspected of having heart failure. It can be used as an aid in the diagnosis of acute decompensated heart failure (ADHF) in patients presenting with signs and symptoms of ADHF to the emergency department (ED). In addition, NT-proBNP of <300 pg/mL indicates ADHF is not likely.    Age Range Result Interpretation  NT-proBNP Concentration (pg/mL:      <50             Positive            >450                   Gray                 300-450                    Negative             <300    50-75           Positive            >900                  Gray                300-900                  Negative            <300      >75             Positive            >1800                  Gray                300-1800                  Negative            <300    Protime-INR [[id number]]  (Normal) Collected: 10/16/24 0935    Specimen: Blood Updated: 10/16/24 1008     Protime 13.4 Seconds      INR 0.99    Narrative:      Therapeutic Ranges for INR: 2.0-3.0 (PT 20-30)                              2.5-3.5 (PT 25-34)    D-dimer, Quantitative [[id number]]  (Abnormal) Collected: 10/16/24 0935    Specimen: Blood Updated: 10/16/24 1008     D-Dimer, Quantitative 0.81 MCGFEU/mL     Narrative:      According to the assay 's published package insert, a normal (<0.50 MCGFEU/mL) D-dimer result in conjunction with a non-high clinical probability assessment, excludes deep vein thrombosis (DVT) and pulmonary embolism (PE) with high sensitivity.    D-dimer values increase with age and this can make VTE exclusion of an older population difficult. To address this, the [employer], based on best available evidence and recent guidelines, recommends that clinicians use age-adjusted D-dimer thresholds in patients greater than 50 years of age with: a) a low probability of PE who do not meet all Pulmonary Embolism Rule Out Criteria, or b) in those with intermediate probability of PE.   The formula for an age-adjusted  "D-dimer cut-off is \"age/100\".  For example, a 60 year old patient would have an age-adjusted cut-off of 0.60 MCGFEU/mL and an 80 year old 0.80 MCGFEU/mL.    Blood Gas, Venous - [[id number]]  (Abnormal) Collected: 10/16/24 0945    Specimen: Venous Blood Updated: 10/16/24 0952     Site Nurse/Dr Draw     pH, Venous 7.470 pH Units      pCO2, Venous 42.3 mm Hg      pO2, Venous 55.9 mm Hg      Comment: 83 Value above reference range        HCO3, Venous 30.8 mmol/L      Comment: 83 Value above reference range        Base Excess, Venous 6.3 mmol/L      Comment: 83 Value above reference range        O2 Saturation, Venous 89.8 %      Comment: 83 Value above reference range        Hemoglobin, Blood Gas 16.8 g/dL      Temperature 37.0     Barometric Pressure for Blood Gas 750 mmHg      Modality RA     Collected by [id number]     Comment: Meter: [id number]     :  [id number]             Imaging Results (Most Recent)       Procedure Component Value Units Date/Time    CT Angiogram Chest [[id number]] Collected: 10/16/24 1046     Updated: 10/16/24 1117    Narrative:      CT ANGIOGRAM CHEST    Date of Exam: 10/16/2024 10:20 AM EDT    Indication: 79-year-old male patient, history of CIDP, seizure.  Recent hospitalization.  Now immobilized.  Weakness.  Desaturation and shortness of breath.  Please evaluate for PE.    Comparison: 7/3/2023    Technique: CTA of the chest was performed before and after the uneventful intravenous administration of iodinated contrast. Reconstructed coronal and sagittal images were also obtained. In addition, a 3-D volume rendered image was created for   interpretation. Automated exposure control and iterative reconstruction methods were used.      FINDINGS:  No significant focal filling defects are identified to indicate the presence of pulmonary embolism.    Increasing densities are noted within the bilateral lung bases suggesting atelectasis versus developing bibasilar infiltrates or bibasilar " pneumonia. The findings are more pronounced in the left lower lobe. No pleural effusion is seen.    No significant hilar, mediastinal, or axillary lymphadenopathy is observed. The heart and great vessels are stable. Mild coronary artery calcifications are identified. The thyroid gland is stable. The esophagus is unremarkable. There is a prominent   fat-containing right anterior diaphragmatic hernia extending into the right anterior lower mediastinum. This finding is stable.    The limited evaluation of the upper abdomen demonstrates no evidence for acute abnormality.    No acute osseous abnormalities are observed.    The subcutaneous cystic lesion seen near the midline on the prior CT has resolved.      Impression:      1.No evidence for pulmonary embolism.  2.Increasing densities are noted within the bilateral lung bases suggesting worsening atelectasis or infiltrates. The findings may indicate developing bibasilar pneumonia.        Electronically Signed: Bandar Man MD    10/16/2024 11:13 AM EDT    Workstation ID: [id number]    XR Chest 1 View [[id number]] Collected: 10/16/24 1044     Updated: 10/16/24 1049    Narrative:      XR CHEST 1 VW    Date of Exam: 10/16/2024 10:41 AM EDT    Indication: 79-year-old male patient, saturation is in the low 90s.  Shortness of breath.    Comparison: CT chest 10/16/2024. Chest x-ray 10/11/2024. CT chest 7/3/2023    FINDINGS:  No definitive new consolidations or pleural effusions are observed. There is chronic elevation of left hemidiaphragm. The cardiac silhouette and mediastinum are stable. No acute osseous abnormalities are identified. An ovoid external density is seen   along the lower aspect of the chest. This finding is better appreciated on the prior CT and suggest a large chronic subcutaneous cystic lesion. There is also evidence for a lower right anterior mediastinal diaphragmatic hernia on the prior CT.      Impression:      Chronic changes are noted which appear  stable. There is no definitive evidence for acute cardiopulmonary process.        Electronically Signed: Bandar Man MD    10/16/2024 10:46 AM EDT    Workstation ID: [id number]    CT Head Without Contrast [[id number]] Collected: 10/16/24 1042     Updated: 10/16/24 1047    Narrative:      CT HEAD WO CONTRAST    Date of Exam: 10/16/2024 10:19 AM EDT    Indication: 79-year-old male patient, history of CDI PE, now with left-sided weakness.  Please evaluate for stroke.    Comparison: None available.    Technique: Axial CT images were obtained of the head without contrast administration.  Coronal reconstructions were performed.  Automated exposure control and iterative reconstruction methods were used.      FINDINGS:  There is no evidence for acute intracranial hemorrhage. No definitive acute focal ischemia is identified. Nonspecific white matter changes are noted likely related to chronic small vessel ischemic changes and age-related changes. Associated diffuse   volume loss is observed. There is no evidence for abnormal cerebral edema. There is no mass effect or midline shift. The ventricular system is nondilated. The basal cisterns are patent. The skull is intact. The paranasal sinuses and mastoid air cells are   clear.      Impression:      1.No evidence for acute intracranial abnormality.  2.Nonspecific white matter changes are noted with associated diffuse volume loss. These findings are likely related to chronic small vessel ischemic changes and/or age-related changes.        Electronically Signed: Bandar Man MD    10/16/2024 10:43 AM EDT    Workstation ID: [id number]            PROCEDURES      Condition on Discharge: Stable    Physical Exam at Discharge  Vital Signs  Temp:  [97 °F (36.1 °C)-98 °F (36.7 °C)] 97.5 °F (36.4 °C)  Heart Rate:  [77-88] 87  Resp:  [16-18] 16  BP: (122-149)/(72-92) 141/80    Physical Exam:  Physical Exam   Constitutional: Patient appears in no acute distress   Cardiovascular: Regular  rate, regular rhythm, S1 normal and S2 normal.  Exam reveals no gallop and no friction rub.  No murmur heard.  Pulmonary/Chest: Lungs are clear to auscultation bilaterally. No respiratory distress. No wheezes. No rhonchi. No rales.   Abdominal: Soft. Bowel sounds are normal. There is no tenderness.   Extremities: No edema.   Neurological: Patient is alert and oriented to person, place, and time. Cranial nerves II-XII are grossly intact with no focal deficits.  Muscle bulk is decreased  Skin: Skin is warm. No rash noted. Nails show no clubbing.  No cyanosis or erythema.    Discharge Disposition  Rehab    Visiting Nurse:    No     Home PT/OT:  No     Home Safety Evaluation:  No     DME  None    Discharge Diet:      Dietary Orders (From admission, onward)       Start     Ordered    10/16/24 1424  Diet: Regular/House; Fluid Consistency: Thin (IDDSI 0)  Diet Effective Now        References:    Diet Order Crosswalk   Question Answer Comment   Diets: Regular/House    Fluid Consistency: Thin (IDDSI 0)        10/16/24 1425                    Activity at Discharge:  As tolerated      Follow-up Appointments  Future Appointments   Date Time Provider Department Center   12/13/2024 11:20 AM Efraín Reyes MD [facility]     Additional Instructions for the Follow-ups that You Need to Schedule       Discharge Follow-up with PCP   As directed       Currently Documented PCP:    Winnie Murray    PCP Phone Number:    [phone]     Follow Up Details: 1-2 weeks        Discharge Follow-up with Specified Provider: Efraín Wen M.D. as scheduled   As directed      To: Efraín Wen M.D. as scheduled                Test Results Pending at Discharge       Lenin Desai MD  10/22/24  08:35 EDT    Time: <30 minutes

## 2024-10-23 NOTE — CASE MANAGEMENT/SOCIAL WORK
Case Management Discharge Note      Final Note: Discharged to [facility] - Discharged on 10/22/2024 Admission date: 10/16/2024 - Discharge disposition: Rehab Facility or Unit (DC - External)      Destination Coordination complete.      Service Provider Services Address Phone Fax Patient Preferred    [facility] [address] [place] [phone] --              Durable Medical Equipment    No services have been selected for the patient.                Dialysis/Infusion    No services have been selected for the patient.                Home Medical Care    No services have been selected for the patient.                Therapy    No services have been selected for the patient.                Community Resources    No services have been selected for the patient.                Community & DME    No services have been selected for the patient.                         Final Discharge Disposition Code: 03 - skilled nursing facility (SNF)

## 2024-10-28 ENCOUNTER — TELEPHONE (OUTPATIENT)
Dept: NEUROLOGY | Facility: CLINIC | Age: 79
End: 2024-10-28
Payer: MEDICARE

## 2024-10-28 NOTE — TELEPHONE ENCOUNTER
Called Lavern to discuss next plan of treatment. I informed her, per Dr. Reyes, pt to begin Vyvgart and pt can begin anytime, there is no wait period needed since his last IVIG infusion on 10/19/24. Lavern v/u and will call [facility] to schedule.

## 2024-10-29 ENCOUNTER — TELEPHONE (OUTPATIENT)
Dept: INFUSION THERAPY | Facility: HOSPITAL | Age: 79
End: 2024-10-29
Payer: MEDICARE

## 2024-10-29 DIAGNOSIS — G61.81 CIDP (CHRONIC INFLAMMATORY DEMYELINATING POLYNEUROPATHY): Primary | ICD-10-CM

## 2024-10-29 NOTE — TELEPHONE ENCOUNTER
Called POA to get patient on the schedule today for his Vyvgart Hytrulo.Left message for call back.

## 2024-10-30 ENCOUNTER — HOSPITAL ENCOUNTER (OUTPATIENT)
Dept: INFUSION THERAPY | Facility: HOSPITAL | Age: 79
Discharge: HOME | End: 2024-10-30
Admitting: PSYCHIATRY & NEUROLOGY
Payer: MEDICARE

## 2024-10-30 VITALS
DIASTOLIC BLOOD PRESSURE: 77 MMHG | TEMPERATURE: 97.9 F | HEART RATE: 105 BPM | SYSTOLIC BLOOD PRESSURE: 126 MMHG | OXYGEN SATURATION: 93 % | RESPIRATION RATE: 16 BRPM

## 2024-10-30 DIAGNOSIS — G61.81 CIDP (CHRONIC INFLAMMATORY DEMYELINATING POLYNEUROPATHY): Primary | ICD-10-CM

## 2024-10-30 PROCEDURE — 25010000002 EFGARTIGIMOD ALFA-HYALUR-QVFC 180-2000 MG-UNIT/ML SOLUTION: Performed by: PSYCHIATRY & NEUROLOGY

## 2024-10-30 PROCEDURE — 63710000001 ACETAMINOPHEN 325 MG TABLET: Performed by: PSYCHIATRY & NEUROLOGY

## 2024-10-30 PROCEDURE — 63710000001 DIPHENHYDRAMINE PER 50 MG: Performed by: PSYCHIATRY & NEUROLOGY

## 2024-10-30 PROCEDURE — A9270 NON-COVERED ITEM OR SERVICE: HCPCS | Performed by: PSYCHIATRY & NEUROLOGY

## 2024-10-30 PROCEDURE — 96372 THER/PROPH/DIAG INJ SC/IM: CPT

## 2024-10-30 RX ORDER — DIPHENHYDRAMINE HCL 50 MG
50 CAPSULE ORAL ONCE
Status: DISCONTINUED | OUTPATIENT
Start: 2024-10-30 | End: 2024-10-30

## 2024-10-30 RX ORDER — ACETAMINOPHEN 325 MG/1
650 TABLET ORAL ONCE
OUTPATIENT
Start: 2024-11-06

## 2024-10-30 RX ORDER — DIPHENHYDRAMINE HYDROCHLORIDE 50 MG/ML
50 INJECTION INTRAMUSCULAR; INTRAVENOUS ONCE
OUTPATIENT
Start: 2024-11-06

## 2024-10-30 RX ORDER — DIPHENHYDRAMINE HCL 25 MG
50 CAPSULE ORAL ONCE
Status: COMPLETED | OUTPATIENT
Start: 2024-10-30 | End: 2024-10-30

## 2024-10-30 RX ORDER — ACETAMINOPHEN 325 MG/1
650 TABLET ORAL ONCE
Status: COMPLETED | OUTPATIENT
Start: 2024-10-30 | End: 2024-10-30

## 2024-10-30 RX ORDER — SODIUM CHLORIDE 9 MG/ML
20 INJECTION, SOLUTION INTRAVENOUS ONCE
OUTPATIENT
Start: 2024-11-06

## 2024-10-30 RX ORDER — DIPHENHYDRAMINE HYDROCHLORIDE 50 MG/ML
50 INJECTION INTRAMUSCULAR; INTRAVENOUS ONCE
Status: DISCONTINUED | OUTPATIENT
Start: 2024-10-30 | End: 2024-10-30

## 2024-10-30 RX ADMIN — ACETAMINOPHEN 650 MG: 325 TABLET ORAL at 13:49

## 2024-10-30 RX ADMIN — DIPHENHYDRAMINE HYDROCHLORIDE 50 MG: 25 CAPSULE ORAL at 13:48

## 2024-10-30 RX ADMIN — EFGARTIGIMOD ALFA AND HYALURONIDASE (HUMAN RECOMBINANT) 1008 MG: 180; 2000 INJECTION, SOLUTION SUBCUTANEOUS at 14:15

## 2024-10-30 NOTE — PATIENT INSTRUCTIONS
"  Call  DR BIB CARTER @ [phone]  if you have any problems or concerns.    We know you have a Choice in healthcare and appreciate you using [facility].  Our purpose is to provide you \"Excellent Care\".  We hope that you will always choose us in the future and continue to recommend us to your family and friends.                "

## 2024-10-30 NOTE — NURSING NOTE
PT ARRIVED TO [facility] FOR APPT. VSS, NO COMPLAINTS AT THIS TIME. MEDICATION ADMINISTERED PER MD ORDER. PT TOLERATED WELL. PT WAITED IN [facility] FOR 30 MINS POST INJECTION TO WATCH FOR ADVERSE REACTIONS. SCHEDULED NEXT APPT. AVS PRINTED OUT, COPY GIVEN TO PT. PT DISCHARGED FROM [facility] AT 14:58 PM IN STABLE CONDITION, WITHOUT COMPLAINTS.

## 2024-11-07 ENCOUNTER — HOSPITAL ENCOUNTER (OUTPATIENT)
Dept: INFUSION THERAPY | Facility: HOSPITAL | Age: 79
Discharge: HOME | End: 2024-11-07

## 2024-11-07 VITALS
RESPIRATION RATE: 16 BRPM | OXYGEN SATURATION: 96 % | SYSTOLIC BLOOD PRESSURE: 120 MMHG | DIASTOLIC BLOOD PRESSURE: 80 MMHG | HEART RATE: 85 BPM | TEMPERATURE: 98.9 F

## 2024-11-07 DIAGNOSIS — G61.81 CIDP (CHRONIC INFLAMMATORY DEMYELINATING POLYNEUROPATHY): Primary | ICD-10-CM

## 2024-11-07 PROCEDURE — 63710000001 ACETAMINOPHEN 325 MG TABLET: Performed by: PSYCHIATRY & NEUROLOGY

## 2024-11-07 PROCEDURE — 96401 CHEMO ANTI-NEOPL SQ/IM: CPT

## 2024-11-07 PROCEDURE — A9270 NON-COVERED ITEM OR SERVICE: HCPCS | Performed by: PSYCHIATRY & NEUROLOGY

## 2024-11-07 PROCEDURE — 25010000002 EFGARTIGIMOD ALFA-HYALUR-QVFC 180-2000 MG-UNIT/ML SOLUTION: Performed by: PSYCHIATRY & NEUROLOGY

## 2024-11-07 PROCEDURE — 63710000001 DIPHENHYDRAMINE PER 50 MG: Performed by: PSYCHIATRY & NEUROLOGY

## 2024-11-07 RX ORDER — DIPHENHYDRAMINE HCL 25 MG
50 CAPSULE ORAL ONCE
Start: 2024-11-07 | End: 2024-11-07

## 2024-11-07 RX ORDER — ACETAMINOPHEN 325 MG/1
650 TABLET ORAL ONCE
OUTPATIENT
Start: 2024-11-13

## 2024-11-07 RX ORDER — DIPHENHYDRAMINE HYDROCHLORIDE 50 MG/ML
50 INJECTION INTRAMUSCULAR; INTRAVENOUS ONCE
Status: DISCONTINUED | OUTPATIENT
Start: 2024-11-07 | End: 2024-11-07

## 2024-11-07 RX ORDER — DIPHENHYDRAMINE HCL 25 MG
50 CAPSULE ORAL ONCE
Status: COMPLETED | OUTPATIENT
Start: 2024-11-07 | End: 2024-11-07

## 2024-11-07 RX ORDER — DIPHENHYDRAMINE HCL 25 MG
50 CAPSULE ORAL ONCE
Start: 2024-11-13 | End: 2024-11-13

## 2024-11-07 RX ORDER — SODIUM CHLORIDE 9 MG/ML
20 INJECTION, SOLUTION INTRAVENOUS ONCE
OUTPATIENT
Start: 2024-11-13

## 2024-11-07 RX ORDER — DIPHENHYDRAMINE HYDROCHLORIDE 50 MG/ML
50 INJECTION INTRAMUSCULAR; INTRAVENOUS ONCE
Status: CANCELLED | OUTPATIENT
Start: 2024-11-13

## 2024-11-07 RX ORDER — ACETAMINOPHEN 325 MG/1
650 TABLET ORAL ONCE
Status: COMPLETED | OUTPATIENT
Start: 2024-11-07 | End: 2024-11-07

## 2024-11-07 RX ADMIN — ACETAMINOPHEN 650 MG: 325 TABLET ORAL at 14:03

## 2024-11-07 RX ADMIN — EFGARTIGIMOD ALFA AND HYALURONIDASE (HUMAN RECOMBINANT) 1008 MG: 180; 2000 INJECTION, SOLUTION SUBCUTANEOUS at 14:20

## 2024-11-07 RX ADMIN — DIPHENHYDRAMINE HYDROCHLORIDE 50 MG: 25 CAPSULE ORAL at 14:03

## 2024-11-07 NOTE — NURSING NOTE
Patient arrived to [facility] at 1348.  History and medications reviewed with patient.  Medications administered as ordered without complications.  AVS refused by patient.  Patient discharged at 1455 in stable condition without complaint.

## 2024-11-08 ENCOUNTER — HOSPITAL ENCOUNTER (INPATIENT)
Facility: HOSPITAL | Age: 79
LOS: 6 days | Discharge: NURSING, REHAB OR LONG-TERM CARE | End: 2024-11-14
Attending: STUDENT IN AN ORGANIZED HEALTH CARE EDUCATION/TRAINING PROGRAM | Admitting: STUDENT IN AN ORGANIZED HEALTH CARE EDUCATION/TRAINING PROGRAM
Payer: MEDICARE

## 2024-11-08 ENCOUNTER — TELEPHONE (OUTPATIENT)
Dept: NEUROLOGY | Facility: CLINIC | Age: 79
End: 2024-11-08
Payer: MEDICARE

## 2024-11-08 ENCOUNTER — APPOINTMENT (OUTPATIENT)
Dept: GENERAL RADIOLOGY | Facility: HOSPITAL | Age: 79
End: 2024-11-08

## 2024-11-08 ENCOUNTER — HOSPITAL ENCOUNTER (EMERGENCY)
Facility: HOSPITAL | Age: 79
Discharge: ANOTHER HOSPITAL | End: 2024-11-08
Attending: EMERGENCY MEDICINE | Admitting: EMERGENCY MEDICINE

## 2024-11-08 VITALS
SYSTOLIC BLOOD PRESSURE: 130 MMHG | BODY MASS INDEX: 22.05 KG/M2 | OXYGEN SATURATION: 94 % | WEIGHT: 154 LBS | DIASTOLIC BLOOD PRESSURE: 78 MMHG | HEIGHT: 70 IN | TEMPERATURE: 98.7 F | RESPIRATION RATE: 18 BRPM | HEART RATE: 73 BPM

## 2024-11-08 DIAGNOSIS — R53.1 WEAKNESS: Primary | ICD-10-CM

## 2024-11-08 DIAGNOSIS — G61.81 CIDP (CHRONIC INFLAMMATORY DEMYELINATING POLYNEUROPATHY): ICD-10-CM

## 2024-11-08 PROBLEM — J30.2 SEASONAL ALLERGIES: Status: ACTIVE | Noted: 2024-11-08

## 2024-11-08 PROBLEM — Z29.89 SEIZURE PROPHYLAXIS: Status: ACTIVE | Noted: 2024-11-08

## 2024-11-08 LAB
ALBUMIN SERPL-MCNC: 3.7 G/DL (ref 3.5–5.2)
ALBUMIN/GLOB SERPL: 1.1 G/DL
ALP SERPL-CCNC: 58 U/L (ref 39–117)
ALT SERPL W P-5'-P-CCNC: 36 U/L (ref 1–41)
ANION GAP SERPL CALCULATED.3IONS-SCNC: 9.6 MMOL/L (ref 5–15)
AST SERPL-CCNC: 33 U/L (ref 1–40)
BASOPHILS # BLD AUTO: 0.01 10*3/MM3 (ref 0–0.2)
BASOPHILS NFR BLD AUTO: 0.1 % (ref 0–1.5)
BILIRUB SERPL-MCNC: 0.6 MG/DL (ref 0–1.2)
BUN SERPL-MCNC: 14 MG/DL (ref 8–23)
BUN/CREAT SERPL: 12.7 (ref 7–25)
CALCIUM SPEC-SCNC: 9.1 MG/DL (ref 8.6–10.5)
CHLORIDE SERPL-SCNC: 101 MMOL/L (ref 98–107)
CO2 SERPL-SCNC: 27.4 MMOL/L (ref 22–29)
CREAT SERPL-MCNC: 1.1 MG/DL (ref 0.76–1.27)
DEPRECATED RDW RBC AUTO: 50.3 FL (ref 37–54)
EGFRCR SERPLBLD CKD-EPI 2021: 69.1 ML/MIN/1.73
EOSINOPHIL # BLD AUTO: 0.01 10*3/MM3 (ref 0–0.4)
EOSINOPHIL NFR BLD AUTO: 0.1 % (ref 0.3–6.2)
ERYTHROCYTE [DISTWIDTH] IN BLOOD BY AUTOMATED COUNT: 14.1 % (ref 12.3–15.4)
GLOBULIN UR ELPH-MCNC: 3.4 GM/DL
GLUCOSE SERPL-MCNC: 119 MG/DL (ref 65–99)
HCT VFR BLD AUTO: 40.4 % (ref 37.5–51)
HGB BLD-MCNC: 13.6 G/DL (ref 13–17.7)
HOLD SPECIMEN: NORMAL
HOLD SPECIMEN: NORMAL
IMM GRANULOCYTES # BLD AUTO: 0.01 10*3/MM3 (ref 0–0.05)
IMM GRANULOCYTES NFR BLD AUTO: 0.1 % (ref 0–0.5)
LYMPHOCYTES # BLD AUTO: 0.52 10*3/MM3 (ref 0.7–3.1)
LYMPHOCYTES NFR BLD AUTO: 7.1 % (ref 19.6–45.3)
MAGNESIUM SERPL-MCNC: 2.1 MG/DL (ref 1.6–2.4)
MCH RBC QN AUTO: 33.2 PG (ref 26.6–33)
MCHC RBC AUTO-ENTMCNC: 33.7 G/DL (ref 31.5–35.7)
MCV RBC AUTO: 98.5 FL (ref 79–97)
MONOCYTES # BLD AUTO: 0.46 10*3/MM3 (ref 0.1–0.9)
MONOCYTES NFR BLD AUTO: 6.3 % (ref 5–12)
NEUTROPHILS NFR BLD AUTO: 6.29 10*3/MM3 (ref 1.7–7)
NEUTROPHILS NFR BLD AUTO: 86.3 % (ref 42.7–76)
PHOSPHATE SERPL-MCNC: 2.6 MG/DL (ref 2.5–4.5)
PLATELET # BLD AUTO: 168 10*3/MM3 (ref 140–450)
PMV BLD AUTO: 9.3 FL (ref 6–12)
POTASSIUM SERPL-SCNC: 3.8 MMOL/L (ref 3.5–5.2)
PROT SERPL-MCNC: 7.1 G/DL (ref 6–8.5)
QT INTERVAL: 328 MS
QTC INTERVAL: 455 MS
RBC # BLD AUTO: 4.1 10*6/MM3 (ref 4.14–5.8)
SODIUM SERPL-SCNC: 138 MMOL/L (ref 136–145)
WBC NRBC COR # BLD AUTO: 7.3 10*3/MM3 (ref 3.4–10.8)
WHOLE BLOOD HOLD COAG: NORMAL
WHOLE BLOOD HOLD SPECIMEN: NORMAL

## 2024-11-08 PROCEDURE — 80053 COMPREHEN METABOLIC PANEL: CPT | Performed by: EMERGENCY MEDICINE

## 2024-11-08 PROCEDURE — 93010 ELECTROCARDIOGRAM REPORT: CPT | Performed by: INTERNAL MEDICINE

## 2024-11-08 PROCEDURE — 96374 THER/PROPH/DIAG INJ IV PUSH: CPT

## 2024-11-08 PROCEDURE — 25010000002 LABETALOL 5 MG/ML SOLUTION: Performed by: STUDENT IN AN ORGANIZED HEALTH CARE EDUCATION/TRAINING PROGRAM

## 2024-11-08 PROCEDURE — 99285 EMERGENCY DEPT VISIT HI MDM: CPT | Performed by: EMERGENCY MEDICINE

## 2024-11-08 PROCEDURE — 83735 ASSAY OF MAGNESIUM: CPT | Performed by: EMERGENCY MEDICINE

## 2024-11-08 PROCEDURE — 85025 COMPLETE CBC W/AUTO DIFF WBC: CPT | Performed by: EMERGENCY MEDICINE

## 2024-11-08 PROCEDURE — 84100 ASSAY OF PHOSPHORUS: CPT | Performed by: EMERGENCY MEDICINE

## 2024-11-08 PROCEDURE — 25010000002 DEXAMETHASONE PER 1 MG: Performed by: STUDENT IN AN ORGANIZED HEALTH CARE EDUCATION/TRAINING PROGRAM

## 2024-11-08 PROCEDURE — 71045 X-RAY EXAM CHEST 1 VIEW: CPT

## 2024-11-08 PROCEDURE — 96375 TX/PRO/DX INJ NEW DRUG ADDON: CPT

## 2024-11-08 PROCEDURE — 93005 ELECTROCARDIOGRAM TRACING: CPT | Performed by: EMERGENCY MEDICINE

## 2024-11-08 RX ORDER — SIMETHICONE 80 MG
80 TABLET,CHEWABLE ORAL 4 TIMES DAILY PRN
Status: DISCONTINUED | OUTPATIENT
Start: 2024-11-08 | End: 2024-11-14 | Stop reason: HOSPADM

## 2024-11-08 RX ORDER — FLUTICASONE PROPIONATE 50 MCG
2 SPRAY, SUSPENSION (ML) NASAL DAILY
Status: DISCONTINUED | OUTPATIENT
Start: 2024-11-09 | End: 2024-11-14 | Stop reason: HOSPADM

## 2024-11-08 RX ORDER — LABETALOL HYDROCHLORIDE 5 MG/ML
20 INJECTION, SOLUTION INTRAVENOUS ONCE
Status: COMPLETED | OUTPATIENT
Start: 2024-11-08 | End: 2024-11-08

## 2024-11-08 RX ORDER — SODIUM CHLORIDE 0.9 % (FLUSH) 0.9 %
10 SYRINGE (ML) INJECTION AS NEEDED
Status: DISCONTINUED | OUTPATIENT
Start: 2024-11-08 | End: 2024-11-08 | Stop reason: HOSPADM

## 2024-11-08 RX ORDER — BISACODYL 5 MG/1
5 TABLET, DELAYED RELEASE ORAL DAILY PRN
Status: DISCONTINUED | OUTPATIENT
Start: 2024-11-08 | End: 2024-11-14 | Stop reason: HOSPADM

## 2024-11-08 RX ORDER — SODIUM CHLORIDE 9 MG/ML
40 INJECTION, SOLUTION INTRAVENOUS AS NEEDED
Status: DISCONTINUED | OUTPATIENT
Start: 2024-11-08 | End: 2024-11-14 | Stop reason: HOSPADM

## 2024-11-08 RX ORDER — DEXAMETHASONE SODIUM PHOSPHATE 10 MG/ML
6 INJECTION INTRAMUSCULAR; INTRAVENOUS ONCE
Status: COMPLETED | OUTPATIENT
Start: 2024-11-09 | End: 2024-11-09

## 2024-11-08 RX ORDER — ENOXAPARIN SODIUM 100 MG/ML
40 INJECTION SUBCUTANEOUS DAILY
Status: DISCONTINUED | OUTPATIENT
Start: 2024-11-09 | End: 2024-11-14 | Stop reason: HOSPADM

## 2024-11-08 RX ORDER — SODIUM CHLORIDE 0.9 % (FLUSH) 0.9 %
10 SYRINGE (ML) INJECTION AS NEEDED
Status: DISCONTINUED | OUTPATIENT
Start: 2024-11-08 | End: 2024-11-14 | Stop reason: HOSPADM

## 2024-11-08 RX ORDER — POLYETHYLENE GLYCOL 3350 17 G/17G
17 POWDER, FOR SOLUTION ORAL DAILY PRN
Status: DISCONTINUED | OUTPATIENT
Start: 2024-11-08 | End: 2024-11-14 | Stop reason: HOSPADM

## 2024-11-08 RX ORDER — SODIUM CHLORIDE 0.9 % (FLUSH) 0.9 %
10 SYRINGE (ML) INJECTION EVERY 12 HOURS SCHEDULED
Status: DISCONTINUED | OUTPATIENT
Start: 2024-11-08 | End: 2024-11-14 | Stop reason: HOSPADM

## 2024-11-08 RX ORDER — PANTOPRAZOLE SODIUM 40 MG/1
40 TABLET, DELAYED RELEASE ORAL DAILY
Status: COMPLETED | OUTPATIENT
Start: 2024-11-09 | End: 2024-11-11

## 2024-11-08 RX ORDER — LEVETIRACETAM 500 MG/1
1000 TABLET ORAL EVERY 12 HOURS SCHEDULED
Status: DISCONTINUED | OUTPATIENT
Start: 2024-11-09 | End: 2024-11-14 | Stop reason: HOSPADM

## 2024-11-08 RX ORDER — DEXAMETHASONE SODIUM PHOSPHATE 10 MG/ML
10 INJECTION INTRAMUSCULAR; INTRAVENOUS ONCE
Status: COMPLETED | OUTPATIENT
Start: 2024-11-08 | End: 2024-11-08

## 2024-11-08 RX ORDER — PREDNISONE 5 MG/1
20 TABLET ORAL
Status: DISCONTINUED | OUTPATIENT
Start: 2024-11-09 | End: 2024-11-08

## 2024-11-08 RX ORDER — AMOXICILLIN 250 MG
2 CAPSULE ORAL 2 TIMES DAILY PRN
Status: DISCONTINUED | OUTPATIENT
Start: 2024-11-08 | End: 2024-11-14 | Stop reason: HOSPADM

## 2024-11-08 RX ORDER — BISACODYL 10 MG
10 SUPPOSITORY, RECTAL RECTAL DAILY PRN
Status: DISCONTINUED | OUTPATIENT
Start: 2024-11-08 | End: 2024-11-14 | Stop reason: HOSPADM

## 2024-11-08 RX ADMIN — DEXAMETHASONE SODIUM PHOSPHATE 10 MG: 10 INJECTION INTRAMUSCULAR; INTRAVENOUS at 16:41

## 2024-11-08 RX ADMIN — LEVETIRACETAM 1000 MG: 500 TABLET, FILM COATED ORAL at 23:28

## 2024-11-08 RX ADMIN — Medication 10 ML: at 23:33

## 2024-11-08 RX ADMIN — LABETALOL HYDROCHLORIDE 20 MG: 5 INJECTION, SOLUTION INTRAVENOUS at 17:28

## 2024-11-08 RX ADMIN — Medication 10 ML: at 23:34

## 2024-11-08 NOTE — TELEPHONE ENCOUNTER
Received a message from the [facility]U that Lavern had called stating pt was unable to walk again and asking if the pt should have another tx of IVIG.    I called Lavern and she reports patient has had 2 Hytrulo infusions, the last one was yesterday. She reports he was doing okay until this morning when he could not walk again. She also reports he is unable to feed himself again. He was able to try but, could not.     Lavern is concerned he will decline quickly again and is asking if there is an amount of time they should give the Hytrulo or should he resume IVIG.

## 2024-11-08 NOTE — TELEPHONE ENCOUNTER
I advised Lavern of recommendations, however, she is reporting continued decline. Needing to recline chair to open airway, more weakness and stomach tenderness. Advised that patient present to ED for evaluation. Lavern v/u and will take pt to [facility].

## 2024-11-08 NOTE — ED PROVIDER NOTES
Subjective   History of Present Illness  Patient presents with family with symptoms of decreased strength in the legs.  Patient has a history of CIDP and gets monthly IVIG infusions.  His last 1 was 3 weeks ago.  His neurologist has been trying him on a new medicine to bridge the gaps.  Patient had 1 infusion yesterday.  Family reports that he was doing okay yesterday but when they tried to get him out of bed this morning he was unable to stand.  Unable to walk.  Patient says he feels like he does not know where his feet are.  Patient is also having weakness and coordination in his upper extremities to the point where he is unable to feed himself or hold a glass to drink.  Patient is also having difficulty breathing when he is reclined.  Patient is able to breathe better when he sitting up.  They discussed this with Dr. Dunlap and he advised the patient go to the ED for evaluation and possible admission.  Apparently the last time patient was evaluated due to insurance reasons he was not able to get IVIG outpatient and had to be admitted to get a 5-day course.  Patient is otherwise generally healthy and denies any recent travel or trauma, fever, vomiting, headache, difficulty speaking, or confusion.          Review of Systems   All other systems reviewed and are negative.      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)     Difficulty walking     Hepatitis A     as a child    Seizures     Syncope     Urgency of urination        No Known Allergies    Past Surgical History:   Procedure Laterality Date    TRUNK LESION/CYST EXCISION N/A 8/3/2023    Procedure: excision of chest wall cyst and back cyst 10:00;  Surgeon: Kianna Oliva DO;  Location: [facility];  Service: General;  Laterality: N/A;       Family History   Problem Relation Age of Onset    Cancer Mother     Cancer Father     Coronary artery disease Brother     Multiple sclerosis  Paternal Cousin     Multiple sclerosis Paternal Cousin        Social History     Socioeconomic History    Marital status: Single   Tobacco Use    Smoking status: Never    Smokeless tobacco: Never   Vaping Use    Vaping status: Never Used   Substance and Sexual Activity    Alcohol use: Never     Comment: occasional    Drug use: Never    Sexual activity: Defer           Objective   Physical Exam  Vitals and nursing note reviewed.   Constitutional:       Comments: Patient sitting in bed comfortably, talkative, friendly, no signs of distress.  Cooperative with exam.   HENT:      Head: Normocephalic and atraumatic.      Right Ear: External ear normal.      Left Ear: External ear normal.      Nose: Nose normal.      Mouth/Throat:      Mouth: Mucous membranes are moist.      Pharynx: Oropharynx is clear.   Eyes:      Conjunctiva/sclera: Conjunctivae normal.   Cardiovascular:      Rate and Rhythm: Normal rate and regular rhythm.      Heart sounds: Normal heart sounds.   Pulmonary:      Effort: Pulmonary effort is normal. No respiratory distress.      Breath sounds: Normal breath sounds. No stridor. No wheezing, rhonchi or rales.   Abdominal:      General: Abdomen is flat.      Palpations: Abdomen is soft.   Musculoskeletal:         General: No swelling or tenderness.      Cervical back: Neck supple.   Skin:     General: Skin is warm and dry.      Capillary Refill: Capillary refill takes 2 to 3 seconds.   Neurological:      Mental Status: He is alert and oriented to person, place, and time.      Comments:  strength equal bilaterally.  Patient is able to flex and extend at hips, knee, and ankles bilaterally.  Patient does have decreased strength in the legs with 4 out of 5 bilaterally   Psychiatric:         Mood and Affect: Mood normal.         Behavior: Behavior normal.         Procedures           ED Course  ED Course as of 11/10/24 0629   Fri Nov 08, 2024   1304 EKG-rate of 115, sinus tachycardia, normal axis, right  bundle branch block, no acute ST elevation or depression.  When compared to EKG from 10/16/2024 generally unchanged [AW]   1348 Discussed patient with Dr. Candelaria neurology who is currently on-call.  He advised getting a NIF and seeing the status of that which will help guide if patient needs to go to the floor versus ICU. [AW]   1503 Patient's NIF came back at sitting up 40 and laying down 30.  Hospitalist has been paged at [facility] for transfer. [AW]   1505 NIF 40 - no icu per neuro [AK]   1546 Signed out to me at 3 PM for patient with CIDP being transferred for generalized weakness secondary to CIDP.  Signout to me was that the patient already had an official diagnosis of this, will be transferred to see neurology at [facility], NIF was adequate, no concerns for intubation so patient will be transferred to PCU and patient was allowed to eat due to this reason.  He will be given steroids for CIDP as we do not have plasmapheresis or IVIG available, will defer rest of management to [facility].  Patient resting comfortably pending transfer [AK]   1719 Spoke with hospitalist Dr. Monroy who accepted admission to telemetry neuro [AK]      ED Course User Index  [AK] Hernan Mcneil MD  [AW] Denzel Varma MD                    No data recorded                             Medical Decision Making  Problems Addressed:  CIDP (chronic inflammatory demyelinating polyneuropathy): complicated acute illness or injury  Weakness: complicated acute illness or injury    Amount and/or Complexity of Data Reviewed  Labs: ordered.  Radiology: ordered.  ECG/medicine tests: ordered.    Risk  Prescription drug management.        Final diagnoses:   Weakness   CIDP (chronic inflammatory demyelinating polyneuropathy)       ED Disposition  ED Disposition       ED Disposition   Transfer to Another Facility     Condition   --    Comment   --               No follow-up provider specified.       Medication List       No changes were made to your prescriptions during this visit.            Denzel Varma MD  11/08/24 1511       Hernan Mcneil MD  11/08/24 1720       Denzel Varma MD  11/10/24 9513

## 2024-11-09 PROBLEM — K59.1 FUNCTIONAL DIARRHEA: Status: ACTIVE | Noted: 2024-11-09

## 2024-11-09 LAB
ANION GAP SERPL CALCULATED.3IONS-SCNC: 15 MMOL/L (ref 5–15)
BUN SERPL-MCNC: 17 MG/DL (ref 8–23)
BUN/CREAT SERPL: 17.2 (ref 7–25)
CALCIUM SPEC-SCNC: 8.7 MG/DL (ref 8.6–10.5)
CHLORIDE SERPL-SCNC: 102 MMOL/L (ref 98–107)
CO2 SERPL-SCNC: 26 MMOL/L (ref 22–29)
CREAT SERPL-MCNC: 0.99 MG/DL (ref 0.76–1.27)
DEPRECATED RDW RBC AUTO: 45.4 FL (ref 37–54)
EGFRCR SERPLBLD CKD-EPI 2021: 78.5 ML/MIN/1.73
ERYTHROCYTE [DISTWIDTH] IN BLOOD BY AUTOMATED COUNT: 13 % (ref 12.3–15.4)
GLUCOSE SERPL-MCNC: 124 MG/DL (ref 65–99)
HCT VFR BLD AUTO: 38.9 % (ref 37.5–51)
HGB BLD-MCNC: 13.5 G/DL (ref 13–17.7)
IGA1 MFR SER: 344 MG/DL (ref 70–400)
IGG1 SER-MCNC: 1601 MG/DL (ref 700–1600)
IGM SERPL-MCNC: 205 MG/DL (ref 40–230)
MCH RBC QN AUTO: 33.7 PG (ref 26.6–33)
MCHC RBC AUTO-ENTMCNC: 34.7 G/DL (ref 31.5–35.7)
MCV RBC AUTO: 97 FL (ref 79–97)
PLATELET # BLD AUTO: 198 10*3/MM3 (ref 140–450)
PMV BLD AUTO: 9.7 FL (ref 6–12)
POTASSIUM SERPL-SCNC: 4.2 MMOL/L (ref 3.5–5.2)
RBC # BLD AUTO: 4.01 10*6/MM3 (ref 4.14–5.8)
SODIUM SERPL-SCNC: 143 MMOL/L (ref 136–145)
WBC NRBC COR # BLD AUTO: 7.99 10*3/MM3 (ref 3.4–10.8)

## 2024-11-09 PROCEDURE — 97162 PT EVAL MOD COMPLEX 30 MIN: CPT

## 2024-11-09 PROCEDURE — 94799 UNLISTED PULMONARY SVC/PX: CPT

## 2024-11-09 PROCEDURE — 63710000001 DIPHENHYDRAMINE PER 50 MG: Performed by: PSYCHIATRY & NEUROLOGY

## 2024-11-09 PROCEDURE — 94760 N-INVAS EAR/PLS OXIMETRY 1: CPT

## 2024-11-09 PROCEDURE — 63710000001 PREDNISONE PER 5 MG: Performed by: PSYCHIATRY & NEUROLOGY

## 2024-11-09 PROCEDURE — 97530 THERAPEUTIC ACTIVITIES: CPT

## 2024-11-09 PROCEDURE — 94664 DEMO&/EVAL PT USE INHALER: CPT

## 2024-11-09 PROCEDURE — 94761 N-INVAS EAR/PLS OXIMETRY MLT: CPT

## 2024-11-09 PROCEDURE — 85027 COMPLETE CBC AUTOMATED: CPT | Performed by: STUDENT IN AN ORGANIZED HEALTH CARE EDUCATION/TRAINING PROGRAM

## 2024-11-09 PROCEDURE — 97166 OT EVAL MOD COMPLEX 45 MIN: CPT

## 2024-11-09 PROCEDURE — 80048 BASIC METABOLIC PNL TOTAL CA: CPT | Performed by: STUDENT IN AN ORGANIZED HEALTH CARE EDUCATION/TRAINING PROGRAM

## 2024-11-09 PROCEDURE — 25010000002 DEXAMETHASONE PER 1 MG: Performed by: STUDENT IN AN ORGANIZED HEALTH CARE EDUCATION/TRAINING PROGRAM

## 2024-11-09 PROCEDURE — 99223 1ST HOSP IP/OBS HIGH 75: CPT | Performed by: PSYCHIATRY & NEUROLOGY

## 2024-11-09 PROCEDURE — 82784 ASSAY IGA/IGD/IGG/IGM EACH: CPT | Performed by: PSYCHIATRY & NEUROLOGY

## 2024-11-09 PROCEDURE — 25010000002 ENOXAPARIN PER 10 MG: Performed by: STUDENT IN AN ORGANIZED HEALTH CARE EDUCATION/TRAINING PROGRAM

## 2024-11-09 PROCEDURE — 25010000002 IMMUNE GLOBULIN (HUMAN) 20 GM/200ML SOLUTION: Performed by: PSYCHIATRY & NEUROLOGY

## 2024-11-09 PROCEDURE — 30233S1 TRANSFUSION OF NONAUTOLOGOUS GLOBULIN INTO PERIPHERAL VEIN, PERCUTANEOUS APPROACH: ICD-10-PCS | Performed by: STUDENT IN AN ORGANIZED HEALTH CARE EDUCATION/TRAINING PROGRAM

## 2024-11-09 RX ORDER — FAMOTIDINE 10 MG/ML
20 INJECTION, SOLUTION INTRAVENOUS ONCE AS NEEDED
Status: DISCONTINUED | OUTPATIENT
Start: 2024-11-09 | End: 2024-11-09

## 2024-11-09 RX ORDER — ACETAMINOPHEN 325 MG/1
650 TABLET ORAL DAILY
Status: COMPLETED | OUTPATIENT
Start: 2024-11-09 | End: 2024-11-13

## 2024-11-09 RX ORDER — DIPHENHYDRAMINE HCL 25 MG
25 CAPSULE ORAL DAILY
Status: COMPLETED | OUTPATIENT
Start: 2024-11-09 | End: 2024-11-13

## 2024-11-09 RX ORDER — DIPHENHYDRAMINE HCL 25 MG
25 CAPSULE ORAL ONCE
Status: DISCONTINUED | OUTPATIENT
Start: 2024-11-09 | End: 2024-11-09

## 2024-11-09 RX ORDER — DIPHENHYDRAMINE HYDROCHLORIDE 50 MG/ML
50 INJECTION INTRAMUSCULAR; INTRAVENOUS ONCE AS NEEDED
Status: DISCONTINUED | OUTPATIENT
Start: 2024-11-09 | End: 2024-11-09

## 2024-11-09 RX ORDER — LOPERAMIDE HYDROCHLORIDE 2 MG/1
2 CAPSULE ORAL 4 TIMES DAILY PRN
Status: DISCONTINUED | OUTPATIENT
Start: 2024-11-09 | End: 2024-11-14 | Stop reason: HOSPADM

## 2024-11-09 RX ORDER — ACETAMINOPHEN 325 MG/1
650 TABLET ORAL ONCE
Status: DISCONTINUED | OUTPATIENT
Start: 2024-11-09 | End: 2024-11-09

## 2024-11-09 RX ORDER — DIPHENHYDRAMINE HYDROCHLORIDE 50 MG/ML
50 INJECTION INTRAMUSCULAR; INTRAVENOUS DAILY PRN
Status: DISCONTINUED | OUTPATIENT
Start: 2024-11-09 | End: 2024-11-14 | Stop reason: HOSPADM

## 2024-11-09 RX ORDER — PREDNISONE 5 MG/1
10 TABLET ORAL
Status: DISCONTINUED | OUTPATIENT
Start: 2024-11-09 | End: 2024-11-14 | Stop reason: HOSPADM

## 2024-11-09 RX ORDER — FAMOTIDINE 10 MG/ML
20 INJECTION, SOLUTION INTRAVENOUS DAILY PRN
Status: DISCONTINUED | OUTPATIENT
Start: 2024-11-09 | End: 2024-11-14 | Stop reason: HOSPADM

## 2024-11-09 RX ADMIN — LEVETIRACETAM 1000 MG: 500 TABLET, FILM COATED ORAL at 20:01

## 2024-11-09 RX ADMIN — ACETAMINOPHEN 325MG 650 MG: 325 TABLET ORAL at 12:28

## 2024-11-09 RX ADMIN — SIMETHICONE 80 MG: 80 TABLET, CHEWABLE ORAL at 00:44

## 2024-11-09 RX ADMIN — ENOXAPARIN SODIUM 40 MG: 100 INJECTION SUBCUTANEOUS at 08:58

## 2024-11-09 RX ADMIN — Medication 10 ML: at 20:01

## 2024-11-09 RX ADMIN — Medication 10 ML: at 08:58

## 2024-11-09 RX ADMIN — LEVETIRACETAM 1000 MG: 500 TABLET, FILM COATED ORAL at 08:58

## 2024-11-09 RX ADMIN — IMMUNE GLOBULIN (HUMAN) 20 G: 10 INJECTION INTRAVENOUS; SUBCUTANEOUS at 12:28

## 2024-11-09 RX ADMIN — DEXAMETHASONE SODIUM PHOSPHATE 6 MG: 10 INJECTION INTRAMUSCULAR; INTRAVENOUS at 00:41

## 2024-11-09 RX ADMIN — PREDNISONE 10 MG: 5 TABLET ORAL at 14:33

## 2024-11-09 RX ADMIN — LOPERAMIDE HYDROCHLORIDE 2 MG: 2 CAPSULE ORAL at 20:13

## 2024-11-09 RX ADMIN — DIPHENHYDRAMINE HYDROCHLORIDE 25 MG: 25 CAPSULE ORAL at 12:28

## 2024-11-09 RX ADMIN — PANTOPRAZOLE SODIUM 40 MG: 40 TABLET, DELAYED RELEASE ORAL at 08:58

## 2024-11-09 NOTE — PROGRESS NOTES
Name: Efren Christianson ADMIT: 2024   : 1945  PCP: Winnie Murray    MRN: [id number] LOS: 1 days   AGE/SEX: 79 y.o. male  ROOM: [facility]/     Subjective   Subjective   No acute events. Patient overall feels about the same. Has been having some gas and diarrhea. No family at bedside.    Objective   Objective   Vital Signs  Temp:  [97.5 °F (36.4 °C)-98.9 °F (37.2 °C)] 98.4 °F (36.9 °C)  Heart Rate:  [] 83  Resp:  [16-18] 18  BP: (113-188)/() 162/98  SpO2:  [91 %-96 %] 96 %  on   ;   Device (Oxygen Therapy): room air  There is no height or weight on file to calculate BMI.  Physical Exam  Vitals and nursing note reviewed.   Constitutional:       General: He is not in acute distress.     Appearance: He is not toxic-appearing or diaphoretic.   HENT:      Head: Normocephalic and atraumatic.      Nose: Nose normal.      Mouth/Throat:      Mouth: Mucous membranes are moist.      Pharynx: Oropharynx is clear.   Eyes:      Conjunctiva/sclera: Conjunctivae normal.      Pupils: Pupils are equal, round, and reactive to light.   Cardiovascular:      Rate and Rhythm: Normal rate and regular rhythm.      Pulses: Normal pulses.   Pulmonary:      Effort: Pulmonary effort is normal.      Breath sounds: Normal breath sounds.   Abdominal:      General: Bowel sounds are normal. There is no distension.      Palpations: Abdomen is soft.      Tenderness: There is no abdominal tenderness.   Musculoskeletal:         General: No swelling or tenderness.      Cervical back: Neck supple.   Skin:     General: Skin is warm and dry.      Capillary Refill: Capillary refill takes less than 2 seconds.   Neurological:      Mental Status: He is alert and oriented to person, place, and time.   Psychiatric:         Mood and Affect: Mood normal.         Behavior: Behavior normal.       Results Review     I reviewed the patient's new clinical results.  Results from last 7 days   Lab Units 24  0338 24  1331   WBC 10*3/mm3  "7.99 7.30   HEMOGLOBIN g/dL 13.5 13.6   PLATELETS 10*3/mm3 198 168     Results from last 7 days   Lab Units 11/09/24  0337 11/08/24  1331   SODIUM mmol/L 143 138   POTASSIUM mmol/L 4.2 3.8   CHLORIDE mmol/L 102 101   CO2 mmol/L 26.0 27.4   BUN mg/dL 17 14   CREATININE mg/dL 0.99 1.10   GLUCOSE mg/dL 124* 119*   EGFR mL/min/1.73 78.5 69.1     Results from last 7 days   Lab Units 11/08/24  1331   ALBUMIN g/dL 3.7   BILIRUBIN mg/dL 0.6   ALK PHOS U/L 58   AST (SGOT) U/L 33   ALT (SGPT) U/L 36     Results from last 7 days   Lab Units 11/09/24  0337 11/08/24  1331   CALCIUM mg/dL 8.7 9.1   ALBUMIN g/dL  --  3.7   MAGNESIUM mg/dL  --  2.1   PHOSPHORUS mg/dL  --  2.6       No results found for: \"HGBA1C\", \"POCGLU\"    XR Chest 1 View    Result Date: 11/8/2024  Impression: 1.Right basilar atelectasis. 2.Elevation left hemidiaphragm which has been noted 3.Some gaseous distention of what looks like the colon within the abdomen which has been suggested of questionable significance. Electronically Signed: Harman Camp MD  11/8/2024 2:13 PM EST  Workstation ID: [id number]     I have personally reviewed all medications:  Scheduled Medications  acetaminophen, 650 mg, Oral, Daily  diphenhydrAMINE, 25 mg, Oral, Daily  enoxaparin, 40 mg, Subcutaneous, Daily  fluticasone, 2 spray, Each Nare, Daily  immune globulin (human), 20 g, Intravenous, Daily   Followed by  [START ON 11/14/2024] immune globulin (human), 10 g, Intravenous, Daily  levETIRAcetam, 1,000 mg, Oral, Q12H  pantoprazole, 40 mg, Oral, Daily  Pharmacy to enter IVIG order, 400 mg/kg, Intravenous, Daily  sodium chloride, 10 mL, Intravenous, Q12H    Infusions   Diet  Diet: Regular/House; Fluid Consistency: Thin (IDDSI 0)    I have personally reviewed:  [x]  Laboratory   []  Microbiology   [x]  Radiology   [x]  EKG/Telemetry  []  Cardiology/Vascular   []  Pathology    [x]  Records       Assessment/Plan     Active Hospital Problems    Diagnosis  POA    **CIDP (chronic inflammatory " demyelinating polyneuropathy) [G61.81]  Yes    Seizure prophylaxis [Z29.89]  Not Applicable    Seasonal allergies [J30.2]  Yes      Resolved Hospital Problems   No resolved problems to display.   Acute on Chronic Inflammatory Demyelinating Polyneuropathy  - has global weakness L>R  - given decadron in the ER, will defer further steroids and IVIG to neurology as they are consulted  - continue supportive care  - NIF every 6hrs while awake     Seizure prophylaxis  - continue Keppra     Seasonal allergies  - symptoms controlled  - continue Flonase    Diarrhea  - abdominal examination and labs benign though he has also had some reported weight loss  - CT abdomen/pelvis ordered  - will otherwise manage symptomatically    Lovenox 40 mg SC daily for DVT prophylaxis.  Full code.  Discussed with patient and nursing staff.  Anticipate discharge home with HH vs SNU facility next week.  Expected Discharge Date: 11/12/2024; Expected Discharge Time:  9:00 AM      Jak Tracy MD  [facility]  11/09/24  11:33 EST

## 2024-11-09 NOTE — THERAPY EVALUATION
Patient Name: Efren Christianson  : 1945    MRN: [id number]                              Today's Date: 2024       Admit Date: 2024    Visit Dx: No diagnosis found.  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)     Difficulty walking     Hepatitis A     as a child    Seizures     Syncope     Urgency of urination      Past Surgical History:   Procedure Laterality Date    TRUNK LESION/CYST EXCISION N/A 8/3/2023    Procedure: excision of chest wall cyst and back cyst 10:00;  Surgeon: Kianna Oliva DO;  Location: [facility] OR;  Service: General;  Laterality: N/A;      General Information       Row Name 24 1318          Physical Therapy Time and Intention    Document Type evaluation  -MP     Mode of Treatment physical therapy;individual therapy  -MP       Row Name 24 1318          General Information    Patient Profile Reviewed yes  -MP     Existing Precautions/Restrictions fall  -MP     Barriers to Rehab medically complex;previous functional deficit  -MP       Row Name 24 1318          Living Environment    People in Home other (see comments)  lives with caregiver, has had recent rehab stay  -MP       Row Name 24 1318          Home Main Entrance    Number of Stairs, Main Entrance none  -MP       Row Name 24 1318          Stairs Within Home, Primary    Number of Stairs, Within Home, Primary none  -MP       Row Name 24 1318          Cognition    Orientation Status (Cognition) oriented x 3  -MP       Row Name 24 1318          Safety Issues/Impairments Affecting Functional Mobility    Safety Issues Affecting Function  (Mobility) awareness of need for assistance;insight into deficits/self-awareness;ability to follow commands  -MP     Impairments Affecting Function (Mobility) balance;pain;postural/trunk control;range of motion (ROM);strength;endurance/activity tolerance;coordination  -MP               User Key  (r) = Recorded By, (t) = Taken By, (c) = Cosigned By      Initials Name Provider Type    Charlotte Jaimes PT Physical Therapist                   Mobility       Row Name 11/09/24 1319          Bed Mobility    Bed Mobility sit-supine;supine-sit  -MP     Supine-Sit [place] (Bed Mobility) moderate assist (50% patient effort);2 person assist  -MP     Sit-Supine [place] (Bed Mobility) moderate assist (50% patient effort);2 person assist  -MP       Row Name 11/09/24 1319          Sit-Stand Transfer    Sit-Stand [place] (Transfers) maximum assist (25% patient effort);1 person assist  -MP     Comment, (Sit-Stand Transfer) unable to achieve upright standing, pt. became anxious with increased tremors and feet sliding forward with heavy posterior lean  -MP               User Key  (r) = Recorded By, (t) = Taken By, (c) = Cosigned By      Initials Name Provider Type    Charlotte Jaimes, PT Physical Therapist                   Obj/Interventions       Row Name 11/09/24 1321          Range of Motion Comprehensive    General Range of Motion bilateral lower extremity ROM WFL  -       Row Name 11/09/24 1321          Strength Comprehensive (MMT)    General Manual Muscle Testing (MMT) Assessment lower extremity strength deficits identified  -MP     Comment, General Manual Muscle Testing (MMT) Assessment generalized weakness  -MP       Row Name 11/09/24 1321          Motor Skills    Therapeutic Exercise other (see comments)  seated marches, seated LAQ  -MP       Row Name 11/09/24 1321          Balance    Balance Assessment sitting static balance  -MP     Static Sitting Balance minimal assist;1-person assist  -MP      Position, Sitting Balance sitting edge of bed  -MP     Balance Interventions sitting  -MP       Row Name 11/09/24 1321          Sensory Assessment (Somatosensory)    Sensory Assessment (Somatosensory) not tested  -MP               User Key  (r) = Recorded By, (t) = Taken By, (c) = Cosigned By      Initials Name Provider Type    Charlotte Jaimes PT Physical Therapist                   Goals/Plan       Row Name 11/09/24 1322          Bed Mobility Goal 1 (PT)    Activity/Assistive Device (Bed Mobility Goal 1, PT) sit to supine;supine to sit  -MP     [place] Level/Cues Needed (Bed Mobility Goal 1, PT) independent  -MP     Time Frame (Bed Mobility Goal 1, PT) 1 week  -MP       Row Name 11/09/24 1322          Transfer Goal 1 (PT)    Activity/Assistive Device (Transfer Goal 1, PT) sit-to-stand/stand-to-sit;bed-to-chair/chair-to-bed  -MP     [place] Level/Cues Needed (Transfer Goal 1, PT) minimum assist (75% or more patient effort)  -MP     Time Frame (Transfer Goal 1, PT) 1 week  -MP       Row Name 11/09/24 1322          Gait Training Goal 1 (PT)    Activity/Assistive Device (Gait Training Goal 1, PT) gait (walking locomotion);walker, rolling  -MP     [place] Level (Gait Training Goal 1, PT) moderate assist (50-74% patient effort)  -MP     Distance (Gait Training Goal 1, PT) 50  -MP     Time Frame (Gait Training Goal 1, PT) 1 week  -MP       Row Name 11/09/24 1322          Therapy Assessment/Plan (PT)    Planned Therapy Interventions (PT) balance training;bed mobility training;gait training;home exercise program;patient/family education;neuromuscular re-education;stair training;strengthening;stretching;ROM (range of motion);transfer training;postural re-education  -MP               User Key  (r) = Recorded By, (t) = Taken By, (c) = Cosigned By      Initials Name Provider Type    Charlotte Jaimes PT Physical Therapist                   Clinical Impression       Row Name 11/09/24 1322          Pain     Pretreatment Pain Rating 0/10 - no pain  -MP     Posttreatment Pain Rating 0/10 - no pain  -MP       Row Name 11/09/24 1322          Plan of Care Review    Plan of Care Reviewed With patient  -MP     Outcome Evaluation Pt. Is a 80 y/o male admitted with c/o decreased LE strength, impaired coordination and difficulty breathing. PMHx includes CIDP for which pt. Receives monthly infusions. At baseline pt. Lives with caregiver, however, has had recent rehab stay. Pt. Is functioning below baseline with impaired strength, mobility and balance. Pt. Required modAx2 for bed mobility, maxAx1 to attempt STS. Unable to achieve upright standing, pt. became anxious with increased tremors and feet sliding forward with heavy posterior lean Pt. Will benefit from continued skilled PT to address deficits noted, anticipate D/C to SNF.  -MP       Row Name 11/09/24 1322          Therapy Assessment/Plan (PT)    Rehab Potential (PT) good  -MP     Criteria for Skilled Interventions Met (PT) yes  -MP     Therapy Frequency (PT) 6 times/wk  -MP     Predicted Duration of Therapy Intervention (PT) 1 week  -MP       Row Name 11/09/24 1322          Positioning and Restraints    Pre-Treatment Position in bed  -MP     Post Treatment Position bed  -MP     In Bed supine;call light within reach;encouraged to call for assist;exit alarm on  -MP               User Key  (r) = Recorded By, (t) = Taken By, (c) = Cosigned By      Initials Name Provider Type    Charlotte Jaimes, PT Physical Therapist                   Outcome Measures       Row Name 11/09/24 1323 11/09/24 0858       How much help from another person do you currently need...    Turning from your back to your side while in flat bed without using bedrails? 3  -MP 3  -BALDOMERO    Moving from lying on back to sitting on the side of a flat bed without bedrails? 3  -MP 3  -BALDOMERO    Moving to and from a bed to a chair (including a wheelchair)? 2  -MP 2  -BALDOMERO    Standing up from a chair using your arms (e.g.,  wheelchair, bedside chair)? 2  -MP 2  -BALDOMERO    Climbing 3-5 steps with a railing? 1  -MP 1  -BALDOMERO    To walk in hospital room? 1  -MP 1  -BALDOMERO    AM-PAC 6 Clicks Score (PT) 12  -MP 12  -BALDOMERO    Highest Level of Mobility Goal 4 --> Transfer to chair/commode  -MP 4 --> Transfer to chair/commode  -BALDOMERO      Row Name 11/09/24 1323          Functional Assessment    Outcome Measure Options AM-PAC 6 Clicks Basic Mobility (PT)  -               User Key  (r) = Recorded By, (t) = Taken By, (c) = Cosigned By      Initials Name Provider Type    Charlotte Jaimes, PT Physical Therapist    Monica Gaona RN Registered Nurse                                 Physical Therapy Education       Title: PT OT SLP Therapies (In Progress)       Topic: Physical Therapy (In Progress)       Point: Mobility training (Done)       Learning Progress Summary            Patient Acceptance, E,TB,D, VU,DU,NR by  at 11/9/2024 1323                      Point: Home exercise program (Not Started)       Learner Progress:  Not documented in this visit.              Point: Body mechanics (Done)       Learning Progress Summary            Patient Acceptance, E,TB,D, VU,DU,NR by  at 11/9/2024 1323                      Point: Precautions (Not Started)       Learner Progress:  Not documented in this visit.                              User Key       Initials Effective Dates Name Provider Type [facility] 02/07/24 -  Charlotte Duncan, PT Physical Therapist PT                  PT Recommendation and Plan  Planned Therapy Interventions (PT): balance training, bed mobility training, gait training, home exercise program, patient/family education, neuromuscular re-education, stair training, strengthening, stretching, ROM (range of motion), transfer training, postural re-education  Outcome Evaluation: Pt. Is a 80 y/o male admitted with c/o decreased LE strength, impaired coordination and difficulty breathing. PMHx includes CIDP for which pt. Receives monthly  infusions. At baseline pt. Lives with caregiver, however, has had recent rehab stay. Pt. Is functioning below baseline with impaired strength, mobility and balance. Pt. Required modAx2 for bed mobility, maxAx1 to attempt STS. Unable to achieve upright standing, pt. became anxious with increased tremors and feet sliding forward with heavy posterior lean Pt. Will benefit from continued skilled PT to address deficits noted, anticipate D/C to SNF.     Time Calculation:         PT Charges       Row Name 11/09/24 1323             Time Calculation    Start Time 0932  -MP      Stop Time 0950  -MP      Time Calculation (min) 18 min  -MP      PT Received On 11/09/24  -MP      PT - Next Appointment 11/11/24  -MP      PT Goal Re-Cert Due Date 11/16/24  -MP         Time Calculation- PT    Total Timed Code Minutes- PT 10 minute(s)  -MP                User Key  (r) = Recorded By, (t) = Taken By, (c) = Cosigned By      Initials Name Provider Type    MP Charlotte Duncan, PT Physical Therapist                  Therapy Charges for Today       Code Description Service Date Service Provider Modifiers Qty    [id number]  PT EVAL MOD COMPLEXITY 2 11/9/2024 Charlotte Duncan, PT GP 1    [id number]  PT THERAPEUTIC ACT EA 15 MIN 11/9/2024 Charlotte Duncan, PT GP 1            PT G-Codes  Outcome Measure Options: AM-PAC 6 Clicks Basic Mobility (PT)  AM-PAC 6 Clicks Score (PT): 12  PT Discharge Summary  Anticipated Discharge Disposition (PT): skilled nursing facility    Charlotte Duncan PT  11/9/2024

## 2024-11-09 NOTE — PLAN OF CARE
Goal Outcome Evaluation:      A/ox4, assist x1 to sit on the edge of bed, patient unable to stand with PT, multiple bowel movements today, admitted for CIDP, patient is a feeder, IGIV given today, history of seizures, LHA, neurology consulted. Right hand contracted      Problem: Adult Inpatient Plan of Care  Goal: Absence of Hospital-Acquired Illness or Injury  Intervention: Identify and Manage Fall Risk  Recent Flowsheet Documentation  Taken 11/9/2024 1643 by Monica Christianson RN  Safety Promotion/Fall Prevention:   assistive device/personal items within reach   safety round/check completed  Taken 11/9/2024 1418 by Monica Christianson RN  Safety Promotion/Fall Prevention:   assistive device/personal items within reach   safety round/check completed  Taken 11/9/2024 1245 by Monica Christianson RN  Safety Promotion/Fall Prevention:   assistive device/personal items within reach   safety round/check completed  Taken 11/9/2024 1034 by Monica Christianson RN  Safety Promotion/Fall Prevention:   assistive device/personal items within reach   safety round/check completed  Taken 11/9/2024 0858 by Monica Christianson RN  Safety Promotion/Fall Prevention:   activity supervised   assistive device/personal items within reach   clutter free environment maintained   fall prevention program maintained   gait belt   lighting adjusted   mobility aid in reach   nonskid shoes/slippers when out of bed   safety round/check completed  Intervention: Prevent Skin Injury  Recent Flowsheet Documentation  Taken 11/9/2024 1643 by Monica Christianson RN  Body Position: supine  Taken 11/9/2024 1418 by Monica Christianson RN  Body Position: dangle, side of bed  Taken 11/9/2024 1245 by Monica Christianson RN  Body Position: supine  Taken 11/9/2024 1034 by Monica Christianson RN  Body Position: sitting up in bed  Taken 11/9/2024 0858 by Monica Christianson RN  Body Position: sitting up in bed  Skin Protection:   incontinence pads utilized   protective footwear used  Intervention: Prevent and Manage VTE (Venous  Thromboembolism) Risk  Recent Flowsheet Documentation  Taken 11/9/2024 0858 by Monica Christianson RN  VTE Prevention/Management:   bilateral   SCDs (sequential compression devices) on  Goal: Optimal Comfort and Wellbeing  Intervention: Monitor Pain and Promote Comfort  Recent Flowsheet Documentation  Taken 11/9/2024 0858 by Monica Christianson RN  Pain Management Interventions:   position adjusted   pillow support provided   relaxation techniques promoted  Intervention: Provide Person-Centered Care  Recent Flowsheet Documentation  Taken 11/9/2024 0858 by Monica Christianson RN  Trust Relationship/Rapport:   care explained   thoughts/feelings acknowledged     Problem: Skin Injury Risk Increased  Goal: Skin Health and Integrity  Intervention: Optimize Skin Protection  Recent Flowsheet Documentation  Taken 11/9/2024 1643 by Monica Christianson RN  Head of Bed (HOB) Positioning: HOB at 20 degrees  Taken 11/9/2024 1418 by Monica Christianson RN  Head of Bed (HOB) Positioning: HOB lowered  Taken 11/9/2024 1245 by Monica Christianson RN  Head of Bed (HOB) Positioning: HOB elevated  Taken 11/9/2024 1034 by Monica Christianson RN  Head of Bed (HOB) Positioning: HOB elevated  Taken 11/9/2024 0858 by Monica Christianson RN  Activity Management: activity encouraged  Pressure Reduction Techniques:   frequent weight shift encouraged   weight shift assistance provided  Head of Bed (HOB) Positioning:   HOB elevated   HOB at 45 degrees  Pressure Reduction Devices: alternating pressure pump (ROSSI)  Skin Protection:   incontinence pads utilized   protective footwear used  Intervention: Promote and Optimize Oral Intake  Recent Flowsheet Documentation  Taken 11/9/2024 0858 by Monica Christianson RN  Nutrition Interventions: meal set-up provided     Problem: Fall Injury Risk  Goal: Absence of Fall and Fall-Related Injury  Intervention: Identify and Manage Contributors  Recent Flowsheet Documentation  Taken 11/9/2024 1643 by Monica Christianson RN  Medication Review/Management: medications reviewed  Taken  11/9/2024 1418 by Monica Christianson RN  Medication Review/Management: medications reviewed  Taken 11/9/2024 1245 by Monica Christianson RN  Medication Review/Management: medications reviewed  Taken 11/9/2024 1034 by Monica Christianson RN  Medication Review/Management: medications reviewed  Taken 11/9/2024 0858 by Monica Christianson RN  Medication Review/Management: medications reviewed  Intervention: Promote Injury-Free Environment  Recent Flowsheet Documentation  Taken 11/9/2024 1643 by Monica Christianson RN  Safety Promotion/Fall Prevention:   assistive device/personal items within reach   safety round/check completed  Taken 11/9/2024 1418 by Monica Christianson RN  Safety Promotion/Fall Prevention:   assistive device/personal items within reach   safety round/check completed  Taken 11/9/2024 1245 by Monica Christianson RN  Safety Promotion/Fall Prevention:   assistive device/personal items within reach   safety round/check completed  Taken 11/9/2024 1034 by Monica Christianson RN  Safety Promotion/Fall Prevention:   assistive device/personal items within reach   safety round/check completed  Taken 11/9/2024 0858 by Monica Christianson RN  Safety Promotion/Fall Prevention:   activity supervised   assistive device/personal items within reach   clutter free environment maintained   fall prevention program maintained   gait belt   lighting adjusted   mobility aid in reach   nonskid shoes/slippers when out of bed   safety round/check completed

## 2024-11-09 NOTE — THERAPY EVALUATION
Patient Name: Efren Christianson  : 1945    MRN: [id number]                              Today's Date: 2024       Admit Date: 2024    Visit Dx: No diagnosis found.  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)     Difficulty walking     Hepatitis A     as a child    Seizures     Syncope     Urgency of urination      Past Surgical History:   Procedure Laterality Date    TRUNK LESION/CYST EXCISION N/A 8/3/2023    Procedure: excision of chest wall cyst and back cyst 10:00;  Surgeon: Kianna Oliva DO;  Location: [facility] OR;  Service: General;  Laterality: N/A;      General Information       Row Name 24 1451          OT Time and Intention    Document Type evaluation  -KR     Mode of Treatment individual therapy;occupational therapy  -KR       Row Name 24 1451          General Information    Patient Profile Reviewed yes  -KR     Prior Level of Function --  reports pivoting to a w/c and caregiver assisting with ADLs. States level of assist flucuates with timing of infusions  -KR     Existing Precautions/Restrictions fall  -KR     Barriers to Rehab medically complex;previous functional deficit  -KR       Row Name 24 1451          Living Environment    People in Home other (see comments)  lives with caregiver and her  with recent rehab stay  -KR       Row Name 24 1451          Home Main Entrance    Number of Stairs, Main Entrance none  -KR       Row Name 24 1451          Stairs Within Home, Primary    Number of Stairs, Within Home, Primary none  -KR       Row Name 24 1451          Cognition    Orientation Status  (Cognition) oriented x 3  -       Row Name 11/09/24 1451          Safety Issues/Impairments Affecting Functional Mobility    Impairments Affecting Function (Mobility) balance;pain;postural/trunk control;range of motion (ROM);strength;endurance/activity tolerance;coordination  -               User Key  (r) = Recorded By, (t) = Taken By, (c) = Cosigned By      Initials Name Provider Type    KR Arin Narayanan OT Occupational Therapist                     Mobility/ADL's       Row Name 11/09/24 1452          Bed Mobility    Supine-Sit [place] (Bed Mobility) moderate assist (50% patient effort);1 person assist  -KR     Sit-Supine [place] (Bed Mobility) maximum assist (25% patient effort);1 person assist  -KR     Bed Mobility, Safety Issues decreased use of arms for pushing/pulling;decreased use of legs for bridging/pushing  -       Row Name 11/09/24 1452          Sit-Stand Transfer    Comment, (Sit-Stand Transfer) Unable to assess. pt reported he was too fatigue to attempt. stated he could not stand with PT earlier with max Ax1  -       Row Name 11/09/24 1452          Functional Mobility    Functional Mobility- Ind. Level not tested  -       Row Name 11/09/24 1452          Activities of Daily Living    BADL Assessment/Intervention toileting;grooming;lower body dressing  -       Row Name 11/09/24 1452          Toileting Assessment/Training    [place] Level (Toileting) toileting skills;dependent (less than 25% patient effort)  -       Row Name 11/09/24 1452          Grooming Assessment/Training    Comment, (Grooming) able to bring wash cloth to face when therapists positions in hand. difficult with coordination attempting to wash face  -       Row Name 11/09/24 1452          Lower Body Dressing Assessment/Training    [place] Level (Lower Body Dressing) lower body dressing skills;dependent (less than 25% patient effort)  -               User Key  (r) = Recorded By, (t) = Taken By, (c) =  Cosigned By      Initials Name Provider Type    Arin Patel OT Occupational Therapist                   Obj/Interventions       Row Name 11/09/24 1454          Range of Motion Comprehensive    General Range of Motion upper extremity range of motion deficits identified  -KR     Comment, General Range of Motion very limited B shoulder ROM. L worse than R. Unable to flex/ext wrists (PROM WFL)  -KR       Row Name 11/09/24 1454          Strength Comprehensive (MMT)    Comment, General Manual Muscle Testing (MMT) Assessment BUE 2+/5; could not squeeze therapists hand due to weak  strength  -KR       Row Name 11/09/24 1454          Balance    Static Sitting Balance contact guard;1-person assist  -KR     Position, Sitting Balance sitting edge of bed  -KR     Balance Interventions sitting  -KR               User Key  (r) = Recorded By, (t) = Taken By, (c) = Cosigned By      Initials Name Provider Type    Arin Patel OT Occupational Therapist                   Goals/Plan       Row Name 11/09/24 1457          Transfer Goal 1 (OT)    Activity/Assistive Device (Transfer Goal 1, OT) transfers, all  -KR     [place] Level/Cues Needed (Transfer Goal 1, OT) maximum assist (25-49% patient effort)  -KR     Time Frame (Transfer Goal 1, OT) short term goal (STG);2 weeks  -KR     Progress/Outcome (Transfer Goal 1, OT) goal ongoing  -KR       Row Name 11/09/24 1457          Grooming Goal 1 (OT)    Activity/Device (Grooming Goal 1, OT) grooming skills, all;universal cuff  -KR     [place] (Grooming Goal 1, OT) minimum assist (75% or more patient effort)  -KR     Time Frame (Grooming Goal 1, OT) short term goal (STG);2 weeks  -KR     Progress/Outcome (Grooming Goal 1, OT) goal ongoing  -KR       Row Name 11/09/24 1457          Self-Feeding Goal 1 (OT)    Activity/Device (Self-Feeding Goal 1, OT) self-feeding skills, all;adapted cup;built-up handle utensils;scoop dish/plate guard  -KR     [place] Level/Cues  Needed (Self-Feeding Goal 1, OT) moderate assist (50-74% patient effort)  -KR     Time Frame (Self-Feeding Goal 1, OT) short term goal (STG);2 weeks  -KR     Progress/Outcomes (Self-Feeding Goal 1, OT) goal ongoing  -KR       Row Name 11/09/24 1457          Therapy Assessment/Plan (OT)    Planned Therapy Interventions (OT) activity tolerance training;patient/caregiver education/training;neuromuscular control/coordination retraining;adaptive equipment training;BADL retraining;occupation/activity based interventions;ROM/therapeutic exercise;strengthening exercise;transfer/mobility retraining;functional balance retraining  -KR               User Key  (r) = Recorded By, (t) = Taken By, (c) = Cosigned By      Initials Name Provider Type    Arin Patel OT Occupational Therapist                   Clinical Impression       Row Name 11/09/24 1456          Pain Assessment    Pretreatment Pain Rating 0/10 - no pain  -KR     Posttreatment Pain Rating 0/10 - no pain  -KR       Row Name 11/09/24 1456          Plan of Care Review    Plan of Care Reviewed With patient  -KR     Outcome Evaluation Pt seen for OT pedro this afternoon. Admitted with decreased LE strength with difficulty breathing and impaired UE coordination and weakness. Pt with hx of CIDP and he receives monthly infusions. He reports living with a caregiver at baseline and her . He reports pivoting to a w/c with assist from caregiver. Recently they were having to assist with toileting. Level of assists flucuates on timing of infusions per pt. Today required mod Ax1 to reach EOB. Required CGA for sitting balance. Unable to attempt STS, pt stating he was too fatigued. Sat EOB and assessed UE ROM and coodination. Pt with very limited shoulder ROM in BUE L worse than the R. Decreased hand AROM to make a fist with decreased  strength. Can wrap fingers around therapists hand but cannot squeeze. Stated he was able to feed himself but recently has gotten  progressively weaker where he can't hold the utensil. Discussed use of a universal cuff for his utensils to assist with self feeding. Pt to continue to benefit from skilled OT to address deficits. Rec SNF at [place].  -KR       Row Name 11/09/24 1456          Therapy Assessment/Plan (OT)    Therapy Frequency (OT) 5 times/wk  -KR       Row Name 11/09/24 1456          Therapy Plan Review/Discharge Plan (OT)    Anticipated Discharge Disposition (OT) skilled nursing facility  -KR       Row Name 11/09/24 1456          Vital Signs    Pre Patient Position Supine  -KR     Intra Patient Position Sitting  -KR     Post Patient Position Supine  -KR       Row Name 11/09/24 1456          Positioning and Restraints    Pre-Treatment Position in bed  -KR     Post Treatment Position bed  -KR     In Bed notified nsg;supine;call light within reach;encouraged to call for assist;exit alarm on  -KR               User Key  (r) = Recorded By, (t) = Taken By, (c) = Cosigned By      Initials Name Provider Type    Arin Patel, TIFFANIE Occupational Therapist                   Outcome Measures       Row Name 11/09/24 1323 11/09/24 0858       How much help from another person do you currently need...    Turning from your back to your side while in flat bed without using bedrails? 3  -MP 3  -BALDOMERO    Moving from lying on back to sitting on the side of a flat bed without bedrails? 3  -MP 3  -BALDOMERO    Moving to and from a bed to a chair (including a wheelchair)? 2  -MP 2  -BALDOMERO    Standing up from a chair using your arms (e.g., wheelchair, bedside chair)? 2  -MP 2  -BALDOMERO    Climbing 3-5 steps with a railing? 1  -MP 1  -BALDOMERO    To walk in hospital room? 1  -MP 1  -BALDOMERO    AM-PAC 6 Clicks Score (PT) 12  -MP 12  -BALDOMERO    Highest Level of Mobility Goal 4 --> Transfer to chair/commode  -MP 4 --> Transfer to chair/commode  -BALDOMERO      Row Name 11/09/24 1323          Functional Assessment    Outcome Measure Options AM-PAC 6 Clicks Basic Mobility (PT)  -MP               User Key   (r) = Recorded By, (t) = Taken By, (c) = Cosigned By      Initials Name Provider Type    MP Charlotte Duncan, PT Physical Therapist    Monica Gaona RN Registered Nurse                    Occupational Therapy Education        No education to display                  OT Recommendation and Plan  Planned Therapy Interventions (OT): activity tolerance training, patient/caregiver education/training, neuromuscular control/coordination retraining, adaptive equipment training, BADL retraining, occupation/activity based interventions, ROM/therapeutic exercise, strengthening exercise, transfer/mobility retraining, functional balance retraining  Therapy Frequency (OT): 5 times/wk  Plan of Care Review  Plan of Care Reviewed With: patient  Outcome Evaluation: Pt seen for OT eval this afternoon. Admitted with decreased LE strength with difficulty breathing and impaired UE coordination and weakness. Pt with hx of CIDP and he receives monthly infusions. He reports living with a caregiver at baseline and her . He reports pivoting to a w/c with assist from caregiver. Recently they were having to assist with toileting. Level of assists flucuates on timing of infusions per pt. Today required mod Ax1 to reach EOB. Required CGA for sitting balance. Unable to attempt STS, pt stating he was too fatigued. Sat EOB and assessed UE ROM and coodination. Pt with very limited shoulder ROM in BUE L worse than the R. Decreased hand AROM to make a fist with decreased  strength. Can wrap fingers around therapists hand but cannot squeeze. Stated he was able to feed himself but recently has gotten progressively weaker where he can't hold the utensil. Discussed use of a universal cuff for his utensils to assist with self feeding. Pt to continue to benefit from skilled OT to address deficits. Rec SNF at [place].     Time Calculation:         Time Calculation- OT       Row Name 11/09/24 5595             Time Calculation- OT    OT Start Time 4070   -KR      OT Stop Time 1426  -KR      OT Time Calculation (min) 28 min  -KR      OT Received On 11/09/24  -KR      OT - Next Appointment 11/11/24  -KR      OT Goal Re-Cert Due Date 11/23/24  -KR         Timed Charges    74083 - OT Therapeutic Activity Minutes 10  -KR         Untimed Charges    OT Eval/Re-eval Minutes 18  -KR         Total Minutes    Timed Charges Total Minutes 10  -KR      Untimed Charges Total Minutes 18  -KR       Total Minutes 28  -KR                User Key  (r) = Recorded By, (t) = Taken By, (c) = Cosigned By      Initials Name Provider Type    Arin Patel OT Occupational Therapist                  Therapy Charges for Today       Code Description Service Date Service Provider Modifiers Qty    70365484606 HC OT THERAPEUTIC ACT EA 15 MIN 11/9/2024 Arin Narayanan OT GO 1    09505535329 HC OT EVAL MOD COMPLEXITY 3 11/9/2024 Arin Narayanan OT GO 1                 Arin Narayanan OT  11/9/2024

## 2024-11-09 NOTE — H&P
Patient Name:  Efren Christianson  YOB: 1945  MRN:  [id number]  Admit Date:  11/8/2024  Patient Care Team:  Winnie Murray as PCP - General (Nurse Practitioner)  Winnie Murray (Nurse Practitioner)      Subjective   History Present Illness     No chief complaint on file.        History of Present Illness  Mr. Christianson is a 79 y.o. possible history of CIDP getting monthly IVIG presenting with bilateral weakness left worse than right.  Gets IVIG every 4 weeks last IVIG was about a week ago. Recently started on vyvgart but he has not really noticed much change from it so far.  Neurology was consulted by [facility] and recommend transfer here for further evaluation and may need IVIG.  Patient did get the flu shot on Tuesday.  Patient with no change in sensation, no headache, no nausea, vomiting, abdominal pain, dyspnea, cough, dysuria.  Had difficulty holding silverware this morning making it difficult for him to eat.  But no dysphagia or shortness of breath.  Has had some chronic nasal stuffiness and postnasal drip for the last few weeks to months, contributes to allergies but does not take any allergy medications.    Review of Systems   Constitutional:  Negative for chills and fever.   HENT:  Positive for congestion and postnasal drip.    Respiratory:  Negative for cough and shortness of breath.    Cardiovascular:  Negative for chest pain and palpitations.   Gastrointestinal:  Negative for abdominal pain, diarrhea, nausea and vomiting.   Neurological:  Positive for weakness. Negative for numbness.        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)     Difficulty walking     Hepatitis A     as a child    Seizures     Syncope     Urgency of urination      Past Surgical History:   Procedure Laterality Date    TRUNK LESION/CYST EXCISION N/A 8/3/2023    Procedure: excision of chest wall cyst and back cyst  10:00;  Surgeon: Kianna Oliva DO;  Location: [facility];  Service: General;  Laterality: N/A;     Family History   Problem Relation Age of Onset    Cancer Mother     Cancer Father     Coronary artery disease Brother     Multiple sclerosis Paternal Cousin     Multiple sclerosis Paternal Cousin      Social History     Tobacco Use    Smoking status: Never    Smokeless tobacco: Never   Vaping Use    Vaping status: Never Used   Substance Use Topics    Alcohol use: Never     Comment: occasional    Drug use: Never     Current Facility-Administered Medications on File Prior to Encounter   Medication Dose Route Frequency Provider Last Rate Last Admin    [COMPLETED] dexAMETHasone (DECADRON) injection 10 mg  10 mg Intravenous Once Hernan Mcneil MD   10 mg at 11/08/24 1641    [COMPLETED] labetalol (NORMODYNE,TRANDATE) injection 20 mg  20 mg Intravenous Once Hernan Mcneil MD   20 mg at 11/08/24 1728    [DISCONTINUED] sodium chloride 0.9 % flush 10 mL  10 mL Intravenous PRN Denzel Varma MD         Current Outpatient Medications on File Prior to Encounter   Medication Sig Dispense Refill    Emollient (Aquaphor Advanced Therapy) ointment ointment Apply 1 Application topically to the appropriate area as directed Every 12 (Twelve) Hours.      levETIRAcetam (KEPPRA) 1000 MG tablet Take 1 tablet by mouth Every 12 (Twelve) Hours.      multivitamin (THERAGRAN) tablet tablet Take  by mouth Daily.      pantoprazole (PROTONIX) 40 MG EC tablet Take 1 tablet by mouth Daily for 30 days. 30 tablet 0    predniSONE (DELTASONE) 20 MG tablet Take 1 tablet by mouth Daily With Breakfast for 30 doses.      Calcium Citrate-Vitamin D 250-2.5 MG-MCG per tablet Take 1 tablet by mouth 2 (Two) Times a Day.      Immune Globulin, Human, (GAMMAGARD IV) Infuse 1 g/kg into a venous catheter Every 30 (Thirty) Days.       No Known Allergies    Objective    Objective     Vital Signs  Temp:  [98.2 °F (36.8 °C)-98.9 °F (37.2 °C)] 98.2 °F (36.8  °C)  Heart Rate:  [] 85  Resp:  [18] 18  BP: (113-188)/() 144/96  SpO2:  [91 %-95 %] 95 %  on   ;   Device (Oxygen Therapy): room air  There is no height or weight on file to calculate BMI.    Physical Exam  Vitals and nursing note reviewed.   Constitutional:       General: He is not in acute distress.  Cardiovascular:      Rate and Rhythm: Normal rate and regular rhythm.   Pulmonary:      Effort: Pulmonary effort is normal. No respiratory distress.   Abdominal:      General: Abdomen is flat. There is no distension.      Tenderness: There is no abdominal tenderness.   Musculoskeletal:         General: No swelling or deformity.   Skin:     General: Skin is warm and dry.   Neurological:      General: No focal deficit present.      Mental Status: He is alert and oriented to person, place, and time. Mental status is at baseline.      Sensory: No sensory deficit.      Motor: Weakness (L 2/5, R 3/5) present.         Results Review:  I reviewed the patient's new clinical results.  I reviewed the patient's new imaging results and agree with the interpretation.  I reviewed the patient's other test results and agree with the interpretation  I personally viewed and interpreted the patient's EKG/Telemetry data  Discussed with ED provider.    Lab Results (last 24 hours)       Procedure Component Value Units Date/Time    CBC & Differential [[id number]]  (Abnormal) Collected: 11/08/24 1331    Specimen: Blood Updated: 11/08/24 1346    Narrative:      The following orders were created for panel order CBC & Differential.  Procedure                               Abnormality         Status                     ---------                               -----------         ------                     CBC Auto Differential[[id number]]        Abnormal            Final result                 Please view results for these tests on the individual orders.    Comprehensive Metabolic Panel [[id number]]  (Abnormal) Collected: 11/08/24 1331     Specimen: Blood Updated: 11/08/24 1357     Glucose 119 mg/dL      BUN 14 mg/dL      Creatinine 1.10 mg/dL      Sodium 138 mmol/L      Potassium 3.8 mmol/L      Chloride 101 mmol/L      CO2 27.4 mmol/L      Calcium 9.1 mg/dL      Total Protein 7.1 g/dL      Albumin 3.7 g/dL      ALT (SGPT) 36 U/L      AST (SGOT) 33 U/L      Alkaline Phosphatase 58 U/L      Total Bilirubin 0.6 mg/dL      Globulin 3.4 gm/dL      A/G Ratio 1.1 g/dL      BUN/Creatinine Ratio 12.7     Anion Gap 9.6 mmol/L      eGFR 69.1 mL/min/1.73     Narrative:      GFR Normal >60  Chronic Kidney Disease <60  Kidney Failure <15    The GFR formula is only valid for adults with stable renal function between ages 18 and 70.    Magnesium [[id number]]  (Normal) Collected: 11/08/24 1331    Specimen: Blood Updated: 11/08/24 1357     Magnesium 2.1 mg/dL     Phosphorus [[id number]]  (Normal) Collected: 11/08/24 1331    Specimen: Blood Updated: 11/08/24 1357     Phosphorus 2.6 mg/dL     CBC Auto Differential [[id number]]  (Abnormal) Collected: 11/08/24 1331    Specimen: Blood Updated: 11/08/24 1346     WBC 7.30 10*3/mm3      RBC 4.10 10*6/mm3      Hemoglobin 13.6 g/dL      Hematocrit 40.4 %      MCV 98.5 fL      MCH 33.2 pg      MCHC 33.7 g/dL      RDW 14.1 %      RDW-SD 50.3 fl      MPV 9.3 fL      Platelets 168 10*3/mm3      Neutrophil % 86.3 %      Lymphocyte % 7.1 %      Monocyte % 6.3 %      Eosinophil % 0.1 %      Basophil % 0.1 %      Immature Grans % 0.1 %      Neutrophils, Absolute 6.29 10*3/mm3      Lymphocytes, Absolute 0.52 10*3/mm3      Monocytes, Absolute 0.46 10*3/mm3      Eosinophils, Absolute 0.01 10*3/mm3      Basophils, Absolute 0.01 10*3/mm3      Immature Grans, Absolute 0.01 10*3/mm3             Imaging Results (Last 24 Hours)       ** No results found for the last 24 hours. **                Telemetry Scan   Final Result           Assessment/Plan     Active Hospital Problems    Diagnosis  POA    **CIDP (chronic inflammatory demyelinating  polyneuropathy) [G61.81]  Yes      Resolved Hospital Problems   No resolved problems to display.     Acute on chronic inflammatory demyelinating polyneuropathy  Weakness left worse than right  -Neurology consulted, likely need some IVIG  -Previously on prednisone 20 mg, IV dexamethasone 6 mg x 1 defer further steroids to neurology.  Continue PPI for prophylaxis  -NIF every 6hrs while awake    Seizure prophylaxis-continue home Keppra    Seasonal allergies-start Flonase      I discussed the patient's findings and my recommendations with patient, nursing staff, and ED provider.    VTE Prophylaxis - Lovenox 40 mg SC daily.  Code Status - Full code.       Feliciano Monroy MD  [facility]  11/08/24  23:12 EST

## 2024-11-09 NOTE — PLAN OF CARE
Goal Outcome Evaluation:  Plan of Care Reviewed With: patient           Outcome Evaluation: Pt. Is a 78 y/o male admitted with c/o decreased LE strength, impaired coordination and difficulty breathing. PMHx includes CIDP for which pt. Receives monthly infusions. At baseline pt. Lives with caregiver, however, has had recent rehab stay. Pt. Is functioning below baseline with impaired strength, mobility and balance. Pt. Required modAx2 for bed mobility, maxAx1 to attempt STS. Unable to achieve upright standing, pt. became anxious with increased tremors and feet sliding forward with heavy posterior lean Pt. Will benefit from continued skilled PT to address deficits noted, anticipate D/C to SNF.    Anticipated Discharge Disposition (PT): skilled nursing facility

## 2024-11-09 NOTE — PLAN OF CARE
Goal Outcome Evaluation:  Plan of Care Reviewed With: patient           Outcome Evaluation: Pt seen for OT eval this afternoon. Admitted with decreased LE strength with difficulty breathing and impaired UE coordination and weakness. Pt with hx of CIDP and he receives monthly infusions. He reports living with a caregiver at baseline and her . He reports pivoting to a w/c with assist from caregiver. Recently they were having to assist with toileting. Level of assists flucuates on timing of infusions per pt. Today required mod Ax1 to reach EOB. Required CGA for sitting balance. Unable to attempt STS, pt stating he was too fatigued. Sat EOB and assessed UE ROM and coodination. Pt with very limited shoulder ROM in BUE L worse than the R. Decreased hand AROM to make a fist with decreased  strength. Can wrap fingers around therapists hand but cannot squeeze. Stated he was able to feed himself but recently has gotten progressively weaker where he can't hold the utensil. Discussed use of a universal cuff for his utensils to assist with self feeding. Pt to continue to benefit from skilled OT to address deficits. Rec SNF at [place].    Anticipated Discharge Disposition (OT): skilled nursing facility

## 2024-11-09 NOTE — CONSULTS
Neurology Consult Note    Consult Date: 11/9/2024    Referring MD: Hernan Mcneil MD    Reason for Consult I have been asked to see the patient in neurological consultation to render advice and opinion regarding CIDP    Efren Christianson is a 79 y.o. 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  At that time he had an EMG and a course of IVIG in the hospital.  He did well but did not follow-up after that and had some progressive decline in his mobility over a number of years.  He reestablished medical care here in [place] in 2019 and since that time has been treated for CIDP with intermittent infusions of IVIG.  He follows with my colleagues Dr. Ayala and Dr. Shay at [facility]. Dr. Shay saw him for hospital admissions and Dr. Ayala was managing him in the outpatient setting.  He was getting IVIG once monthly but seem to be having exacerbations prior to making it to his next infusion.  He was tried on a few doses of Vyvgart but did not seem to respond and deteriorated physically after that.  He was recently admitted to [facility] and received another 5 days of IVIG.  He was discharged to [facility] rehab and initially seemed to do well there but went home and began rapidly worsening again.  Associated with this he developed some intermittent shortness of breath which was a new symptom.  The patient previously endorsed occasionally feeling short of breath when lying flat in bed but would feel better if he turned on his side.  I spoke with his caregiver who reports that he did not previously have shortness of breath until these recent admissions.  At his home she noticed that he seemed to be rapidly worsening again from the standpoint of his neuromuscular weakness and she sent him back to the hospital last night.  I was called by the emergency department physician at [place] and the patient was then direct admitted here  for further care.  This morning he endorses mild shortness of air which he attributes to congestion and a possible URI.  He endorses persistent weakness worse on the left side which is worse than his recent baseline.    His caregiver also reports that he has had intermittent abdominal pain associated with significant weight loss and these more frequent exacerbations of his CIDP.    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  CN: visual acuity grossly normal, PERRL, EOMI, no facial droop, no dysarthria  Motor: 4 -/5 bilateral upper and lower extremities, diffuse atrophy  Sensory: Diminished vibratory sensation bilateral upper and lower extremities distally  Reflexes: Absent throughout, no Joao sign, plantars mute    DATA:    Lab Results   Component Value Date    GLUCOSE 124 (H) 11/09/2024    CALCIUM 8.7 11/09/2024     11/09/2024    K 4.2 11/09/2024    CO2 26.0 11/09/2024     11/09/2024    BUN 17 11/09/2024    CREATININE 0.99 11/09/2024    BCR 17.2 11/09/2024    ANIONGAP 15.0 11/09/2024     Lab Results   Component Value Date    WBC 7.99 11/09/2024    HGB 13.5 11/09/2024    HCT 38.9 11/09/2024    MCV 97.0 11/09/2024     11/09/2024       Lab review: CBC, BMP unremarkable    Imaging review: CT abdomen with contrast ordered    Diagnoses:  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  His exacerbations seem to be more  frequent despite recent treatment which raises the question of an alternative underlying immunologic stress.  Given his intermittent abdominal pain and weight loss I think it would be prudent to do a CT of the abdomen to rule out an underlying malignancy.  For now we will restart IVIG 2 g/kg ideal body weight over 5 days.  If he responds to this he would likely be able to go back to rehab mid next week.  He might need more frequent outpatient infusions of his IVIG given that he still does respond but does not seem to be revealed to make it at home on monthly infusions.  He was started on prednisone by Dr. Shay which I will begin tapering because he does not seem to have responded to that.  If he is not improving with prednisone I think the risk of worsening his osteoporosis is probably not worth the benefit and his abdominal pain raises the question of peptic ulcer disease as well.    PLAN:  NIF/FVC bid  Restart IVIG 400 g/kg ideal body weight daily for 5 days  Reduce prednisone to 10 mg daily, would consider stopping this admission if he tolerates the dose reduction without symptoms of adrenal insufficiency  Check CT abdomen pelvis with contrast  Plan to discuss his case with Dr. Ayala next week to determine if his outpatient infusion schedule needs to be changed  Likely discharge to rehab mid to late next week depending on clinical progression    High risk, severe exacerbation of neuromuscular disease with respiratory symptoms

## 2024-11-10 ENCOUNTER — APPOINTMENT (OUTPATIENT)
Dept: CT IMAGING | Facility: HOSPITAL | Age: 79
End: 2024-11-10
Payer: MEDICARE

## 2024-11-10 LAB
ANION GAP SERPL CALCULATED.3IONS-SCNC: 8.5 MMOL/L (ref 5–15)
BASOPHILS # BLD AUTO: 0.01 10*3/MM3 (ref 0–0.2)
BASOPHILS NFR BLD AUTO: 0.1 % (ref 0–1.5)
BUN SERPL-MCNC: 25 MG/DL (ref 8–23)
BUN/CREAT SERPL: 23.1 (ref 7–25)
CALCIUM SPEC-SCNC: 8.8 MG/DL (ref 8.6–10.5)
CHLORIDE SERPL-SCNC: 102 MMOL/L (ref 98–107)
CO2 SERPL-SCNC: 27.5 MMOL/L (ref 22–29)
CREAT SERPL-MCNC: 1.08 MG/DL (ref 0.76–1.27)
DEPRECATED RDW RBC AUTO: 45 FL (ref 37–54)
EGFRCR SERPLBLD CKD-EPI 2021: 70.7 ML/MIN/1.73
EOSINOPHIL # BLD AUTO: 0.01 10*3/MM3 (ref 0–0.4)
EOSINOPHIL NFR BLD AUTO: 0.1 % (ref 0.3–6.2)
ERYTHROCYTE [DISTWIDTH] IN BLOOD BY AUTOMATED COUNT: 12.8 % (ref 12.3–15.4)
GLUCOSE SERPL-MCNC: 80 MG/DL (ref 65–99)
HCT VFR BLD AUTO: 35.4 % (ref 37.5–51)
HGB BLD-MCNC: 12.1 G/DL (ref 13–17.7)
IMM GRANULOCYTES # BLD AUTO: 0.02 10*3/MM3 (ref 0–0.05)
IMM GRANULOCYTES NFR BLD AUTO: 0.3 % (ref 0–0.5)
LYMPHOCYTES # BLD AUTO: 1.44 10*3/MM3 (ref 0.7–3.1)
LYMPHOCYTES NFR BLD AUTO: 19.8 % (ref 19.6–45.3)
MCH RBC QN AUTO: 33.2 PG (ref 26.6–33)
MCHC RBC AUTO-ENTMCNC: 34.2 G/DL (ref 31.5–35.7)
MCV RBC AUTO: 97.3 FL (ref 79–97)
MONOCYTES # BLD AUTO: 0.68 10*3/MM3 (ref 0.1–0.9)
MONOCYTES NFR BLD AUTO: 9.3 % (ref 5–12)
NEUTROPHILS NFR BLD AUTO: 5.12 10*3/MM3 (ref 1.7–7)
NEUTROPHILS NFR BLD AUTO: 70.4 % (ref 42.7–76)
NRBC BLD AUTO-RTO: 0 /100 WBC (ref 0–0.2)
PLATELET # BLD AUTO: 166 10*3/MM3 (ref 140–450)
PMV BLD AUTO: 9.5 FL (ref 6–12)
POTASSIUM SERPL-SCNC: 3.7 MMOL/L (ref 3.5–5.2)
RBC # BLD AUTO: 3.64 10*6/MM3 (ref 4.14–5.8)
SODIUM SERPL-SCNC: 138 MMOL/L (ref 136–145)
WBC NRBC COR # BLD AUTO: 7.28 10*3/MM3 (ref 3.4–10.8)

## 2024-11-10 PROCEDURE — 63710000001 PREDNISONE PER 5 MG: Performed by: PSYCHIATRY & NEUROLOGY

## 2024-11-10 PROCEDURE — 63710000001 DIPHENHYDRAMINE PER 50 MG: Performed by: PSYCHIATRY & NEUROLOGY

## 2024-11-10 PROCEDURE — 25510000001 IOPAMIDOL 61 % SOLUTION: Performed by: INTERNAL MEDICINE

## 2024-11-10 PROCEDURE — 94760 N-INVAS EAR/PLS OXIMETRY 1: CPT

## 2024-11-10 PROCEDURE — 99232 SBSQ HOSP IP/OBS MODERATE 35: CPT | Performed by: PSYCHIATRY & NEUROLOGY

## 2024-11-10 PROCEDURE — 85025 COMPLETE CBC W/AUTO DIFF WBC: CPT | Performed by: INTERNAL MEDICINE

## 2024-11-10 PROCEDURE — 25010000002 ENOXAPARIN PER 10 MG: Performed by: STUDENT IN AN ORGANIZED HEALTH CARE EDUCATION/TRAINING PROGRAM

## 2024-11-10 PROCEDURE — 74177 CT ABD & PELVIS W/CONTRAST: CPT

## 2024-11-10 PROCEDURE — 94761 N-INVAS EAR/PLS OXIMETRY MLT: CPT

## 2024-11-10 PROCEDURE — 80048 BASIC METABOLIC PNL TOTAL CA: CPT | Performed by: INTERNAL MEDICINE

## 2024-11-10 PROCEDURE — 25010000002 IMMUNE GLOBULIN (HUMAN) 20 GM/200ML SOLUTION: Performed by: PSYCHIATRY & NEUROLOGY

## 2024-11-10 PROCEDURE — 94799 UNLISTED PULMONARY SVC/PX: CPT

## 2024-11-10 RX ORDER — IOPAMIDOL 612 MG/ML
85 INJECTION, SOLUTION INTRAVASCULAR
Status: COMPLETED | OUTPATIENT
Start: 2024-11-10 | End: 2024-11-10

## 2024-11-10 RX ADMIN — DIPHENHYDRAMINE HYDROCHLORIDE 25 MG: 25 CAPSULE ORAL at 09:31

## 2024-11-10 RX ADMIN — IOPAMIDOL 85 ML: 612 INJECTION, SOLUTION INTRAVENOUS at 16:50

## 2024-11-10 RX ADMIN — Medication 10 ML: at 20:32

## 2024-11-10 RX ADMIN — LEVETIRACETAM 1000 MG: 500 TABLET, FILM COATED ORAL at 09:31

## 2024-11-10 RX ADMIN — PANTOPRAZOLE SODIUM 40 MG: 40 TABLET, DELAYED RELEASE ORAL at 09:31

## 2024-11-10 RX ADMIN — ACETAMINOPHEN 325MG 650 MG: 325 TABLET ORAL at 09:31

## 2024-11-10 RX ADMIN — ENOXAPARIN SODIUM 40 MG: 100 INJECTION SUBCUTANEOUS at 09:31

## 2024-11-10 RX ADMIN — IMMUNE GLOBULIN (HUMAN) 20 G: 10 INJECTION INTRAVENOUS; SUBCUTANEOUS at 12:16

## 2024-11-10 RX ADMIN — Medication 10 ML: at 09:33

## 2024-11-10 RX ADMIN — PREDNISONE 10 MG: 5 TABLET ORAL at 09:31

## 2024-11-10 RX ADMIN — LEVETIRACETAM 1000 MG: 500 TABLET, FILM COATED ORAL at 20:32

## 2024-11-10 RX ADMIN — IMMUNE GLOBULIN (HUMAN) 20 G: 10 INJECTION INTRAVENOUS; SUBCUTANEOUS at 09:31

## 2024-11-10 NOTE — PLAN OF CARE
Goal Outcome Evaluation:  Patient BUE weak L worse than R.  Plan for rehab @ discharge.  Case management following.

## 2024-11-10 NOTE — PROGRESS NOTES
"DOS: 11/10/2024  NAME: Efren Christianson   : 1945  PCP: Winnie Murray  No chief complaint on file.      Chief complaint: weakness  Subjective: weakness is about the same. Denies significant SOA. NIF stable at -30. Getting IVIG dose 2/5 this morning    Objective:  Vital signs: /74 (BP Location: Right arm, Patient Position: Lying)   Pulse 84   Temp 98.1 °F (36.7 °C) (Oral)   Resp 18   SpO2 97%    Gen: NAD, vitals reviewed  MS: oriented x3, recent/remote memory intact, normal attention/concentration, language intact, no neglect.  CN: visual acuity grossly normal, PERRL, EOMI, no facial droop, no dysarthria  Motor: 4/5 bilateral UE/LE, diffuse muscle atrophy  Sensory: diminished to vibration distal upper and lower ext    Laboratory results:  Lab Results   Component Value Date    GLUCOSE 80 11/10/2024    CALCIUM 8.8 11/10/2024     11/10/2024    K 3.7 11/10/2024    CO2 27.5 11/10/2024     11/10/2024    BUN 25 (H) 11/10/2024    CREATININE 1.08 11/10/2024    BCR 23.1 11/10/2024    ANIONGAP 8.5 11/10/2024     Lab Results   Component Value Date    WBC 7.28 11/10/2024    HGB 12.1 (L) 11/10/2024    HCT 35.4 (L) 11/10/2024    MCV 97.3 (H) 11/10/2024     11/10/2024     No results found for: \"LDL\"         Review of labs: CBC, BMP unremarkable    Review and interpretation of imaging: Ct abdomen with contrast pending    Diagnoses:  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 IVIG D2/5  2. Prednisone 10, plan wean/stop  3. CT abdomen pending  4. Might benefit from follow up EMG in the outpatient setting. I feel that his degree of atrophy and his respiratory difficulty is atypical for CIDP.    Management discussed with patient.    "

## 2024-11-10 NOTE — PROGRESS NOTES
Name: Efren Christianson ADMIT: 2024   : 1945  PCP: MurrayWinnie    MRN: [id number] LOS: 2 days   AGE/SEX: 79 y.o. male  ROOM: [facility]/     Subjective   Subjective   No acute events. Patient overall feels about the same. No new complaints. No family at bedside.    Objective   Objective   Vital Signs  Temp:  [97.3 °F (36.3 °C)-98.1 °F (36.7 °C)] 98.1 °F (36.7 °C)  Heart Rate:  [54-86] 69  Resp:  [16-18] 18  BP: (117-142)/(73-83) 123/73  SpO2:  [95 %-97 %] 95 %  on   ;   Device (Oxygen Therapy): room air  There is no height or weight on file to calculate BMI.  Physical Exam  Vitals and nursing note reviewed.   Constitutional:       General: He is not in acute distress.     Appearance: He is not toxic-appearing or diaphoretic.   HENT:      Head: Normocephalic and atraumatic.      Nose: Nose normal.      Mouth/Throat:      Mouth: Mucous membranes are moist.      Pharynx: Oropharynx is clear.   Eyes:      Conjunctiva/sclera: Conjunctivae normal.      Pupils: Pupils are equal, round, and reactive to light.   Cardiovascular:      Rate and Rhythm: Normal rate and regular rhythm.      Pulses: Normal pulses.   Pulmonary:      Effort: Pulmonary effort is normal.      Breath sounds: Normal breath sounds.   Abdominal:      General: Bowel sounds are normal. There is no distension.      Palpations: Abdomen is soft.      Tenderness: There is no abdominal tenderness.   Musculoskeletal:         General: No swelling or tenderness.      Cervical back: Neck supple.   Skin:     General: Skin is warm and dry.      Capillary Refill: Capillary refill takes less than 2 seconds.   Neurological:      Mental Status: He is alert and oriented to person, place, and time.   Psychiatric:         Mood and Affect: Mood normal.         Behavior: Behavior normal.       Results Review     I reviewed the patient's new clinical results.  Results from last 7 days   Lab Units 11/10/24  0709 24  0338 24  1331   WBC 10*3/mm3 7.28  "7.99 7.30   HEMOGLOBIN g/dL 12.1* 13.5 13.6   PLATELETS 10*3/mm3 166 198 168     Results from last 7 days   Lab Units 11/10/24  0710 11/09/24  0337 11/08/24  1331   SODIUM mmol/L 138 143 138   POTASSIUM mmol/L 3.7 4.2 3.8   CHLORIDE mmol/L 102 102 101   CO2 mmol/L 27.5 26.0 27.4   BUN mg/dL 25* 17 14   CREATININE mg/dL 1.08 0.99 1.10   GLUCOSE mg/dL 80 124* 119*   EGFR mL/min/1.73 70.7 78.5 69.1     Results from last 7 days   Lab Units 11/08/24  1331   ALBUMIN g/dL 3.7   BILIRUBIN mg/dL 0.6   ALK PHOS U/L 58   AST (SGOT) U/L 33   ALT (SGPT) U/L 36     Results from last 7 days   Lab Units 11/10/24  0710 11/09/24  0337 11/08/24  1331   CALCIUM mg/dL 8.8 8.7 9.1   ALBUMIN g/dL  --   --  3.7   MAGNESIUM mg/dL  --   --  2.1   PHOSPHORUS mg/dL  --   --  2.6       No results found for: \"HGBA1C\", \"POCGLU\"    No radiology results for the last day    I have personally reviewed all medications:  Scheduled Medications  acetaminophen, 650 mg, Oral, Daily  diphenhydrAMINE, 25 mg, Oral, Daily  enoxaparin, 40 mg, Subcutaneous, Daily  fluticasone, 2 spray, Each Nare, Daily  [START ON 11/11/2024] immune globulin (human), 20 g, Intravenous, Q24H   And  [START ON 11/11/2024] immune globulin (human), 10 g, Intravenous, Q24H  levETIRAcetam, 1,000 mg, Oral, Q12H  pantoprazole, 40 mg, Oral, Daily  Pharmacy to enter IVIG order, 400 mg/kg, Intravenous, Daily  predniSONE, 10 mg, Oral, Daily With Breakfast  sodium chloride, 10 mL, Intravenous, Q12H    Infusions   Diet  Diet: Regular/House; Fluid Consistency: Thin (IDDSI 0)    I have personally reviewed:  [x]  Laboratory   []  Microbiology   []  Radiology   [x]  EKG/Telemetry  []  Cardiology/Vascular   []  Pathology    []  Records       Assessment/Plan     Active Hospital Problems    Diagnosis  POA    **CIDP (chronic inflammatory demyelinating polyneuropathy) [G61.81]  Yes    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   Acute on Chronic Inflammatory Demyelinating Polyneuropathy  - has global weakness L>R  - getting 5 day course of IVIG  - on prednisone 10mg daily  - continue supportive care  - NIF stable, continue every 6 hrs while awake  - appreciate neurology recs, considering outpatient EMG     Seizure prophylaxis  - continue Keppra     Seasonal allergies  - symptoms controlled  - continue Flonase    Diarrhea  - abdominal examination and labs benign though he has also had some reported weight loss  - CT abdomen/pelvis ordered  - will otherwise manage symptomatically    Lovenox 40 mg SC daily for DVT prophylaxis.  Full code.  Discussed with patient and nursing staff.  Anticipate discharge home with HH vs SNU facility next week.  Expected Discharge Date: 11/12/2024; Expected Discharge Time:  9:00 AM      Jak Tracy MD  [facility]  11/10/24  14:09 EST    Portions of this text have been copied and I have reviewed them. They are accurate as of 11/10/2024

## 2024-11-11 LAB
ANION GAP SERPL CALCULATED.3IONS-SCNC: 6.9 MMOL/L (ref 5–15)
BASOPHILS # BLD AUTO: 0.02 10*3/MM3 (ref 0–0.2)
BASOPHILS NFR BLD AUTO: 0.4 % (ref 0–1.5)
BUN SERPL-MCNC: 24 MG/DL (ref 8–23)
BUN/CREAT SERPL: 24 (ref 7–25)
CALCIUM SPEC-SCNC: 8.4 MG/DL (ref 8.6–10.5)
CHLORIDE SERPL-SCNC: 103 MMOL/L (ref 98–107)
CO2 SERPL-SCNC: 27.1 MMOL/L (ref 22–29)
CREAT SERPL-MCNC: 1 MG/DL (ref 0.76–1.27)
DEPRECATED RDW RBC AUTO: 47.5 FL (ref 37–54)
EGFRCR SERPLBLD CKD-EPI 2021: 77.5 ML/MIN/1.73
EOSINOPHIL # BLD AUTO: 0.01 10*3/MM3 (ref 0–0.4)
EOSINOPHIL NFR BLD AUTO: 0.2 % (ref 0.3–6.2)
ERYTHROCYTE [DISTWIDTH] IN BLOOD BY AUTOMATED COUNT: 13.1 % (ref 12.3–15.4)
GLUCOSE SERPL-MCNC: 77 MG/DL (ref 65–99)
HCT VFR BLD AUTO: 34.9 % (ref 37.5–51)
HGB BLD-MCNC: 11.8 G/DL (ref 13–17.7)
IMM GRANULOCYTES # BLD AUTO: 0.01 10*3/MM3 (ref 0–0.05)
IMM GRANULOCYTES NFR BLD AUTO: 0.2 % (ref 0–0.5)
LYMPHOCYTES # BLD AUTO: 1.35 10*3/MM3 (ref 0.7–3.1)
LYMPHOCYTES NFR BLD AUTO: 27.1 % (ref 19.6–45.3)
MCH RBC QN AUTO: 33.6 PG (ref 26.6–33)
MCHC RBC AUTO-ENTMCNC: 33.8 G/DL (ref 31.5–35.7)
MCV RBC AUTO: 99.4 FL (ref 79–97)
MONOCYTES # BLD AUTO: 0.52 10*3/MM3 (ref 0.1–0.9)
MONOCYTES NFR BLD AUTO: 10.4 % (ref 5–12)
NEUTROPHILS NFR BLD AUTO: 3.08 10*3/MM3 (ref 1.7–7)
NEUTROPHILS NFR BLD AUTO: 61.7 % (ref 42.7–76)
NRBC BLD AUTO-RTO: 0 /100 WBC (ref 0–0.2)
PLATELET # BLD AUTO: 142 10*3/MM3 (ref 140–450)
PMV BLD AUTO: 9.5 FL (ref 6–12)
POTASSIUM SERPL-SCNC: 3.7 MMOL/L (ref 3.5–5.2)
RBC # BLD AUTO: 3.51 10*6/MM3 (ref 4.14–5.8)
SODIUM SERPL-SCNC: 137 MMOL/L (ref 136–145)
WBC NRBC COR # BLD AUTO: 4.99 10*3/MM3 (ref 3.4–10.8)

## 2024-11-11 PROCEDURE — 84153 ASSAY OF PSA TOTAL: CPT | Performed by: STUDENT IN AN ORGANIZED HEALTH CARE EDUCATION/TRAINING PROGRAM

## 2024-11-11 PROCEDURE — 25010000002 IMMUNE GLOBULIN (HUMAN) 10 GM/100ML SOLUTION: Performed by: PSYCHIATRY & NEUROLOGY

## 2024-11-11 PROCEDURE — 25010000002 IMMUNE GLOBULIN (HUMAN) 20 GM/200ML SOLUTION: Performed by: PSYCHIATRY & NEUROLOGY

## 2024-11-11 PROCEDURE — 63710000001 PREDNISONE PER 5 MG: Performed by: PSYCHIATRY & NEUROLOGY

## 2024-11-11 PROCEDURE — 99232 SBSQ HOSP IP/OBS MODERATE 35: CPT | Performed by: NURSE PRACTITIONER

## 2024-11-11 PROCEDURE — 97110 THERAPEUTIC EXERCISES: CPT

## 2024-11-11 PROCEDURE — 97530 THERAPEUTIC ACTIVITIES: CPT

## 2024-11-11 PROCEDURE — 85025 COMPLETE CBC W/AUTO DIFF WBC: CPT | Performed by: INTERNAL MEDICINE

## 2024-11-11 PROCEDURE — 63710000001 DIPHENHYDRAMINE PER 50 MG: Performed by: PSYCHIATRY & NEUROLOGY

## 2024-11-11 PROCEDURE — 80048 BASIC METABOLIC PNL TOTAL CA: CPT | Performed by: INTERNAL MEDICINE

## 2024-11-11 PROCEDURE — 25010000002 ENOXAPARIN PER 10 MG: Performed by: STUDENT IN AN ORGANIZED HEALTH CARE EDUCATION/TRAINING PROGRAM

## 2024-11-11 RX ORDER — AMLODIPINE BESYLATE 5 MG/1
5 TABLET ORAL
Status: DISCONTINUED | OUTPATIENT
Start: 2024-11-11 | End: 2024-11-14 | Stop reason: HOSPADM

## 2024-11-11 RX ADMIN — AMLODIPINE BESYLATE 5 MG: 5 TABLET ORAL at 09:34

## 2024-11-11 RX ADMIN — IMMUNE GLOBULIN (HUMAN) 10 G: 10 INJECTION INTRAVENOUS; SUBCUTANEOUS at 11:56

## 2024-11-11 RX ADMIN — PANTOPRAZOLE SODIUM 40 MG: 40 TABLET, DELAYED RELEASE ORAL at 09:32

## 2024-11-11 RX ADMIN — PREDNISONE 10 MG: 5 TABLET ORAL at 09:32

## 2024-11-11 RX ADMIN — ACETAMINOPHEN 325MG 650 MG: 325 TABLET ORAL at 09:32

## 2024-11-11 RX ADMIN — Medication 10 ML: at 09:37

## 2024-11-11 RX ADMIN — SIMETHICONE 80 MG: 80 TABLET, CHEWABLE ORAL at 20:49

## 2024-11-11 RX ADMIN — LEVETIRACETAM 1000 MG: 500 TABLET, FILM COATED ORAL at 20:50

## 2024-11-11 RX ADMIN — ENOXAPARIN SODIUM 40 MG: 100 INJECTION SUBCUTANEOUS at 09:32

## 2024-11-11 RX ADMIN — LEVETIRACETAM 1000 MG: 500 TABLET, FILM COATED ORAL at 09:32

## 2024-11-11 RX ADMIN — IMMUNE GLOBULIN (HUMAN) 20 G: 10 INJECTION INTRAVENOUS; SUBCUTANEOUS at 09:31

## 2024-11-11 RX ADMIN — Medication 10 ML: at 20:50

## 2024-11-11 RX ADMIN — DIPHENHYDRAMINE HYDROCHLORIDE 25 MG: 25 CAPSULE ORAL at 09:32

## 2024-11-11 NOTE — THERAPY TREATMENT NOTE
Patient Name: Efren Christianson  : 1945    MRN: [id number]                              Today's Date: 2024       Admit Date: 2024    Visit Dx: No diagnosis found.  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)     Difficulty walking     Hepatitis A     as a child    Seizures     Syncope     Urgency of urination      Past Surgical History:   Procedure Laterality Date    TRUNK LESION/CYST EXCISION N/A 8/3/2023    Procedure: excision of chest wall cyst and back cyst 10:00;  Surgeon: Kianna Oliva DO;  Location: [facility] OR;  Service: General;  Laterality: N/A;      General Information       Row Name 24 0900          Physical Therapy Time and Intention    Document Type therapy note (daily note)  -SV     Mode of Treatment individual therapy;physical therapy  -SV       Row Name 24 0900          General Information    Patient Profile Reviewed yes  -SV               User Key  (r) = Recorded By, (t) = Taken By, (c) = Cosigned By      Initials Name Provider Type    SV Gita Real, PT Physical Therapist                   Mobility       Row Name 24 1007          Bed Mobility    Supine-Sit [place] (Bed Mobility) moderate assist (50% patient effort)  -SV     Sit-Supine [place] (Bed Mobility) maximum assist (25% patient effort)  -SV       Row Name 24 1007          Sit-Stand Transfer    Sit-Stand [place] (Transfers) maximum assist (25% patient effort);1 person assist;2 person assist  -SV     Comment, (Sit-Stand Transfer) Pt cleared bed with partial erect standing , knees flexed, left foot  pronated, , right  knee blocked,  feet blocked , Pt able to hold for 10 seconds  -SV               User Key  (r) = Recorded By, (t) = Taken By, (c) = Cosigned By      Initials Name Provider Type    Gita Acevedo, PT Physical Therapist                   Obj/Interventions       Row Name 11/11/24 1009          Motor Skills    Therapeutic Exercise --  Pt educated on need for consistent bed exercises: performed lima qs, gs, saq, hip rot in neutral 5-10 reps each, resisted hip/knee ext 5 reps , lima shoulder elevation with attempts to hold with eccentric lowering to bed, laq x5 lima in sitting  -SV       Row Name 11/11/24 1009          Balance    Balance Assessment sitting static balance;standing static balance  -SV     Static Sitting Balance contact guard  -SV     Static Standing Balance moderate assist;2-person assist  -SV     Comment, Balance PPT in sitting with laq likely tight hamstrings  -SV               User Key  (r) = Recorded By, (t) = Taken By, (c) = Cosigned By      Initials Name Provider Type    Gita Acevedo, PT Physical Therapist                   Goals/Plan    No documentation.                  Clinical Impression       Row Name 11/11/24 1012          Pain    Pretreatment Pain Rating 0/10 - no pain  -SV     Posttreatment Pain Rating 0/10 - no pain  -SV       Row Name 11/11/24 1012          Vital Signs    O2 Delivery Pre Treatment room air  -SV     O2 Delivery Intra Treatment room air  -SV     O2 Delivery Post Treatment room air  -SV     Pre Patient Position Supine  -SV     Post Patient Position Supine  -SV       Row Name 11/11/24 1012          Positioning and Restraints    Pre-Treatment Position in bed  -SV     Post Treatment Position bed  -SV     In Bed call light within reach;encouraged to call for assist;exit alarm on;fowlers  -SV               User Key  (r) = Recorded By, (t) = Taken By, (c) = Cosigned By      Initials Name Provider Type    Gita Acevedo, PT Physical Therapist                   Outcome Measures       Row  Name 11/11/24 1012          How much help from another person do you currently need...    Turning from your back to your side while in flat bed without using bedrails? 2  -SV     Moving from lying on back to sitting on the side of a flat bed without bedrails? 2  -SV     Moving to and from a bed to a chair (including a wheelchair)? 2  -SV     Standing up from a chair using your arms (e.g., wheelchair, bedside chair)? 2  -SV     Climbing 3-5 steps with a railing? 1  -SV     To walk in hospital room? 1  -SV     AM-PAC 6 Clicks Score (PT) 10  -SV     Highest Level of Mobility Goal 4 --> Transfer to chair/commode  -SV               User Key  (r) = Recorded By, (t) = Taken By, (c) = Cosigned By      Initials Name Provider Type     Gtia Real, PT Physical Therapist                                 Physical Therapy Education       Title: PT OT SLP Therapies (In Progress)       Topic: Physical Therapy (In Progress)       Point: Mobility training (Done)       Learning Progress Summary            Patient Acceptance, E, VU,NR by  at 11/11/2024 1013    Acceptance, E,TB,D, VU,DU,NR by  at 11/9/2024 1323                      Point: Home exercise program (Done)       Learning Progress Summary            Patient Acceptance, E, VU,NR by  at 11/11/2024 1013                      Point: Body mechanics (Done)       Learning Progress Summary            Patient Acceptance, E,TB,D, VU,DU,NR by  at 11/9/2024 1323                      Point: Precautions (Not Started)       Learner Progress:  Not documented in this visit.                              User Key       Initials Effective Dates Name Provider Type Discipline     07/11/23 -  Gita Real, PT Physical Therapist PT    MP 02/07/24 -  Charlotte Duncan PT Physical Therapist PT                  PT Recommendation and Plan           Time Calculation:         PT Charges       Row Name 11/11/24 1020             Time Calculation    Start Time 0900  -      Stop Time 0936   -SV      Time Calculation (min) 36 min  -SV      PT Received On 11/11/24  -SV      PT - Next Appointment 11/12/24  -SV                User Key  (r) = Recorded By, (t) = Taken By, (c) = Cosigned By      Initials Name Provider Type    Gita Acevedo, PT Physical Therapist                  Therapy Charges for Today       Code Description Service Date Service Provider Modifiers Qty    85037900031  PT THERAPEUTIC ACT EA 15 MIN 11/11/2024 Gita Real, PT GP 2    [id number] HC PT THER SUPP EA 15 MIN 11/11/2024 Gita Real, PT GP 1            PT G-Codes  Outcome Measure Options: Modified Alberto  AM-PAC 6 Clicks Score (PT): 10  Modified [place] Scale: 4 - Moderately severe disability.  Unable to walk without assistance, and unable to attend to own bodily needs without assistance.       Gita Real, PT  11/11/2024

## 2024-11-11 NOTE — THERAPY TREATMENT NOTE
Patient Name: Efern Christianson  : 1945    MRN: [id number]                              Today's Date: 2024       Admit Date: 2024    Visit Dx: No diagnosis found.  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)     Difficulty walking     Hepatitis A     as a child    Seizures     Syncope     Urgency of urination      Past Surgical History:   Procedure Laterality Date    TRUNK LESION/CYST EXCISION N/A 8/3/2023    Procedure: excision of chest wall cyst and back cyst 10:00;  Surgeon: Kianna Oliva DO;  Location: [facility] OR;  Service: General;  Laterality: N/A;      General Information       Row Name 24 1519          OT Time and Intention    Subjective Information complains of;weakness;fatigue  -KG     Document Type therapy note (daily note)  -KG     Mode of Treatment individual therapy;occupational therapy  -KG     Patient Effort good  -KG       Row Name 24 1519          General Information    Existing Precautions/Restrictions fall   -KG     Barriers to Rehab medically complex  -KG       Row Name 24 1519          Safety Issues/Impairments Affecting Functional Mobility    Impairments Affecting Function (Mobility) balance;coordination;endurance/activity tolerance;grasp;pain;strength;range of motion (ROM)  -KG               User Key  (r) = Recorded By, (t) = Taken By, (c) = Cosigned By      Initials Name Provider Type    KG Minor Floyd OT Occupational Therapist                     Mobility/ADL's       Row Name 24 1520          Bed Mobility    Bed Mobility supine-sit;sit-supine  -KG     Supine-Sit [place] (Bed Mobility)  moderate assist (50% patient effort)  -KG     Sit-Supine [place] (Bed Mobility) moderate assist (50% patient effort)  -KG     Bed Mobility, Safety Issues decreased use of arms for pushing/pulling  -KG       Row Name 11/11/24 1520          Transfers    Comment, (Transfers) Unable to perform STS w/ Max A. Pt performed 3 lateral scoots to HOB w/ Max A  -KG       Row Name 11/11/24 1520          Functional Mobility    Functional Mobility- Comment unable to perform this date -- not yet able to stand  -KG               User Key  (r) = Recorded By, (t) = Taken By, (c) = Cosigned By      Initials Name Provider Type    Minor Long OT Occupational Therapist                   Obj/Interventions       Row Name 11/11/24 1526          Shoulder (Therapeutic Exercise)    Shoulder (Therapeutic Exercise) AAROM (active assistive range of motion)  -KG     Shoulder AAROM (Therapeutic Exercise) bilateral;flexion;extension;5 repetitions  -KG       Row Name 11/11/24 1526          Motor Skills    Therapeutic Exercise shoulder  -KG       Row Name 11/11/24 1522          Balance    Balance Assessment sitting static balance;sitting dynamic balance  -KG     Static Sitting Balance standby assist;contact guard  -KG     Dynamic Sitting Balance standby assist;contact guard  -KG     Position, Sitting Balance sitting edge of bed  -KG     Balance Interventions sitting;supported;static;dynamic  -KG               User Key  (r) = Recorded By, (t) = Taken By, (c) = Cosigned By      Initials Name Provider Type    Minor Long OT Occupational Therapist                   Goals/Plan    No documentation.                  Clinical Impression       Row Name 11/11/24 1523          Pain Assessment    Pretreatment Pain Rating 0/10 - no pain  -KG     Posttreatment Pain Rating 0/10 - no pain  -KG       Row Name 11/11/24 1523          Plan of Care Review    Plan of Care Reviewed With patient  -KG     Outcome Evaluation Pt seen today for OT tx.  Performing bed mobility w/ Mod A. SBA for static sitting and CGA for dynamic sitting. Attempted STS, pt unable to clear bed w/ Max A. Performed 3 lateral scoots to HOB w/ Max A. Completed BUE AAROM before returing to supine. Pt continues to present w/ impaired strength/ROM, balance, and activity tolerance. OT will f/u to address stated deficits.  -KG       Row Name 11/11/24 1523          Therapy Plan Review/Discharge Plan (OT)    Anticipated Discharge Disposition (OT) skilled nursing facility  -KG       Row Name 11/11/24 1523          Vital Signs    Pre Patient Position Supine  -KG     Intra Patient Position Sitting  -KG     Post Patient Position Supine  -KG       Row Name 11/11/24 1523          Positioning and Restraints    Pre-Treatment Position in bed  -KG     Post Treatment Position bed  -KG     In Bed notified nsg;supine;call light within reach;encouraged to call for assist;exit alarm on  -KG               User Key  (r) = Recorded By, (t) = Taken By, (c) = Cosigned By      Initials Name Provider Type    Minor Long, OT Occupational Therapist                   Outcome Measures       Row Name 11/11/24 1012 11/11/24 0845       How much help from another person do you currently need...    Turning from your back to your side while in flat bed without using bedrails? 2  -SV 2  -JM    Moving from lying on back to sitting on the side of a flat bed without bedrails? 2  -SV 2  -JM    Moving to and from a bed to a chair (including a wheelchair)? 2  -SV 2  -JM    Standing up from a chair using your arms (e.g., wheelchair, bedside chair)? 2  -SV 2  -JM    Climbing 3-5 steps with a railing? 1  -SV 1  -JM    To walk in hospital room? 1  -SV 1  -JM    AM-PAC 6 Clicks Score (PT) 10  -SV 10  -JM    Highest Level of Mobility Goal 4 --> Transfer to chair/commode  -SV 4 --> Transfer to chair/commode  -JM              User Key  (r) = Recorded By, (t) = Taken By, (c) = Cosigned By      Initials Name Provider Type    ORALIA Real  Gita ALCANTAR, PT Physical Therapist    Erendira Lauren, RN Registered Nurse                    Occupational Therapy Education       Title: PT OT SLP Therapies (In Progress)       Topic: Occupational Therapy (Not Started)       Point: ADL training (Not Started)       Description:   Instruct learner(s) on proper safety adaptation and remediation techniques during self care or transfers.   Instruct in proper use of assistive devices.                  Learner Progress:  Not documented in this visit.              Point: Home exercise program (Not Started)       Description:   Instruct learner(s) on appropriate technique for monitoring, assisting and/or progressing therapeutic exercises/activities.                  Learner Progress:  Not documented in this visit.              Point: Precautions (Not Started)       Description:   Instruct learner(s) on prescribed precautions during self-care and functional transfers.                  Learner Progress:  Not documented in this visit.              Point: Body mechanics (Not Started)       Description:   Instruct learner(s) on proper positioning and spine alignment during self-care, functional mobility activities and/or exercises.                  Learner Progress:  Not documented in this visit.                                  OT Recommendation and Plan     Plan of Care Review  Plan of Care Reviewed With: patient  Outcome Evaluation: Pt seen today for OT tx. Performing bed mobility w/ Mod A. SBA for static sitting and CGA for dynamic sitting. Attempted STS, pt unable to clear bed w/ Max A. Performed 3 lateral scoots to HOB w/ Max A. Completed BUE AAROM before returing to supine. Pt continues to present w/ impaired strength/ROM, balance, and activity tolerance. OT will f/u to address stated deficits.     Time Calculation:         Time Calculation- OT       Row Name 11/11/24 1527             Time Calculation- OT    OT Start Time 1400  -KG      OT Stop Time 1432  -KG      OT Time  Calculation (min) 32 min  -KG      Total Timed Code Minutes- OT 32 minute(s)  -KG      OT Received On 11/11/24  -KG      OT - Next Appointment 11/12/24  -KG         Timed Charges    28221 - OT Therapeutic Exercise Minutes 12  -KG      49318 - OT Therapeutic Activity Minutes 20  -KG         Total Minutes    Timed Charges Total Minutes 32  -KG       Total Minutes 32  -KG                User Key  (r) = Recorded By, (t) = Taken By, (c) = Cosigned By      Initials Name Provider Type    KG Minor Floyd OT Occupational Therapist                  Therapy Charges for Today       Code Description Service Date Service Provider Modifiers Qty    18236402323  OT THER PROC EA 15 MIN 11/11/2024 Minor Floyd OT GO 1    [id number] HC OT THERAPEUTIC ACT EA 15 MIN 11/11/2024 Minor Floyd OT GO 1                 Minor Floyd OT  11/11/2024

## 2024-11-11 NOTE — SIGNIFICANT NOTE
Assessment & Plan     Acute left-sided low back pain without sciatica    The Relafen and muscle relaxer worked well for the patient. He did find the Flexeril difficult to work while taking (works from home). Will trial Robaxin instead. Recommend PT if not improving in 1-2 weeks.    - nabumetone (RELAFEN) 500 MG tablet; Take 1 tablet (500 mg) by mouth 2 times daily as needed for moderate pain  - methocarbamol (ROBAXIN) 500 MG tablet; Take 1 tablet (500 mg) by mouth 4 times daily as needed for muscle spasms               Patient Instructions   Take Robaxin up to 4 times a day as needed (muscle relaxer).    Take Relafen twice a day.    Continue to apply heat as much as possible.    Do some gentle stretching when feeling able.    Call if not improving in 1-2 weeks and I will order physical therapy.      No follow-ups on file.    JAMISON Gutierrez [facility] ASHLEY Mendez is a 52 year old who presents for the following health issues     HPI     Back Pain  Onset/Duration: 6 months ago was seen and given medications but pain is back and worse  Description:   Location of pain: low back left  Character of pain: sharp   Pain radiation: none  New numbness or weakness in legs, not attributed to pain: no   Intensity: Currently 1/10, moderate  Progression of Symptoms: worsening  History:   Specific cause: sitting for 12 hours 6 days a week  Pain interferes with job: YES  History of back problems: recurrent self limited episodes of low back pain in the past  Any previous MRI or X-rays: None  Sees a specialist for back pain: No  Alleviating factors:   Improved by: acetaminophen (Tylenol) and standing    Precipitating factors:  Worsened by: Sitting  Therapies tried and outcome: Flexeril, Relafen- helped while taking but ran out now, heat helps some    Accompanying Signs & Symptoms:  Risk of Fracture: None  Risk of Cauda Equina: None  Risk of Infection: None  Risk of Cancer: None  Risk of     11/11/24 0922   Negative Inspiratory Force (NIF)   Negative Inspiratory Force (cm H2O) -30   Effort (NIF) good   Patient Position (NIF) semi-Patrick's        "Ankylosing Spondylitis: Onset at age <35, male, AND morning back stiffness  no       Review of Systems   Constitutional, HEENT, cardiovascular, pulmonary, gi and gu systems are negative, except as otherwise noted.        Objective    /72 (BP Location: Right arm, Patient Position: Sitting, Cuff Size: Adult Regular)   Pulse 80   Temp 98.4  F (36.9  C) (Oral)   Resp 18   Ht 1.803 m (5' 11\")   Wt 94.3 kg (208 lb)   BMI 29.01 kg/m    Body mass index is 29.01 kg/m .         Physical Exam   GENERAL: healthy, alert and no distress  EYES: Eyes grossly normal to inspection, PERRL and conjunctivae and sclerae normal  MS: no gross musculoskeletal defects noted, no edema  SKIN: no suspicious lesions or rashes  NEURO: Normal strength and tone, mentation intact and speech normal  Comprehensive back pain exam:  Tenderness of left SI joint, Pain limits the following motions: flexion, extension, left side bending, Lower extremity strength functional and equal on both sides, Lower extremity sensation normal and equal on both sides and Straight leg raise negative bilaterally  PSYCH: mentation appears normal, affect normal/bright                "

## 2024-11-11 NOTE — PLAN OF CARE
Goal Outcome Evaluation:         Pt A&Ox4, R/A, SR, Q2 hr turns encouraged but pt sometimes refused and was educated repeatedly regarding bed sore prevention, recommended heels elevated on a pillow but pt refused and only wants it under his R knee.  Pain denied.  Total feed and needs assist with urinal and bedpan but is continent.  Pt passing a lot of gas but no significant BM this shift.  Seizure precautions in place.       Problem: Adult Inpatient Plan of Care  Goal: Plan of Care Review  Outcome: Progressing  Goal: Optimal Comfort and Wellbeing  Outcome: Progressing  Intervention: Provide Person-Centered Care  Description: Use a family-focused approach to care; encourage support system presence and participation.  Develop trust and rapport by proactively providing information, encouraging questions, addressing concerns and offering reassurance.  Acknowledge emotional response to hospitalization.  Recognize and utilize personal coping strategies and strengths; develop goals via shared decision-making.  Honor spiritual and cultural preferences.  Recent Flowsheet Documentation  Taken 11/11/2024 0000 by Sarika Piña RN  Trust Relationship/Rapport:   care explained   choices provided   empathic listening provided   reassurance provided   thoughts/feelings acknowledged   questions encouraged   questions answered  Taken 11/10/2024 2030 by Sarika Piña, RN  Trust Relationship/Rapport:   care explained   choices provided   questions answered   thoughts/feelings acknowledged   reassurance provided   questions encouraged  Goal: Readiness for Transition of Care  Outcome: Progressing     Problem: Skin Injury Risk Increased  Goal: Skin Health and Integrity  Outcome: Progressing  Intervention: Optimize Skin Protection  Description: Perform a full pressure injury risk assessment, as indicated by screening, upon admission to care unit.  Reassess skin (full inspection and injury risk, including skin temperature,  consistency and color) frequently (e.g., scheduled interval, with change in condition) to provide optimal early detection and prevention.  Maintain adequate tissue perfusion (e.g., encourage fluid balance; avoid crossing legs, constrictive clothing or devices) to promote tissue oxygenation.  Maintain head of bed at lowest degree of elevation tolerated, considering medical condition and other restrictions. Use positioning supports to prevent sliding and friction. Consider low friction textiles.  Avoid positioning onto an area that remains reddened or on bony prominences.  Minimize incontinence and moisture (e.g., toileting schedule; moisture-wicking pad, diaper or incontinence collection device; skin moisture barrier).  Cleanse skin promptly and gently, when soiled, utilizing a pH-balanced cleanser.  Relieve and redistribute pressure (e.g., scheduled position changes, weight shifts, use of support surface, medical device repositioning, protective dressing application, use of positioning device, microclimate control, use of pressure-injury-monitor  Encourage increased activity, such as sitting in a chair at the bedside or early mobilization, when able to tolerate. Avoid prolonged sitting.  Recent Flowsheet Documentation  Taken 11/11/2024 0600 by Sarika Piña, RN  Head of Bed (HOB) Positioning: HOB elevated  Taken 11/11/2024 0400 by Sarika Piña RN  Head of Bed (HOB) Positioning: HOB at 20-30 degrees  Taken 11/11/2024 0300 by Sarika Piña, RN  Head of Bed (HOB) Positioning: HOB at 30 degrees  Taken 11/11/2024 0200 by Sarika Piña RN  Head of Bed (HOB) Positioning: HOB at 20-30 degrees  Taken 11/11/2024 0000 by Sarika Piña RN  Activity Management:   activity encouraged   bedrest  Pressure Reduction Techniques: frequent weight shift encouraged  Head of Bed (HOB) Positioning: HOB elevated  Pressure Reduction Devices:   alternating pressure pump (ROSSI)   positioning supports utilized    pressure-redistributing mattress utilized  Skin Protection: transparent dressing maintained  Taken 11/10/2024 2230 by Sarika Piña, RN  Activity Management: activity encouraged  Head of Bed (HOB) Positioning: HOB at 20-30 degrees  Taken 11/10/2024 2030 by Sarika Piña, RN  Activity Management:   bedrest   activity encouraged  Pressure Reduction Techniques: (pt refused heels elevated off bed and refused turn off from coccyx; education provided)   frequent weight shift encouraged   weight shift assistance provided   other (see comments)  Head of Bed (HOB) Positioning: HOB at 30-45 degrees  Pressure Reduction Devices:   alternating pressure pump (ROSSI)   positioning supports utilized   pressure-redistributing mattress utilized  Skin Protection:   incontinence pads utilized   transparent dressing maintained  Intervention: Promote and Optimize Oral Intake  Description: Perform a nutrition assessment that includes a nutrition-focused physical exam; identify malnutrition risk.  Assess for adequate oral intake; if inadequate, offer oral supplemental food or drinks to enhance calorie and protein intake.  Assess for vitamin and mineral deficiencies; supplement if depleted.  Assess need for, and assist with, meal set-up and feeding.  Adjust diet or meal schedule based on preferences and tolerance.  Minimize unnecessary dietary restrictions to increase oral intake.  Establish bowel elimination program to increase comfort and appetite.  Provide and encourage oral hygiene to enhance desire to eat.  Consider enteral nutrition support if oral intake remains inadequate; provide parenteral nutrition if enteral is contraindicated.  Recent Flowsheet Documentation  Taken 11/11/2024 0000 by Sarika Piña, RN  Oral Nutrition Promotion: rest periods promoted  Taken 11/10/2024 2030 by Sarika Piña, RN  Oral Nutrition Promotion: rest periods promoted     Problem: Fall Injury Risk  Goal: Absence of Fall and Fall-Related  Injury  Outcome: Progressing  Intervention: Identify and Manage Contributors  Description: Develop a fall prevention plan, considering patient-centered interventions and family/caregiver involvement; identify and address patient's facilitators and barriers.  Provide reorientation, appropriate sensory stimulation and routines with changes in mental status to decrease risk of fall.  Promote use of personal vision and auditory aids.  Assess assistance level required for safe and effective self-care; provide support as needed, such as toileting and mobilization. For age 65 and older, implement timed toileting with assistance.  Encourage physical activity, such as performance of mobility and self-care at highest level of patient ability, multicomponent exercise program and provision of appropriate assistive devices.  If fall occurs, assess the severity of injury; implement fall injury protocol. Determine the cause and revise fall injury prevention plan.  Regularly review and advocate for medication adjustment to decrease fall risk; consider administration times, polypharmacy and age.  Balance adequate pain management with potential for oversedation.  Recent Flowsheet Documentation  Taken 11/11/2024 0600 by Sarika Piña RN  Medication Review/Management:   medications reviewed   high-risk medications identified  Taken 11/11/2024 0400 by Sarika Piña RN  Medication Review/Management:   medications reviewed   high-risk medications identified  Taken 11/11/2024 0300 by Sarika Piña, RN  Medication Review/Management:   medications reviewed   high-risk medications identified  Taken 11/11/2024 0200 by Sarika Piña RN  Medication Review/Management:   medications reviewed   high-risk medications identified  Taken 11/11/2024 0000 by Sarika Piña RN  Medication Review/Management:   medications reviewed   high-risk medications identified  Self-Care Promotion:   independence encouraged   BADL personal objects  within reach   adaptive equipment use encouraged  Taken 11/10/2024 2230 by Sarika Piña RN  Medication Review/Management:   high-risk medications identified   medications reviewed  Taken 11/10/2024 2030 by Sarika Piña RN  Medication Review/Management:   high-risk medications identified   medications reviewed  Self-Care Promotion:   independence encouraged   BADL personal objects within reach   adaptive equipment use encouraged  Intervention: Promote Injury-Free Environment  Description: Provide a safe, barrier-free environment that encourages independent activity.  Keep care area uncluttered and well-lighted.  Determine need for increased observation or monitoring.  Avoid use of devices that minimize mobility, such as restraints or indwelling urinary catheter.  Recent Flowsheet Documentation  Taken 11/11/2024 0600 by Sarika Piña RN  Safety Promotion/Fall Prevention: safety round/check completed  Taken 11/11/2024 0400 by Sarika Piña RN  Safety Promotion/Fall Prevention: safety round/check completed  Taken 11/11/2024 0300 by Sarika Piña RN  Safety Promotion/Fall Prevention: safety round/check completed  Taken 11/11/2024 0200 by Sarika Piña RN  Safety Promotion/Fall Prevention: safety round/check completed  Taken 11/11/2024 0000 by Sarika Piña RN  Safety Promotion/Fall Prevention: safety round/check completed  Taken 11/10/2024 2230 by Sarika Piña, RN  Safety Promotion/Fall Prevention: safety round/check completed  Taken 11/10/2024 2030 by Sarika Piña, RN  Safety Promotion/Fall Prevention: safety round/check completed

## 2024-11-11 NOTE — SIGNIFICANT NOTE
11/11/24 1307   Negative Inspiratory Force (NIF)   Negative Inspiratory Force (cm H2O) -29   Effort (NIF) good   Patient Position (NIF) semi-Patrick's

## 2024-11-11 NOTE — PLAN OF CARE
Goal Outcome Evaluation:      Pt alert and motivated to work with PT today. Ex performed x4 ext as per  note. Education performed for supine program and detailed on board in room. Pt encouraged to perform multiple attempts per day and enlist caregiver to asst with resisted hip/knee ext and place pillow under knee for saq. BUE appear much weaker than BLE, Pt able to bear weight during STS and static partial standing for 10 seconds with asst 1-2. Pt preparing for IVIG this morning. Recommend cont BLE ex , static standing, stand or squat pivot soon. Pt able to sit eob and perform knee ext x 5 lima today. Left foot drop with impaired ankle dorsiflexion rom. Recommend multipodus splint to asst in improved DF . Impaired DF will cause balance disturbance during STS , bed to chair of any attempt to ambulate.

## 2024-11-11 NOTE — PLAN OF CARE
Goal Outcome Evaluation:  Plan of Care Reviewed With: patient           Outcome Evaluation: Pt seen today for OT tx. Performing bed mobility w/ Mod A. SBA for static sitting and CGA for dynamic sitting. Attempted STS, pt unable to clear bed w/ Max A. Performed 3 lateral scoots to HOB w/ Max A. Completed BUE AAROM before returing to supine. Pt continues to present w/ impaired strength/ROM, balance, and activity tolerance. OT will f/u to address stated deficits.    Anticipated Discharge Disposition (OT): skilled nursing facility

## 2024-11-11 NOTE — PROGRESS NOTES
DOS: 2024  NAME: Efren Christianson   : 1945  PCP: Winnie Murray  CC: worsening CIDP    Neurology    Subjective: Feels like he may be a little stronger today, receiving third dose of IVIG today.  No new complaints. Pt seen in follow up today, however the problem is new to the examiner.      Objective:  Vital signs: /88   Pulse 88   Temp 97.9 °F (36.6 °C)   Resp 16   SpO2 96%       GEN: NAD, v/s reviewed  HEENT: Normocephalic, atraumatic   COR: RRR  Resp: Even and unlabored, clear to auscultation bilaterally  Extremities: No edema    Neurological:   MS: AOx3. Language normal. No neglect. Higher integrative function normal  CN: Visual fields are intact bilaterally, PERRL, extraocular movements intact, normal facial sensation, no facial droop, tongue midline, no dysarthria  Motor: 4- out of 5 in bilateral upper and lower extremities with diffuse atrophy      Scheduled Meds:acetaminophen, 650 mg, Oral, Daily  amLODIPine, 5 mg, Oral, Q24H  diphenhydrAMINE, 25 mg, Oral, Daily  enoxaparin, 40 mg, Subcutaneous, Daily  fluticasone, 2 spray, Each Nare, Daily  immune globulin (human), 20 g, Intravenous, Q24H   And  immune globulin (human), 10 g, Intravenous, Q24H  levETIRAcetam, 1,000 mg, Oral, Q12H  Pharmacy to enter IVIG order, 400 mg/kg, Intravenous, Daily  predniSONE, 10 mg, Oral, Daily With Breakfast  sodium chloride, 10 mL, Intravenous, Q12H      Continuous Infusions:   PRN Meds:.  senna-docusate sodium **AND** polyethylene glycol **AND** bisacodyl **AND** bisacodyl    diphenhydrAMINE    famotidine    hydrocortisone sodium succinate    loperamide    simethicone    sodium chloride    sodium chloride      Laboratory results:  Lab Results   Component Value Date    GLUCOSE 77 2024    CALCIUM 8.4 (L) 2024     2024    K 3.7 2024    CO2 27.1 2024     2024    BUN 24 (H) 2024    CREATININE 1.00 2024    BCR 24.0 2024    ANIONGAP 6.9  "11/11/2024     Lab Results   Component Value Date    WBC 4.99 11/11/2024    HGB 11.8 (L) 11/11/2024    HCT 34.9 (L) 11/11/2024    MCV 99.4 (H) 11/11/2024     11/11/2024     No results found for: \"CHOL\"  No results found for: \"HDL\"  No results found for: \"LDL\"  No results found for: \"TRIG\"       Review and interpretation of imaging:  CT Abdomen Pelvis With Contrast    Result Date: 11/11/2024  CT ABDOMEN AND PELVIS WITH IV CONTRAST  HISTORY: 79-year-old male with unexplained weight loss. Intermittent abdominal pain.  TECHNIQUE: Radiation dose reduction techniques were utilized, including automated exposure control and exposure modulation based on body size. 3 mm images were obtained through the abdomen and pelvis after the administration of IV contrast. No previous CT abdomen pelvis for comparison. Compared with outside facility chest CTA 10/16/2024.  FINDINGS: 1. There is no interval change in a sizable right anterior diaphragmatic hernia, Morgagni hernia, with an approximately 10 x 6 cm collection of herniated fat into the anterior aspect of the right lower thorax. There are dependent atelectatic changes at both lung bases. No pleural effusions.  2. Liver, gallbladder, spleen, pancreas, adrenals, and kidneys appear unremarkable other than a few subcentimeter renal cysts and a 3.1 cm right renal cyst. Urinary bladder wall appears thickened, but is partially collapsed. Please correlate clinically for acute UTI. There is significant prostatic enlargement. PSA follow-up is recommended.  3. The transverse colon and descending colon are mildly distended with air. There is a small volume of formed stool within the colon and there is no evidence for colitis and there is no bowel obstruction. No appendicitis. No free fluid or lymphadenopathy. Mild abdominal aortic atherosclerotic changes without aneurysmal dilatation.  This report was finalized on 11/11/2024 11:34 AM by Dr. Chloe Joy M.D on Workstation: " [id number]      XR Chest 1 View    Result Date: 11/8/2024  XR CHEST 1 VW Date of Exam: 11/8/2024 2:02 PM EST Indication: dypsnea Comparison: October 16, 2024 Findings: There is elevation of the left hemidiaphragm. There is gas-distention of the colon underlying the hemidiaphragm. There is some atelectasis at the right base. The upper lung zones seem clear. There are no pleural effusions. There is prominent cardiophrenic fat adjacent to the right heart border at the right lung base.     Impression: 1.Right basilar atelectasis. 2.Elevation left hemidiaphragm which has been noted 3.Some gaseous distention of what looks like the colon within the abdomen which has been suggested of questionable significance. Electronically Signed: Harman Camp MD  11/8/2024 2:13 PM EST  Workstation ID: [id number]     Impression:  79-year-old male with past medical history of seizure disorder on Keppra prior to arrival and CIDP originally diagnosed by [facility] which time he had an EMG and a course of IVIG in the hospital.  He did well but did not follow-up and after that he developed some progressive decline in his mobility.  He reestablish medical care care here with Dr. Mancia and has been treated with IVIG once monthly but has been having exacerbations with wearing off prior to his next infusion.  He was tried on a few doses of Vyvgart but did not seem to respond and deteriorated after that.  He was recently admitted to [facility] in October and received another 5 days of IVIG followed by rehab in [place] where he initially did well but once he went home he began rapidly declining again.  With this he has had associated intermittent shortness of breath which is a new symptom.  He returned to [facility] with worsening symptoms and was transferred here for further care.    CT abdomen and pelvis with contrast completed 11/8 showed no change in sizable right anterior diaphragmatic hernia, dependent  atelectatic changes in the lungs noted.  Urinary bladder wall appeared thickened, partially collapsed, correlate for UTI.  Significant prostatic enlargement noted, PSA follow-up recommended.  Mildly distended colon with small volume of formed stool in the colon.    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  Likely will need rehab stay again upon discharge.  I sent a message to Dr. Concepcion's office letting them know he is here with exacerbation and whether they would like to change outpatient management.  Discussed with Dr. Fleming today.  Will follow.

## 2024-11-11 NOTE — PROGRESS NOTES
NIF -38  GOOD EFFORT  HOB ELEVATED   Topical Sulfur Applications Counseling: Topical Sulfur Counseling: Patient counseled that this medication may cause skin irritation or allergic reactions.  In the event of skin irritation, the patient was advised to reduce the amount of the drug applied or use it less frequently.   The patient verbalized understanding of the proper use and possible adverse effects of topical sulfur application.  All of the patient's questions and concerns were addressed.

## 2024-11-11 NOTE — NURSING NOTE
Notified Dr Siddiqui pt had 12 beat run SVT. Pt is asymptomatic. No new orders at this time. Will monitor

## 2024-11-11 NOTE — PROGRESS NOTES
Name: Efren Christianson ADMIT: 2024   : 1945  PCP: MurrayWinnie    MRN: [id number] LOS: 3 days   AGE/SEX: 79 y.o. male  ROOM: hospitals/     Subjective   Subjective   Lying in bed.  Still feels weak overall.  Was working with PT prior to my room entry.    Objective   Objective   Vital Signs  Temp:  [97.7 °F (36.5 °C)-98.5 °F (36.9 °C)] 97.7 °F (36.5 °C)  Heart Rate:  [69-84] 77  Resp:  [18] 18  BP: (123-170)/(73-88) 170/88  SpO2:  [95 %-97 %] 95 %  on   ;   Device (Oxygen Therapy): room air  There is no height or weight on file to calculate BMI.  Physical Exam  Constitutional:       Appearance: He is ill-appearing.   Pulmonary:      Effort: Pulmonary effort is normal. No respiratory distress.      Breath sounds: No stridor.   Skin:     Coloration: Skin is not pale.   Neurological:      Mental Status: He is alert and oriented to person, place, and time.         Results Review     I reviewed the patient's new clinical results.  Results from last 7 days   Lab Units 24  0539 11/10/24  0709 24  0338 24  1331   WBC 10*3/mm3 4.99 7.28 7.99 7.30   HEMOGLOBIN g/dL 11.8* 12.1* 13.5 13.6   PLATELETS 10*3/mm3 142 166 198 168     Results from last 7 days   Lab Units 24  0539 11/10/24  0710 24  0337 24  1331   SODIUM mmol/L 137 138 143 138   POTASSIUM mmol/L 3.7 3.7 4.2 3.8   CHLORIDE mmol/L 103 102 102 101   CO2 mmol/L 27.1 27.5 26.0 27.4   BUN mg/dL 24* 25* 17 14   CREATININE mg/dL 1.00 1.08 0.99 1.10   GLUCOSE mg/dL 77 80 124* 119*   EGFR mL/min/1.73 77.5 70.7 78.5 69.1     Results from last 7 days   Lab Units 24  1331   ALBUMIN g/dL 3.7   BILIRUBIN mg/dL 0.6   ALK PHOS U/L 58   AST (SGOT) U/L 33   ALT (SGPT) U/L 36     Results from last 7 days   Lab Units 24  0539 11/10/24  0710 24  0337 24  1331   CALCIUM mg/dL 8.4* 8.8 8.7 9.1   ALBUMIN g/dL  --   --   --  3.7   MAGNESIUM mg/dL  --   --   --  2.1   PHOSPHORUS mg/dL  --   --   --  2.6       No results  "found for: \"HGBA1C\", \"POCGLU\"    No radiology results for the last day  Scheduled Medications  acetaminophen, 650 mg, Oral, Daily  diphenhydrAMINE, 25 mg, Oral, Daily  enoxaparin, 40 mg, Subcutaneous, Daily  fluticasone, 2 spray, Each Nare, Daily  immune globulin (human), 20 g, Intravenous, Q24H   And  immune globulin (human), 10 g, Intravenous, Q24H  levETIRAcetam, 1,000 mg, Oral, Q12H  pantoprazole, 40 mg, Oral, Daily  Pharmacy to enter IVIG order, 400 mg/kg, Intravenous, Daily  predniSONE, 10 mg, Oral, Daily With Breakfast  sodium chloride, 10 mL, Intravenous, Q12H    Infusions   Diet  Diet: Regular/House; Fluid Consistency: Thin (IDDSI 0)       Assessment/Plan     Active Hospital Problems    Diagnosis  POA    **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       79 y.o. male admitted with CIDP (chronic inflammatory demyelinating polyneuropathy).      11/11/24  Day 3/5 IVIG. Start Amlodipine.    Acute on Chronic Inflammatory Demyelinating Polyneuropathy  - has global weakness L>R  - getting 5 day course of IVIG  - on prednisone 10mg daily  - continue supportive care  - NIF stable, continue every 6 hrs while awake  - appreciate neurology recs, considering outpatient EMG     HTN  -start amlodipine    Seizure prophylaxis  - continue Keppra     Seasonal allergies  - symptoms controlled  - continue Flonase     Diarrhea  - abdominal examination and labs benign though he has also had some reported weight loss  - CT abdomen/pelvis ordered  - will otherwise manage symptomatically    Prostatomegaly on CT  -Check PSA with reflex         DVT prophylaxis: Lovenox  Discussed with patient.  Anticipated discharge home with HH vs SNF, when cleared by consultants            Bubba Siddiqui MD  [facility]  11/11/24  09:07 EST

## 2024-11-12 LAB
ANION GAP SERPL CALCULATED.3IONS-SCNC: 9.5 MMOL/L (ref 5–15)
BASOPHILS # BLD AUTO: 0.01 10*3/MM3 (ref 0–0.2)
BASOPHILS NFR BLD AUTO: 0.2 % (ref 0–1.5)
BUN SERPL-MCNC: 20 MG/DL (ref 8–23)
BUN/CREAT SERPL: 21.1 (ref 7–25)
CALCIUM SPEC-SCNC: 8.4 MG/DL (ref 8.6–10.5)
CHLORIDE SERPL-SCNC: 101 MMOL/L (ref 98–107)
CO2 SERPL-SCNC: 25.5 MMOL/L (ref 22–29)
CREAT SERPL-MCNC: 0.95 MG/DL (ref 0.76–1.27)
DEPRECATED RDW RBC AUTO: 46.6 FL (ref 37–54)
EGFRCR SERPLBLD CKD-EPI 2021: 82.4 ML/MIN/1.73
EOSINOPHIL # BLD AUTO: 0.01 10*3/MM3 (ref 0–0.4)
EOSINOPHIL NFR BLD AUTO: 0.2 % (ref 0.3–6.2)
ERYTHROCYTE [DISTWIDTH] IN BLOOD BY AUTOMATED COUNT: 13.1 % (ref 12.3–15.4)
GLUCOSE SERPL-MCNC: 77 MG/DL (ref 65–99)
HCT VFR BLD AUTO: 36.5 % (ref 37.5–51)
HGB BLD-MCNC: 12.6 G/DL (ref 13–17.7)
IMM GRANULOCYTES # BLD AUTO: 0.01 10*3/MM3 (ref 0–0.05)
IMM GRANULOCYTES NFR BLD AUTO: 0.2 % (ref 0–0.5)
LYMPHOCYTES # BLD AUTO: 1.3 10*3/MM3 (ref 0.7–3.1)
LYMPHOCYTES NFR BLD AUTO: 25.2 % (ref 19.6–45.3)
MCH RBC QN AUTO: 34.1 PG (ref 26.6–33)
MCHC RBC AUTO-ENTMCNC: 34.5 G/DL (ref 31.5–35.7)
MCV RBC AUTO: 98.9 FL (ref 79–97)
MONOCYTES # BLD AUTO: 0.58 10*3/MM3 (ref 0.1–0.9)
MONOCYTES NFR BLD AUTO: 11.2 % (ref 5–12)
NEUTROPHILS NFR BLD AUTO: 3.25 10*3/MM3 (ref 1.7–7)
NEUTROPHILS NFR BLD AUTO: 63 % (ref 42.7–76)
NRBC BLD AUTO-RTO: 0 /100 WBC (ref 0–0.2)
PLATELET # BLD AUTO: 153 10*3/MM3 (ref 140–450)
PMV BLD AUTO: 9.3 FL (ref 6–12)
POTASSIUM SERPL-SCNC: 4 MMOL/L (ref 3.5–5.2)
PSA SERPL-MCNC: 2.1 NG/ML (ref 0–4)
RBC # BLD AUTO: 3.69 10*6/MM3 (ref 4.14–5.8)
REFLEX: NORMAL
SODIUM SERPL-SCNC: 136 MMOL/L (ref 136–145)
WBC NRBC COR # BLD AUTO: 5.16 10*3/MM3 (ref 3.4–10.8)

## 2024-11-12 PROCEDURE — 63710000001 DIPHENHYDRAMINE PER 50 MG: Performed by: PSYCHIATRY & NEUROLOGY

## 2024-11-12 PROCEDURE — 25010000002 ENOXAPARIN PER 10 MG: Performed by: STUDENT IN AN ORGANIZED HEALTH CARE EDUCATION/TRAINING PROGRAM

## 2024-11-12 PROCEDURE — 25010000002 IMMUNE GLOBULIN (HUMAN) 10 GM/100ML SOLUTION: Performed by: PSYCHIATRY & NEUROLOGY

## 2024-11-12 PROCEDURE — 85025 COMPLETE CBC W/AUTO DIFF WBC: CPT | Performed by: INTERNAL MEDICINE

## 2024-11-12 PROCEDURE — 97110 THERAPEUTIC EXERCISES: CPT

## 2024-11-12 PROCEDURE — 63710000001 PREDNISONE PER 5 MG: Performed by: PSYCHIATRY & NEUROLOGY

## 2024-11-12 PROCEDURE — 25010000002 IMMUNE GLOBULIN (HUMAN) 20 GM/200ML SOLUTION: Performed by: PSYCHIATRY & NEUROLOGY

## 2024-11-12 PROCEDURE — 94799 UNLISTED PULMONARY SVC/PX: CPT

## 2024-11-12 PROCEDURE — 97530 THERAPEUTIC ACTIVITIES: CPT

## 2024-11-12 PROCEDURE — 80048 BASIC METABOLIC PNL TOTAL CA: CPT | Performed by: INTERNAL MEDICINE

## 2024-11-12 PROCEDURE — 99232 SBSQ HOSP IP/OBS MODERATE 35: CPT | Performed by: NURSE PRACTITIONER

## 2024-11-12 RX ADMIN — LEVETIRACETAM 1000 MG: 500 TABLET, FILM COATED ORAL at 08:50

## 2024-11-12 RX ADMIN — ENOXAPARIN SODIUM 40 MG: 100 INJECTION SUBCUTANEOUS at 08:50

## 2024-11-12 RX ADMIN — IMMUNE GLOBULIN (HUMAN) 20 G: 10 INJECTION INTRAVENOUS; SUBCUTANEOUS at 10:15

## 2024-11-12 RX ADMIN — AMLODIPINE BESYLATE 5 MG: 5 TABLET ORAL at 08:50

## 2024-11-12 RX ADMIN — FLUTICASONE PROPIONATE 2 SPRAY: 50 SPRAY, METERED NASAL at 08:54

## 2024-11-12 RX ADMIN — LEVETIRACETAM 1000 MG: 500 TABLET, FILM COATED ORAL at 20:11

## 2024-11-12 RX ADMIN — DIPHENHYDRAMINE HYDROCHLORIDE 25 MG: 25 CAPSULE ORAL at 08:50

## 2024-11-12 RX ADMIN — Medication 10 ML: at 08:51

## 2024-11-12 RX ADMIN — IMMUNE GLOBULIN (HUMAN) 10 G: 10 INJECTION INTRAVENOUS; SUBCUTANEOUS at 12:54

## 2024-11-12 RX ADMIN — Medication 10 ML: at 20:11

## 2024-11-12 RX ADMIN — ACETAMINOPHEN 325MG 650 MG: 325 TABLET ORAL at 08:50

## 2024-11-12 RX ADMIN — PREDNISONE 10 MG: 5 TABLET ORAL at 10:13

## 2024-11-12 NOTE — PLAN OF CARE
Goal Outcome Evaluation:  Plan of Care Reviewed With: patient        Progress: no change     4/5 IVIG dose administered today, pt tolerated well, no sign of adverse reaction noted or reported.

## 2024-11-12 NOTE — DISCHARGE PLACEMENT REQUEST
"Carson Ibrahim (79 y.o. Male)       Date of Birth   1945    Social Security Number       Address   [address] [address]    Home Phone   [phone]    MRN   [id number]       [facility]    Marital Status   Single                            Admission Date   11/8/24    Admission Type   Urgent    Admitting Provider   Feliciano Monroy MD    Attending Provider   Bubba Siddiqui MD    Department, Room/Bed   [address], P578/1       Discharge Date       Discharge Disposition       Discharge Destination                                 Attending Provider: Bubba Siddiqui MD    Allergies: No Known Allergies    Isolation: None   Infection: None   Code Status: CPR    Ht: 177.8 cm (70\")   Wt: 69.8 kg (153 lb 15.9 oz)    Admission Cmt: None   Principal Problem: CIDP (chronic inflammatory demyelinating polyneuropathy) [G61.81]                   Active Insurance as of 11/8/2024       Primary Coverage       Payor Plan Insurance Group Employer/Plan Group    MEDICARE MEDICARE A ONLY        Payor Plan Address Payor Plan Phone Number Payor Plan Fax Number Effective Dates    [address] [phone]  3/1/2012 - None Entered    [place] [address]         Subscriber Name Subscriber Birth Date Member ID       CARSON IBRAHIM 1945 [id number]                     Emergency Contacts        (Rel.) Home Phone Work Phone Mobile Phone    Lavenr Carrera (Other) [phone] -- --                "

## 2024-11-12 NOTE — PROGRESS NOTES
"DOS: 2024  NAME: Efren Christianson   : 1945  PCP: Winnie Murray  CC: worsening CIDP    Neurology    Subjective: No significant changes or events overnight.  Receiving fourth dose of IVIG today.    Objective:  Vital signs: /76   Pulse 83   Temp 97.9 °F (36.6 °C)   Resp 18   Ht 177.8 cm (70\")   Wt 69.8 kg (153 lb 15.9 oz)   SpO2 93%   BMI 22.10 kg/m²       GEN: NAD, v/s reviewed  HEENT: Normocephalic, atraumatic   COR: RRR  Resp: Even and unlabored, clear to auscultation bilaterally  Extremities: No edema    Neurological:   MS: AOx3. Language normal. No neglect. Higher integrative function normal  CN: Visual fields intact bilaterally, PERRL, extraocular movements intact, normal facial sensation, no facial droop, tongue midline, no dysarthria  Motor: 4- out of 5 in bilateral upper extremities, right lower extremity 4 out of 5, left lower extremity 4 [place] out of 5 with diffuse atrophy    Scheduled Meds:acetaminophen, 650 mg, Oral, Daily  amLODIPine, 5 mg, Oral, Q24H  diphenhydrAMINE, 25 mg, Oral, Daily  enoxaparin, 40 mg, Subcutaneous, Daily  fluticasone, 2 spray, Each Nare, Daily  immune globulin (human), 20 g, Intravenous, Q24H   And  immune globulin (human), 10 g, Intravenous, Q24H  levETIRAcetam, 1,000 mg, Oral, Q12H  Pharmacy to enter IVIG order, 400 mg/kg, Intravenous, Daily  predniSONE, 10 mg, Oral, Daily With Breakfast  sodium chloride, 10 mL, Intravenous, Q12H      Continuous Infusions:   PRN Meds:.  senna-docusate sodium **AND** polyethylene glycol **AND** bisacodyl **AND** bisacodyl    diphenhydrAMINE    famotidine    hydrocortisone sodium succinate    loperamide    simethicone    sodium chloride    sodium chloride    Laboratory results:  Lab Results   Component Value Date    GLUCOSE 77 2024    CALCIUM 8.4 (L) 2024     2024    K 4.0 2024    CO2 25.5 2024     2024    BUN 20 2024    CREATININE 0.95 2024    BCR 21.1 " "11/12/2024    ANIONGAP 9.5 11/12/2024     Lab Results   Component Value Date    WBC 5.16 11/12/2024    HGB 12.6 (L) 11/12/2024    HCT 36.5 (L) 11/12/2024    MCV 98.9 (H) 11/12/2024     11/12/2024     No results found for: \"CHOL\"  No results found for: \"HDL\"  No results found for: \"LDL\"  No results found for: \"TRIG\"       Review and interpretation of imaging:  No radiology results for the last day    Impression:  79-year-old male with past medical history of [facility] disorder [facility] prior to arrival and CIDP originally diagnosed by [facility] in 2020 (though notes symptoms >20 years prior to that) at which time he had an EMG and a course of IVIG in the hospital.  He did well but did not follow-up and after that he developed some progressive decline in his mobility.  He reestablished medical care care here with Dr. Mancia and has been treated with IVIG once monthly but has been having exacerbations with wearing off prior to his next infusion.  He was tried on a few doses of Vyvgart but did not seem to respond and deteriorated after that.  He was recently admitted to [facility] in October and received another 5 days of IVIG followed by rehab in [place] where he initially did well but once he went home he began rapidly declining again.  With this he has had associated intermittent shortness of breath which is a new symptom.  He returned to [facility] with worsening symptoms and was transferred here for further care.     CT abdomen and pelvis with contrast completed 11/8 showed no change in sizable right anterior diaphragmatic hernia, dependent atelectatic changes in the lungs noted.  Urinary bladder wall appeared thickened, partially collapsed, correlate for UTI.  Significant prostatic enlargement noted, PSA follow-up recommended.  Mildly distended colon with small volume of formed stool in the colon.    NIF today was -38.      Diagnoses:  Chronic immune demyelinating " polyneuropathy with current exacerbation  Neuromuscular respiratory failure  History of seizures  The above impression statement reviewed and changes noted.    Plan:  Receiving fourth of 5 IVIG  infusions today.  Plans to go to subacute rehab at discharge.  Have sent a message to Dr. Mancia's office requesting review of current outpatient treatment options for CIDP.   On keppra for h/o seizures.  Primary checking PSA for prostamegaly seen on CT abdomen and pelvis.  D/W Dr. Fleming today. Will follow.

## 2024-11-12 NOTE — SIGNIFICANT NOTE
11/12/24 1300   Negative Inspiratory Force (NIF)   Negative Inspiratory Force (cm H2O) -38   Effort (NIF) good   Patient Position (NIF) semi-Patrick's

## 2024-11-12 NOTE — PROGRESS NOTES
Name: Efren Christianson ADMIT: 2024   : 1945  PCP: MurrayWinnie    MRN: [id number] LOS: 4 days   AGE/SEX: 79 y.o. male  ROOM: [facility]     Subjective   Subjective   Resting bed.  He says he feels about the same today.  Was up in the chair earlier.  Had a BM.    Objective   Objective   Vital Signs  Temp:  [97.5 °F (36.4 °C)-98.3 °F (36.8 °C)] 97.9 °F (36.6 °C)  Heart Rate:  [73-93] 74  Resp:  [16-18] 18  BP: (114-159)/(71-88) 114/76  SpO2:  [93 %-97 %] 93 %  on   ;   Device (Oxygen Therapy): room air  Body mass index is 22.1 kg/m².  Physical Exam  Constitutional:       Appearance: He is ill-appearing.   Pulmonary:      Effort: Pulmonary effort is normal. No respiratory distress.      Breath sounds: No stridor.   Skin:     Coloration: Skin is not pale.   Neurological:      Mental Status: He is alert and oriented to person, place, and time.         Results Review     I reviewed the patient's new clinical results.  Results from last 7 days   Lab Units 11/12/24  0427 11/11/24  0539 11/10/24  0709 24  0338   WBC 10*3/mm3 5.16 4.99 7.28 7.99   HEMOGLOBIN g/dL 12.6* 11.8* 12.1* 13.5   PLATELETS 10*3/mm3 153 142 166 198     Results from last 7 days   Lab Units 11/12/24  0427 11/11/24  0539 11/10/24  0710 24  0337   SODIUM mmol/L 136 137 138 143   POTASSIUM mmol/L 4.0 3.7 3.7 4.2   CHLORIDE mmol/L 101 103 102 102   CO2 mmol/L 25.5 27.1 27.5 26.0   BUN mg/dL 20 24* 25* 17   CREATININE mg/dL 0.95 1.00 1.08 0.99   GLUCOSE mg/dL 77 77 80 124*   EGFR mL/min/1.73 82.4 77.5 70.7 78.5     Results from last 7 days   Lab Units 24  1331   ALBUMIN g/dL 3.7   BILIRUBIN mg/dL 0.6   ALK PHOS U/L 58   AST (SGOT) U/L 33   ALT (SGPT) U/L 36     Results from last 7 days   Lab Units 24  0427 24  0539 11/10/24  0710 24  0337 24  1331   CALCIUM mg/dL 8.4* 8.4* 8.8 8.7 9.1   ALBUMIN g/dL  --   --   --   --  3.7   MAGNESIUM mg/dL  --   --   --   --  2.1   PHOSPHORUS mg/dL  --   --   --   --   "2.6       No results found for: \"HGBA1C\", \"POCGLU\"    No radiology results for the last day  Scheduled Medications  acetaminophen, 650 mg, Oral, Daily  amLODIPine, 5 mg, Oral, Q24H  diphenhydrAMINE, 25 mg, Oral, Daily  enoxaparin, 40 mg, Subcutaneous, Daily  fluticasone, 2 spray, Each Nare, Daily  immune globulin (human), 20 g, Intravenous, Q24H   And  immune globulin (human), 10 g, Intravenous, Q24H  levETIRAcetam, 1,000 mg, Oral, Q12H  Pharmacy to enter IVIG order, 400 mg/kg, Intravenous, Daily  predniSONE, 10 mg, Oral, Daily With Breakfast  sodium chloride, 10 mL, Intravenous, Q12H    Infusions   Diet  Diet: Regular/House; Fluid Consistency: Thin (IDDSI 0)       Assessment/Plan     Active Hospital Problems    Diagnosis  POA    **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       79 y.o. male admitted with CIDP (chronic inflammatory demyelinating polyneuropathy).      11/12/24  Day 4/5 of IVIG.      Acute on Chronic Inflammatory Demyelinating Polyneuropathy  - has global weakness L>R  - getting 5 day course of IVIG (day 5 is 11/13)  - on prednisone 10mg daily  - continue supportive care  - NIF stable, continue every 6 hrs while awake  - appreciate neurology recs, considering outpatient EMG     HTN  -improved on amlodipine    Seizure prophylaxis  - continue Keppra     Seasonal allergies  - symptoms controlled  - continue Flonase     Diarrhea.  - abdominal examination and labs benign though he has also had some reported weight loss  - CT abdomen/pelvis with diaphragmatic hernia, no change in size; renal cysts, enlarged prostate; no evidence for colitis  - will otherwise manage symptomatically    Prostatomegaly on CT  -Check PSA with reflex         DVT prophylaxis: Lovenox  Discussed with " patient.  Anticipated discharge SNF, when cleared by consultants            Bubba Siddiqui MD  [facility]  11/12/24  12:37 EST

## 2024-11-12 NOTE — PLAN OF CARE
Goal Outcome Evaluation:  Plan of Care Reviewed With: patient        Progress: improving  Outcome Evaluation: Pt seen for PT this AM with improving mobilty. min A to reach EOB, SBA for unsupported sitting. Pt tolerates LE exercises while sitting unsupported. Cues for posture and posterior lean noted with fatigue. Mod A of 2 to stand and then mod A of 2 to squat pivot to recliner to R side. bilat knee block needed, especially on L LE for stability. Put L'nard splint on L LE and educated pt is for resting in bed but can have breaks from it. He continues to benefit from skilled PT to address mobility deficits. Recommend further skilled rehab at d/c.    Anticipated Discharge Disposition (PT): skilled nursing facility

## 2024-11-12 NOTE — CASE MANAGEMENT/SOCIAL WORK
Case Management Readmission Assessment Note    Case Management Readmission Assessment (all recorded)       Readmission Interview       Row Name 11/12/24 1442             Readmission Indications    Is the patient and/or family able to complete the readmission assessment questions? Yes      Is this hospitalization related to the prior hospital diagnosis? No        Row Name 11/12/24 1442             Recommendation for rehospitalization    Did you speak with your physician prior to coming to the hospital No        [facility] Name 11/12/24 1442             Follow-up Appointments    Do you have a PCP? Yes      Did you have an appointment with PCP after your hospitalization? No      Did you have an appointment with a Specialist? No      Are you current with the Pulmonary Clinic? No      Are you current with the CHF Clinic? No        Row Name 11/12/24 1442             Medications    Did you have newly prescribed medications at discharge? Yes      Did you understand the reasons for your medications at discharge and how to take them? Yes      Did you understand the side effects of your medications? Yes      Are you taking all of you prescribed medications? Yes      What pharmacy was used to fill prescription(s)? went to SNF after discharge      Were medications picked up? Yes        [facility] Name 11/12/24 1442             Discharge Instructions    Did you understand your discharge instructions? Yes      Did your family/caregiver hear your instructions? No      Were you told to eat a special diet? No      Did you adhere to the diet? Yes  n/a      Were you given a number of someone to call if you had questions or concerns? Yes        Row Name 11/12/24 1442             Index discharge location/services    Where did you go upon discharge? Skilled Nursing Facility      Do you have supportive family or friends in the home? Yes      What services were arranged at discharge? Home Health      Was the provider seen at the facility? Yes      What  actions were taken to avoid a readmit? unsure      Which Skilled Nursing Facility were your admitted from? [facility]        Row Name 11/12/24 1442             Discharge Readiness    On a scale of 1-5 (5 being well prepared), how ready were you for discharge 4      Recommendation based on interview Additional caregiver support        Row Name 11/12/24 1442             Palliative Care/Hospice    Are you current with Palliative Care? No      Are you current with Hospice Care? No        Row Name 11/12/24 1442 11/08/24 2142          Advance Directives (For Healthcare)    Pre-existing AND/MOST/POLST Order No No     Advance Directive Status Patient does not have advance directive Patient does not have advance directive     Have you reviewed your Advance Directive and is it valid for this stay? No No     Literature Provided on Advance Directives No No     Patient Requests Assistance on Advance Directives Patient Declined Patient Declined

## 2024-11-12 NOTE — THERAPY TREATMENT NOTE
Patient Name: Efren Christianson  : 1945    MRN: [id number]                              Today's Date: 2024       Admit Date: 2024    Visit Dx: No diagnosis found.  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)     Difficulty walking     Hepatitis A     as a child    Seizures     Syncope     Urgency of urination      Past Surgical History:   Procedure Laterality Date    TRUNK LESION/CYST EXCISION N/A 8/3/2023    Procedure: excision of chest wall cyst and back cyst 10:00;  Surgeon: Kianna Oliva DO;  Location: [facility] OR;  Service: General;  Laterality: N/A;      General Information       Row Name 24          Physical Therapy Time and Intention    Document Type therapy note (daily note)  -     Mode of Treatment individual therapy;physical therapy  -       Row Name 24          General Information    Patient Profile Reviewed yes  -ST     Existing Precautions/Restrictions fall  -       Row Name 24          Cognition    Orientation Status (Cognition) oriented x 3  -       Row Name 24          Safety Issues/Impairments Affecting Functional Mobility    Impairments Affecting Function (Mobility) balance;coordination;endurance/activity tolerance;grasp;pain;strength;range of motion (ROM)  -     Comment, Safety Issues/Impairments (Mobility) nonskid socks, gait belt donned  -ST               User Key  (r) = Recorded By, (t) = Taken By, (c) = Cosigned By      Initials Name Provider Type    ST Sridevi Henry PT Physical Therapist                   Mobility       Row Name 24          Bed Mobility     Supine-Sit Thayer (Bed Mobility) minimum assist (75% patient effort)  -ST     Sit-Supine Thayer (Bed Mobility) 1 person assist;1 person to manage equipment  -       Row Name 11/12/24 0927          Bed-Chair Transfer    Bed-Chair Thayer (Transfers) moderate assist (50% patient effort);2 person assist  -[facility] Name 11/12/24 0927          Sit-Stand Transfer    Sit-Stand Thayer (Transfers) moderate assist (50% patient effort);2 person assist  -ST     Comment, (Sit-Stand Transfer) bilat knee block reaches partical to erect stand each trial. significant shaking noted pt reports that has been happening lately  -       Row Name 11/12/24 0927          Gait/Stairs (Locomotion)    Thayer Level (Gait) not tested  -               User Key  (r) = Recorded By, (t) = Taken By, (c) = Cosigned By      Initials Name Provider Type    ST rSidevi Henry, PT Physical Therapist                   Obj/Interventions       [facility] Name 11/12/24 0928          Motor Skills    Therapeutic Exercise --  seated hip march, LAQ x 10 bilat. AP R LE x 10, no active DF noted on L LE  -ST       [facility] Name 11/12/24 0928          Balance    Comment, Balance SBA for unsupported sitting, mod A of 2 for dynamic standing balance. VC for leaning forward to improve glut activation  -ST               User Key  (r) = Recorded By, (t) = Taken By, (c) = Cosigned By      Initials Name Provider Type    Sridevi Lagos, PT Physical Therapist                   Goals/Plan    No documentation.                  Clinical Impression       [facility] Name 11/12/24 0929          Pain    Pretreatment Pain Rating 0/10 - no pain  -ST     Posttreatment Pain Rating 0/10 - no pain  -ST       Row Name 11/12/24 0929          Plan of Care Review    Plan of Care Reviewed With patient  -ST     Progress improving  -ST     Outcome Evaluation Pt seen for PT this AM with improving mobilty. min A to reach EOB, SBA for unsupported sitting. Pt  tolerates LE exercises while sitting unsupported. Cues for posture and posterior lean noted with fatigue. Mod A of 2 to stand and then mod A of 2 to squat pivot to recliner to R side. bilat knee block needed, especially on L LE for stability. Put L'nard splint on L LE and educated pt is for resting in bed but can have breaks from it. He continues to benefit from skilled PT to address mobility deficits. Recommend further skilled rehab at d/c.  -       Row Name 11/12/24 0929          Therapy Assessment/Plan (PT)    Rehab Potential (PT) good  -ST     Criteria for Skilled Interventions Met (PT) yes  -ST     Therapy Frequency (PT) 6 times/wk  -       Row Name 11/12/24 0929          Positioning and Restraints    Pre-Treatment Position in bed  -ST     Post Treatment Position chair  -ST     In Chair notified nsg;reclined;call light within reach;encouraged to call for assist;exit alarm on;with other staff  -ST               User Key  (r) = Recorded By, (t) = Taken By, (c) = Cosigned By      Initials Name Provider Type    Sridevi Lagos, SYLVIE Physical Therapist                   Outcome Measures       Row Name 11/12/24 0931          How much help from another person do you currently need...    Turning from your back to your side while in flat bed without using bedrails? 3  -ST     Moving from lying on back to sitting on the side of a flat bed without bedrails? 2  -ST     Moving to and from a bed to a chair (including a wheelchair)? 2  -ST     Standing up from a chair using your arms (e.g., wheelchair, bedside chair)? 2  -ST     Climbing 3-5 steps with a railing? 1  -ST     To walk in hospital room? 1  -ST     AM-PAC 6 Clicks Score (PT) 11  -ST     Highest Level of Mobility Goal 4 --> Transfer to chair/commode  -ST               User Key  (r) = Recorded By, (t) = Taken By, (c) = Cosigned By      Initials Name Provider Type    Sridevi Lagos PT Physical Therapist                                 Physical  Therapy Education       Title: PT OT SLP Therapies (In Progress)       Topic: Physical Therapy (In Progress)       Point: Mobility training (Done)       Learning Progress Summary            Patient Acceptance, TB,E, VU,NR by  at 11/12/2024 0931    Acceptance, E, VU,NR by  at 11/11/2024 1013    Acceptance, E,TB,D, VU,DU,NR by  at 11/9/2024 1323                      Point: Home exercise program (Done)       Learning Progress Summary            Patient Acceptance, E, VU,NR by  at 11/11/2024 1013                      Point: Body mechanics (Done)       Learning Progress Summary            Patient Acceptance, TB,E, VU,NR by  at 11/12/2024 0931    Acceptance, E,TB,D, VU,DU,NR by  at 11/9/2024 1323                      Point: Precautions (Not Started)       Learner Progress:  Not documented in this visit.                              User Key       Initials Effective Dates Name Provider Type Discipline     07/11/23 -  Gita Real, PT Physical Therapist PT     02/07/24 -  Charlotte Duncan PT Physical Therapist PT     09/22/22 -  Sridevi Henry PT Physical Therapist PT                  PT Recommendation and Plan     Progress: improving  Outcome Evaluation: Pt seen for PT this AM with improving mobilty. min A to reach EOB, SBA for unsupported sitting. Pt tolerates LE exercises while sitting unsupported. Cues for posture and posterior lean noted with fatigue. Mod A of 2 to stand and then mod A of 2 to squat pivot to recliner to R side. bilat knee block needed, especially on L LE for stability. Put L'nard splint on L LE and educated pt is for resting in bed but can have breaks from it. He continues to benefit from skilled PT to address mobility deficits. Recommend further skilled rehab at d/c.     Time Calculation:         PT Charges       Row Name 11/12/24 0931             Time Calculation    Start Time 0856  -ST      Stop Time 0922  -ST      Time Calculation (min) 26 min  -ST      PT Received On  11/12/24  -ST      PT - Next Appointment 11/13/24  -ST         Time Calculation- PT    Total Timed Code Minutes- PT 26 minute(s)  -ST         Timed Charges    57794 - PT Therapeutic Exercise Minutes 8  -ST      72705 - PT Therapeutic Activity Minutes 18  -ST         Total Minutes    Timed Charges Total Minutes 26  -ST       Total Minutes 26  -ST                User Key  (r) = Recorded By, (t) = Taken By, (c) = Cosigned By      Initials Name Provider Type     Sridevi Henry, PT Physical Therapist                  Therapy Charges for Today       Code Description Service Date Service Provider Modifiers Qty    50800463044 HC PT THER PROC EA 15 MIN 11/12/2024 Sridevi Henry, PT GP 1    15685349964 HC PT THERAPEUTIC ACT EA 15 MIN 11/12/2024 Sridevi Henry, PT GP 1    [id number]  PT THER SUPP EA 15 MIN 11/12/2024 Sridevi Henry, PT GP 2            PT G-Codes  Outcome Measure Options: Modified [place]  AM-PAC 6 Clicks Score (PT): 11  Modified [place] Scale: 4 - Moderately severe disability.  Unable to walk without assistance, and unable to attend to own bodily needs without assistance.  PT Discharge Summary  Anticipated Discharge Disposition (PT): skilled nursing facility    Sridevi Henry PT  11/12/2024

## 2024-11-12 NOTE — PLAN OF CARE
Problem: Adult Inpatient Plan of Care  Goal: Plan of Care Review  Outcome: Progressing  Flowsheets (Taken 11/12/2024 0237)  Plan of Care Reviewed With: patient  Goal: Patient-Specific Goal (Individualized)  Outcome: Progressing  Goal: Absence of Hospital-Acquired Illness or Injury  Outcome: Progressing  Intervention: Identify and Manage Fall Risk  Recent Flowsheet Documentation  Taken 11/12/2024 0200 by Charlotte Graham RN  Safety Promotion/Fall Prevention:   activity supervised   assistive device/personal items within reach   clutter free environment maintained   safety round/check completed  Taken 11/11/2024 2351 by Charlotte Graham RN  Safety Promotion/Fall Prevention:   activity supervised   assistive device/personal items within reach   clutter free environment maintained   safety round/check completed  Taken 11/11/2024 2200 by Charlotte Graham RN  Safety Promotion/Fall Prevention:   activity supervised   assistive device/personal items within reach   clutter free environment maintained   safety round/check completed  Taken 11/11/2024 2000 by Charlotte Graham RN  Safety Promotion/Fall Prevention: safety round/check completed  Intervention: Prevent Skin Injury  Recent Flowsheet Documentation  Taken 11/12/2024 0200 by Charlotte Graham RN  Body Position: turned  Taken 11/11/2024 2351 by Charlotte Graham RN  Body Position: turned  Taken 11/11/2024 2200 by Charlotte Graham RN  Body Position: turned  Taken 11/11/2024 2000 by Charlotte Graham RN  Body Position: turned  Skin Protection: protective footwear used  Intervention: Prevent and Manage VTE (Venous Thromboembolism) Risk  Recent Flowsheet Documentation  Taken 11/11/2024 2000 by Charlotte Graham RN  VTE Prevention/Management: (lovanox)   other (see comments)   patient refused intervention  Intervention: Prevent Infection  Recent Flowsheet Documentation  Taken 11/11/2024 2000 by Charlotte Graham RN  Infection Prevention: single patient room provided  Goal: Optimal Comfort  and Wellbeing  Outcome: Progressing  Intervention: Provide Person-Centered Care  Recent Flowsheet Documentation  Taken 11/11/2024 2000 by Charlotte Graham RN  Trust Relationship/Rapport:   choices provided   care explained  Goal: Readiness for Transition of Care  Outcome: Progressing     Problem: Skin Injury Risk Increased  Goal: Skin Health and Integrity  Outcome: Progressing  Intervention: Optimize Skin Protection  Recent Flowsheet Documentation  Taken 11/12/2024 0200 by Charlotte Graham RN  Head of Bed (HOB) Positioning: HOB at 30 degrees  Taken 11/11/2024 2351 by Charlotte Graham RN  Head of Bed (HOB) Positioning: HOB at 30 degrees  Taken 11/11/2024 2200 by Charlotte Graham RN  Head of Bed (HOB) Positioning: HOB at 30 degrees  Taken 11/11/2024 2000 by Charlotte Graham RN  Pressure Reduction Techniques: frequent weight shift encouraged  Head of Bed (HOB) Positioning: HOB at 20 degrees  Pressure Reduction Devices: alternating pressure pump (ROSSI)  Skin Protection: protective footwear used     Problem: Fall Injury Risk  Goal: Absence of Fall and Fall-Related Injury  Outcome: Progressing  Intervention: Identify and Manage Contributors  Recent Flowsheet Documentation  Taken 11/12/2024 0200 by Charlotte Graham RN  Medication Review/Management: medications reviewed  Taken 11/11/2024 2351 by Charlotte Graham RN  Medication Review/Management: medications reviewed  Taken 11/11/2024 2200 by Charlotte Graham RN  Medication Review/Management: medications reviewed  Taken 11/11/2024 2000 by Charlotte Graham RN  Medication Review/Management: medications reviewed  Intervention: Promote Injury-Free Environment  Recent Flowsheet Documentation  Taken 11/12/2024 0200 by Charlotte Graham RN  Safety Promotion/Fall Prevention:   activity supervised   assistive device/personal items within reach   clutter free environment maintained   safety round/check completed  Taken 11/11/2024 2351 by Charlotte Graham RN  Safety Promotion/Fall Prevention:    activity supervised   assistive device/personal items within reach   clutter free environment maintained   safety round/check completed  Taken 11/11/2024 2200 by Charlotte Graham, RN  Safety Promotion/Fall Prevention:   activity supervised   assistive device/personal items within reach   clutter free environment maintained   safety round/check completed  Taken 11/11/2024 2000 by Charlotte Graham, RN  Safety Promotion/Fall Prevention: safety round/check completed   Goal Outcome Evaluation:  Plan of Care Reviewed With: patient

## 2024-11-12 NOTE — SIGNIFICANT NOTE
11/12/24 0902   Negative Inspiratory Force (NIF)   Negative Inspiratory Force (cm H2O) -35   Effort (NIF) good   Patient Position (NIF) sitting in chair

## 2024-11-13 LAB
ANION GAP SERPL CALCULATED.3IONS-SCNC: 7.7 MMOL/L (ref 5–15)
BASOPHILS # BLD AUTO: 0.02 10*3/MM3 (ref 0–0.2)
BASOPHILS NFR BLD AUTO: 0.4 % (ref 0–1.5)
BUN SERPL-MCNC: 24 MG/DL (ref 8–23)
BUN/CREAT SERPL: 28.2 (ref 7–25)
CALCIUM SPEC-SCNC: 8.3 MG/DL (ref 8.6–10.5)
CHLORIDE SERPL-SCNC: 103 MMOL/L (ref 98–107)
CO2 SERPL-SCNC: 24.3 MMOL/L (ref 22–29)
CREAT SERPL-MCNC: 0.85 MG/DL (ref 0.76–1.27)
DEPRECATED RDW RBC AUTO: 48.4 FL (ref 37–54)
EGFRCR SERPLBLD CKD-EPI 2021: 89.5 ML/MIN/1.73
EOSINOPHIL # BLD AUTO: 0.03 10*3/MM3 (ref 0–0.4)
EOSINOPHIL NFR BLD AUTO: 0.5 % (ref 0.3–6.2)
ERYTHROCYTE [DISTWIDTH] IN BLOOD BY AUTOMATED COUNT: 13 % (ref 12.3–15.4)
GLUCOSE SERPL-MCNC: 79 MG/DL (ref 65–99)
HCT VFR BLD AUTO: 35.6 % (ref 37.5–51)
HGB BLD-MCNC: 12.1 G/DL (ref 13–17.7)
IMM GRANULOCYTES # BLD AUTO: 0.03 10*3/MM3 (ref 0–0.05)
IMM GRANULOCYTES NFR BLD AUTO: 0.5 % (ref 0–0.5)
LYMPHOCYTES # BLD AUTO: 1.4 10*3/MM3 (ref 0.7–3.1)
LYMPHOCYTES NFR BLD AUTO: 25.3 % (ref 19.6–45.3)
MCH RBC QN AUTO: 34.1 PG (ref 26.6–33)
MCHC RBC AUTO-ENTMCNC: 34 G/DL (ref 31.5–35.7)
MCV RBC AUTO: 100.3 FL (ref 79–97)
MONOCYTES # BLD AUTO: 0.53 10*3/MM3 (ref 0.1–0.9)
MONOCYTES NFR BLD AUTO: 9.6 % (ref 5–12)
NEUTROPHILS NFR BLD AUTO: 3.53 10*3/MM3 (ref 1.7–7)
NEUTROPHILS NFR BLD AUTO: 63.7 % (ref 42.7–76)
NRBC BLD AUTO-RTO: 0 /100 WBC (ref 0–0.2)
PLATELET # BLD AUTO: 146 10*3/MM3 (ref 140–450)
PMV BLD AUTO: 9.4 FL (ref 6–12)
POTASSIUM SERPL-SCNC: 3.8 MMOL/L (ref 3.5–5.2)
RBC # BLD AUTO: 3.55 10*6/MM3 (ref 4.14–5.8)
SODIUM SERPL-SCNC: 135 MMOL/L (ref 136–145)
WBC NRBC COR # BLD AUTO: 5.54 10*3/MM3 (ref 3.4–10.8)

## 2024-11-13 PROCEDURE — 80048 BASIC METABOLIC PNL TOTAL CA: CPT | Performed by: INTERNAL MEDICINE

## 2024-11-13 PROCEDURE — 99232 SBSQ HOSP IP/OBS MODERATE 35: CPT | Performed by: PHYSICIAN ASSISTANT

## 2024-11-13 PROCEDURE — 63710000001 PREDNISONE PER 5 MG: Performed by: PSYCHIATRY & NEUROLOGY

## 2024-11-13 PROCEDURE — 97530 THERAPEUTIC ACTIVITIES: CPT

## 2024-11-13 PROCEDURE — 94799 UNLISTED PULMONARY SVC/PX: CPT

## 2024-11-13 PROCEDURE — 25010000002 IMMUNE GLOBULIN (HUMAN) 20 GM/200ML SOLUTION: Performed by: PSYCHIATRY & NEUROLOGY

## 2024-11-13 PROCEDURE — 85025 COMPLETE CBC W/AUTO DIFF WBC: CPT | Performed by: INTERNAL MEDICINE

## 2024-11-13 PROCEDURE — 94761 N-INVAS EAR/PLS OXIMETRY MLT: CPT

## 2024-11-13 PROCEDURE — 25010000002 ENOXAPARIN PER 10 MG: Performed by: STUDENT IN AN ORGANIZED HEALTH CARE EDUCATION/TRAINING PROGRAM

## 2024-11-13 PROCEDURE — 25010000002 IMMUNE GLOBULIN (HUMAN) 10 GM/100ML SOLUTION: Performed by: PSYCHIATRY & NEUROLOGY

## 2024-11-13 PROCEDURE — 63710000001 DIPHENHYDRAMINE PER 50 MG: Performed by: PSYCHIATRY & NEUROLOGY

## 2024-11-13 RX ORDER — AMLODIPINE BESYLATE 5 MG/1
5 TABLET ORAL
Qty: 30 TABLET | Refills: 1 | Status: SHIPPED | OUTPATIENT
Start: 2024-11-14

## 2024-11-13 RX ORDER — PREDNISONE 10 MG/1
10 TABLET ORAL
Qty: 30 TABLET | Refills: 0 | Status: SHIPPED | OUTPATIENT
Start: 2024-11-14

## 2024-11-13 RX ADMIN — PREDNISONE 10 MG: 5 TABLET ORAL at 09:43

## 2024-11-13 RX ADMIN — LEVETIRACETAM 1000 MG: 500 TABLET, FILM COATED ORAL at 09:42

## 2024-11-13 RX ADMIN — ACETAMINOPHEN 325MG 650 MG: 325 TABLET ORAL at 09:42

## 2024-11-13 RX ADMIN — LEVETIRACETAM 1000 MG: 500 TABLET, FILM COATED ORAL at 20:00

## 2024-11-13 RX ADMIN — Medication 10 ML: at 22:15

## 2024-11-13 RX ADMIN — IMMUNE GLOBULIN (HUMAN) 10 G: 10 INJECTION INTRAVENOUS; SUBCUTANEOUS at 12:42

## 2024-11-13 RX ADMIN — AMLODIPINE BESYLATE 5 MG: 5 TABLET ORAL at 09:45

## 2024-11-13 RX ADMIN — ENOXAPARIN SODIUM 40 MG: 100 INJECTION SUBCUTANEOUS at 09:42

## 2024-11-13 RX ADMIN — Medication 10 ML: at 09:45

## 2024-11-13 RX ADMIN — IMMUNE GLOBULIN (HUMAN) 20 G: 10 INJECTION INTRAVENOUS; SUBCUTANEOUS at 09:43

## 2024-11-13 RX ADMIN — DIPHENHYDRAMINE HYDROCHLORIDE 25 MG: 25 CAPSULE ORAL at 09:43

## 2024-11-13 NOTE — PROGRESS NOTES
"DOS: 2024  NAME: Efren Christianson   : 1945  PCP: Winnie Murray  No chief complaint on file.      Chief complaint: weakness  Subjective: PT needs sitter to assist with feeding.  He is optimistic about     Objective:  Vital signs: /80   Pulse 90   Temp 97.8 °F (36.6 °C)   Resp 16   Ht 177.8 cm (70\")   Wt 69.8 kg (153 lb 15.9 oz)   SpO2 91%   BMI 22.10 kg/m²      Gen: NAD, vitals reviewed  MS: oriented x3, recent/remote memory intact, normal attention/concentration, language intact, no neglect.  CN: visual acuity grossly normal, PERRL, EOMI, no facial droop, no dysarthria  Motor: diffuse weakness, antigravity throughout, R stronger than left, 3//5 finger flexors, 4/5 R IP, no fasciculations seen  sensory: intact to light touch all 4 ext.  Reflexes: down toes    ROS:  No weakness, numbness  No fevers, chills      Laboratory results:  Lab Results   Component Value Date    GLUCOSE 79 2024    CALCIUM 8.3 (L) 2024     (L) 2024    K 3.8 2024    CO2 24.3 2024     2024    BUN 24 (H) 2024    CREATININE 0.85 2024    BCR 28.2 (H) 2024    ANIONGAP 7.7 2024     Lab Results   Component Value Date    WBC 5.54 2024    HGB 12.1 (L) 2024    HCT 35.6 (L) 2024    .3 (H) 2024     2024     No results found for: \"LDL\"         Review of labs:     Review and interpretation of imaging:     Workup to date:    Diagnoses:  CIDP  History of seizures    Impression: 79-year-old male with past medical history of seizure disorder on Keppra prior to arrival and CIDP with diagnosis in  after  admission with EMG and improvement after IVIG.  He follows with Dr. Azevedo in [place] and is maintained on monthly IVIG but had previously reported wearing off a few days before his next infusion and also there was difficulty with transportation for infusions.  For this Vyvgart was being considered and I think he " has a few doses.  And for symptoms of exacerbation he was started on Vyvgart.  Just last month he had admission where and he had ZAHIDA responded to last dose being 10/21 and he was also initiated on steroids and was DC'd to rehab but wants home decompensated.  He is completed 5-day course of IVIG.  Recent CT chest and abdomen pelvis negative for overt malignancy       Plan:  Completed 5 day course of IVIG on 11/13   Continue Keppra as prescribed  Pt being d/pallavi to [facility]  Close follow-up neurology for reconsideration of maintenance meds      Thank you for this consultation.  Discussed above plan with neuro attending, Dr. Fleming who agrees with above plan.  Neurology team is available for concerns or questions.

## 2024-11-13 NOTE — PROGRESS NOTES
Name: Efren Christianson ADMIT: 2024   : 1945  PCP: Winnie Murray    MRN: [id number] LOS: 5 days   AGE/SEX: 79 y.o. male  ROOM: [facility]     Subjective   Subjective   No new problems today, resting comfortably.     Objective   Objective   Vital Signs  Temp:  [97.7 °F (36.5 °C)-98.3 °F (36.8 °C)] 97.9 °F (36.6 °C)  Heart Rate:  [74-95] 92  Resp:  [16-18] 16  BP: (128-149)/(77-90) 142/78  SpO2:  [91 %-98 %] 91 %  on   ;   Device (Oxygen Therapy): room air  Body mass index is 22.1 kg/m².  Physical Exam  Constitutional:       Appearance: He is ill-appearing.   Pulmonary:      Effort: Pulmonary effort is normal. No respiratory distress.      Breath sounds: No stridor.   Skin:     Coloration: Skin is not pale.   Neurological:      Mental Status: He is alert and oriented to person, place, and time.         Results Review     I reviewed the patient's new clinical results.  Results from last 7 days   Lab Units 24  0501 24  0427 24  0539 11/10/24  0709   WBC 10*3/mm3 5.54 5.16 4.99 7.28   HEMOGLOBIN g/dL 12.1* 12.6* 11.8* 12.1*   PLATELETS 10*3/mm3 146 153 142 166     Results from last 7 days   Lab Units 24  0501 24  0427 24  0539 11/10/24  0710   SODIUM mmol/L 135* 136 137 138   POTASSIUM mmol/L 3.8 4.0 3.7 3.7   CHLORIDE mmol/L 103 101 103 102   CO2 mmol/L 24.3 25.5 27.1 27.5   BUN mg/dL 24* 20 24* 25*   CREATININE mg/dL 0.85 0.95 1.00 1.08   GLUCOSE mg/dL 79 77 77 80   EGFR mL/min/1.73 89.5 82.4 77.5 70.7     Results from last 7 days   Lab Units 24  1331   ALBUMIN g/dL 3.7   BILIRUBIN mg/dL 0.6   ALK PHOS U/L 58   AST (SGOT) U/L 33   ALT (SGPT) U/L 36     Results from last 7 days   Lab Units 24  0501 24  0427 24  0539 11/10/24  0710 24  0337 24  1331   CALCIUM mg/dL 8.3* 8.4* 8.4* 8.8   < > 9.1   ALBUMIN g/dL  --   --   --   --   --  3.7   MAGNESIUM mg/dL  --   --   --   --   --  2.1   PHOSPHORUS mg/dL  --   --   --   --   --  2.6    < >  "= values in this interval not displayed.       No results found for: \"HGBA1C\", \"POCGLU\"    No radiology results for the last day  Scheduled Medications  amLODIPine, 5 mg, Oral, Q24H  enoxaparin, 40 mg, Subcutaneous, Daily  fluticasone, 2 spray, Each Nare, Daily  levETIRAcetam, 1,000 mg, Oral, Q12H  Pharmacy to enter IVIG order, 400 mg/kg, Intravenous, Daily  predniSONE, 10 mg, Oral, Daily With Breakfast  sodium chloride, 10 mL, Intravenous, Q12H    Infusions   Diet  Diet: Regular/House; Fluid Consistency: Thin (IDDSI 0)       Assessment/Plan     Active Hospital Problems    Diagnosis  POA    **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       79 y.o. male admitted with CIDP (chronic inflammatory demyelinating polyneuropathy).      11/13/24  Complete IVIG today.     Acute on Chronic Inflammatory Demyelinating Polyneuropathy  - has global weakness L>R  - getting 5 day course of IVIG (day 5 is 11/13)  - on prednisone 10mg daily  - continue supportive care  - NIF stable, continue every 6 hrs while awake  - appreciate neurology recs, considering outpatient EMG     HTN  -improved on amlodipine    Seizure prophylaxis  - continue Keppra     Seasonal allergies  - continue Flonase     Diarrhea, improved  - abdominal examination and labs benign  - CT abdomen/pelvis with diaphragmatic hernia, no change in size; renal cysts, enlarged prostate; no evidence for colitis  - will otherwise manage symptomatically    Prostatomegaly on CT  -PSA WNL         DVT prophylaxis: Lovenox  Discussed with patient and CCP.  Anticipated discharge SNF, when cleared by consultants            Bubba Siddiqui MD  [facility]  11/13/24  13:09 EST      "

## 2024-11-13 NOTE — DISCHARGE SUMMARY
"    Patient Name: Efren Christianson  : 1945  MRN: [id number]    Date of Admission: 2024  Date of Discharge:  24  Primary Care Physician: Winnie Murray      Chief Complaint:   No chief complaint on file.      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        Admitting HPI     \"Mr. Christianson is a 79 y.o. possible history of CIDP getting monthly IVIG presenting with bilateral weakness left worse than right.  Gets IVIG every 4 weeks last IVIG was about a week ago. Recently started on vyvgart but he has not really noticed much change from it so far.  Neurology was consulted by [facility] and recommend transfer here for further evaluation and may need IVIG.  Patient did get the flu shot on Tuesday.  Patient with no change in sensation, no headache, no nausea, vomiting, abdominal pain, dyspnea, cough, dysuria.  Had difficulty holding silverware this morning making it difficult for him to eat.  But no dysphagia or shortness of breath.  Has had some chronic nasal stuffiness and postnasal drip for the last few weeks to months, contributes to allergies but does not take any allergy medications. \"    Hospital Course     Pt admitted for exacerbation of CIDP.  Seen in consultation with neurology.  He received 5 days of IVIG.  Evaluated by physical therapy with recommendations for SNF.  Of note was found to have prostatomegaly on CT scan and PSA was obtained which was within normal limits.    Discharge Plan     Acute on Chronic Inflammatory Demyelinating Polyneuropathy  - has global weakness L>R  - 5 day course of IVIG   - on prednisone 10mg daily  - NIF stable  - appreciate neurology recs, considering outpatient EMG   "   HTN  -improved on amlodipine     Seizure prophylaxis  - continue Keppra     Seasonal allergies  - continue Flonase     Diarrhea, improved  - abdominal examination and labs benign  - CT abdomen/pelvis with diaphragmatic hernia, no change in size; renal cysts, enlarged prostate; no evidence for colitis  - will otherwise manage symptomatically     Prostatomegaly on CT  -PSA WNL    Day of Discharge     Physical Exam:  Temp:  [97.7 °F (36.5 °C)-98.5 °F (36.9 °C)] 97.7 °F (36.5 °C)  Heart Rate:  [] 78  Resp:  [16-18] 16  BP: (127-149)/(74-90) 136/76  Body mass index is 22.1 kg/m².  Physical Exam  Constitutional:       Appearance: He is ill-appearing.   Pulmonary:      Effort: Pulmonary effort is normal. No respiratory distress.      Breath sounds: No stridor.   Skin:     Coloration: Skin is not pale.   Neurological:      Mental Status: He is alert and oriented to person, place, and time.   Consultants     Consult Orders (all) (From admission, onward)       Start     Ordered    11/09/24 0000  Inpatient Case Management  Consult  Once        Provider:  (Not yet assigned)    11/08/24 2202    11/08/24 2259  Inpatient Neurology Consult General  Once        Specialty:  Neurology  Provider:  Chad Solis MD    11/08/24 2259                  Procedures     * Surgery not found *      Imaging Results (All)       Procedure Component Value Units Date/Time    CT Abdomen Pelvis With Contrast [[id number]] Collected: 11/10/24 1720     Updated: 11/11/24 1137    Narrative:      CT ABDOMEN AND PELVIS WITH IV CONTRAST     HISTORY: 79-year-old male with unexplained weight loss. Intermittent  abdominal pain.     TECHNIQUE: Radiation dose reduction techniques were utilized, including  automated exposure control and exposure modulation based on body size.   3 mm images were obtained through the abdomen and pelvis after the  administration of IV contrast. No previous CT abdomen pelvis for  comparison. Compared with  outside facility chest CTA 10/16/2024.     FINDINGS:  1. There is no interval change in a sizable right anterior diaphragmatic  hernia, Morgagni hernia, with an approximately 10 x 6 cm collection of  herniated fat into the anterior aspect of the right lower thorax.  There are dependent atelectatic changes at both lung bases. No pleural  effusions.     2. Liver, gallbladder, spleen, pancreas, adrenals, and kidneys appear  unremarkable other than a few subcentimeter renal cysts and a 3.1 cm  right renal cyst. Urinary bladder wall appears thickened, but is  partially collapsed. Please correlate clinically for acute UTI. There is  significant prostatic enlargement. PSA follow-up is recommended.     3. The transverse colon and descending colon are mildly distended with  air. There is a small volume of formed stool within the colon and there  is no evidence for colitis and there is no bowel obstruction. No  appendicitis. No free fluid or lymphadenopathy. Mild abdominal aortic  atherosclerotic changes without aneurysmal dilatation.     This report was finalized on 11/11/2024 11:34 AM by Dr. Chloe Joy M.D  on Workstation: [id number]                 Pertinent Labs     Results from last 7 days   Lab Units 11/13/24  0501 11/12/24  0427 11/11/24  0539 11/10/24  0709   WBC 10*3/mm3 5.54 5.16 4.99 7.28   HEMOGLOBIN g/dL 12.1* 12.6* 11.8* 12.1*   PLATELETS 10*3/mm3 146 153 142 166     Results from last 7 days   Lab Units 11/13/24  0501 11/12/24  0427 11/11/24  0539 11/10/24  0710   SODIUM mmol/L 135* 136 137 138   POTASSIUM mmol/L 3.8 4.0 3.7 3.7   CHLORIDE mmol/L 103 101 103 102   CO2 mmol/L 24.3 25.5 27.1 27.5   BUN mg/dL 24* 20 24* 25*   CREATININE mg/dL 0.85 0.95 1.00 1.08   GLUCOSE mg/dL 79 77 77 80   EGFR mL/min/1.73 89.5 82.4 77.5 70.7     Results from last 7 days   Lab Units 11/08/24  1331   ALBUMIN g/dL 3.7   BILIRUBIN mg/dL 0.6   ALK PHOS U/L 58   AST (SGOT) U/L 33   ALT (SGPT) U/L 36     Results from last 7 days  "  Lab Units 11/13/24  0501 11/12/24  0427 11/11/24  0539 11/10/24  0710 11/09/24  0337 11/08/24  1331   CALCIUM mg/dL 8.3* 8.4* 8.4* 8.8   < > 9.1   ALBUMIN g/dL  --   --   --   --   --  3.7   MAGNESIUM mg/dL  --   --   --   --   --  2.1   PHOSPHORUS mg/dL  --   --   --   --   --  2.6    < > = values in this interval not displayed.               Invalid input(s): \"LDLCALC\"          Test Results Pending at Discharge     Pending Labs       Order Current Status    Basic Metabolic Panel In process    CBC & Differential In process    CBC Auto Differential In process            Discharge Details        Discharge Medications        New Medications        Instructions Start Date   amLODIPine 5 MG tablet  Commonly known as: NORVASC   5 mg, Oral, Every 24 Hours Scheduled             Changes to Medications        Instructions Start Date   predniSONE 10 MG tablet  Commonly known as: DELTASONE  What changed:   medication strength  how much to take   10 mg, Oral, Daily With Breakfast             Continue These Medications        Instructions Start Date   Aquaphor Advanced Therapy ointment ointment   1 Application, Topical, Every 12 Hours Scheduled      Calcium Citrate-Vitamin D 250-2.5 MG-MCG per tablet   1 tablet, Oral, 2 Times Daily      GAMMAGARD IV   1 g/kg, Every 30 Days      levETIRAcetam 1000 MG tablet  Commonly known as: KEPPRA   1 tablet, Every 12 Hours Scheduled      multivitamin tablet tablet   Daily             Stop These Medications      pantoprazole 40 MG EC tablet  Commonly known as: PROTONIX              No Known Allergies    Discharge Disposition:  Skilled Nursing Facility (DC - External)      Discharge Diet:  Diet Order   Procedures    Diet: Regular/House; Fluid Consistency: Thin (IDDSI 0)       Discharge Activity:   Activity Instructions       Activity as Tolerated              CODE STATUS:    Code Status and Medical Interventions: CPR (Attempt to Resuscitate); Full Support   Ordered at: 11/09/24 1133     Code " Status (Patient has no pulse and is not breathing):    CPR (Attempt to Resuscitate)     Medical Interventions (Patient has pulse or is breathing):    Full Support       Future Appointments   Date Time Provider Department Center   11/21/2024  2:00 PM ROOM 1 [facility]U [facility] ACU [facility]   11/26/2024  2:00 PM ROOM 3 [facility]U [facility]   12/5/2024  2:00 PM ROOM 2 [facility]   12/12/2024  2:00 PM ROOM 2-B [facility]U [facility]   12/13/2024 11:20 AM Efraín Reyes MD [facility]   12/19/2024  2:00 PM ROOM 3 [facility]U [facility]   12/26/2024  2:00 PM ROOM 3 [facility]U [facility]U [facility]   1/2/2025  2:00 PM ROOM 2-B [facility]U [facility] ACU [facility]      Contact information for follow-up providers       Winnie Murray .    Specialty: Nurse Practitioner  Contact information:  [address]  [facility] [id number] [phone]                       Contact information for after-discharge care       Destination       [facility] .    Service: Skilled Nursing  Contact information:  [address] [place] [id number] [phone]                                   Time Spent on Discharge:  Greater than 30 minutes spent on discharge management including final examination, discussion of hospital stay and patient education, preparation of records, medication reconciliation, follow up planning      Bubba Siddiqui MD  [facility]  11/14/24  13:05 EST

## 2024-11-13 NOTE — THERAPY TREATMENT NOTE
Patient Name: Efren Christianson  : 1945    MRN: [id number]                              Today's Date: 2024       Admit Date: 2024    Visit Dx: No diagnosis found.  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)     Difficulty walking     Hepatitis A     as a child    Seizures     Syncope     Urgency of urination      Past Surgical History:   Procedure Laterality Date    TRUNK LESION/CYST EXCISION N/A 8/3/2023    Procedure: excision of chest wall cyst and back cyst 10:00;  Surgeon: Kianna Oliva DO;  Location: [facility] OR;  Service: General;  Laterality: N/A;      General Information       Row Name 24 1530          Physical Therapy Time and Intention    Document Type therapy note (daily note)  -ST     Mode of Treatment physical therapy;co-treatment;occupational therapy  two sets of skilled hands to maximally and safely progress mobility  -ST       Row Name 24 1530          General Information    Patient Profile Reviewed yes  -ST     Existing Precautions/Restrictions fall  -ST       Row Name 24 1530          Cognition    Orientation Status (Cognition) oriented x 3  -ST       Row Name 24 1530          Safety Issues/Impairments Affecting Functional Mobility    Impairments Affecting Function (Mobility) balance;coordination;endurance/activity tolerance;grasp;pain;strength;range of motion (ROM)  -ST     Comment, Safety Issues/Impairments (Mobility) nonskid socks, gait belt donned  -ST               User Key  (r) = Recorded By, (t) = Taken By, (c) = Cosigned By      Initials Name Provider Type    ST Sridevi Henry PT Physical  Therapist                   Mobility       Row Name 11/13/24 1531          Bed Mobility    Supine-Sit [place] (Bed Mobility) minimum assist (75% patient effort)  -ST     Sit-Supine [place] (Bed Mobility) moderate assist (50% patient effort);2 person assist  -ST       Row Name 11/13/24 1531          Sit-Stand Transfer    Sit-Stand [place] (Transfers) moderate assist (50% patient effort);2 person assist;maximum assist (25% patient effort)  -ST     Comment, (Sit-Stand Transfer) bilat knee block. multiple attempts today improved performance when on therapist on either side of pt  -ST               User Key  (r) = Recorded By, (t) = Taken By, (c) = Cosigned By      Initials Name Provider Type    ST Sridevi Henry, PT Physical Therapist                   Obj/Interventions       [facility] Name 11/13/24 1532          Balance    Comment, Balance first two attempts to stand pt noted to have significant posterior pushing. tolerated seated forward flexion x 5 reps with improved transfers following. improved performance when leaning forward and bilat feet blocked in place  -ST               User Key  (r) = Recorded By, (t) = Taken By, (c) = Cosigned By      Initials Name Provider Type    ST Sridevi Henry, PT Physical Therapist                   Goals/Plan    No documentation.                  Clinical Impression       [facility] Name 11/13/24 1533          Pain    Pretreatment Pain Rating 0/10 - no pain  -ST     Posttreatment Pain Rating 0/10 - no pain  -ST       [facility] Name 11/13/24 1533          Plan of Care Review    Plan of Care Reviewed With patient  -ST     Progress no change  -ST     Outcome Evaluation Pt seen for PT/OT this PM. PT noted to have increased stiffness and shaking during mobility compared to previous session. Significant posterior pushing with attempts to get to recliner today, opted to perfrom more STS for scooting d/t safety concern of pushing out of recliner. improved STS noted when one therapist  on each side, each blocking a knee. Pt also notably looser after seated trunk stretching today. Maintained upright positioning in bed for continued trunk extensor stretching. pt continues to benefit from skilled PT to address mobility deficits. Plans SNU at d/c.  -       Row Name 11/13/24 1533          Therapy Assessment/Plan (PT)    Rehab Potential (PT) good  -ST     Criteria for Skilled Interventions Met (PT) yes  -ST     Therapy Frequency (PT) 6 times/wk  -       Row Name 11/13/24 1533          Positioning and Restraints    Pre-Treatment Position in bed  -ST     Post Treatment Position bed  -ST     In Bed notified nsg;supine;call light within reach;encouraged to call for assist;exit alarm on  chair position  -ST               User Key  (r) = Recorded By, (t) = Taken By, (c) = Cosigned By      Initials Name Provider Type    Sridevi Lagos PT Physical Therapist                   Outcome Measures       Row Name 11/13/24 1535 11/13/24 0850       How much help from another person do you currently need...    Turning from your back to your side while in flat bed without using bedrails? 3  -ST 3  -JM    Moving from lying on back to sitting on the side of a flat bed without bedrails? 2  -ST 2  -JM    Moving to and from a bed to a chair (including a wheelchair)? 2  -ST 1  -JM    Standing up from a chair using your arms (e.g., wheelchair, bedside chair)? 2  -ST 1  -JM    Climbing 3-5 steps with a railing? 1  -ST 1  -JM    To walk in hospital room? 1  -ST 1  -JM    AM-PAC 6 Clicks Score (PT) 11 -ST 9  -JM    Highest Level of Mobility Goal 4 --> Transfer to chair/commode  -ST 3 --> Sit at edge of bed  -JM              User Key  (r) = Recorded By, (t) = Taken By, (c) = Cosigned By      Initials Name Provider Type    Erendira Lauren, RN Registered Nurse    Sridevi Lagos PT Physical Therapist                                 Physical Therapy Education       Title: PT OT SLP Therapies (In Progress)        Topic: Physical Therapy (In Progress)       Point: Mobility training (In Progress)       Learning Progress Summary            Patient Acceptance, E,TB, NR by  at 11/13/2024 1535    Acceptance, TB,E, VU,NR by ST at 11/12/2024 0931    Acceptance, E, VU,NR by  at 11/11/2024 1013    Acceptance, E,TB,D, VU,DU,NR by  at 11/9/2024 1323                      Point: Home exercise program (Done)       Learning Progress Summary            Patient Acceptance, E, VU,NR by  at 11/11/2024 1013                      Point: Body mechanics (In Progress)       Learning Progress Summary            Patient Acceptance, E,TB, NR by ST at 11/13/2024 1535    Acceptance, TB,E, VU,NR by  at 11/12/2024 0931    Acceptance, E,TB,D, VU,DU,NR by  at 11/9/2024 1323                      Point: Precautions (Not Started)       Learner Progress:  Not documented in this visit.                              User Key       Initials Effective Dates Name Provider Type Discipline     07/11/23 -  Gita Real, PT Physical Therapist PT     02/07/24 -  Charlotte Duncan PT Physical Therapist PT     09/22/22 -  Sridevi Henry PT Physical Therapist PT                  PT Recommendation and Plan     Progress: no change  Outcome Evaluation: Pt seen for PT/OT this PM. PT noted to have increased stiffness and shaking during mobility compared to previous session. Significant posterior pushing with attempts to get to recliner today, opted to perfrom more STS for scooting d/t safety concern of pushing out of recliner. improved STS noted when one therapist on each side, each blocking a knee. Pt also notably looser after seated trunk stretching today. Maintained upright positioning in bed for continued trunk extensor stretching. pt continues to benefit from skilled PT to address mobility deficits. Plans SNU at d/c.     Time Calculation:         PT Charges       Row Name 11/13/24 1536             Time Calculation    Start Time 1427  -ST      Stop  Time 1443  -ST      Time Calculation (min) 16 min  -ST      PT Received On 11/13/24  -ST      PT - Next Appointment 11/14/24  -ST         Time Calculation- PT    Total Timed Code Minutes- PT 16 minute(s)  -ST         Timed Charges    29632 - PT Therapeutic Activity Minutes 16  -ST         Total Minutes    Timed Charges Total Minutes 16  -ST       Total Minutes 16  -ST                User Key  (r) = Recorded By, (t) = Taken By, (c) = Cosigned By      Initials Name Provider Type    ST Sridevi Henry, PT Physical Therapist                  Therapy Charges for Today       Code Description Service Date Service Provider Modifiers Qty    55754450237  PT THER PROC EA 15 MIN 11/12/2024 Sridevi Henry, PT GP 1    33307163112 HC PT THERAPEUTIC ACT EA 15 MIN 11/12/2024 Sridevi Henry, PT GP 1    [id number]  PT THER SUPP EA 15 MIN 11/12/2024 Sridevi Henry, PT GP 2    [id number]  PT THERAPEUTIC ACT EA 15 MIN 11/13/2024 Sridevi Henry, PT GP 1            PT G-Codes  Outcome Measure Options: Modified Alberto  AM-PAC 6 Clicks Score (PT): 11  Modified [place] Scale: 4 - Moderately severe disability.  Unable to walk without assistance, and unable to attend to own bodily needs without assistance.  PT Discharge Summary  Anticipated Discharge Disposition (PT): skilled nursing facility    Sridevi Henry, SYLVIE  11/13/2024

## 2024-11-13 NOTE — SIGNIFICANT NOTE
11/12/24 2101   Negative Inspiratory Force (NIF)   Negative Inspiratory Force (cm H2O) -36   Effort (NIF) good   Patient Position (NIF) semi-Patrick's

## 2024-11-13 NOTE — PROGRESS NOTES
Rebel Henley, RRT   Respiratory Therapist  Pulmonology     Significant Note     Signed     Date of Service: 11/13/24 0750  Creation Time: 11/13/24 0750     Signed              11/12/24 2104   Negative Inspiratory Force (NIF)   Negative Inspiratory Force (cm H2O) -39   Effort (NIF) good   Patient Position (NIF) semi-Patrick's

## 2024-11-13 NOTE — PLAN OF CARE
Goal Outcome Evaluation:           Progress: no change  Outcome Evaluation: Patient seen for OT/PT co-treat this pm.  Patient resting in bed visiting with family.  Patient noted to have increased stiffness throughout sitting and standing.  Patient was able to transition to EOB with Min A.  Noted scooting toward EOB with Min A.  Patient attempted x2 STS transfers to recliner but demo increase stiffness and shaking and returned to EOB.  Patient able to scoot up in bed from sitting position with Mod/Max A.  Patient returned to bed and positioned in chair position for stretching for trunk.  Cont OT as tolerated.    Anticipated Discharge Disposition (OT): skilled nursing facility

## 2024-11-13 NOTE — THERAPY TREATMENT NOTE
Patient Name: Efren Christianson  : 1945    MRN: [id number]                              Today's Date: 2024       Admit Date: 2024    Visit Dx: No diagnosis found.  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)     Difficulty walking     Hepatitis A     as a child    Seizures     Syncope     Urgency of urination      Past Surgical History:   Procedure Laterality Date    TRUNK LESION/CYST EXCISION N/A 8/3/2023    Procedure: excision of chest wall cyst and back cyst 10:00;  Surgeon: Kianna Oliva DO;  Location: [facility] OR;  Service: General;  Laterality: N/A;      General Information       Row Name 24 1549          OT Time and Intention    Document Type therapy note (daily note)  -JR     Mode of Treatment occupational therapy;physical therapy;co-treatment  -       Row Name 24 1549          General Information    Patient Profile Reviewed yes  -JR     Existing Precautions/Restrictions fall  -       Row Name 24 1549          Cognition    Orientation Status (Cognition) oriented x 3  -       Row Name 24 1549          Safety Issues/Impairments Affecting Functional Mobility    Impairments Affecting Function (Mobility) balance;coordination;endurance/activity tolerance;grasp;pain;strength;range of motion (ROM)  -JR     Comment, Safety Issues/Impairments (Mobility) PT/OT cotreatment medically appropriate and necessary due to patient acuity level, to maximize therapeutic benefit due to impaired act tolerance, and for safety of patient and staff. Treatment focused on progression of care and goals established in POC.  -JR                User Key  (r) = Recorded By, (t) = Taken By, (c) = Cosigned By      Initials Name Provider Type    Fuentes Oliva OT Occupational Therapist                     Mobility/ADL's       Row Name 11/13/24 1549          Bed Mobility    Supine-Sit [place] (Bed Mobility) minimum assist (75% patient effort)  -JR     Sit-Supine [place] (Bed Mobility) moderate assist (50% patient effort);2 person assist  -JR     Bed Mobility, Safety Issues decreased use of arms for pushing/pulling  -[facility] Name 11/13/24 1549          Sit-Stand Transfer    Sit-Stand [place] (Transfers) moderate assist (50% patient effort);2 person assist;maximum assist (25% patient effort)  -               User Key  (r) = Recorded By, (t) = Taken By, (c) = Cosigned By      Initials Name Provider Type    Fuentes Oliva OT Occupational Therapist                   Obj/Interventions       [facility] Name 11/13/24 1547          Balance    Balance Assessment sitting static balance;sitting dynamic balance;standing static balance;standing dynamic balance  -JR     Static Sitting Balance standby assist  -     Dynamic Sitting Balance standby assist  -JR     Static Standing Balance maximum assist;2-person assist  -JR     Dynamic Standing Balance maximum assist;2-person assist  -JR               User Key  (r) = Recorded By, (t) = Taken By, (c) = Cosigned By      Initials Name Provider Type    Fuentes Oliva OT Occupational Therapist                   Goals/Plan    No documentation.                  Clinical Impression       Row Name 11/13/24 9726          Pain Assessment    Pretreatment Pain Rating 0/10 - no pain  -JR     Posttreatment Pain Rating 0/10 - no pain  -JR       Row Name 11/13/24 7206          Plan of Care Review    Progress no change  -     Outcome Evaluation Patient seen for OT/PT co-treat this pm.  Patient resting in bed visiting with family.  Patient noted to have increased stiffness throughout sitting and standing.  Patient was able to  transition to EOB with Min A.  Noted scooting toward EOB with Min A.  Patient attempted x2 STS transfers to recliner but demo increase stiffness and shaking and returned to EOB.  Patient able to scoot up in bed from sitting position with Mod/Max A.  Patient returned to bed and positioned in chair position for stretching for trunk.  Cont OT as tolerated.  -JR       Row Name 11/13/24 3663          Therapy Assessment/Plan (OT)    Rehab Potential (OT) good  -JR     Criteria for Skilled Therapeutic Interventions Met (OT) yes  -JR     Therapy Frequency (OT) 5 times/wk  -JR       Row Name 11/13/24 1550          Therapy Plan Review/Discharge Plan (OT)    Anticipated Discharge Disposition (OT) skilled nursing facility  -JR       Row Name 11/13/24 1550          Vital Signs    O2 Delivery Pre Treatment room air  -JR     O2 Delivery Intra Treatment room air  -JR     Pre Patient Position Supine  -JR     Intra Patient Position Standing  -JR     Post Patient Position Supine  -JR       Row Name 11/13/24 2012          Positioning and Restraints    Pre-Treatment Position in bed  -JR     Post Treatment Position bed  -JR     In Bed notified nsg;call light within reach;encouraged to call for assist;exit alarm on;with family/caregiver  -JR               User Key  (r) = Recorded By, (t) = Taken By, (c) = Cosigned By      Initials Name Provider Type    Fuentes Oliva, OT Occupational Therapist                   Outcome Measures       Row Name 11/13/24 0276          How much help from another is currently needed...    Putting on and taking off regular lower body clothing? 1  -JR     Bathing (including washing, rinsing, and drying) 1  -JR     Toileting (which includes using toilet bed pan or urinal) 1  -JR     Putting on and taking off regular upper body clothing 1  -JR     Taking care of personal grooming (such as brushing teeth) 1  -JR     Eating meals 1  -JR     AM-PAC 6 Clicks Score (OT) 6  -JR       Row Name 11/13/24 2475 11/13/24 1694        How much help from another person do you currently need...    Turning from your back to your side while in flat bed without using bedrails? 3  -ST 3  -JM    Moving from lying on back to sitting on the side of a flat bed without bedrails? 2  -ST 2  -JM    Moving to and from a bed to a chair (including a wheelchair)? 2  -ST 1  -JM    Standing up from a chair using your arms (e.g., wheelchair, bedside chair)? 2  -ST 1  -JM    Climbing 3-5 steps with a railing? 1  -ST 1  -JM    To walk in hospital room? 1  -ST 1  -JM    AM-PAC 6 Clicks Score (PT) 11  -ST 9  -JM    Highest Level of Mobility Goal 4 --> Transfer to chair/commode  -ST 3 --> Sit at edge of bed  -JM      Row Name 11/13/24 1556          Modified Alberto Scale    Modified Richmond Scale 4 - Moderately severe disability.  Unable to walk without assistance, and unable to attend to own bodily needs without assistance.  -       Row Name 11/13/24 1556          Functional Assessment    Outcome Measure Options AM-PAC 6 Clicks Daily Activity (OT);Modified Alberto  -               User Key  (r) = Recorded By, (t) = Taken By, (c) = Cosigned By      Initials Name Provider Type    Erendira Lauren, RN Registered Nurse    Sridevi Lagos, PT Physical Therapist    Fuentes Oliva, OT Occupational Therapist                    Occupational Therapy Education       Title: PT OT SLP Therapies (In Progress)       Topic: Occupational Therapy (In Progress)       Point: ADL training (Not Started)       Description:   Instruct learner(s) on proper safety adaptation and remediation techniques during self care or transfers.   Instruct in proper use of assistive devices.                  Learner Progress:  Not documented in this visit.              Point: Home exercise program (Done)       Description:   Instruct learner(s) on appropriate technique for monitoring, assisting and/or progressing therapeutic exercises/activities.                  Learning Progress Summary             Patient Acceptance, E, VU by MS at 11/13/2024 0220                      Point: Precautions (Not Started)       Description:   Instruct learner(s) on prescribed precautions during self-care and functional transfers.                  Learner Progress:  Not documented in this visit.              Point: Body mechanics (Not Started)       Description:   Instruct learner(s) on proper positioning and spine alignment during self-care, functional mobility activities and/or exercises.                  Learner Progress:  Not documented in this visit.                              User Key       Initials Effective Dates Name Provider Type Discipline    MS 09/29/22 -  Eveline Donato, RN Registered Nurse Nurse                  OT Recommendation and Plan  Therapy Frequency (OT): 5 times/wk  Plan of Care Review  Progress: no change  Outcome Evaluation: Patient seen for OT/PT co-treat this pm.  Patient resting in bed visiting with family.  Patient noted to have increased stiffness throughout sitting and standing.  Patient was able to transition to EOB with Min A.  Noted scooting toward EOB with Min A.  Patient attempted x2 STS transfers to recliner but demo increase stiffness and shaking and returned to EOB.  Patient able to scoot up in bed from sitting position with Mod/Max A.  Patient returned to bed and positioned in chair position for stretching for trunk.  Cont OT as tolerated.     Time Calculation:         Time Calculation- OT       Row Name 11/13/24 1557             Time Calculation- OT    OT Start Time 1427  -JR      OT Stop Time 1445  -JR      OT Time Calculation (min) 18 min  -JR      Total Timed Code Minutes- OT 18 minute(s)  -JR      OT Received On 11/13/24  -JR      OT - Next Appointment 11/14/24  -JR         Timed Charges    80627 - OT Therapeutic Activity Minutes 18  -JR         Total Minutes    Timed Charges Total Minutes 18  -JR       Total Minutes 18  -JR                User Key  (r) = Recorded By, (t) = Taken By,  (c) = Cosigned By      Initials Name Provider Type    Fuentes Oliva OT Occupational Therapist                  Therapy Charges for Today       Code Description Service Date Service Provider Modifiers Qty    [id number] HC OT THERAPEUTIC ACT EA 15 MIN 11/13/2024 Fuentes Tony OT GO 1                 Fuentes Tony OT  11/13/2024

## 2024-11-13 NOTE — PLAN OF CARE
Goal Outcome Evaluation:  Plan of Care Reviewed With: patient        Progress: no change  Outcome Evaluation: Pt seen for PT/OT this PM. PT noted to have increased stiffness and shaking during mobility compared to previous session. Significant posterior pushing with attempts to get to recliner today, opted to perfrom more STS for scooting d/t safety concern of pushing out of recliner. improved STS noted when one therapist on each side, each blocking a knee. Pt also notably looser after seated trunk stretching today. Maintained upright positioning in bed for continued trunk extensor stretching. pt continues to benefit from skilled PT to address mobility deficits. Plans SNU at d/c.    Anticipated Discharge Disposition (PT): skilled nursing facility

## 2024-11-13 NOTE — CASE MANAGEMENT/SOCIAL WORK
"Physicians Statement of Medical Necessity for  Ambulance Transportation    GENERAL INFORMATION     Name: Efren Christianson  YOB: 1945  Medicare #:     [id number]     Transport Date: 11/14/24 (Valid for round trips this date, or for scheduled repetitive trips for 60 days from the date signed below.)  Origin: [facility]   Destination: [facility]  Is the Patient's stay covered under Medicare Part A (PPS/DRG?)Yes  Closest appropriate facility? Yes  If this a hosp-hosp transfer? No  Is this a hospice patient? No    MEDICAL NECESSITY QUESTIONAIRE    Ambulance Transportation is medically necessary only if other means of transportation are contraindicated or would be potentially harmful to the patient.  To meet this requirement, the patient must be either \"bed confined\" or suffer from a condition such that transport by means other than an ambulance is contraindicated by the patient's condition.  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)     Difficulty walking     Hepatitis A     as a child    Seizures     Syncope     Urgency of urination       Past Surgical History:   Procedure Laterality Date    TRUNK LESION/CYST EXCISION N/A 8/3/2023    Procedure: excision of chest wall cyst and back cyst 10:00;  Surgeon: Kianna Oliva DO;  Location: [facility];  Service: General;  Laterality: N/A;      2) Is this patient \"bed confined\" as defined below?Yes   To be \"bed confined\" the patient must satisfy all three of the following criteria:  (1) unable to get up from " bed without assistance; AND (2) unable to ambulate;  AND (3) unable to sit in a chair or wheelchair.  3) Can this patient safely be transported by car or wheelchair van (I.e., may safely sit during transport, without an attendant or monitoring?)No   4. In addition to completing questions 1-3 above, please check any of the following conditions that apply*:          *Note: supporting documentation for any boxes checked must be maintained in the patient's medical records Unable to tolerate seated position for time needed to transport      SIGNATURE OF PHYSICIAN OR OTHER AUTHORIZED HEALTHCARE PROFESSIONAL    I certify that the above information is true and correct based on my evaluation of this patient, and represent that the patient requires transport by ambulance and that other forms of transport are contraindicated.  I understand that this information will be used by the Centers for Medicare and Medicaid Services (CMS) to support the determiniation of medical necessity for ambulance services, and I represent that I have personal knowledge of the patient's condition at the time of transport.       If this box is checked, I also certify that the patient is physically or mentally incapable of signing the ambulance service's claim form and that the institution with which I am affiliated has furnished care, services or assistance to the patient.  My signature below is made on behalf of the patient pursuant to 42 .36(b)(4). In accordance with 42 .37, the specific reason(s) that the patient is physically or mentally incapable of signing the claim for is as follows:     Signature of Physician or Healthcare Professional  Date/Time:        (For Scheduled repetitive transport, this form is not valid for transports performed more than 60 days after this date).                                                                                                                                             --------------------------------------------------------------------------------------------  Printed Name and Credentials of Physician or Authorized Healthcare Professional     *Form must be signed by patient's attending physician for scheduled, repetitive transports,.  For non-repetitive ambulance transports, if unable to obtain the signature of the attending physician, any of the following may sign (please select below):     Physician  Clinical Nurse Specialist  Registered Nurse     Physician Assistant  Discharge Planner  Licensed Practical Nurse     Nurse Practitioner

## 2024-11-13 NOTE — PLAN OF CARE
Goal Outcome Evaluation:  Patient will complete IVIG #5 dose today.   Plan for [facility] @ discharge.

## 2024-11-13 NOTE — CASE MANAGEMENT/SOCIAL WORK
Continued Stay Note  [facility]     Patient Name: Efren Christianson  MRN: [id number]  Today's Date: 11/13/2024    Admit Date: 11/8/2024    Plan: [facility]. [facility] scheduled 11/14 @ 0800.   Discharge Plan       Row Name 11/13/24 1214       Plan    [facility]. [facility] scheduled 11/14 @ 0800.    Plan Comments Spoke with patient at bedside and Caregiver/Lavern per his request to discuss to [facility] vs HH. Patient and Lavern want to DC to [facility]. Spoke with Bucky/Juan who confirmed patient is approved and bed is available tomorrow 11/14 and patient could admit anytime. Scheduled [facility] at 0800. Updated [facility], RN and Family. Packet to be placed in [place]. CCP to follow. Ezra SABA RN                   Discharge Codes    No documentation.                 Expected Discharge Date and Time       Expected Discharge Date Expected Discharge Time    Nov 13, 2024               Ezra Carter RN

## 2024-11-14 ENCOUNTER — HOSPITAL ENCOUNTER (OUTPATIENT)
Dept: INFUSION THERAPY | Facility: HOSPITAL | Age: 79
Discharge: HOME | End: 2024-11-14

## 2024-11-14 ENCOUNTER — TELEPHONE (OUTPATIENT)
Dept: NEUROLOGY | Facility: CLINIC | Age: 79
End: 2024-11-14
Payer: MEDICARE

## 2024-11-14 VITALS
WEIGHT: 153.99 LBS | DIASTOLIC BLOOD PRESSURE: 86 MMHG | SYSTOLIC BLOOD PRESSURE: 160 MMHG | HEIGHT: 70 IN | HEART RATE: 94 BPM | BODY MASS INDEX: 22.05 KG/M2 | TEMPERATURE: 98 F | RESPIRATION RATE: 16 BRPM | OXYGEN SATURATION: 93 %

## 2024-11-14 LAB
ANION GAP SERPL CALCULATED.3IONS-SCNC: 8 MMOL/L (ref 5–15)
BASOPHILS # BLD AUTO: 0.03 10*3/MM3 (ref 0–0.2)
BASOPHILS NFR BLD AUTO: 0.5 % (ref 0–1.5)
BUN SERPL-MCNC: 26 MG/DL (ref 8–23)
BUN/CREAT SERPL: 34.2 (ref 7–25)
CALCIUM SPEC-SCNC: 8.3 MG/DL (ref 8.6–10.5)
CHLORIDE SERPL-SCNC: 102 MMOL/L (ref 98–107)
CO2 SERPL-SCNC: 25 MMOL/L (ref 22–29)
CREAT SERPL-MCNC: 0.76 MG/DL (ref 0.76–1.27)
DEPRECATED RDW RBC AUTO: 48.1 FL (ref 37–54)
EGFRCR SERPLBLD CKD-EPI 2021: 92.6 ML/MIN/1.73
EOSINOPHIL # BLD AUTO: 0.04 10*3/MM3 (ref 0–0.4)
EOSINOPHIL NFR BLD AUTO: 0.7 % (ref 0.3–6.2)
ERYTHROCYTE [DISTWIDTH] IN BLOOD BY AUTOMATED COUNT: 13.4 % (ref 12.3–15.4)
GLUCOSE SERPL-MCNC: 79 MG/DL (ref 65–99)
HCT VFR BLD AUTO: 35.3 % (ref 37.5–51)
HGB BLD-MCNC: 12.4 G/DL (ref 13–17.7)
IMM GRANULOCYTES # BLD AUTO: 0.02 10*3/MM3 (ref 0–0.05)
IMM GRANULOCYTES NFR BLD AUTO: 0.3 % (ref 0–0.5)
LYMPHOCYTES # BLD AUTO: 1.44 10*3/MM3 (ref 0.7–3.1)
LYMPHOCYTES NFR BLD AUTO: 24.7 % (ref 19.6–45.3)
MCH RBC QN AUTO: 34.2 PG (ref 26.6–33)
MCHC RBC AUTO-ENTMCNC: 35.1 G/DL (ref 31.5–35.7)
MCV RBC AUTO: 97.2 FL (ref 79–97)
MONOCYTES # BLD AUTO: 0.53 10*3/MM3 (ref 0.1–0.9)
MONOCYTES NFR BLD AUTO: 9.1 % (ref 5–12)
NEUTROPHILS NFR BLD AUTO: 3.78 10*3/MM3 (ref 1.7–7)
NEUTROPHILS NFR BLD AUTO: 64.7 % (ref 42.7–76)
NRBC BLD AUTO-RTO: 0 /100 WBC (ref 0–0.2)
PLATELET # BLD AUTO: 153 10*3/MM3 (ref 140–450)
PMV BLD AUTO: 9 FL (ref 6–12)
POTASSIUM SERPL-SCNC: 3.8 MMOL/L (ref 3.5–5.2)
RBC # BLD AUTO: 3.63 10*6/MM3 (ref 4.14–5.8)
SODIUM SERPL-SCNC: 135 MMOL/L (ref 136–145)
WBC NRBC COR # BLD AUTO: 5.84 10*3/MM3 (ref 3.4–10.8)

## 2024-11-14 PROCEDURE — 25010000002 ENOXAPARIN PER 10 MG: Performed by: STUDENT IN AN ORGANIZED HEALTH CARE EDUCATION/TRAINING PROGRAM

## 2024-11-14 PROCEDURE — 85025 COMPLETE CBC W/AUTO DIFF WBC: CPT | Performed by: INTERNAL MEDICINE

## 2024-11-14 PROCEDURE — 80048 BASIC METABOLIC PNL TOTAL CA: CPT | Performed by: INTERNAL MEDICINE

## 2024-11-14 PROCEDURE — 63710000001 PREDNISONE PER 5 MG: Performed by: PSYCHIATRY & NEUROLOGY

## 2024-11-14 RX ADMIN — AMLODIPINE BESYLATE 5 MG: 5 TABLET ORAL at 08:05

## 2024-11-14 RX ADMIN — ENOXAPARIN SODIUM 40 MG: 100 INJECTION SUBCUTANEOUS at 08:05

## 2024-11-14 RX ADMIN — LEVETIRACETAM 1000 MG: 500 TABLET, FILM COATED ORAL at 07:12

## 2024-11-14 RX ADMIN — PREDNISONE 10 MG: 5 TABLET ORAL at 07:11

## 2024-11-14 NOTE — PLAN OF CARE
Goal Outcome Evaluation:  Plan of Care Reviewed With: patient, caregiver        Progress: improving     Patient going to [facility] this am.   is @ 0800.

## 2024-11-14 NOTE — TELEPHONE ENCOUNTER
[facility]    Relay     \"Mr. Christianson is scheduled for a hospital follow up with Dr. Reyes on 11/21/24 9:20 am\"                 "

## 2024-11-14 NOTE — NURSING NOTE
Attempted to call report to [facility]. Was on hold for over 10 mins. Left message with  for nurse to call back for report.

## 2024-11-14 NOTE — CASE MANAGEMENT/SOCIAL WORK
Case Management Discharge Note      Final Note: DC'd to [facility] SNF via [facility]. Ezra SABA RN         Selected Continued Care - Discharged on 11/14/2024 Admission date: 11/8/2024 - Discharge disposition: Skilled Nursing Facility (DC - External)      Destination Coordination complete.      Service Provider Services Address Phone Fax Patient Preferred    [facility] [address] [place] [address] [phone] --              Durable Medical Equipment    No services have been selected for the patient.                Dialysis/Infusion    No services have been selected for the patient.                Home Medical Care    No services have been selected for the patient.                Therapy    No services have been selected for the patient.                Community Resources    No services have been selected for the patient.                Community & DME    No services have been selected for the patient.                    Selected Continued Care - Prior Encounters Includes continued care and service providers with selected services from prior encounters from 8/10/2024 to 11/14/2024      Discharged on 10/22/2024 Admission date: 10/16/2024 - Discharge disposition: Skilled Nursing Facility (DC - External)      Destination       Service Provider Services Address Phone Fax Patient Preferred    [facility] [address] [place] [address] [phone] --                          Transportation Services  Ambulance: [facility]    Final Discharge Disposition Code: 03 - skilled nursing facility (SNF)

## 2024-11-14 NOTE — TELEPHONE ENCOUNTER
Name: Lavern Carrera    Relationship: Emergency Contact    Best Callback Number: [phone]      HUB PROVIDED THE RELAY MESSAGE FROM THE OFFICE   PATIENT VOICED UNDERSTANDING AND HAS NO FURTHER QUESTIONS AT THIS TIME

## 2024-11-21 ENCOUNTER — OFFICE VISIT (OUTPATIENT)
Dept: NEUROLOGY | Facility: CLINIC | Age: 79
End: 2024-11-21

## 2024-11-21 VITALS — HEART RATE: 106 BPM | SYSTOLIC BLOOD PRESSURE: 126 MMHG | DIASTOLIC BLOOD PRESSURE: 82 MMHG | OXYGEN SATURATION: 94 %

## 2024-11-21 DIAGNOSIS — G61.81 CIDP (CHRONIC INFLAMMATORY DEMYELINATING POLYNEUROPATHY): Primary | ICD-10-CM

## 2024-11-21 DIAGNOSIS — G40.909 SEIZURE DISORDER: ICD-10-CM

## 2024-11-21 NOTE — PROGRESS NOTES
Notes by MA:  Mr Christianson is here today for a follow up appointment. He has recently been hospitalized. His caregiver, Chris accompanies him today. Mr Christianson verifies consent that we can speak with Chris regarding his medical care.      Subjective:     Patient ID: Efren Christianson is a 79 y.o. male.    History of Present Illness  The following portions of the patient's history were reviewed and updated as appropriate: allergies, current medications, past family history, past medical history, past social history, past surgical history, and problem list.    Review of Systems   Musculoskeletal:  Positive for gait problem.   Neurological:  Positive for weakness.        Objective:    Neurological Exam   He is awake alert pleasant cooperative cognitively preserved and very pleasant.  Speech and language functions are normal.     Cranial nerves II through XII normal and symmetric.     Motor exam reveals diffuse atrophy of the intrinsic hand muscles bilaterally with both proximal and distal weakness of the arms, greater than legs.  However, there is weakness of hip flexion extension and distal foot weakness, worse in the left than on the right.  No spasticity or upper motor neuron findings.     Sensory exam reveals some distal decreased to light touch and temperature sensation.     Tendon reflexes are diffusely absent.     He is in a wheelchair today.  He cannot walk without assistance from ambulatory devices or physical help.  Physical Exam    Assessment/Plan:     Diagnoses and all orders for this visit:    1. 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 Seizure disorder     Returns for hospital follow-up after worsening of his CIDP.  He has now completed 5 days of IVIG and stabilized.  He is now in inpatient rehab and is slowly improving.  Prior to admission, he was still responding to IVIG but was having significant worsening at the 3-week point on his every 4-week infusions.  We had started  him on Vyvgart Hytrulo but he only got 2 infusions and was worsening and he and the family would prefer to go back to IVIG.  Therefore we are restarting his IVIG at every 3 week intervals I will see him back in short order to see how he is doing.    History of seizure disorder continue on Keppra 1000 mg twice daily, no seizures.  No problematic side effects.  He had expressed an interest in the past to possibly coming off medications but will address this again at in the future when the more pressing issues are stabilized.

## 2024-11-22 DIAGNOSIS — G61.81 CIDP (CHRONIC INFLAMMATORY DEMYELINATING POLYNEUROPATHY): Primary | ICD-10-CM

## 2024-11-22 RX ORDER — FAMOTIDINE 10 MG/ML
20 INJECTION, SOLUTION INTRAVENOUS ONCE
OUTPATIENT
Start: 2024-12-04

## 2024-11-22 RX ORDER — SODIUM CHLORIDE 9 MG/ML
20 INJECTION, SOLUTION INTRAVENOUS ONCE
OUTPATIENT
Start: 2024-12-04

## 2024-11-22 RX ORDER — DIPHENHYDRAMINE HYDROCHLORIDE 50 MG/ML
25 INJECTION INTRAMUSCULAR; INTRAVENOUS AS NEEDED
OUTPATIENT
Start: 2024-12-04

## 2024-11-22 RX ORDER — DIPHENHYDRAMINE HCL 25 MG
25 CAPSULE ORAL ONCE
OUTPATIENT
Start: 2024-12-04

## 2024-11-22 RX ORDER — ACETAMINOPHEN 325 MG/1
650 TABLET ORAL ONCE
OUTPATIENT
Start: 2024-12-04

## 2024-11-22 RX ORDER — FAMOTIDINE 10 MG/ML
20 INJECTION, SOLUTION INTRAVENOUS AS NEEDED
OUTPATIENT
Start: 2024-12-04

## 2024-11-22 RX ORDER — METHYLPREDNISOLONE SODIUM SUCCINATE 125 MG/2ML
125 INJECTION, POWDER, LYOPHILIZED, FOR SOLUTION INTRAMUSCULAR; INTRAVENOUS ONCE
OUTPATIENT
Start: 2024-12-04

## 2024-12-02 ENCOUNTER — TELEPHONE (OUTPATIENT)
Dept: NEUROLOGY | Facility: CLINIC | Age: 79
End: 2024-12-02

## 2024-12-02 NOTE — TELEPHONE ENCOUNTER
"Lavern reports pt is currently in [facility] on a ventilator. Per Lavern, pt was at [facility] when she received a call that he had \"coded\". Pt was transported to [facility] where they have placed him on a vent. Lavern reports patient is unable to move from his neck down but, is able to nod when asked a question. Lavern is wanting to know if she should push for his IVIG to resume.     I spoke with Dr. Reyes who recommends holding treatment for his CIDP until he recovers from the cardiac event.   "

## (undated) DEVICE — SPNG GZ WOVN 4X4IN 12PLY 10/BX STRL

## (undated) DEVICE — UNDYED BRAIDED (POLYGLACTIN 910), SYNTHETIC ABSORBABLE SUTURE: Brand: COATED VICRYL

## (undated) DEVICE — TBG PENCL TELESCP MEGADYNE SMOKE EVAC 10FT

## (undated) DEVICE — SUT MNCRYL 2/0 SH 27IN Y417H

## (undated) DEVICE — SUT ETHLN 3/0 PSL BLK MONO SA 30IN 1691H

## (undated) DEVICE — TRAP FLD MINIVAC MEGADYNE 100ML

## (undated) DEVICE — GAUZE,SPONGE,FLUFF,6"X6.75",STRL,5/TRAY: Brand: MEDLINE

## (undated) DEVICE — SOL IRR H2O BTL 1000ML STRL

## (undated) DEVICE — APPL CHLORAPREP HI/LITE 26ML ORNG

## (undated) DEVICE — GLV SURG SENSICARE W/ALOE PF LF 6.5 STRL

## (undated) DEVICE — LAG MINOR PROCEDURE: Brand: MEDLINE INDUSTRIES, INC.

## (undated) DEVICE — SUT ETHLN 3/0 PS2 18 IN 1669H

## (undated) DEVICE — DRSNG PAD ABD 8X10IN STRL